# Patient Record
Sex: MALE | Race: WHITE | Employment: FULL TIME | URBAN - METROPOLITAN AREA
[De-identification: names, ages, dates, MRNs, and addresses within clinical notes are randomized per-mention and may not be internally consistent; named-entity substitution may affect disease eponyms.]

---

## 2017-11-02 ENCOUNTER — HOSPITAL ENCOUNTER (EMERGENCY)
Age: 52
Discharge: HOME | End: 2017-11-02
Attending: EMERGENCY MEDICINE
Payer: COMMERCIAL

## 2017-11-02 ENCOUNTER — APPOINTMENT (OUTPATIENT)
Dept: CT IMAGING | Age: 52
End: 2017-11-02
Payer: COMMERCIAL

## 2017-11-02 VITALS
RESPIRATION RATE: 16 BRPM | DIASTOLIC BLOOD PRESSURE: 93 MMHG | HEART RATE: 85 BPM | OXYGEN SATURATION: 97 % | SYSTOLIC BLOOD PRESSURE: 131 MMHG | HEIGHT: 73 IN | BODY MASS INDEX: 35.25 KG/M2 | TEMPERATURE: 98.1 F | WEIGHT: 266 LBS

## 2017-11-02 DIAGNOSIS — R19.7 NAUSEA VOMITING AND DIARRHEA: Primary | ICD-10-CM

## 2017-11-02 DIAGNOSIS — R11.2 NAUSEA VOMITING AND DIARRHEA: Primary | ICD-10-CM

## 2017-11-02 LAB
ABSOLUTE EOS #: 0.1 K/UL (ref 0–0.4)
ABSOLUTE IMMATURE GRANULOCYTE: ABNORMAL K/UL (ref 0–0.3)
ABSOLUTE LYMPH #: 0.8 K/UL (ref 1–4.8)
ABSOLUTE MONO #: 0.7 K/UL (ref 0.2–0.8)
ALBUMIN SERPL-MCNC: 4.1 G/DL (ref 3.5–5.2)
ALBUMIN/GLOBULIN RATIO: ABNORMAL (ref 1–2.5)
ALP BLD-CCNC: 61 U/L (ref 40–129)
ALT SERPL-CCNC: 19 U/L (ref 5–41)
AMYLASE: 29 U/L (ref 28–100)
ANION GAP SERPL CALCULATED.3IONS-SCNC: 12 MMOL/L (ref 9–17)
AST SERPL-CCNC: 15 U/L
BASOPHILS # BLD: 1 %
BASOPHILS ABSOLUTE: 0 K/UL (ref 0–0.2)
BILIRUB SERPL-MCNC: 1.47 MG/DL (ref 0.3–1.2)
BUN BLDV-MCNC: 16 MG/DL (ref 6–20)
BUN/CREAT BLD: 17 (ref 9–20)
CALCIUM SERPL-MCNC: 8.6 MG/DL (ref 8.6–10.4)
CHLORIDE BLD-SCNC: 103 MMOL/L (ref 98–107)
CO2: 22 MMOL/L (ref 20–31)
CREAT SERPL-MCNC: 0.95 MG/DL (ref 0.7–1.2)
DIFFERENTIAL TYPE: ABNORMAL
EKG ATRIAL RATE: 85 BPM
EKG P AXIS: 47 DEGREES
EKG P-R INTERVAL: 194 MS
EKG Q-T INTERVAL: 360 MS
EKG QRS DURATION: 88 MS
EKG QTC CALCULATION (BAZETT): 428 MS
EKG R AXIS: 64 DEGREES
EKG T AXIS: 72 DEGREES
EKG VENTRICULAR RATE: 85 BPM
EOSINOPHILS RELATIVE PERCENT: 1 %
GFR AFRICAN AMERICAN: >60 ML/MIN
GFR NON-AFRICAN AMERICAN: >60 ML/MIN
GFR SERPL CREATININE-BSD FRML MDRD: ABNORMAL ML/MIN/{1.73_M2}
GFR SERPL CREATININE-BSD FRML MDRD: ABNORMAL ML/MIN/{1.73_M2}
GLUCOSE BLD-MCNC: 139 MG/DL (ref 70–99)
HCT VFR BLD CALC: 31.2 % (ref 41–53)
HEMOGLOBIN: 10.9 G/DL (ref 13.5–17.5)
IMMATURE GRANULOCYTES: ABNORMAL %
LACTIC ACID: 0.7 MMOL/L (ref 0.5–2.2)
LIPASE: 35 U/L (ref 13–60)
LYMPHOCYTES # BLD: 10 %
MCH RBC QN AUTO: 28.1 PG (ref 26–34)
MCHC RBC AUTO-ENTMCNC: 34.8 G/DL (ref 31–37)
MCV RBC AUTO: 80.8 FL (ref 80–100)
MONOCYTES # BLD: 9 %
PDW BLD-RTO: 17.6 % (ref 11.5–14.5)
PLATELET # BLD: 124 K/UL (ref 130–400)
PLATELET ESTIMATE: ABNORMAL
PMV BLD AUTO: ABNORMAL FL (ref 6–12)
POTASSIUM SERPL-SCNC: 3.8 MMOL/L (ref 3.7–5.3)
RBC # BLD: 3.86 M/UL (ref 4.5–5.9)
RBC # BLD: ABNORMAL 10*6/UL
SEG NEUTROPHILS: 79 %
SEGMENTED NEUTROPHILS ABSOLUTE COUNT: 6.4 K/UL (ref 1.8–7.7)
SODIUM BLD-SCNC: 137 MMOL/L (ref 135–144)
TOTAL PROTEIN: 7 G/DL (ref 6.4–8.3)
WBC # BLD: 8 K/UL (ref 3.5–11)
WBC # BLD: ABNORMAL 10*3/UL

## 2017-11-02 PROCEDURE — 96375 TX/PRO/DX INJ NEW DRUG ADDON: CPT

## 2017-11-02 PROCEDURE — 96361 HYDRATE IV INFUSION ADD-ON: CPT

## 2017-11-02 PROCEDURE — 2580000003 HC RX 258: Performed by: EMERGENCY MEDICINE

## 2017-11-02 PROCEDURE — 80053 COMPREHEN METABOLIC PANEL: CPT

## 2017-11-02 PROCEDURE — 82150 ASSAY OF AMYLASE: CPT

## 2017-11-02 PROCEDURE — 83605 ASSAY OF LACTIC ACID: CPT

## 2017-11-02 PROCEDURE — 6360000002 HC RX W HCPCS: Performed by: EMERGENCY MEDICINE

## 2017-11-02 PROCEDURE — 85025 COMPLETE CBC W/AUTO DIFF WBC: CPT

## 2017-11-02 PROCEDURE — 93005 ELECTROCARDIOGRAM TRACING: CPT

## 2017-11-02 PROCEDURE — 96374 THER/PROPH/DIAG INJ IV PUSH: CPT

## 2017-11-02 PROCEDURE — 6360000004 HC RX CONTRAST MEDICATION: Performed by: EMERGENCY MEDICINE

## 2017-11-02 PROCEDURE — 99284 EMERGENCY DEPT VISIT MOD MDM: CPT

## 2017-11-02 PROCEDURE — 96376 TX/PRO/DX INJ SAME DRUG ADON: CPT

## 2017-11-02 PROCEDURE — 83690 ASSAY OF LIPASE: CPT

## 2017-11-02 PROCEDURE — 74177 CT ABD & PELVIS W/CONTRAST: CPT

## 2017-11-02 RX ORDER — SODIUM CHLORIDE 0.9 % (FLUSH) 0.9 %
10 SYRINGE (ML) INJECTION PRN
Status: DISCONTINUED | OUTPATIENT
Start: 2017-11-02 | End: 2017-11-02 | Stop reason: HOSPADM

## 2017-11-02 RX ORDER — TRAMADOL HYDROCHLORIDE 50 MG/1
50 TABLET ORAL EVERY 6 HOURS PRN
Qty: 10 TABLET | Refills: 0 | Status: SHIPPED | OUTPATIENT
Start: 2017-11-02 | End: 2017-11-12

## 2017-11-02 RX ORDER — 0.9 % SODIUM CHLORIDE 0.9 %
1000 INTRAVENOUS SOLUTION INTRAVENOUS ONCE
Status: COMPLETED | OUTPATIENT
Start: 2017-11-02 | End: 2017-11-02

## 2017-11-02 RX ORDER — ONDANSETRON 2 MG/ML
4 INJECTION INTRAMUSCULAR; INTRAVENOUS EVERY 30 MIN PRN
Status: DISCONTINUED | OUTPATIENT
Start: 2017-11-02 | End: 2017-11-02 | Stop reason: HOSPADM

## 2017-11-02 RX ORDER — LORAZEPAM 2 MG/ML
1 INJECTION INTRAMUSCULAR ONCE
Status: COMPLETED | OUTPATIENT
Start: 2017-11-02 | End: 2017-11-02

## 2017-11-02 RX ORDER — ONDANSETRON 4 MG/1
8 TABLET, ORALLY DISINTEGRATING ORAL EVERY 8 HOURS PRN
Qty: 10 TABLET | Refills: 0 | Status: ON HOLD | OUTPATIENT
Start: 2017-11-02 | End: 2018-02-01 | Stop reason: CLARIF

## 2017-11-02 RX ORDER — 0.9 % SODIUM CHLORIDE 0.9 %
50 INTRAVENOUS SOLUTION INTRAVENOUS ONCE
Status: COMPLETED | OUTPATIENT
Start: 2017-11-02 | End: 2017-11-02

## 2017-11-02 RX ADMIN — SODIUM CHLORIDE 50 ML: 9 INJECTION, SOLUTION INTRAVENOUS at 19:53

## 2017-11-02 RX ADMIN — ONDANSETRON 4 MG: 2 INJECTION INTRAMUSCULAR; INTRAVENOUS at 19:07

## 2017-11-02 RX ADMIN — LORAZEPAM 1 MG: 2 INJECTION INTRAMUSCULAR; INTRAVENOUS at 19:08

## 2017-11-02 RX ADMIN — HYDROMORPHONE HYDROCHLORIDE 1 MG: 1 INJECTION, SOLUTION INTRAMUSCULAR; INTRAVENOUS; SUBCUTANEOUS at 19:10

## 2017-11-02 RX ADMIN — Medication 10 ML: at 19:52

## 2017-11-02 RX ADMIN — SODIUM CHLORIDE 1000 ML: 9 INJECTION, SOLUTION INTRAVENOUS at 19:07

## 2017-11-02 RX ADMIN — IOPAMIDOL 125 ML: 755 INJECTION, SOLUTION INTRAVENOUS at 19:52

## 2017-11-02 RX ADMIN — HYDROMORPHONE HYDROCHLORIDE 1 MG: 1 INJECTION, SOLUTION INTRAMUSCULAR; INTRAVENOUS; SUBCUTANEOUS at 20:36

## 2017-11-02 ASSESSMENT — PAIN DESCRIPTION - DESCRIPTORS: DESCRIPTORS: SHARP;THROBBING

## 2017-11-02 ASSESSMENT — PAIN SCALES - GENERAL
PAINLEVEL_OUTOF10: 10
PAINLEVEL_OUTOF10: 10
PAINLEVEL_OUTOF10: 8
PAINLEVEL_OUTOF10: 5

## 2017-11-02 ASSESSMENT — PAIN DESCRIPTION - LOCATION: LOCATION: ABDOMEN

## 2017-11-02 ASSESSMENT — ENCOUNTER SYMPTOMS
EYES NEGATIVE: 1
RESPIRATORY NEGATIVE: 1
ABDOMINAL PAIN: 1
ABDOMINAL DISTENTION: 1

## 2017-11-02 ASSESSMENT — PAIN DESCRIPTION - ONSET: ONSET: GRADUAL

## 2017-11-02 ASSESSMENT — PAIN DESCRIPTION - FREQUENCY: FREQUENCY: CONTINUOUS

## 2017-11-02 ASSESSMENT — PAIN DESCRIPTION - ORIENTATION: ORIENTATION: MID;UPPER;RIGHT

## 2017-11-02 NOTE — ED PROVIDER NOTES
[address] [place] [address] ED  eMERGENCY dEPARTMENT eNCOUnter      Pt Name: Georgie Canela  MRN: [id number]  [place] 1965  Date of evaluation: 11/2/2017  Provider: Rojelio Matias MD    [address] [place] [address]       Chief Complaint   Patient presents with    Nausea    Diarrhea    Abdominal Pain         HISTORY OF PRESENT ILLNESS  (Location/Symptom, Timing/Onset, Context/Setting, Quality, Duration, Modifying Factors, Severity.)   Georgie Canela is a 46 y.o. male who presents to the emergency department With abdominal pain states his abdomen is distended and feels that he probably has a bowel obstruction again states that he's had these in the past from prior trauma as admitted here about a year ago for the same and seen by Dr. Digna Allen. At that time he did not require surgery he complains now of nausea and vomiting and some loose stool she is passing gas. But less than he normally does but she is abnormal  Nursing Notes were reviewed. ALLERGIES     Benadryl [diphenhydramine] and Pcn [penicillins]    CURRENT MEDICATIONS       Previous Medications    ALLOPURINOL (ZYLOPRIM) 100 MG TABLET    Take 1 tablet by mouth daily. ASPIRIN 81 MG TABLET    Take 81 mg by mouth daily. DAPAGLIFLOZIN (FARXIGA) 10 MG TABLET    Take 10 mg by mouth every morning    DICYCLOMINE (BENTYL) 10 MG CAPSULE    Take 1 capsule by mouth every 6 hours as needed (cramps)    DULAGLUTIDE (TRULICITY SC)    Inject 0.75 mg into the skin once a week     LISINOPRIL (PRINIVIL;ZESTRIL) 10 MG TABLET    Take 10 mg by mouth daily. METFORMIN (GLUCOPHAGE) 1000 MG TABLET    Take 1 tablet by mouth daily (with breakfast). Resume on Sunday pm    METOPROLOL (LOPRESSOR) 25 MG TABLET    Take 25 mg by mouth 2 times daily. OMEPRAZOLE (PRILOSEC) 20 MG CAPSULE    Take 20 mg by mouth daily. PRAVASTATIN (PRAVACHOL) 10 MG TABLET    Take 1 tablet by mouth daily. Take dose that you have at home    SITAGLIPTAN (JANUVIA) 100 MG TABLET    Take 100 mg by mouth daily. SOTALOL (BETAPACE) 120 MG TABLET    Take 120 mg by mouth 2 times daily. TAMSULOSIN (FLOMAX) 0.4 MG CAPSULE    Take 1 capsule by mouth daily       PAST MEDICAL HISTORY         Diagnosis Date    ADHD, predominantly hyperactive type     Anemia     Blood clot due to device, implant, or graft     IV site L forearm    Bowel obstruction     CAD (coronary artery disease)     Cardiomyopathy ([facility]     Diabetes mellitus ([facility]     Diverticulitis 1996    GERD (gastroesophageal reflux disease)     History of atrial fibrillation     History of ventricular tachycardia     Hypertension     Kidney calculi     Obesity     Sleep apnea     uses CPAP nightly       SURGICAL HISTORY           Procedure Laterality Date    ABDOMEN SURGERY  6113 & 1305    umbillical hernia with mesh and lysis of adhesions    ABDOMINAL HERNIA REPAIR  7/7/2014    RECURRENT HERNIA REPAIR WITH MESH    APPENDECTOMY      CARDIAC DEFIBRILLATOR PLACEMENT      CHOLECYSTECTOMY      COLECTOMY  1996    d/t ruptured diverticuli    CYSTOSCOPY N/A     stent in place taped to penis head    CYSTOSCOPY  3/4/16    with bilateral pyelogram    HERNIA REPAIR      umbilical    LITHOTRIPSY  3/4/16    laser lithotripsy with ureteral dilation    PACEMAKER PLACEMENT      pacemaker/defib         FAMILY HISTORY     History reviewed. No pertinent family history. No family status information on file. SOCIAL HISTORY      reports that he quit smoking about 5 years ago. He has a 2.00 pack-year smoking history. He has never used smokeless tobacco. He reports that he drinks alcohol. He reports that he does not use drugs. REVIEW OF SYSTEMS    (2-9 systems for level 4, 10 or more for level 5)     Review of Systems   Constitutional: Negative. HENT: Negative. Eyes: Negative. Respiratory: Negative. Cardiovascular: Negative. Gastrointestinal: Positive for abdominal distention and abdominal pain. See the HPI   Genitourinary: Negative. within normal limits   COMPREHENSIVE METABOLIC PANEL - Abnormal; Notable for the following:     Glucose 139 (*)     Total Bilirubin 1.47 (*)     All other components within normal limits   AMYLASE   LIPASE   LACTIC ACID       All other labs were within normal range or not returned as of this dictation. EMERGENCY DEPARTMENT COURSE and DIFFERENTIAL DIAGNOSIS/MDM:   Vitals:    Vitals:    11/02/17 1845   BP: (!) 131/93   Pulse: 85   Resp: 16   Temp: 98.1 °F (36.7 °C)   SpO2: 97%   Weight: 266 lb (120.7 kg)   Height: 6' 1\" (1.854 m)   Patient states he feels slightly better. That he uses her several signs of obstruction he will return if the symptoms worsen we'll give him the night off work    CONSULTS:  None    PROCEDURES:  Procedures    FINAL IMPRESSION      1.  Nausea vomiting and diarrhea          DISPOSITION/PLAN   DISPOSITION     PATIENT REFERRED TO:   Aubrey Porter MD  [address] [address] [phone]    In 1 day      [facility] ED  [address] [facility]  [phone]    If symptoms worsen      DISCHARGE MEDICATIONS:     New Prescriptions    ONDANSETRON (ZOFRAN ODT) 4 MG DISINTEGRATING TABLET    Take 2 tablets by mouth every 8 hours as needed for Nausea or Vomiting    TRAMADOL (ULTRAM) 50 MG TABLET    Take 1 tablet by mouth every 6 hours as needed for Pain           (Please note that portions of this note were completed with a voice recognition program.  Efforts were made to edit the dictations but occasionally words are mis-transcribed.)    Braulio Coker MD  Attending Emergency Physician          Braulio Coker MD  11/02/17 8056

## 2018-01-31 ENCOUNTER — APPOINTMENT (OUTPATIENT)
Dept: GENERAL RADIOLOGY | Age: 53
DRG: 389 | End: 2018-01-31
Payer: COMMERCIAL

## 2018-01-31 ENCOUNTER — HOSPITAL ENCOUNTER (INPATIENT)
Age: 53
LOS: 2 days | Discharge: HOME | DRG: 389 | End: 2018-02-03
Attending: EMERGENCY MEDICINE | Admitting: INTERNAL MEDICINE
Payer: COMMERCIAL

## 2018-01-31 DIAGNOSIS — K56.609 INTESTINAL OBSTRUCTION, UNSPECIFIED CAUSE, UNSPECIFIED WHETHER PARTIAL OR COMPLETE (HCC): Primary | ICD-10-CM

## 2018-01-31 LAB
ABSOLUTE EOS #: 0.2 K/UL (ref 0–0.4)
ABSOLUTE IMMATURE GRANULOCYTE: ABNORMAL K/UL (ref 0–0.3)
ABSOLUTE LYMPH #: 1.1 K/UL (ref 1–4.8)
ABSOLUTE MONO #: 0.6 K/UL (ref 0.2–0.8)
ALBUMIN SERPL-MCNC: 4.4 G/DL (ref 3.5–5.2)
ALBUMIN/GLOBULIN RATIO: NORMAL (ref 1–2.5)
ALP BLD-CCNC: 67 U/L (ref 40–129)
ALT SERPL-CCNC: 23 U/L (ref 5–41)
ANION GAP SERPL CALCULATED.3IONS-SCNC: 11 MMOL/L (ref 9–17)
AST SERPL-CCNC: 18 U/L
BASOPHILS # BLD: 0 % (ref 0–2)
BASOPHILS ABSOLUTE: 0 K/UL (ref 0–0.2)
BILIRUB SERPL-MCNC: 1.07 MG/DL (ref 0.3–1.2)
BILIRUBIN DIRECT: 0.23 MG/DL
BILIRUBIN, INDIRECT: 0.84 MG/DL (ref 0–1)
BUN BLDV-MCNC: 16 MG/DL (ref 6–20)
BUN/CREAT BLD: 16 (ref 9–20)
CALCIUM SERPL-MCNC: 8.8 MG/DL (ref 8.6–10.4)
CHLORIDE BLD-SCNC: 101 MMOL/L (ref 98–107)
CO2: 23 MMOL/L (ref 20–31)
CREAT SERPL-MCNC: 0.98 MG/DL (ref 0.7–1.2)
DIFFERENTIAL TYPE: ABNORMAL
EOSINOPHILS RELATIVE PERCENT: 2 % (ref 1–4)
GFR AFRICAN AMERICAN: >60 ML/MIN
GFR NON-AFRICAN AMERICAN: >60 ML/MIN
GFR SERPL CREATININE-BSD FRML MDRD: ABNORMAL ML/MIN/{1.73_M2}
GFR SERPL CREATININE-BSD FRML MDRD: ABNORMAL ML/MIN/{1.73_M2}
GLOBULIN: NORMAL G/DL (ref 1.5–3.8)
GLUCOSE BLD-MCNC: 118 MG/DL (ref 70–99)
HCT VFR BLD CALC: 42.4 % (ref 41–53)
HEMOGLOBIN: 14.2 G/DL (ref 13.5–17.5)
IMMATURE GRANULOCYTES: ABNORMAL %
LIPASE: 28 U/L (ref 13–60)
LYMPHOCYTES # BLD: 15 % (ref 24–44)
MCH RBC QN AUTO: 28.1 PG (ref 26–34)
MCHC RBC AUTO-ENTMCNC: 33.6 G/DL (ref 31–37)
MCV RBC AUTO: 83.7 FL (ref 80–100)
MONOCYTES # BLD: 7 % (ref 1–7)
NRBC AUTOMATED: ABNORMAL PER 100 WBC
PDW BLD-RTO: 18.5 % (ref 11.5–14.5)
PLATELET # BLD: 158 K/UL (ref 130–400)
PLATELET ESTIMATE: ABNORMAL
PMV BLD AUTO: ABNORMAL FL (ref 6–12)
POTASSIUM SERPL-SCNC: 3.9 MMOL/L (ref 3.7–5.3)
RBC # BLD: 5.06 M/UL (ref 4.5–5.9)
RBC # BLD: ABNORMAL 10*6/UL
SEG NEUTROPHILS: 76 % (ref 36–66)
SEGMENTED NEUTROPHILS ABSOLUTE COUNT: 6 K/UL (ref 1.8–7.7)
SODIUM BLD-SCNC: 135 MMOL/L (ref 135–144)
TOTAL PROTEIN: 7.1 G/DL (ref 6.4–8.3)
WBC # BLD: 7.9 K/UL (ref 3.5–11)
WBC # BLD: ABNORMAL 10*3/UL

## 2018-01-31 PROCEDURE — 83690 ASSAY OF LIPASE: CPT

## 2018-01-31 PROCEDURE — 74022 RADEX COMPL AQT ABD SERIES: CPT

## 2018-01-31 PROCEDURE — 80076 HEPATIC FUNCTION PANEL: CPT

## 2018-01-31 PROCEDURE — 80048 BASIC METABOLIC PNL TOTAL CA: CPT

## 2018-01-31 PROCEDURE — 85025 COMPLETE CBC W/AUTO DIFF WBC: CPT

## 2018-01-31 PROCEDURE — 96375 TX/PRO/DX INJ NEW DRUG ADDON: CPT

## 2018-01-31 PROCEDURE — 99285 EMERGENCY DEPT VISIT HI MDM: CPT

## 2018-01-31 PROCEDURE — 96374 THER/PROPH/DIAG INJ IV PUSH: CPT

## 2018-01-31 PROCEDURE — 6360000002 HC RX W HCPCS: Performed by: NURSE PRACTITIONER

## 2018-01-31 PROCEDURE — 2580000003 HC RX 258: Performed by: NURSE PRACTITIONER

## 2018-01-31 RX ORDER — ONDANSETRON 2 MG/ML
4 INJECTION INTRAMUSCULAR; INTRAVENOUS ONCE
Status: COMPLETED | OUTPATIENT
Start: 2018-01-31 | End: 2018-01-31

## 2018-01-31 RX ORDER — MORPHINE SULFATE 2 MG/ML
2 INJECTION, SOLUTION INTRAMUSCULAR; INTRAVENOUS ONCE
Status: COMPLETED | OUTPATIENT
Start: 2018-01-31 | End: 2018-01-31

## 2018-01-31 RX ORDER — 0.9 % SODIUM CHLORIDE 0.9 %
1000 INTRAVENOUS SOLUTION INTRAVENOUS ONCE
Status: COMPLETED | OUTPATIENT
Start: 2018-01-31 | End: 2018-01-31

## 2018-01-31 RX ADMIN — ONDANSETRON 4 MG: 2 INJECTION INTRAMUSCULAR; INTRAVENOUS at 21:55

## 2018-01-31 RX ADMIN — MORPHINE SULFATE 2 MG: 2 INJECTION, SOLUTION INTRAMUSCULAR; INTRAVENOUS at 21:55

## 2018-01-31 RX ADMIN — SODIUM CHLORIDE 1000 ML: 9 INJECTION, SOLUTION INTRAVENOUS at 21:55

## 2018-01-31 ASSESSMENT — ENCOUNTER SYMPTOMS
COUGH: 0
SHORTNESS OF BREATH: 0
ABDOMINAL PAIN: 1
DIARRHEA: 1
NAUSEA: 1
BLOOD IN STOOL: 0

## 2018-01-31 ASSESSMENT — PAIN DESCRIPTION - FREQUENCY: FREQUENCY: CONTINUOUS

## 2018-01-31 ASSESSMENT — PAIN SCALES - GENERAL: PAINLEVEL_OUTOF10: 10

## 2018-01-31 ASSESSMENT — PAIN SCALES - WONG BAKER: WONGBAKER_NUMERICALRESPONSE: 10

## 2018-01-31 ASSESSMENT — PAIN DESCRIPTION - DESCRIPTORS: DESCRIPTORS: CRAMPING

## 2018-01-31 ASSESSMENT — PAIN DESCRIPTION - LOCATION: LOCATION: ABDOMEN

## 2018-02-01 PROBLEM — I42.8 NONISCHEMIC CARDIOMYOPATHY (HCC): Status: ACTIVE | Noted: 2018-02-01

## 2018-02-01 PROBLEM — K56.609 SBO (SMALL BOWEL OBSTRUCTION) (HCC): Status: ACTIVE | Noted: 2018-02-01

## 2018-02-01 LAB
-: ABNORMAL
AMORPHOUS: ABNORMAL
BACTERIA: ABNORMAL
BILIRUBIN URINE: NEGATIVE
CASTS UA: ABNORMAL /LPF
COLOR: YELLOW
COMMENT UA: ABNORMAL
CRYSTALS, UA: ABNORMAL /HPF
EPITHELIAL CELLS UA: ABNORMAL /HPF (ref 0–5)
ESTIMATED AVERAGE GLUCOSE: 100 MG/DL
GLUCOSE BLD-MCNC: 115 MG/DL (ref 75–110)
GLUCOSE BLD-MCNC: 87 MG/DL (ref 75–110)
GLUCOSE BLD-MCNC: 88 MG/DL (ref 75–110)
GLUCOSE BLD-MCNC: 97 MG/DL (ref 75–110)
GLUCOSE URINE: ABNORMAL
HBA1C MFR BLD: 5.1 % (ref 4–6)
KETONES, URINE: NEGATIVE
LEUKOCYTE ESTERASE, URINE: NEGATIVE
MUCUS: ABNORMAL
NITRITE, URINE: NEGATIVE
OTHER OBSERVATIONS UA: ABNORMAL
PH UA: 5.5 (ref 5–8)
PROTEIN UA: ABNORMAL
RBC UA: ABNORMAL /HPF (ref 0–2)
RENAL EPITHELIAL, UA: ABNORMAL /HPF
SPECIFIC GRAVITY UA: 1.03 (ref 1–1.03)
TRICHOMONAS: ABNORMAL
TURBIDITY: CLEAR
URINE HGB: NEGATIVE
UROBILINOGEN, URINE: NORMAL
WBC UA: ABNORMAL /HPF (ref 0–5)
YEAST: ABNORMAL

## 2018-02-01 PROCEDURE — 2580000003 HC RX 258: Performed by: SURGERY

## 2018-02-01 PROCEDURE — 87086 URINE CULTURE/COLONY COUNT: CPT

## 2018-02-01 PROCEDURE — 82947 ASSAY GLUCOSE BLOOD QUANT: CPT

## 2018-02-01 PROCEDURE — 2580000003 HC RX 258: Performed by: NURSE PRACTITIONER

## 2018-02-01 PROCEDURE — 99223 1ST HOSP IP/OBS HIGH 75: CPT | Performed by: INTERNAL MEDICINE

## 2018-02-01 PROCEDURE — 1200000000 HC SEMI PRIVATE

## 2018-02-01 PROCEDURE — 6360000002 HC RX W HCPCS: Performed by: NURSE PRACTITIONER

## 2018-02-01 PROCEDURE — 6370000000 HC RX 637 (ALT 250 FOR IP): Performed by: NURSE PRACTITIONER

## 2018-02-01 PROCEDURE — 36415 COLL VENOUS BLD VENIPUNCTURE: CPT

## 2018-02-01 PROCEDURE — 6370000000 HC RX 637 (ALT 250 FOR IP): Performed by: INTERNAL MEDICINE

## 2018-02-01 PROCEDURE — 83036 HEMOGLOBIN GLYCOSYLATED A1C: CPT

## 2018-02-01 PROCEDURE — 81001 URINALYSIS AUTO W/SCOPE: CPT

## 2018-02-01 RX ORDER — DEXTROSE MONOHYDRATE 25 G/50ML
12.5 INJECTION, SOLUTION INTRAVENOUS PRN
Status: DISCONTINUED | OUTPATIENT
Start: 2018-02-01 | End: 2018-02-03 | Stop reason: HOSPADM

## 2018-02-01 RX ORDER — MAGNESIUM SULFATE 1 G/100ML
1 INJECTION INTRAVENOUS PRN
Status: DISCONTINUED | OUTPATIENT
Start: 2018-02-01 | End: 2018-02-03 | Stop reason: HOSPADM

## 2018-02-01 RX ORDER — MORPHINE SULFATE 2 MG/ML
2 INJECTION, SOLUTION INTRAMUSCULAR; INTRAVENOUS ONCE
Status: COMPLETED | OUTPATIENT
Start: 2018-02-01 | End: 2018-02-01

## 2018-02-01 RX ORDER — ASPIRIN 81 MG/1
81 TABLET, CHEWABLE ORAL DAILY
Status: DISCONTINUED | OUTPATIENT
Start: 2018-02-01 | End: 2018-02-03 | Stop reason: HOSPADM

## 2018-02-01 RX ORDER — SODIUM CHLORIDE, SODIUM LACTATE, POTASSIUM CHLORIDE, CALCIUM CHLORIDE 600; 310; 30; 20 MG/100ML; MG/100ML; MG/100ML; MG/100ML
INJECTION, SOLUTION INTRAVENOUS CONTINUOUS
Status: DISCONTINUED | OUTPATIENT
Start: 2018-02-01 | End: 2018-02-03 | Stop reason: HOSPADM

## 2018-02-01 RX ORDER — ONDANSETRON 2 MG/ML
4 INJECTION INTRAMUSCULAR; INTRAVENOUS EVERY 6 HOURS PRN
Status: DISCONTINUED | OUTPATIENT
Start: 2018-02-01 | End: 2018-02-03 | Stop reason: HOSPADM

## 2018-02-01 RX ORDER — POTASSIUM CHLORIDE 7.45 MG/ML
10 INJECTION INTRAVENOUS PRN
Status: DISCONTINUED | OUTPATIENT
Start: 2018-02-01 | End: 2018-02-03 | Stop reason: HOSPADM

## 2018-02-01 RX ORDER — ALLOPURINOL 300 MG/1
300 TABLET ORAL DAILY
Status: DISCONTINUED | OUTPATIENT
Start: 2018-02-01 | End: 2018-02-03 | Stop reason: HOSPADM

## 2018-02-01 RX ORDER — LISINOPRIL 10 MG/1
10 TABLET ORAL DAILY
Status: DISCONTINUED | OUTPATIENT
Start: 2018-02-01 | End: 2018-02-03 | Stop reason: HOSPADM

## 2018-02-01 RX ORDER — SODIUM CHLORIDE 0.9 % (FLUSH) 0.9 %
10 SYRINGE (ML) INJECTION PRN
Status: DISCONTINUED | OUTPATIENT
Start: 2018-02-01 | End: 2018-02-03 | Stop reason: HOSPADM

## 2018-02-01 RX ORDER — SOTALOL HYDROCHLORIDE 80 MG/1
120 TABLET ORAL 2 TIMES DAILY
Status: DISCONTINUED | OUTPATIENT
Start: 2018-02-01 | End: 2018-02-03 | Stop reason: HOSPADM

## 2018-02-01 RX ORDER — DEXTROSE MONOHYDRATE 50 MG/ML
100 INJECTION, SOLUTION INTRAVENOUS PRN
Status: DISCONTINUED | OUTPATIENT
Start: 2018-02-01 | End: 2018-02-03 | Stop reason: HOSPADM

## 2018-02-01 RX ORDER — NICOTINE POLACRILEX 4 MG
15 LOZENGE BUCCAL PRN
Status: DISCONTINUED | OUTPATIENT
Start: 2018-02-01 | End: 2018-02-03 | Stop reason: HOSPADM

## 2018-02-01 RX ORDER — SODIUM CHLORIDE 9 MG/ML
INJECTION, SOLUTION INTRAVENOUS CONTINUOUS
Status: DISCONTINUED | OUTPATIENT
Start: 2018-02-01 | End: 2018-02-01

## 2018-02-01 RX ORDER — SODIUM CHLORIDE 0.9 % (FLUSH) 0.9 %
10 SYRINGE (ML) INJECTION EVERY 12 HOURS SCHEDULED
Status: DISCONTINUED | OUTPATIENT
Start: 2018-02-01 | End: 2018-02-03 | Stop reason: HOSPADM

## 2018-02-01 RX ORDER — TAMSULOSIN HYDROCHLORIDE 0.4 MG/1
0.4 CAPSULE ORAL DAILY
Status: DISCONTINUED | OUTPATIENT
Start: 2018-02-01 | End: 2018-02-03 | Stop reason: HOSPADM

## 2018-02-01 RX ORDER — PANTOPRAZOLE SODIUM 40 MG/1
40 TABLET, DELAYED RELEASE ORAL
Status: DISCONTINUED | OUTPATIENT
Start: 2018-02-01 | End: 2018-02-03 | Stop reason: HOSPADM

## 2018-02-01 RX ADMIN — MORPHINE SULFATE 2 MG: 2 INJECTION, SOLUTION INTRAMUSCULAR; INTRAVENOUS at 00:56

## 2018-02-01 RX ADMIN — METOPROLOL TARTRATE 25 MG: 25 TABLET ORAL at 21:09

## 2018-02-01 RX ADMIN — HYDROMORPHONE HYDROCHLORIDE 0.5 MG: 1 INJECTION, SOLUTION INTRAMUSCULAR; INTRAVENOUS; SUBCUTANEOUS at 08:57

## 2018-02-01 RX ADMIN — ASPIRIN 81 MG 81 MG: 81 TABLET ORAL at 11:16

## 2018-02-01 RX ADMIN — SODIUM CHLORIDE, POTASSIUM CHLORIDE, SODIUM LACTATE AND CALCIUM CHLORIDE: 600; 310; 30; 20 INJECTION, SOLUTION INTRAVENOUS at 21:09

## 2018-02-01 RX ADMIN — SOTALOL HYDROCHLORIDE 120 MG: 80 TABLET ORAL at 21:08

## 2018-02-01 RX ADMIN — ONDANSETRON 4 MG: 2 INJECTION INTRAMUSCULAR; INTRAVENOUS at 18:02

## 2018-02-01 RX ADMIN — TAMSULOSIN HYDROCHLORIDE 0.4 MG: 0.4 CAPSULE ORAL at 11:17

## 2018-02-01 RX ADMIN — SODIUM CHLORIDE, POTASSIUM CHLORIDE, SODIUM LACTATE AND CALCIUM CHLORIDE 1000 ML: 600; 310; 30; 20 INJECTION, SOLUTION INTRAVENOUS at 12:48

## 2018-02-01 RX ADMIN — ENOXAPARIN SODIUM 40 MG: 40 INJECTION SUBCUTANEOUS at 08:58

## 2018-02-01 RX ADMIN — HYDROMORPHONE HYDROCHLORIDE 0.5 MG: 1 INJECTION, SOLUTION INTRAMUSCULAR; INTRAVENOUS; SUBCUTANEOUS at 06:16

## 2018-02-01 RX ADMIN — LISINOPRIL 10 MG: 10 TABLET ORAL at 11:16

## 2018-02-01 RX ADMIN — SODIUM CHLORIDE: 9 INJECTION, SOLUTION INTRAVENOUS at 03:22

## 2018-02-01 RX ADMIN — HYDROMORPHONE HYDROCHLORIDE 0.5 MG: 1 INJECTION, SOLUTION INTRAMUSCULAR; INTRAVENOUS; SUBCUTANEOUS at 18:03

## 2018-02-01 RX ADMIN — MORPHINE SULFATE 2 MG: 2 INJECTION, SOLUTION INTRAMUSCULAR; INTRAVENOUS at 03:13

## 2018-02-01 RX ADMIN — PANTOPRAZOLE SODIUM 40 MG: 40 TABLET, DELAYED RELEASE ORAL at 11:17

## 2018-02-01 RX ADMIN — ALLOPURINOL 300 MG: 300 TABLET ORAL at 11:15

## 2018-02-01 RX ADMIN — HYDROMORPHONE HYDROCHLORIDE 0.5 MG: 1 INJECTION, SOLUTION INTRAMUSCULAR; INTRAVENOUS; SUBCUTANEOUS at 15:13

## 2018-02-01 RX ADMIN — SOTALOL HYDROCHLORIDE 120 MG: 80 TABLET ORAL at 13:01

## 2018-02-01 RX ADMIN — HYDROMORPHONE HYDROCHLORIDE 0.5 MG: 1 INJECTION, SOLUTION INTRAMUSCULAR; INTRAVENOUS; SUBCUTANEOUS at 23:58

## 2018-02-01 RX ADMIN — METOPROLOL TARTRATE 25 MG: 25 TABLET ORAL at 11:16

## 2018-02-01 RX ADMIN — HYDROMORPHONE HYDROCHLORIDE 0.5 MG: 1 INJECTION, SOLUTION INTRAMUSCULAR; INTRAVENOUS; SUBCUTANEOUS at 21:09

## 2018-02-01 RX ADMIN — HYDROMORPHONE HYDROCHLORIDE 0.5 MG: 1 INJECTION, SOLUTION INTRAMUSCULAR; INTRAVENOUS; SUBCUTANEOUS at 11:48

## 2018-02-01 ASSESSMENT — PAIN SCALES - GENERAL
PAINLEVEL_OUTOF10: 10
PAINLEVEL_OUTOF10: 7
PAINLEVEL_OUTOF10: 8
PAINLEVEL_OUTOF10: 10
PAINLEVEL_OUTOF10: 5
PAINLEVEL_OUTOF10: 7
PAINLEVEL_OUTOF10: 7
PAINLEVEL_OUTOF10: 6
PAINLEVEL_OUTOF10: 10
PAINLEVEL_OUTOF10: 7
PAINLEVEL_OUTOF10: 8
PAINLEVEL_OUTOF10: 8
PAINLEVEL_OUTOF10: 10
PAINLEVEL_OUTOF10: 8
PAINLEVEL_OUTOF10: 7
PAINLEVEL_OUTOF10: 4
PAINLEVEL_OUTOF10: 7

## 2018-02-01 ASSESSMENT — PAIN DESCRIPTION - LOCATION
LOCATION: ABDOMEN

## 2018-02-01 ASSESSMENT — PAIN DESCRIPTION - ORIENTATION
ORIENTATION: RIGHT;UPPER

## 2018-02-01 ASSESSMENT — PAIN DESCRIPTION - PROGRESSION: CLINICAL_PROGRESSION: GRADUALLY IMPROVING

## 2018-02-01 ASSESSMENT — PAIN DESCRIPTION - FREQUENCY
FREQUENCY: CONTINUOUS

## 2018-02-01 ASSESSMENT — PAIN DESCRIPTION - PAIN TYPE
TYPE: ACUTE PAIN

## 2018-02-01 ASSESSMENT — PAIN DESCRIPTION - ONSET
ONSET: ON-GOING

## 2018-02-01 ASSESSMENT — PAIN DESCRIPTION - DESCRIPTORS
DESCRIPTORS: CRAMPING

## 2018-02-01 NOTE — ED PROVIDER NOTES
Attending Supervisory Note/Shared Visit   I have personally performed a face to face diagnostic evaluation on this patient. I have reviewed the mid-levels findings and agree. Patient presents with abdominal distention, pain and nausea and his workup reveals bowel obstruction. A nasogastric tube has been inserted for gastric decompression. Patient is admitted to the hospitalist service and his surgeon Dr. Rolanda Contreras has also been consulted.       (Please note that portions of this note were completed with a voice recognition program.  Efforts were made to edit the dictations but occasionally words are mis-transcribed.)    Gianna Mccarty MD  Attending Emergency Physician        Gianna Mccarty MD  02/01/18 [id number]

## 2018-02-01 NOTE — CONSULTS
REPAIR      umbilical    LITHOTRIPSY  3/4/16    laser lithotripsy with ureteral dilation    PACEMAKER PLACEMENT      pacemaker/defib        Medications Prior to Admission:     Prior to Admission medications    Medication Sig Start Date End Date Taking? Authorizing Provider   tamsulosin (FLOMAX) 0.4 MG capsule Take 1 capsule by mouth daily 3/4/16  Yes Lamberto Gaxiola MD   dapagliflozin (FARXIGA) 10 MG tablet Take 10 mg by mouth every morning   Yes Historical Provider, MD   metoprolol (LOPRESSOR) 25 MG tablet Take 25 mg by mouth 2 times daily. Yes Historical Provider, MD   allopurinol (ZYLOPRIM) 100 MG tablet Take 1 tablet by mouth daily. Patient taking differently: Take 300 mg by mouth daily  7/21/14  Yes Alberta Edmonds MD   sitaGLIPtan (JANUVIA) 100 MG tablet Take 100 mg by mouth daily. Yes Historical Provider, MD   omeprazole (PRILOSEC) 20 MG capsule Take 20 mg by mouth daily. Yes Historical Provider, MD   aspirin 81 MG tablet Take 81 mg by mouth daily. Yes Historical Provider, MD   sotalol (BETAPACE) 120 MG tablet Take 120 mg by mouth 2 times daily. Yes Historical Provider, MD   lisinopril (PRINIVIL;ZESTRIL) 10 MG tablet Take 10 mg by mouth daily. Yes Historical Provider, MD   ondansetron (ZOFRAN ODT) 4 MG disintegrating tablet Take 2 tablets by mouth every 8 hours as needed for Nausea or Vomiting 11/2/17   Julia Clements MD   dicyclomine (BENTYL) 10 MG capsule Take 1 capsule by mouth every 6 hours as needed (cramps) 8/3/16   Rojas Paredes, NP   Dulaglutide (TRULICITY SC) Inject 7.35 mg into the skin once a week     Historical Provider, MD   pravastatin (PRAVACHOL) 10 MG tablet Take 1 tablet by mouth daily. Take dose that you have at home  Patient taking differently: Take 10 mg by mouth every other day Take dose that you have at home 7/21/14   Alberta Edmonds MD   metFORMIN (GLUCOPHAGE) 1000 MG tablet Take 1 tablet by mouth daily (with breakfast).  Resume on Sunday pm  Patient taking movements intact, sclera anicteric  Nose - within normal limits, NG tube in place. Mouth -he has his own teeth. No erythema. Neck - supple, no significant adenopathy, thyroid exam: thyroid is normal in size without nodules or tenderness  Lymphatics - no palpable lymphadenopathy  Chest - clear to auscultation, no wheezes, rales or rhonchi, symmetric air entry  Heart - normal rate, regular rhythm, normal S1, S2, no murmurs, rubs, clicks or gallops, no JVD  Abdomen - abdomen is soft but slightly distended. He is quite obese. There is a long midline scar. No evidence of recurrent hernia can be palpated within the scar. Abdomen is soft without peritoneal signs but he is somewhat tender to palpation in the epigastrium just to the right of the midline. Bowel sounds are present and are normal pitched although somewhat active. They could not be characterized as hyperactive. I cannot palpate any intra-abdominal masses. No costal vertebral angle tenderness is present. Rectal exam was not performed. Rectal: Not performed.   Back exam - not examined  Neurological - alert, oriented, normal speech, no focal findings or movement disorder noted  Musculoskeletal - no joint tenderness, deformity or swelling  Extremities - peripheral pulses normal, no pedal edema, no clubbing or cyanosis  Skin - normal coloration and turgor, no rashes, no suspicious skin lesions noted           DATA:    Hematology:  Recent Labs      01/31/18   2200   WBC  7.9   RBC  5.06   HGB  14.2   HCT  42.4   MCV  83.7   MCH  28.1   MCHC  33.6   RDW  18.5*   PLT  158   MPV  NOT REPORTED     Chemistry:  Recent Labs      01/31/18   2200   NA  135   K  3.9   CL  101   CO2  23   GLUCOSE  118*   BUN  16   CREATININE  0.98   ANIONGAP  11   LABGLOM  >60   GFRAA  >60   CALCIUM  8.8     Recent Labs      01/31/18   2200  02/01/18   0327  02/01/18   1102   PROT  7.1   --    --    LABALBU  4.4   --    --    AST  18   --    --    ALT  23   --    --    ALKPHOS  67 --    --    BILITOT  1.07   --    --    BILIDIR  0.23   --    --    LIPASE  28   --    --    POCGLU   --   115*  88     Imaging:   Narrative   EXAMINATION:   ACUTE ABDOMINAL SERIES WITH SINGLE  VIEW OF THE CHEST       1/31/2018 10:15 pm       COMPARISON:   None.       HISTORY:   ORDERING SYSTEM PROVIDED HISTORY: pain, h/o obstruction   TECHNOLOGIST PROVIDED HISTORY:   Reason for exam:->pain, h/o obstruction   Ordering Physician Provided Reason for Exam: centralized belly pain   Relevant Medical/Surgical History: several bowel surgeries and several SBO       FINDINGS:   Lungs are clear without acute process. No pleural effusion or pneumothorax. No focal consolidation or edema. Cardiomediastinal silhouette and bony thorax   are without acute abnormality.       No evidence of intraperitoneal free air.  Dilated small bowel loops. Osseous   structures are intact.           Impression   No acute process in the chest.       Dilated small bowel loops-suspicious for small bowel obstruction or less   likely ileus. I personally reviewed above images as well as CT from 11/2017. ASSESSMENT:  The patient has either partial small bowel obstruction or ileus. He has had numerous similar episodes in the past.  These were not entirely eliminated by repair of his hernia but have been far less numerous since his hernia was repaired. Likely there is some degree of functional problem and some relationship to intra-abdominal adhesions. There are no current peritoneal findings or signs of a surgical emergency. Given his past history of a chance this will resolve with nonoperative means is likely quite good. A has not had a UA and in light of his past history he should have a UA performed despite absence of genitourinary symptoms.       Active Hospital Problems    Diagnosis Date Noted    Nonischemic cardiomyopathy [facility]) [I42.8] 02/01/2018    Small bowel obstruction due to adhesions [K56.50] 07/22/2016    Diabetes mellitus

## 2018-02-01 NOTE — PROGRESS NOTES
Pt admitted to room. Oriented to room, call light and bed mechanics. Side rails up x2. Call light within reach. Orders reviewed.

## 2018-02-01 NOTE — H&P
(FARXIGA) 10 MG tablet Take 10 mg by mouth every morning      metoprolol (LOPRESSOR) 25 MG tablet Take 25 mg by mouth 2 times daily.  allopurinol (ZYLOPRIM) 100 MG tablet Take 1 tablet by mouth daily. (Patient taking differently: Take 300 mg by mouth daily ) 30 tablet 3    sitaGLIPtan (JANUVIA) 100 MG tablet Take 100 mg by mouth daily.  omeprazole (PRILOSEC) 20 MG capsule Take 20 mg by mouth daily.  aspirin 81 MG tablet Take 81 mg by mouth daily.  sotalol (BETAPACE) 120 MG tablet Take 120 mg by mouth 2 times daily.  lisinopril (PRINIVIL;ZESTRIL) 10 MG tablet Take 10 mg by mouth daily.  ondansetron (ZOFRAN ODT) 4 MG disintegrating tablet Take 2 tablets by mouth every 8 hours as needed for Nausea or Vomiting 10 tablet 0    dicyclomine (BENTYL) 10 MG capsule Take 1 capsule by mouth every 6 hours as needed (cramps) 20 capsule 0    Dulaglutide (TRULICITY SC) Inject 8.41 mg into the skin once a week       pravastatin (PRAVACHOL) 10 MG tablet Take 1 tablet by mouth daily. Take dose that you have at home (Patient taking differently: Take 10 mg by mouth every other day Take dose that you have at home) 30 tablet 3    metFORMIN (GLUCOPHAGE) 1000 MG tablet Take 1 tablet by mouth daily (with breakfast).  Resume on Sunday pm (Patient taking differently: Take 1,000 mg by mouth daily (with breakfast) Indications: pt states he is to resume this on Sunday 7/24/16 Resume on Sunday pm) 60 tablet 1     Consultations:  IP CONSULT TO HOSPITALIST  IP CONSULT TO GENERAL SURGERY  IP CONSULT TO GENERAL SURGERY    ASSESSMENT:    Primary Problem  Small bowel obstruction due to adhesions    Active Hospital Problems    Diagnosis Date Noted    Nonischemic cardiomyopathy ([facility] [I42.8] 02/01/2018    Small bowel obstruction due to adhesions [K56.50] 07/22/2016    Diabetes mellitus type 2 in obese ([facility] [E11.69, E66.9] 07/10/2014    AICD (automatic cardioverter/defibrillator) present [Z95.810] 07/10/2014 PLAN:  1. SBO: NG tube to low intermittent suction  2. General surgical consultation Dr. Angela Anguiano  3. Recheck a.m. labs, monitor electrolytes closely  4. Nonischemic cardiomyopathy: Cardiac medications with small sip of water. Clamp NG tube for 1 hour following  5. Telemetry  6. GI prophylaxis  7. DVT prophylaxis  8. Glucose control with sliding scale coverage    The severity of this patient's signs and symptoms indicate the need for an inpatient admission. This patient's medical condition would be significantly and directly threatened if care was provided in a less intensive setting.     Electronically signed by Ara Atkinson DO on 2/1/2018 at 10:07 AM     Copy sent to Dr. Jluis Jo MD

## 2018-02-01 NOTE — ED PROVIDER NOTES
[address] [place] [address] ED  eMERGENCY dEPARTMENT eNCOUnter      Pt Name: Ash Rios  MRN: [id number]  [place] 1965  Date of evaluation: 1/31/2018  Provider: Jayne Fernandez NP    CHIEF COMPLAINT       Chief Complaint   Patient presents with    Abdominal Pain     onset this am, history of bowel obstruction    Nausea    Diarrhea         HISTORY OF PRESENT ILLNESS  (Location/Symptom, Timing/Onset, Context/Setting, Quality, Duration, Modifying Factors, Severity.)   Ash Rios is a 46 y.o. male who presents to the emergency department Via private auto with complaint of abdominal pain that started this morning. He has diffuse abdominal pain with more concentration in the middle. He rates the pain at a 10 and describes it as distended and cramping. Additional symptoms include nausea without vomiting and diarrhea that started within the past several hours. He has had multiple brown loose watery stools without blood or mucus. No generalized body aches or fevers. Abdominal pain does not radiate. He has diabetic and sugars have been running good. Most recently was 5.5. He does have history of bowel obstructions. He states that this feels similar. Nursing Notes were reviewed. ALLERGIES     Benadryl [diphenhydramine] and Pcn [penicillins]    CURRENT MEDICATIONS       Previous Medications    ALLOPURINOL (ZYLOPRIM) 100 MG TABLET    Take 1 tablet by mouth daily. ASPIRIN 81 MG TABLET    Take 81 mg by mouth daily. DAPAGLIFLOZIN (FARXIGA) 10 MG TABLET    Take 10 mg by mouth every morning    DICYCLOMINE (BENTYL) 10 MG CAPSULE    Take 1 capsule by mouth every 6 hours as needed (cramps)    DULAGLUTIDE (TRULICITY SC)    Inject 0.75 mg into the skin once a week     LISINOPRIL (PRINIVIL;ZESTRIL) 10 MG TABLET    Take 10 mg by mouth daily. METFORMIN (GLUCOPHAGE) 1000 MG TABLET    Take 1 tablet by mouth daily (with breakfast).  Resume on Sunday pm    METOPROLOL (LOPRESSOR) 25 MG TABLET    Take 25 mg by mouth note were completed with a voice recognition program.  Efforts were made to edit the dictations but occasionally words are mis-transcribed. )    NEDA SUAREZ NP  02/01/18 0056       Kendal Paredes NP  02/01/18 0101

## 2018-02-02 ENCOUNTER — APPOINTMENT (OUTPATIENT)
Dept: GENERAL RADIOLOGY | Age: 53
DRG: 389 | End: 2018-02-02
Payer: COMMERCIAL

## 2018-02-02 LAB
ALBUMIN SERPL-MCNC: 4 G/DL (ref 3.5–5.2)
ALBUMIN/GLOBULIN RATIO: ABNORMAL (ref 1–2.5)
ALP BLD-CCNC: 61 U/L (ref 40–129)
ALT SERPL-CCNC: 20 U/L (ref 5–41)
ANION GAP SERPL CALCULATED.3IONS-SCNC: 10 MMOL/L (ref 9–17)
AST SERPL-CCNC: 17 U/L
BILIRUB SERPL-MCNC: 1.92 MG/DL (ref 0.3–1.2)
BUN BLDV-MCNC: 14 MG/DL (ref 6–20)
BUN/CREAT BLD: 17 (ref 9–20)
CALCIUM SERPL-MCNC: 8.6 MG/DL (ref 8.6–10.4)
CHLORIDE BLD-SCNC: 98 MMOL/L (ref 98–107)
CO2: 26 MMOL/L (ref 20–31)
CREAT SERPL-MCNC: 0.83 MG/DL (ref 0.7–1.2)
CULTURE: NORMAL
CULTURE: NORMAL
GFR AFRICAN AMERICAN: >60 ML/MIN
GFR NON-AFRICAN AMERICAN: >60 ML/MIN
GFR SERPL CREATININE-BSD FRML MDRD: ABNORMAL ML/MIN/{1.73_M2}
GFR SERPL CREATININE-BSD FRML MDRD: ABNORMAL ML/MIN/{1.73_M2}
GLUCOSE BLD-MCNC: 108 MG/DL (ref 75–110)
GLUCOSE BLD-MCNC: 81 MG/DL (ref 75–110)
GLUCOSE BLD-MCNC: 81 MG/DL (ref 75–110)
GLUCOSE BLD-MCNC: 89 MG/DL (ref 75–110)
GLUCOSE BLD-MCNC: 90 MG/DL (ref 75–110)
GLUCOSE BLD-MCNC: 93 MG/DL (ref 70–99)
GLUCOSE BLD-MCNC: 97 MG/DL (ref 75–110)
HCT VFR BLD CALC: 37.9 % (ref 41–53)
HEMOGLOBIN: 12.8 G/DL (ref 13.5–17.5)
INR BLD: 1.1
Lab: NORMAL
MAGNESIUM: 1.7 MG/DL (ref 1.6–2.6)
MCH RBC QN AUTO: 28.4 PG (ref 26–34)
MCHC RBC AUTO-ENTMCNC: 33.7 G/DL (ref 31–37)
MCV RBC AUTO: 84.2 FL (ref 80–100)
NRBC AUTOMATED: ABNORMAL PER 100 WBC
PDW BLD-RTO: 19.1 % (ref 11.5–14.5)
PHOSPHORUS: 2.8 MG/DL (ref 2.5–4.5)
PLATELET # BLD: 131 K/UL (ref 130–400)
PMV BLD AUTO: 7.6 FL (ref 6–12)
POTASSIUM SERPL-SCNC: 3.6 MMOL/L (ref 3.7–5.3)
PROTHROMBIN TIME: 11.8 SEC (ref 9.7–11.6)
RBC # BLD: 4.51 M/UL (ref 4.5–5.9)
SODIUM BLD-SCNC: 134 MMOL/L (ref 135–144)
SPECIMEN DESCRIPTION: NORMAL
SPECIMEN DESCRIPTION: NORMAL
STATUS: NORMAL
TOTAL PROTEIN: 6.8 G/DL (ref 6.4–8.3)
WBC # BLD: 7.7 K/UL (ref 3.5–11)

## 2018-02-02 PROCEDURE — 6360000002 HC RX W HCPCS: Performed by: SURGERY

## 2018-02-02 PROCEDURE — 36415 COLL VENOUS BLD VENIPUNCTURE: CPT

## 2018-02-02 PROCEDURE — 83735 ASSAY OF MAGNESIUM: CPT

## 2018-02-02 PROCEDURE — 2580000003 HC RX 258: Performed by: SURGERY

## 2018-02-02 PROCEDURE — 84100 ASSAY OF PHOSPHORUS: CPT

## 2018-02-02 PROCEDURE — 1200000000 HC SEMI PRIVATE

## 2018-02-02 PROCEDURE — 74019 RADEX ABDOMEN 2 VIEWS: CPT

## 2018-02-02 PROCEDURE — 99232 SBSQ HOSP IP/OBS MODERATE 35: CPT | Performed by: INTERNAL MEDICINE

## 2018-02-02 PROCEDURE — 6370000000 HC RX 637 (ALT 250 FOR IP): Performed by: SURGERY

## 2018-02-02 PROCEDURE — 80053 COMPREHEN METABOLIC PANEL: CPT

## 2018-02-02 PROCEDURE — 82947 ASSAY GLUCOSE BLOOD QUANT: CPT

## 2018-02-02 PROCEDURE — 2580000003 HC RX 258: Performed by: NURSE PRACTITIONER

## 2018-02-02 PROCEDURE — 6370000000 HC RX 637 (ALT 250 FOR IP): Performed by: NURSE PRACTITIONER

## 2018-02-02 PROCEDURE — 85027 COMPLETE CBC AUTOMATED: CPT

## 2018-02-02 PROCEDURE — 85610 PROTHROMBIN TIME: CPT

## 2018-02-02 PROCEDURE — 6360000002 HC RX W HCPCS: Performed by: NURSE PRACTITIONER

## 2018-02-02 PROCEDURE — 6370000000 HC RX 637 (ALT 250 FOR IP): Performed by: INTERNAL MEDICINE

## 2018-02-02 RX ORDER — ALLOPURINOL 300 MG/1
300 TABLET ORAL DAILY
Refills: 2 | COMMUNITY
Start: 2018-01-12

## 2018-02-02 RX ORDER — MAGNESIUM SULFATE 1 G/100ML
1 INJECTION INTRAVENOUS ONCE
Status: COMPLETED | OUTPATIENT
Start: 2018-02-02 | End: 2018-02-02

## 2018-02-02 RX ORDER — POTASSIUM CHLORIDE 20 MEQ/1
10 TABLET, EXTENDED RELEASE ORAL 2 TIMES DAILY
Status: COMPLETED | OUTPATIENT
Start: 2018-02-02 | End: 2018-02-02

## 2018-02-02 RX ADMIN — HYDROMORPHONE HYDROCHLORIDE 0.5 MG: 1 INJECTION, SOLUTION INTRAMUSCULAR; INTRAVENOUS; SUBCUTANEOUS at 08:28

## 2018-02-02 RX ADMIN — TAMSULOSIN HYDROCHLORIDE 0.4 MG: 0.4 CAPSULE ORAL at 08:21

## 2018-02-02 RX ADMIN — ALLOPURINOL 300 MG: 300 TABLET ORAL at 08:22

## 2018-02-02 RX ADMIN — Medication 10 ML: at 21:20

## 2018-02-02 RX ADMIN — LISINOPRIL 10 MG: 10 TABLET ORAL at 08:22

## 2018-02-02 RX ADMIN — PANTOPRAZOLE SODIUM 40 MG: 40 TABLET, DELAYED RELEASE ORAL at 04:54

## 2018-02-02 RX ADMIN — METOPROLOL TARTRATE 25 MG: 25 TABLET ORAL at 21:20

## 2018-02-02 RX ADMIN — METOPROLOL TARTRATE 25 MG: 25 TABLET ORAL at 08:22

## 2018-02-02 RX ADMIN — SOTALOL HYDROCHLORIDE 120 MG: 80 TABLET ORAL at 08:21

## 2018-02-02 RX ADMIN — ENOXAPARIN SODIUM 40 MG: 40 INJECTION SUBCUTANEOUS at 08:20

## 2018-02-02 RX ADMIN — POTASSIUM CHLORIDE 10 MEQ: 20 TABLET, EXTENDED RELEASE ORAL at 21:20

## 2018-02-02 RX ADMIN — SOTALOL HYDROCHLORIDE 120 MG: 80 TABLET ORAL at 21:19

## 2018-02-02 RX ADMIN — HYDROMORPHONE HYDROCHLORIDE 0.5 MG: 1 INJECTION, SOLUTION INTRAMUSCULAR; INTRAVENOUS; SUBCUTANEOUS at 04:53

## 2018-02-02 RX ADMIN — ASPIRIN 81 MG 81 MG: 81 TABLET ORAL at 08:22

## 2018-02-02 RX ADMIN — MAGNESIUM SULFATE HEPTAHYDRATE 1 G: 1 INJECTION, SOLUTION INTRAVENOUS at 14:20

## 2018-02-02 RX ADMIN — SODIUM CHLORIDE, POTASSIUM CHLORIDE, SODIUM LACTATE AND CALCIUM CHLORIDE: 600; 310; 30; 20 INJECTION, SOLUTION INTRAVENOUS at 04:58

## 2018-02-02 RX ADMIN — HYDROMORPHONE HYDROCHLORIDE 0.5 MG: 1 INJECTION, SOLUTION INTRAMUSCULAR; INTRAVENOUS; SUBCUTANEOUS at 21:27

## 2018-02-02 RX ADMIN — SODIUM CHLORIDE, POTASSIUM CHLORIDE, SODIUM LACTATE AND CALCIUM CHLORIDE: 600; 310; 30; 20 INJECTION, SOLUTION INTRAVENOUS at 13:14

## 2018-02-02 RX ADMIN — POTASSIUM CHLORIDE 10 MEQ: 20 TABLET, EXTENDED RELEASE ORAL at 14:20

## 2018-02-02 ASSESSMENT — PAIN DESCRIPTION - ORIENTATION
ORIENTATION: RIGHT;UPPER
ORIENTATION: RIGHT

## 2018-02-02 ASSESSMENT — PAIN SCALES - GENERAL
PAINLEVEL_OUTOF10: 7
PAINLEVEL_OUTOF10: 4
PAINLEVEL_OUTOF10: 7
PAINLEVEL_OUTOF10: 7

## 2018-02-02 ASSESSMENT — PAIN DESCRIPTION - DESCRIPTORS
DESCRIPTORS: SHARP
DESCRIPTORS: DULL

## 2018-02-02 ASSESSMENT — PAIN DESCRIPTION - FREQUENCY: FREQUENCY: CONTINUOUS

## 2018-02-02 ASSESSMENT — PAIN DESCRIPTION - LOCATION
LOCATION: ABDOMEN
LOCATION: ABDOMEN

## 2018-02-02 ASSESSMENT — PAIN DESCRIPTION - ONSET: ONSET: ON-GOING

## 2018-02-02 ASSESSMENT — PAIN DESCRIPTION - PAIN TYPE: TYPE: ACUTE PAIN

## 2018-02-02 NOTE — FLOWSHEET NOTE
Patient had lights off and TV on very low. Patient states he is ok at this moment and would like to rest.   shared in prayer. Patient grateful for the visit. Follow up as needed.       02/01/18 1939   Encounter Summary   Services provided to: Patient   Referral/Consult From: Cathy Renteria Visiting (2-1-18)   Complexity of Encounter Low   Length of Encounter 15 minutes   Spiritual Assessment Completed Yes   Routine   Type Initial   Assessment Approachable;Calm   Intervention Explored feelings, thoughts, concerns;Prayer;Discussed belief system/Restorationist practices/ramone   Outcome Expressed gratitude;Receptive;Engaged in conversation

## 2018-02-02 NOTE — CARE COORDINATION
Case Management Initial Discharge Plan  Ash Gabriel,         Readmission Risk              Readmission Risk:        8.5       Age 72 or Greater:  0    Admitted from SNF or Requires Paid or Family Care:  0    Currently has CHF,COPD,ARF,CRI,or is on dialysis:  0    Takes more than 5 Prescription Medications:  4    Takes Digoxin,Insulin,Anticoagulants,Narcotics or ASA/Plavix:  [address] in Past 12 Months:  0    On Disability:  0    Patient Considers own Health:  2.5            Met with:patient to discuss discharge plans. Information verified: address, contacts, phone number, , insurance Yes  PCP: Shailesh Talyor MD  Date of last visit:     Insurance Provider: [employer]    Discharge Planning  Current Residence:   Private residence  Living Arrangements:  Spouse/Significant Other, Children   Home has 2 stories/12 stairs to climb  Support Systems:  Spouse/Significant Other, Children  Current Services PTA:    Supplier: none  Patient able to perform ADL's:Independent  DME used to aid ambulation prior to admission: CPAP/during admission - none    Potential Assistance Needed:  N/A    Pharmacy: [facility]   Potential Assistance Purchasing Medications:  No  Does patient want to participate in local refill/ meds to beds program?  N/A    Patient agreeable to home care: No  [place] of choice provided:  n/a      Type of Home Care Services:  None  Patient expects to be discharged to:  home    Prior SNF/Rehab Placement and Facility: no  Agreeable to SNF/Rehab: No  [place] of choice provided: n/a   Evaluation: n/a    Expected Discharge date:  18  Follow Up Appointment: Best Day/ Time:      Transportation provider: wife  Transportation arrangements needed for discharge: No    Discharge Plan: Met with patient at bedside. Lives with wife and daughter, independent and works full time.   Has had multiple bowel obstructions in the past and most have resolved with NG tube.   Awaiting surgical recommendation. No home needs identified.         Electronically signed by Virginia Crocker RN on 2/2/18 at 8:49 AM

## 2018-02-02 NOTE — PROGRESS NOTES
2/2/2018  1:25 PM  General Surgery    T  98.2  P 64  /63  Feeling much better. States bloating and pain is gone. He is passing flatus now. NG tube drainage now clear yellow. He had 500 ml out NG over night but only 100 ml since 0700 today. Abdomen less distended, soft, no focal tenderness. Bowel sounds are present and are normal pitched. K+ 3.6  Mg++ 1.7    I reviewed the abdominal x-rays today. (Images and report reviewed). No small bowel gas is seen, air fluid levels are gone and colon gas is present, even in the sigmoid colon. Reported by radiologist as follows:      EXAMINATION:   SUPINE AND UPRIGHT VIEWS OF THE ABDOMEN       2/2/2018 1:08 pm       COMPARISON:   January 31, 2018       HISTORY:   ORDERING SYSTEM PROVIDED HISTORY: f/u sbo   TECHNOLOGIST PROVIDED HISTORY:   Reason for exam:->f/u sbo   Ordering Physician Provided Reason for Exam: Possible SBO abd pain. Acuity: Chronic   Type of Exam: Subsequent/Follow-up       FINDINGS:   The visualized lung bases reveal no acute findings.  An ICD is partially   visualized.  There is an enteric tube present projecting in the region of the   body of the stomach.  Surgical clips consistent with cholecystectomy are   noted.  The previously seen small bowel dilatation and air-fluid levels are   no longer appreciated. Chito Jessica is a relative paucity of bowel gas. Degenerative spurring is present in the spine.           Impression   1.   Relative paucity of bowel gas.  The previously seen dilated loops of   small bowel are no longer appreciated.       2.  Enteric tube terminates in the      IMPRESSION:  SBO/ileus resolving. Both magnesium and potassium are borderline. PLAN:  OK to D/C NG, try clear liquids. Will supplement Magnesium and K+ today and re-check. Will not order further films tomorrow unless he regress with clears po.      Koby Alvarado MD

## 2018-02-02 NOTE — PROGRESS NOTES
[facility] - Progress Note    2018   9:31 AM    Name:  Solange Talbert  :    1965  Age:  46 y.o. male  MRN:    [id number]     Acct:     [de-identified]   Room:    IP Day:  Date: 2018  9:28 PM  PCP: Misha Sumner MD    Subjective:     C/C:   Chief Complaint   Patient presents with    Abdominal Pain     onset this am, history of bowel obstruction    Nausea    Diarrhea       Interval History: Status: improved. Patient states abdominal discomfort is largely gone with only one area midline towards the right upper quadrant that is still somewhat tender. He is beginning to pass flatus. He denies any nausea, chest pain or shortness of breath. Surgical consultation has been reviewed extensively. Patient is on both IV potassium and magnesium replacement scales. Hemoglobin has dropped slightly with hydration. KUB will be ordered for this morning. If stable NG tube possibly will be clamped per surgery. History: \"Jim Blair is a 46 y.o.  male who presents to the emergency department with a complaint of abdominal pain starting yesterday morning. He describes the pain as diffuse across the entire abdomen concentrating in the middle. The pain is associated with nausea without emesis as well as diarrhea that began several hours prior. He has a prior history of bowel obstruction and states that this feels the same. The patient has a history of recurring incisional hernias, numerous intra-abdominal adhesions from a remote prior surgery for diverticulitis () and a history of episodes of partial bowel obstructions. His hospital records from 2016 were reviewed. At that time he was admitted with a similar episode of small bowel obstruction which was treated conservatively. He has had a total of 3 prior abdominal surgeries the 1st being the one for diverticulitis the last being a repair of an umbilical hernia.  Acute >60   CALCIUM  8.8  8.6   PHOS   --   2.8     Recent Labs      01/31/18   2200  02/01/18   0306  02/02/18   0615   PROT  7.1   --   6.8   LABALBU  4.4   --   4.0   LABA1C   --   5.1   --    AST  18   --   17   ALT  23   --   20   ALKPHOS  67   --   61   BILITOT  1.07   --   1.92*   BILIDIR  0.23   --    --    LIPASE  28   --    --      UA:  Recent Labs      02/01/18   1738   COLORU  YELLOW   PHUR  5.5   WBCUA  5 TO 10   RBCUA  0 TO 2   MUCUS  1+*   TRICHOMONAS  NOT REPORTED   YEAST  NOT REPORTED   BACTERIA  NOT REPORTED   SPECGRAV  1.027   LEUKOCYTESUR  NEGATIVE   UROBILINOGEN  Normal   BILIRUBINUR  NEGATIVE   GLUCOSEU  2+*   AMORPHOUS  1+*       POC Glucose:  Recent Labs      02/01/18   1102  02/01/18   1834  02/01/18   2050  02/01/18   2356  02/02/18   0318  02/02/18   0810   POCGLU  88  87  97  89  90  81       Physical Examination:    /63   Pulse 64   Temp 98.2 °F (36.8 °C) (Oral)   Resp 18   Ht 6' 1\" (1.854 m)   Wt 260 lb (117.9 kg)   SpO2 98%   BMI 34.30 kg/m²   Intake/Output Summary (Last 24 hours) at 02/02/18 0931  Last data filed at 02/02/18 0505   Gross per 24 hour   Intake             1619 ml   Output             1900 ml   Net             -281 ml       General Appearance:    Alert, cooperative, no distress, appears stated age   Head:    Normocephalic, without obvious abnormality, atraumatic   Eyes:    PERRL, conjunctiva/corneas clear, EOM's intact        Ears:    Normal external ear canals, both ears   Nose:   Nares normal, septum midline, mucosa normal, no drainage    or sinus tenderness. NG tube is in place.     Throat:   Lips, mucosa, and tongue normal; teeth and gums normal   Neck:   Supple, symmetrical, trachea midline, no adenopathy;        thyroid:  No enlargement/tenderness/nodules; no carotid    bruit or JVD   Back:     Symmetric, no curvature, ROM Not attempted, no CVA tenderness   Lungs:     Clear to auscultation bilaterally, respirations unlabored   Chest wall:    No tenderness or

## 2018-02-02 NOTE — PLAN OF CARE
Problem: Falls - Risk of  Goal: Absence of falls  Outcome: Ongoing  Siderails up x 2  Hourly rounding. Call light in reach. Instructed to call for assist before attempting out of bed. Remains free from falls and accidental injury at this time. Floor free from obstacles, and bed is locked and in lowest position. Adequate lighting provided. Bed alarm on, red falling star and Stay with Me signs posted. Problem: Pain:  Goal: Pain level will decrease  Pain level will decrease   Outcome: Ongoing  Pain level assessment complete. Pt educated on pain scale and control interventions. PRN pain medication given per pt request.   Pt instructed to call out with new onset of pain or unrelieved pain.

## 2018-02-02 NOTE — PROGRESS NOTES
Pharmacy Accuracy Service Medication History Note    The patient's list of current home medications has been reviewed. Source(s) of information: patient, Surescripts    Based on information provided by the above source(s), I have updated the patient's home med list as described below. I changed or updated the following medications on the patient's home medication list:  Adjusted   · Allopurinol 100mg x 3 tabs daily adjusted to 300mg tab daily       Discrepancies on current hospital orders that need to be addressed by a physician: none      Please feel free to call me with any questions about this encounter. Thank you.     Akira Peraza PharmD  Pharmacy Medication Accuracy Review Service  Phone:  [phone]  Fax: [phone]      Electronically signed by ROXANN Augustin [facility] on 2/2/2018 at 4:46 PM

## 2018-02-03 VITALS
DIASTOLIC BLOOD PRESSURE: 55 MMHG | HEIGHT: 73 IN | BODY MASS INDEX: 34.46 KG/M2 | OXYGEN SATURATION: 98 % | RESPIRATION RATE: 20 BRPM | HEART RATE: 67 BPM | SYSTOLIC BLOOD PRESSURE: 103 MMHG | WEIGHT: 260 LBS | TEMPERATURE: 98.4 F

## 2018-02-03 LAB
ABSOLUTE EOS #: 0.1 K/UL (ref 0–0.4)
ABSOLUTE IMMATURE GRANULOCYTE: ABNORMAL K/UL (ref 0–0.3)
ABSOLUTE LYMPH #: 0.6 K/UL (ref 1–4.8)
ABSOLUTE MONO #: 0.5 K/UL (ref 0.2–0.8)
ANION GAP SERPL CALCULATED.3IONS-SCNC: 11 MMOL/L (ref 9–17)
BASOPHILS # BLD: 0 % (ref 0–2)
BASOPHILS ABSOLUTE: 0 K/UL (ref 0–0.2)
BUN BLDV-MCNC: 13 MG/DL (ref 6–20)
BUN/CREAT BLD: 16 (ref 9–20)
CALCIUM SERPL-MCNC: 8.9 MG/DL (ref 8.6–10.4)
CHLORIDE BLD-SCNC: 98 MMOL/L (ref 98–107)
CO2: 27 MMOL/L (ref 20–31)
CREAT SERPL-MCNC: 0.83 MG/DL (ref 0.7–1.2)
DIFFERENTIAL TYPE: ABNORMAL
EOSINOPHILS RELATIVE PERCENT: 2 % (ref 1–4)
GFR AFRICAN AMERICAN: >60 ML/MIN
GFR NON-AFRICAN AMERICAN: >60 ML/MIN
GFR SERPL CREATININE-BSD FRML MDRD: ABNORMAL ML/MIN/{1.73_M2}
GFR SERPL CREATININE-BSD FRML MDRD: ABNORMAL ML/MIN/{1.73_M2}
GLUCOSE BLD-MCNC: 111 MG/DL (ref 75–110)
GLUCOSE BLD-MCNC: 141 MG/DL (ref 70–99)
GLUCOSE BLD-MCNC: 158 MG/DL (ref 75–110)
GLUCOSE BLD-MCNC: 211 MG/DL (ref 75–110)
HCT VFR BLD CALC: 38.3 % (ref 41–53)
HEMOGLOBIN: 12.9 G/DL (ref 13.5–17.5)
IMMATURE GRANULOCYTES: ABNORMAL %
LYMPHOCYTES # BLD: 9 % (ref 24–44)
MAGNESIUM: 1.9 MG/DL (ref 1.6–2.6)
MCH RBC QN AUTO: 28.4 PG (ref 26–34)
MCHC RBC AUTO-ENTMCNC: 33.7 G/DL (ref 31–37)
MCV RBC AUTO: 84.1 FL (ref 80–100)
MONOCYTES # BLD: 8 % (ref 1–7)
NRBC AUTOMATED: ABNORMAL PER 100 WBC
PDW BLD-RTO: 18.9 % (ref 11.5–14.5)
PLATELET # BLD: 139 K/UL (ref 130–400)
PLATELET ESTIMATE: ABNORMAL
PMV BLD AUTO: 7.7 FL (ref 6–12)
POTASSIUM SERPL-SCNC: 3.9 MMOL/L (ref 3.7–5.3)
RBC # BLD: 4.56 M/UL (ref 4.5–5.9)
RBC # BLD: ABNORMAL 10*6/UL
SEG NEUTROPHILS: 81 % (ref 36–66)
SEGMENTED NEUTROPHILS ABSOLUTE COUNT: 5.3 K/UL (ref 1.8–7.7)
SODIUM BLD-SCNC: 136 MMOL/L (ref 135–144)
WBC # BLD: 6.5 K/UL (ref 3.5–11)
WBC # BLD: ABNORMAL 10*3/UL

## 2018-02-03 PROCEDURE — 6360000002 HC RX W HCPCS: Performed by: NURSE PRACTITIONER

## 2018-02-03 PROCEDURE — 2580000003 HC RX 258: Performed by: SURGERY

## 2018-02-03 PROCEDURE — 6370000000 HC RX 637 (ALT 250 FOR IP): Performed by: INTERNAL MEDICINE

## 2018-02-03 PROCEDURE — 82947 ASSAY GLUCOSE BLOOD QUANT: CPT

## 2018-02-03 PROCEDURE — 99239 HOSP IP/OBS DSCHRG MGMT >30: CPT | Performed by: INTERNAL MEDICINE

## 2018-02-03 PROCEDURE — 6370000000 HC RX 637 (ALT 250 FOR IP): Performed by: NURSE PRACTITIONER

## 2018-02-03 PROCEDURE — 83735 ASSAY OF MAGNESIUM: CPT

## 2018-02-03 PROCEDURE — 85025 COMPLETE CBC W/AUTO DIFF WBC: CPT

## 2018-02-03 PROCEDURE — 36415 COLL VENOUS BLD VENIPUNCTURE: CPT

## 2018-02-03 PROCEDURE — 80048 BASIC METABOLIC PNL TOTAL CA: CPT

## 2018-02-03 RX ADMIN — METOPROLOL TARTRATE 25 MG: 25 TABLET ORAL at 08:46

## 2018-02-03 RX ADMIN — INSULIN LISPRO 2 UNITS: 100 INJECTION, SOLUTION INTRAVENOUS; SUBCUTANEOUS at 12:15

## 2018-02-03 RX ADMIN — ALLOPURINOL 300 MG: 300 TABLET ORAL at 08:46

## 2018-02-03 RX ADMIN — TAMSULOSIN HYDROCHLORIDE 0.4 MG: 0.4 CAPSULE ORAL at 08:46

## 2018-02-03 RX ADMIN — PANTOPRAZOLE SODIUM 40 MG: 40 TABLET, DELAYED RELEASE ORAL at 06:05

## 2018-02-03 RX ADMIN — ENOXAPARIN SODIUM 40 MG: 40 INJECTION SUBCUTANEOUS at 08:46

## 2018-02-03 RX ADMIN — SODIUM CHLORIDE, POTASSIUM CHLORIDE, SODIUM LACTATE AND CALCIUM CHLORIDE: 600; 310; 30; 20 INJECTION, SOLUTION INTRAVENOUS at 10:23

## 2018-02-03 RX ADMIN — ASPIRIN 81 MG 81 MG: 81 TABLET ORAL at 08:46

## 2018-02-03 RX ADMIN — LISINOPRIL 10 MG: 10 TABLET ORAL at 08:46

## 2018-02-03 RX ADMIN — SOTALOL HYDROCHLORIDE 120 MG: 80 TABLET ORAL at 08:45

## 2018-09-26 ENCOUNTER — APPOINTMENT (OUTPATIENT)
Dept: CT IMAGING | Age: 53
DRG: 389 | End: 2018-09-26
Payer: COMMERCIAL

## 2018-09-26 ENCOUNTER — HOSPITAL ENCOUNTER (INPATIENT)
Age: 53
LOS: 6 days | Discharge: HOME | DRG: 389 | End: 2018-10-02
Admitting: INTERNAL MEDICINE
Payer: COMMERCIAL

## 2018-09-26 DIAGNOSIS — R10.30 LOWER ABDOMINAL PAIN: Primary | ICD-10-CM

## 2018-09-26 DIAGNOSIS — K56.609 SBO (SMALL BOWEL OBSTRUCTION) (HCC): ICD-10-CM

## 2018-09-26 DIAGNOSIS — R11.2 NON-INTRACTABLE VOMITING WITH NAUSEA, UNSPECIFIED VOMITING TYPE: ICD-10-CM

## 2018-09-26 PROBLEM — E66.9 OBESITY (BMI 30-39.9): Status: ACTIVE | Noted: 2018-09-26

## 2018-09-26 PROBLEM — E87.5 HYPERKALEMIA: Status: ACTIVE | Noted: 2018-09-26

## 2018-09-26 PROBLEM — I10 ESSENTIAL HYPERTENSION: Status: ACTIVE | Noted: 2018-09-26

## 2018-09-26 LAB
ABSOLUTE EOS #: 0.2 K/UL (ref 0–0.4)
ABSOLUTE IMMATURE GRANULOCYTE: ABNORMAL K/UL (ref 0–0.3)
ABSOLUTE LYMPH #: 0.9 K/UL (ref 1–4.8)
ABSOLUTE MONO #: 0.7 K/UL (ref 0.2–0.8)
ALBUMIN SERPL-MCNC: 4.8 G/DL (ref 3.5–5.2)
ALBUMIN/GLOBULIN RATIO: ABNORMAL (ref 1–2.5)
ALP BLD-CCNC: 66 U/L (ref 40–129)
ALT SERPL-CCNC: 37 U/L (ref 5–41)
AMYLASE: 32 U/L (ref 28–100)
ANION GAP SERPL CALCULATED.3IONS-SCNC: 12 MMOL/L (ref 9–17)
AST SERPL-CCNC: 21 U/L
BASOPHILS # BLD: 0 % (ref 0–2)
BASOPHILS ABSOLUTE: 0 K/UL (ref 0–0.2)
BILIRUB SERPL-MCNC: 1.26 MG/DL (ref 0.3–1.2)
BILIRUBIN DIRECT: 0.27 MG/DL
BILIRUBIN URINE: NEGATIVE
BILIRUBIN, INDIRECT: 0.99 MG/DL (ref 0–1)
BUN BLDV-MCNC: 22 MG/DL (ref 6–20)
BUN/CREAT BLD: 22 (ref 9–20)
CALCIUM SERPL-MCNC: 9.9 MG/DL (ref 8.6–10.4)
CHLORIDE BLD-SCNC: 95 MMOL/L (ref 98–107)
CO2: 24 MMOL/L (ref 20–31)
COLOR: YELLOW
COMMENT UA: ABNORMAL
CREAT SERPL-MCNC: 1.01 MG/DL (ref 0.7–1.2)
DIFFERENTIAL TYPE: ABNORMAL
EOSINOPHILS RELATIVE PERCENT: 2 % (ref 1–4)
GFR AFRICAN AMERICAN: >60 ML/MIN
GFR NON-AFRICAN AMERICAN: >60 ML/MIN
GFR SERPL CREATININE-BSD FRML MDRD: ABNORMAL ML/MIN/{1.73_M2}
GFR SERPL CREATININE-BSD FRML MDRD: ABNORMAL ML/MIN/{1.73_M2}
GLOBULIN: ABNORMAL G/DL (ref 1.5–3.8)
GLUCOSE BLD-MCNC: 140 MG/DL (ref 75–110)
GLUCOSE BLD-MCNC: 158 MG/DL (ref 70–99)
GLUCOSE URINE: ABNORMAL
HCT VFR BLD CALC: 49.7 % (ref 41–53)
HEMOGLOBIN: 16.8 G/DL (ref 13.5–17.5)
IMMATURE GRANULOCYTES: ABNORMAL %
KETONES, URINE: NEGATIVE
LEUKOCYTE ESTERASE, URINE: NEGATIVE
LIPASE: 36 U/L (ref 13–60)
LYMPHOCYTES # BLD: 10 % (ref 24–44)
MCH RBC QN AUTO: 28.5 PG (ref 26–34)
MCHC RBC AUTO-ENTMCNC: 33.8 G/DL (ref 31–37)
MCV RBC AUTO: 84.4 FL (ref 80–100)
MONOCYTES # BLD: 7 % (ref 1–7)
NITRITE, URINE: NEGATIVE
NRBC AUTOMATED: ABNORMAL PER 100 WBC
PDW BLD-RTO: 17.1 % (ref 11.5–14.5)
PH UA: 5.5 (ref 5–8)
PLATELET # BLD: 145 K/UL (ref 130–400)
PLATELET ESTIMATE: ABNORMAL
PMV BLD AUTO: ABNORMAL FL (ref 6–12)
POTASSIUM SERPL-SCNC: 5.5 MMOL/L (ref 3.7–5.3)
PROTEIN UA: NEGATIVE
RBC # BLD: 5.88 M/UL (ref 4.5–5.9)
RBC # BLD: ABNORMAL 10*6/UL
SEG NEUTROPHILS: 81 % (ref 36–66)
SEGMENTED NEUTROPHILS ABSOLUTE COUNT: 7.7 K/UL (ref 1.8–7.7)
SODIUM BLD-SCNC: 131 MMOL/L (ref 135–144)
SPECIFIC GRAVITY UA: 1.01 (ref 1–1.03)
TOTAL PROTEIN: 8 G/DL (ref 6.4–8.3)
TURBIDITY: CLEAR
URINE HGB: NEGATIVE
UROBILINOGEN, URINE: NORMAL
WBC # BLD: 9.5 K/UL (ref 3.5–11)
WBC # BLD: ABNORMAL 10*3/UL

## 2018-09-26 PROCEDURE — 80048 BASIC METABOLIC PNL TOTAL CA: CPT

## 2018-09-26 PROCEDURE — 96375 TX/PRO/DX INJ NEW DRUG ADDON: CPT

## 2018-09-26 PROCEDURE — 83690 ASSAY OF LIPASE: CPT

## 2018-09-26 PROCEDURE — 99285 EMERGENCY DEPT VISIT HI MDM: CPT

## 2018-09-26 PROCEDURE — 85025 COMPLETE CBC W/AUTO DIFF WBC: CPT

## 2018-09-26 PROCEDURE — 81003 URINALYSIS AUTO W/O SCOPE: CPT

## 2018-09-26 PROCEDURE — 82150 ASSAY OF AMYLASE: CPT

## 2018-09-26 PROCEDURE — 96374 THER/PROPH/DIAG INJ IV PUSH: CPT

## 2018-09-26 PROCEDURE — 82947 ASSAY GLUCOSE BLOOD QUANT: CPT

## 2018-09-26 PROCEDURE — 80076 HEPATIC FUNCTION PANEL: CPT

## 2018-09-26 PROCEDURE — 96361 HYDRATE IV INFUSION ADD-ON: CPT

## 2018-09-26 PROCEDURE — 1200000000 HC SEMI PRIVATE

## 2018-09-26 PROCEDURE — 99222 1ST HOSP IP/OBS MODERATE 55: CPT | Performed by: NURSE PRACTITIONER

## 2018-09-26 PROCEDURE — 2580000003 HC RX 258: Performed by: NURSE PRACTITIONER

## 2018-09-26 PROCEDURE — 2580000003 HC RX 258

## 2018-09-26 PROCEDURE — 6360000002 HC RX W HCPCS

## 2018-09-26 PROCEDURE — 6370000000 HC RX 637 (ALT 250 FOR IP): Performed by: NURSE PRACTITIONER

## 2018-09-26 PROCEDURE — 74177 CT ABD & PELVIS W/CONTRAST: CPT

## 2018-09-26 PROCEDURE — 6360000002 HC RX W HCPCS: Performed by: NURSE PRACTITIONER

## 2018-09-26 PROCEDURE — 6360000004 HC RX CONTRAST MEDICATION

## 2018-09-26 PROCEDURE — 83036 HEMOGLOBIN GLYCOSYLATED A1C: CPT

## 2018-09-26 RX ORDER — ASPIRIN 81 MG/1
81 TABLET, CHEWABLE ORAL DAILY
Status: DISCONTINUED | OUTPATIENT
Start: 2018-09-27 | End: 2018-10-02 | Stop reason: HOSPADM

## 2018-09-26 RX ORDER — SODIUM CHLORIDE 0.9 % (FLUSH) 0.9 %
10 SYRINGE (ML) INJECTION EVERY 12 HOURS SCHEDULED
Status: DISCONTINUED | OUTPATIENT
Start: 2018-09-26 | End: 2018-10-02 | Stop reason: HOSPADM

## 2018-09-26 RX ORDER — 0.9 % SODIUM CHLORIDE 0.9 %
50 INTRAVENOUS SOLUTION INTRAVENOUS ONCE
Status: COMPLETED | OUTPATIENT
Start: 2018-09-26 | End: 2018-09-26

## 2018-09-26 RX ORDER — SODIUM CHLORIDE 9 MG/ML
INJECTION, SOLUTION INTRAVENOUS CONTINUOUS
Status: DISCONTINUED | OUTPATIENT
Start: 2018-09-26 | End: 2018-09-27

## 2018-09-26 RX ORDER — MAGNESIUM SULFATE 1 G/100ML
1 INJECTION INTRAVENOUS PRN
Status: DISCONTINUED | OUTPATIENT
Start: 2018-09-26 | End: 2018-10-02 | Stop reason: HOSPADM

## 2018-09-26 RX ORDER — POTASSIUM CHLORIDE 20 MEQ/1
40 TABLET, EXTENDED RELEASE ORAL PRN
Status: DISCONTINUED | OUTPATIENT
Start: 2018-09-26 | End: 2018-10-02 | Stop reason: HOSPADM

## 2018-09-26 RX ORDER — NICOTINE POLACRILEX 4 MG
15 LOZENGE BUCCAL PRN
Status: DISCONTINUED | OUTPATIENT
Start: 2018-09-26 | End: 2018-10-02 | Stop reason: HOSPADM

## 2018-09-26 RX ORDER — DEXTROSE MONOHYDRATE 25 G/50ML
12.5 INJECTION, SOLUTION INTRAVENOUS PRN
Status: DISCONTINUED | OUTPATIENT
Start: 2018-09-26 | End: 2018-10-02 | Stop reason: HOSPADM

## 2018-09-26 RX ORDER — POTASSIUM CHLORIDE 20MEQ/15ML
40 LIQUID (ML) ORAL PRN
Status: DISCONTINUED | OUTPATIENT
Start: 2018-09-26 | End: 2018-10-02 | Stop reason: HOSPADM

## 2018-09-26 RX ORDER — SOTALOL HYDROCHLORIDE 80 MG/1
160 TABLET ORAL 2 TIMES DAILY
Status: DISCONTINUED | OUTPATIENT
Start: 2018-09-26 | End: 2018-10-02 | Stop reason: HOSPADM

## 2018-09-26 RX ORDER — ONDANSETRON 2 MG/ML
4 INJECTION INTRAMUSCULAR; INTRAVENOUS ONCE
Status: COMPLETED | OUTPATIENT
Start: 2018-09-26 | End: 2018-09-26

## 2018-09-26 RX ORDER — NICOTINE 21 MG/24HR
1 PATCH, TRANSDERMAL 24 HOURS TRANSDERMAL DAILY PRN
Status: DISCONTINUED | OUTPATIENT
Start: 2018-09-26 | End: 2018-10-02 | Stop reason: HOSPADM

## 2018-09-26 RX ORDER — PRAVASTATIN SODIUM 10 MG
10 TABLET ORAL DAILY
COMMUNITY
Start: 2018-09-06

## 2018-09-26 RX ORDER — ALLOPURINOL 300 MG/1
300 TABLET ORAL DAILY
Status: DISCONTINUED | OUTPATIENT
Start: 2018-09-27 | End: 2018-10-02 | Stop reason: HOSPADM

## 2018-09-26 RX ORDER — PANTOPRAZOLE SODIUM 40 MG/1
40 TABLET, DELAYED RELEASE ORAL
Status: DISCONTINUED | OUTPATIENT
Start: 2018-09-27 | End: 2018-09-27

## 2018-09-26 RX ORDER — ONDANSETRON 2 MG/ML
4 INJECTION INTRAMUSCULAR; INTRAVENOUS EVERY 6 HOURS PRN
Status: DISCONTINUED | OUTPATIENT
Start: 2018-09-26 | End: 2018-09-26 | Stop reason: SDUPTHER

## 2018-09-26 RX ORDER — ONDANSETRON 2 MG/ML
4 INJECTION INTRAMUSCULAR; INTRAVENOUS EVERY 6 HOURS PRN
Status: DISCONTINUED | OUTPATIENT
Start: 2018-09-26 | End: 2018-10-02 | Stop reason: HOSPADM

## 2018-09-26 RX ORDER — TAMSULOSIN HYDROCHLORIDE 0.4 MG/1
0.4 CAPSULE ORAL DAILY
Status: DISCONTINUED | OUTPATIENT
Start: 2018-09-27 | End: 2018-10-02 | Stop reason: HOSPADM

## 2018-09-26 RX ORDER — BISACODYL 10 MG
10 SUPPOSITORY, RECTAL RECTAL DAILY PRN
Status: DISCONTINUED | OUTPATIENT
Start: 2018-09-26 | End: 2018-10-02 | Stop reason: HOSPADM

## 2018-09-26 RX ORDER — SODIUM CHLORIDE 0.9 % (FLUSH) 0.9 %
10 SYRINGE (ML) INJECTION PRN
Status: DISCONTINUED | OUTPATIENT
Start: 2018-09-26 | End: 2018-10-02 | Stop reason: HOSPADM

## 2018-09-26 RX ORDER — 0.9 % SODIUM CHLORIDE 0.9 %
1000 INTRAVENOUS SOLUTION INTRAVENOUS ONCE
Status: COMPLETED | OUTPATIENT
Start: 2018-09-26 | End: 2018-09-26

## 2018-09-26 RX ORDER — ONDANSETRON 4 MG/1
4 TABLET, ORALLY DISINTEGRATING ORAL EVERY 6 HOURS PRN
Status: DISCONTINUED | OUTPATIENT
Start: 2018-09-26 | End: 2018-10-02 | Stop reason: HOSPADM

## 2018-09-26 RX ORDER — SODIUM CHLORIDE 0.9 % (FLUSH) 0.9 %
10 SYRINGE (ML) INJECTION PRN
Status: DISCONTINUED | OUTPATIENT
Start: 2018-09-26 | End: 2018-09-26

## 2018-09-26 RX ORDER — ACETAMINOPHEN 325 MG/1
650 TABLET ORAL EVERY 4 HOURS PRN
Status: DISCONTINUED | OUTPATIENT
Start: 2018-09-26 | End: 2018-09-30

## 2018-09-26 RX ORDER — ALBUTEROL SULFATE 2.5 MG/3ML
2.5 SOLUTION RESPIRATORY (INHALATION)
Status: DISCONTINUED | OUTPATIENT
Start: 2018-09-26 | End: 2018-09-26

## 2018-09-26 RX ORDER — HYDROMORPHONE HCL 110MG/55ML
2 PATIENT CONTROLLED ANALGESIA SYRINGE INTRAVENOUS ONCE
Status: COMPLETED | OUTPATIENT
Start: 2018-09-26 | End: 2018-09-26

## 2018-09-26 RX ORDER — DEXTROSE MONOHYDRATE 50 MG/ML
100 INJECTION, SOLUTION INTRAVENOUS PRN
Status: DISCONTINUED | OUTPATIENT
Start: 2018-09-26 | End: 2018-10-02 | Stop reason: HOSPADM

## 2018-09-26 RX ORDER — LISINOPRIL 10 MG/1
10 TABLET ORAL DAILY
Status: DISCONTINUED | OUTPATIENT
Start: 2018-09-27 | End: 2018-10-02 | Stop reason: HOSPADM

## 2018-09-26 RX ORDER — POTASSIUM CHLORIDE 7.45 MG/ML
10 INJECTION INTRAVENOUS PRN
Status: DISCONTINUED | OUTPATIENT
Start: 2018-09-26 | End: 2018-10-02 | Stop reason: HOSPADM

## 2018-09-26 RX ORDER — PRAVASTATIN SODIUM 20 MG
10 TABLET ORAL NIGHTLY
Status: DISCONTINUED | OUTPATIENT
Start: 2018-09-26 | End: 2018-09-30

## 2018-09-26 RX ADMIN — Medication 10 ML: at 19:33

## 2018-09-26 RX ADMIN — SODIUM CHLORIDE 1000 ML: 9 INJECTION, SOLUTION INTRAVENOUS at 18:34

## 2018-09-26 RX ADMIN — SODIUM CHLORIDE: 9 INJECTION, SOLUTION INTRAVENOUS at 22:40

## 2018-09-26 RX ADMIN — HYDROMORPHONE HYDROCHLORIDE 2 MG: 2 INJECTION INTRAMUSCULAR; INTRAVENOUS; SUBCUTANEOUS at 20:49

## 2018-09-26 RX ADMIN — METOPROLOL TARTRATE 25 MG: 25 TABLET ORAL at 22:36

## 2018-09-26 RX ADMIN — Medication 10 ML: at 22:41

## 2018-09-26 RX ADMIN — PRAVASTATIN SODIUM 10 MG: 20 TABLET ORAL at 22:36

## 2018-09-26 RX ADMIN — HYDROMORPHONE HYDROCHLORIDE 0.5 MG: 1 INJECTION, SOLUTION INTRAMUSCULAR; INTRAVENOUS; SUBCUTANEOUS at 23:46

## 2018-09-26 RX ADMIN — ONDANSETRON 4 MG: 2 INJECTION INTRAMUSCULAR; INTRAVENOUS at 18:33

## 2018-09-26 RX ADMIN — INSULIN LISPRO 2 UNITS: 100 INJECTION, SOLUTION INTRAVENOUS; SUBCUTANEOUS at 22:36

## 2018-09-26 RX ADMIN — ONDANSETRON 4 MG: 2 INJECTION INTRAMUSCULAR; INTRAVENOUS at 20:49

## 2018-09-26 RX ADMIN — IOPAMIDOL 75 ML: 755 INJECTION, SOLUTION INTRAVENOUS at 19:32

## 2018-09-26 RX ADMIN — HYDROMORPHONE HYDROCHLORIDE 1 MG: 1 INJECTION, SOLUTION INTRAMUSCULAR; INTRAVENOUS; SUBCUTANEOUS at 18:33

## 2018-09-26 RX ADMIN — SODIUM CHLORIDE 50 ML: 9 INJECTION, SOLUTION INTRAVENOUS at 19:33

## 2018-09-26 RX ADMIN — APIXABAN 5 MG: 5 TABLET, FILM COATED ORAL at 22:36

## 2018-09-26 RX ADMIN — SOTALOL HYDROCHLORIDE 160 MG: 80 TABLET ORAL at 22:36

## 2018-09-26 ASSESSMENT — ENCOUNTER SYMPTOMS
ABDOMINAL PAIN: 1
VOMITING: 0
CONSTIPATION: 0
DIARRHEA: 0
ABDOMINAL DISTENTION: 1
NAUSEA: 1

## 2018-09-26 ASSESSMENT — PAIN DESCRIPTION - LOCATION
LOCATION: ABDOMEN
LOCATION: ABDOMEN

## 2018-09-26 ASSESSMENT — PAIN SCALES - GENERAL
PAINLEVEL_OUTOF10: 9
PAINLEVEL_OUTOF10: 10
PAINLEVEL_OUTOF10: 6

## 2018-09-26 ASSESSMENT — PAIN DESCRIPTION - ORIENTATION: ORIENTATION: LEFT;UPPER

## 2018-09-27 ENCOUNTER — APPOINTMENT (OUTPATIENT)
Dept: GENERAL RADIOLOGY | Age: 53
DRG: 389 | End: 2018-09-27
Payer: COMMERCIAL

## 2018-09-27 LAB
ALBUMIN SERPL-MCNC: 4.2 G/DL (ref 3.5–5.2)
ALBUMIN SERPL-MCNC: 4.5 G/DL (ref 3.5–5.2)
ALBUMIN/GLOBULIN RATIO: ABNORMAL (ref 1–2.5)
ALBUMIN/GLOBULIN RATIO: ABNORMAL (ref 1–2.5)
ALP BLD-CCNC: 58 U/L (ref 40–129)
ALP BLD-CCNC: 61 U/L (ref 40–129)
ALT SERPL-CCNC: 35 U/L (ref 5–41)
ALT SERPL-CCNC: 41 U/L (ref 5–41)
AMYLASE: 27 U/L (ref 28–100)
ANION GAP SERPL CALCULATED.3IONS-SCNC: 9 MMOL/L (ref 9–17)
AST SERPL-CCNC: 20 U/L
AST SERPL-CCNC: 25 U/L
BILIRUB SERPL-MCNC: 2.06 MG/DL (ref 0.3–1.2)
BILIRUB SERPL-MCNC: 2.83 MG/DL (ref 0.3–1.2)
BILIRUBIN DIRECT: 0.55 MG/DL
BILIRUBIN, INDIRECT: 1.51 MG/DL (ref 0–1)
BUN BLDV-MCNC: 24 MG/DL (ref 6–20)
BUN/CREAT BLD: 22 (ref 9–20)
CALCIUM SERPL-MCNC: 9.3 MG/DL (ref 8.6–10.4)
CHLORIDE BLD-SCNC: 98 MMOL/L (ref 98–107)
CO2: 25 MMOL/L (ref 20–31)
CREAT SERPL-MCNC: 1.11 MG/DL (ref 0.7–1.2)
EKG ATRIAL RATE: 62 BPM
EKG P AXIS: 56 DEGREES
EKG P-R INTERVAL: 218 MS
EKG Q-T INTERVAL: 414 MS
EKG QRS DURATION: 76 MS
EKG QTC CALCULATION (BAZETT): 420 MS
EKG R AXIS: 35 DEGREES
EKG T AXIS: 49 DEGREES
EKG VENTRICULAR RATE: 62 BPM
ESTIMATED AVERAGE GLUCOSE: 105 MG/DL
GFR AFRICAN AMERICAN: >60 ML/MIN
GFR NON-AFRICAN AMERICAN: >60 ML/MIN
GFR SERPL CREATININE-BSD FRML MDRD: ABNORMAL ML/MIN/{1.73_M2}
GFR SERPL CREATININE-BSD FRML MDRD: ABNORMAL ML/MIN/{1.73_M2}
GLOBULIN: ABNORMAL G/DL (ref 1.5–3.8)
GLUCOSE BLD-MCNC: 116 MG/DL (ref 75–110)
GLUCOSE BLD-MCNC: 121 MG/DL (ref 75–110)
GLUCOSE BLD-MCNC: 138 MG/DL (ref 75–110)
GLUCOSE BLD-MCNC: 189 MG/DL (ref 70–99)
GLUCOSE BLD-MCNC: 213 MG/DL (ref 75–110)
HBA1C MFR BLD: 5.3 % (ref 4–6)
HCT VFR BLD CALC: 45.3 % (ref 41–53)
HEMOGLOBIN: 15.6 G/DL (ref 13.5–17.5)
LIPASE: 19 U/L (ref 13–60)
MAGNESIUM: 2.1 MG/DL (ref 1.6–2.6)
MCH RBC QN AUTO: 28.7 PG (ref 26–34)
MCHC RBC AUTO-ENTMCNC: 34.5 G/DL (ref 31–37)
MCV RBC AUTO: 83.2 FL (ref 80–100)
NRBC AUTOMATED: ABNORMAL PER 100 WBC
PDW BLD-RTO: 18.1 % (ref 11.5–14.5)
PHOSPHORUS: 2.9 MG/DL (ref 2.5–4.5)
PLATELET # BLD: 147 K/UL (ref 130–400)
PMV BLD AUTO: 8 FL (ref 6–12)
POTASSIUM SERPL-SCNC: 4.7 MMOL/L (ref 3.7–5.3)
POTASSIUM SERPL-SCNC: 5.4 MMOL/L (ref 3.7–5.3)
RBC # BLD: 5.44 M/UL (ref 4.5–5.9)
SODIUM BLD-SCNC: 132 MMOL/L (ref 135–144)
TOTAL PROTEIN: 7 G/DL (ref 6.4–8.3)
TOTAL PROTEIN: 7.4 G/DL (ref 6.4–8.3)
WBC # BLD: 11.9 K/UL (ref 3.5–11)

## 2018-09-27 PROCEDURE — 74019 RADEX ABDOMEN 2 VIEWS: CPT

## 2018-09-27 PROCEDURE — 6370000000 HC RX 637 (ALT 250 FOR IP): Performed by: NURSE PRACTITIONER

## 2018-09-27 PROCEDURE — 80076 HEPATIC FUNCTION PANEL: CPT

## 2018-09-27 PROCEDURE — 90686 IIV4 VACC NO PRSV 0.5 ML IM: CPT | Performed by: INTERNAL MEDICINE

## 2018-09-27 PROCEDURE — 2580000003 HC RX 258: Performed by: INTERNAL MEDICINE

## 2018-09-27 PROCEDURE — 0D9670Z DRAINAGE OF STOMACH WITH DRAINAGE DEVICE, VIA NATURAL OR ARTIFICIAL OPENING: ICD-10-PCS | Performed by: INTERNAL MEDICINE

## 2018-09-27 PROCEDURE — 93005 ELECTROCARDIOGRAM TRACING: CPT

## 2018-09-27 PROCEDURE — 84100 ASSAY OF PHOSPHORUS: CPT

## 2018-09-27 PROCEDURE — G0008 ADMIN INFLUENZA VIRUS VAC: HCPCS | Performed by: INTERNAL MEDICINE

## 2018-09-27 PROCEDURE — 80053 COMPREHEN METABOLIC PANEL: CPT

## 2018-09-27 PROCEDURE — 99232 SBSQ HOSP IP/OBS MODERATE 35: CPT | Performed by: INTERNAL MEDICINE

## 2018-09-27 PROCEDURE — C9113 INJ PANTOPRAZOLE SODIUM, VIA: HCPCS | Performed by: INTERNAL MEDICINE

## 2018-09-27 PROCEDURE — 1200000000 HC SEMI PRIVATE

## 2018-09-27 PROCEDURE — 83735 ASSAY OF MAGNESIUM: CPT

## 2018-09-27 PROCEDURE — 2580000003 HC RX 258: Performed by: NURSE PRACTITIONER

## 2018-09-27 PROCEDURE — 6360000002 HC RX W HCPCS: Performed by: INTERNAL MEDICINE

## 2018-09-27 PROCEDURE — 82150 ASSAY OF AMYLASE: CPT

## 2018-09-27 PROCEDURE — 2500000003 HC RX 250 WO HCPCS: Performed by: INTERNAL MEDICINE

## 2018-09-27 PROCEDURE — 6360000002 HC RX W HCPCS: Performed by: NURSE PRACTITIONER

## 2018-09-27 PROCEDURE — 36415 COLL VENOUS BLD VENIPUNCTURE: CPT

## 2018-09-27 PROCEDURE — 74018 RADEX ABDOMEN 1 VIEW: CPT

## 2018-09-27 PROCEDURE — 83690 ASSAY OF LIPASE: CPT

## 2018-09-27 PROCEDURE — 82947 ASSAY GLUCOSE BLOOD QUANT: CPT

## 2018-09-27 PROCEDURE — 84132 ASSAY OF SERUM POTASSIUM: CPT

## 2018-09-27 PROCEDURE — 85027 COMPLETE CBC AUTOMATED: CPT

## 2018-09-27 RX ORDER — COLCHICINE 0.6 MG/1
0.6 TABLET ORAL 2 TIMES DAILY PRN
Status: ON HOLD | COMMUNITY
End: 2022-01-25

## 2018-09-27 RX ORDER — IBUPROFEN 200 MG
200 TABLET ORAL DAILY PRN
COMMUNITY
End: 2019-01-03

## 2018-09-27 RX ORDER — 0.9 % SODIUM CHLORIDE 0.9 %
10 VIAL (ML) INJECTION DAILY
Status: DISCONTINUED | OUTPATIENT
Start: 2018-09-27 | End: 2018-10-02 | Stop reason: HOSPADM

## 2018-09-27 RX ORDER — METOPROLOL TARTRATE 5 MG/5ML
5 INJECTION INTRAVENOUS EVERY 6 HOURS
Status: DISCONTINUED | OUTPATIENT
Start: 2018-09-27 | End: 2018-10-01

## 2018-09-27 RX ORDER — PANTOPRAZOLE SODIUM 40 MG/10ML
40 INJECTION, POWDER, LYOPHILIZED, FOR SOLUTION INTRAVENOUS DAILY
Status: DISCONTINUED | OUTPATIENT
Start: 2018-09-27 | End: 2018-10-02 | Stop reason: HOSPADM

## 2018-09-27 RX ORDER — DEXTROSE AND SODIUM CHLORIDE 5; .45 G/100ML; G/100ML
INJECTION, SOLUTION INTRAVENOUS CONTINUOUS
Status: DISCONTINUED | OUTPATIENT
Start: 2018-09-27 | End: 2018-10-02 | Stop reason: HOSPADM

## 2018-09-27 RX ADMIN — HYDROMORPHONE HYDROCHLORIDE 0.5 MG: 1 INJECTION, SOLUTION INTRAMUSCULAR; INTRAVENOUS; SUBCUTANEOUS at 11:42

## 2018-09-27 RX ADMIN — INFLUENZA A VIRUS A/MICHIGAN/45/2015 X-275 (H1N1) ANTIGEN (FORMALDEHYDE INACTIVATED), INFLUENZA A VIRUS A/SINGAPORE/INFIMH-16-0019/2016 IVR-186 (H3N2) ANTIGEN (FORMALDEHYDE INACTIVATED), INFLUENZA B VIRUS B/PHUKET/3073/2013 ANTIGEN (FORMALDEHYDE INACTIVATED), AND INFLUENZA B VIRUS B/MARYLAND/15/2016 BX-69A ANTIGEN (FORMALDEHYDE INACTIVATED) 0.5 ML: 15; 15; 15; 15 INJECTION, SUSPENSION INTRAMUSCULAR at 10:14

## 2018-09-27 RX ADMIN — SOTALOL HYDROCHLORIDE 160 MG: 80 TABLET ORAL at 08:17

## 2018-09-27 RX ADMIN — DEXTROSE AND SODIUM CHLORIDE: 5; 450 INJECTION, SOLUTION INTRAVENOUS at 23:12

## 2018-09-27 RX ADMIN — INSULIN LISPRO 4 UNITS: 100 INJECTION, SOLUTION INTRAVENOUS; SUBCUTANEOUS at 04:44

## 2018-09-27 RX ADMIN — HYDROMORPHONE HYDROCHLORIDE 0.5 MG: 1 INJECTION, SOLUTION INTRAMUSCULAR; INTRAVENOUS; SUBCUTANEOUS at 03:55

## 2018-09-27 RX ADMIN — DEXTROSE AND SODIUM CHLORIDE: 5; 450 INJECTION, SOLUTION INTRAVENOUS at 14:56

## 2018-09-27 RX ADMIN — HYDROMORPHONE HYDROCHLORIDE 0.5 MG: 1 INJECTION, SOLUTION INTRAMUSCULAR; INTRAVENOUS; SUBCUTANEOUS at 18:06

## 2018-09-27 RX ADMIN — ONDANSETRON 4 MG: 2 INJECTION INTRAMUSCULAR; INTRAVENOUS at 14:56

## 2018-09-27 RX ADMIN — HYDROMORPHONE HYDROCHLORIDE 0.5 MG: 1 INJECTION, SOLUTION INTRAMUSCULAR; INTRAVENOUS; SUBCUTANEOUS at 21:07

## 2018-09-27 RX ADMIN — METOPROLOL TARTRATE 5 MG: 5 INJECTION, SOLUTION INTRAVENOUS at 17:22

## 2018-09-27 RX ADMIN — METOPROLOL TARTRATE 25 MG: 25 TABLET ORAL at 08:17

## 2018-09-27 RX ADMIN — PANTOPRAZOLE SODIUM 40 MG: 40 INJECTION, POWDER, FOR SOLUTION INTRAVENOUS at 17:21

## 2018-09-27 RX ADMIN — HYDROMORPHONE HYDROCHLORIDE 0.5 MG: 1 INJECTION, SOLUTION INTRAMUSCULAR; INTRAVENOUS; SUBCUTANEOUS at 14:56

## 2018-09-27 RX ADMIN — ONDANSETRON 4 MG: 2 INJECTION INTRAMUSCULAR; INTRAVENOUS at 08:11

## 2018-09-27 RX ADMIN — SODIUM CHLORIDE: 9 INJECTION, SOLUTION INTRAVENOUS at 06:29

## 2018-09-27 RX ADMIN — HYDROMORPHONE HYDROCHLORIDE 0.5 MG: 1 INJECTION, SOLUTION INTRAMUSCULAR; INTRAVENOUS; SUBCUTANEOUS at 08:11

## 2018-09-27 RX ADMIN — Medication 10 ML: at 17:22

## 2018-09-27 ASSESSMENT — PAIN DESCRIPTION - DESCRIPTORS
DESCRIPTORS: ACHING;CONSTANT;DISCOMFORT
DESCRIPTORS: ACHING;DISCOMFORT
DESCRIPTORS: ACHING;CONSTANT;DISCOMFORT

## 2018-09-27 ASSESSMENT — PAIN SCALES - GENERAL
PAINLEVEL_OUTOF10: 6
PAINLEVEL_OUTOF10: 5
PAINLEVEL_OUTOF10: 10
PAINLEVEL_OUTOF10: 5
PAINLEVEL_OUTOF10: 7
PAINLEVEL_OUTOF10: 5
PAINLEVEL_OUTOF10: 8
PAINLEVEL_OUTOF10: 7
PAINLEVEL_OUTOF10: 5
PAINLEVEL_OUTOF10: 3
PAINLEVEL_OUTOF10: 7
PAINLEVEL_OUTOF10: 4
PAINLEVEL_OUTOF10: 7

## 2018-09-27 ASSESSMENT — PAIN DESCRIPTION - LOCATION
LOCATION: ABDOMEN

## 2018-09-27 ASSESSMENT — PAIN DESCRIPTION - ORIENTATION
ORIENTATION: LEFT;LOWER
ORIENTATION: LEFT;UPPER

## 2018-09-27 ASSESSMENT — PAIN DESCRIPTION - PAIN TYPE
TYPE: ACUTE PAIN

## 2018-09-27 ASSESSMENT — PAIN DESCRIPTION - FREQUENCY
FREQUENCY: CONTINUOUS

## 2018-09-27 ASSESSMENT — PAIN DESCRIPTION - ONSET
ONSET: ON-GOING

## 2018-09-27 ASSESSMENT — PAIN DESCRIPTION - PROGRESSION
CLINICAL_PROGRESSION: NOT CHANGED

## 2018-09-27 NOTE — H&P
CYSTOSCOPY N/A     stent in place taped to penis head    CYSTOSCOPY  3/4/16    with bilateral pyelogram    HERNIA REPAIR      umbilical    LITHOTRIPSY  3/4/16    laser lithotripsy with ureteral dilation    PACEMAKER PLACEMENT      pacemaker/defib        Medications Prior to Admission:     Prior to Admission medications    Medication Sig Start Date End Date Taking? Authorizing Provider   apixaban (ELIQUIS) 5 MG TABS tablet Take 5 mg by mouth 2 times daily  6/21/18  Yes Historical Provider, MD   pravastatin (PRAVACHOL) 10 MG tablet Take 10 mg by mouth 9/6/18  Yes Historical Provider, MD   allopurinol (ZYLOPRIM) 300 MG tablet Take 300 mg by mouth daily 1/12/18  Yes Historical Provider, MD   metFORMIN (GLUCOPHAGE) 1000 MG tablet Take 1,000 mg by mouth daily   Yes Historical Provider, MD   Dulaglutide (TRULICITY SC) Inject 3.68 mg into the skin once a week    Yes Historical Provider, MD   tamsulosin (FLOMAX) 0.4 MG capsule Take 1 capsule by mouth daily 3/4/16  Yes Dallas Oliveira MD   dapagliflozin (FARXIGA) 10 MG tablet Take 10 mg by mouth every morning   Yes Historical Provider, MD   metoprolol (LOPRESSOR) 25 MG tablet Take 25 mg by mouth 2 times daily. Yes Historical Provider, MD   sitaGLIPtan (JANUVIA) 100 MG tablet Take 100 mg by mouth daily. Yes Historical Provider, MD   omeprazole (PRILOSEC) 20 MG capsule Take 20 mg by mouth daily. Yes Historical Provider, MD   aspirin 81 MG tablet Take 81 mg by mouth daily. Yes Historical Provider, MD   sotalol (BETAPACE) 120 MG tablet Take 160 mg by mouth 2 times daily    Yes Historical Provider, MD   lisinopril (PRINIVIL;ZESTRIL) 10 MG tablet Take 10 mg by mouth daily. Yes Historical Provider, MD        Allergies:     Benadryl [diphenhydramine] and Pcn [penicillins]    Social History:     Tobacco:    reports that he quit smoking about 6 years ago. He has a 2.00 pack-year smoking history.  He has never used smokeless tobacco.  Alcohol:      reports that he drinks external ear, no discharge, hearing intact  Nose:  No drainage noted  Mouth: Mucous membranes moist  Neck: Supple, no carotid bruits, thyroid not palpable  Lungs: Bilateral equal air entry, clear to auscultation, no wheezing, rales or rhonchi, normal effort. Cardiovascular: Normal rate, regular rhythm, no murmur, gallop, rub. Abdomen: Firm, left upper and lower quadrant tenderness, distended, normal bowel sounds. Neurologic: There are no new focal motor or sensory deficits, normal muscle tone and bulk, no abnormal sensation, normal speech, cranial nerves II through XII grossly intact. Skin: No gross lesions, rashes, bruising or bleeding on exposed skin area. Extremities:  Peripheral pulses palpable, no pedal edema or calf pain with palpation. Psych: Normal affect.      Investigations:      Laboratory Testing:  Recent Results (from the past 24 hour(s))   CBC with DIFF    Collection Time: 09/26/18  6:34 PM   Result Value Ref Range    WBC 9.5 3.5 - 11.0 k/uL    RBC 5.88 4.5 - 5.9 m/uL    Hemoglobin 16.8 13.5 - 17.5 g/dL    Hematocrit 49.7 41 - 53 %    MCV 84.4 80 - 100 fL    MCH 28.5 26 - 34 pg    MCHC 33.8 31 - 37 g/dL    RDW 17.1 (H) 11.5 - 14.5 %    Platelets 784 312 - 230 k/uL    MPV NOT REPORTED 6.0 - 12.0 fL    NRBC Automated NOT REPORTED per 100 WBC    Differential Type NOT REPORTED     Immature Granulocytes NOT REPORTED 0 %    Absolute Immature Granulocyte NOT REPORTED 0.00 - 0.30 k/uL    WBC Morphology NOT REPORTED     RBC Morphology NOT REPORTED     Platelet Estimate NOT REPORTED     Seg Neutrophils 81 (H) 36 - 66 %    Lymphocytes 10 (L) 24 - 44 %    Monocytes 7 1 - 7 %    Eosinophils % 2 1 - 4 %    Basophils 0 0 - 2 %    Segs Absolute 7.70 1.8 - 7.7 k/uL    Absolute Lymph # 0.90 (L) 1.0 - 4.8 k/uL    Absolute Mono # 0.70 0.2 - 0.8 k/uL    Absolute Eos # 0.20 0.0 - 0.4 k/uL    Basophils # 0.00 0.0 - 0.2 k/uL   Basic Metabolic Prof    Collection Time: 09/26/18  6:34 PM   Result Value Ref Range    Glucose reduce the radiation dose to as low as reasonably achievable. COMPARISON: None. Splenomegaly. Loops of small bowel within the left abdomen are mildly dilated measuring up to 3.4 cm. No discrete transition point visualized. No free air or fluid collection. Assessment :      Primary Problem  SBO (small bowel obstruction) [facility])    Active Hospital Problems    Diagnosis Date Noted    Essential hypertension [I10] 09/26/2018    Obesity (BMI 30-39. 9) [E66.9] 09/26/2018    Hyperkalemia [E87.5] 09/26/2018    SBO (small bowel obstruction) ([facility] [K56.609] 02/01/2018    Diabetes mellitus type 2 in obese ([facility] [E11.69, E66.9] 07/10/2014    Hyponatremia [E87.1] 07/10/2014       Plan:     Patient status Admit as inpatient to the medical surgical unit. 1. Admit as inpatient. 2. Consult surgery. 3. IV fluids. 4. Nausea control. 5. Pain control. 6. Restart home medications. 7. Monitor and control blood glucose. 8. Electrolyte replacement per siding scale. 9. NPO after midnight, may take medications with sips of water. 10. Activity as tolerated. Consultations:   IP CONSULT TO HOSPITALIST  IP CONSULT TO GENERAL SURGERY    Patient is admitted as inpatient status because of co-morbidities listed above, severity of signs and symptoms as outlined, requirement for current medical therapies and most importantly because of direct risk to patient if care not provided in a hospital setting.     FABIANO Larson - CNP  9/26/2018  10:16 PM    Copy sent to Dr. Kelsey Valdez MD

## 2018-09-27 NOTE — CONSULTS
both his CT scan from yesterday and plain abdominal x-rays from today. Once again there is no evidence of recurrent hernia by CT. The CT scan did not show very much impressive bowel distention but plain films show primarily gaseous distention of proximal small bowel loops which corresponds to the dilated loops on the CT. There is only a very tiny amount of colon gas present on today's films consistent with persistent high-grade partial small bowel obstruction. Surgery is consulted for evaluation and treatment of his high-grade small bowel obstruction. Past Medical History:    Past Medical History:   Diagnosis Date    ADHD, predominantly hyperactive type     Anemia     Blood clot due to device, implant, or graft     IV site L forearm    Bowel obstruction (HCC)     CAD (coronary artery disease)     Cardiomyopathy ([address]     Diabetes mellitus ([address]     Diverticulitis 1996    GERD (gastroesophageal reflux disease)     History of atrial fibrillation     History of ventricular tachycardia     Kidney calculi     Obesity     Sleep apnea     uses CPAP nightly        Past Surgical History:    Past Surgical History:   Procedure Laterality Date    ABDOMEN SURGERY  [address]    umbillical hernia with mesh and lysis of adhesions    ABDOMINAL HERNIA REPAIR  7/7/2014    RECURRENT HERNIA REPAIR WITH MESH    APPENDECTOMY      CARDIAC DEFIBRILLATOR PLACEMENT      CHOLECYSTECTOMY      COLECTOMY  1996    d/t ruptured diverticuli    CYSTOSCOPY N/A     stent in place taped to penis head    CYSTOSCOPY  3/4/16    with bilateral pyelogram    HERNIA REPAIR      umbilical    LITHOTRIPSY  3/4/16    laser lithotripsy with ureteral dilation    PACEMAKER PLACEMENT      pacemaker/defib        Medications Prior to Admission:     Prior to Admission medications    Medication Sig Start Date End Date Taking?  Authorizing Provider   apixaban (ELIQUIS) 5 MG TABS tablet Take 5 mg by mouth 2 times daily  6/21/18  Yes Historical Provider, MD   pravastatin (PRAVACHOL) 10 MG tablet Take 10 mg by mouth 9/6/18  Yes Historical Provider, MD   allopurinol (ZYLOPRIM) 300 MG tablet Take 300 mg by mouth daily 1/12/18  Yes Historical Provider, MD   metFORMIN (GLUCOPHAGE) 1000 MG tablet Take 1,000 mg by mouth daily   Yes Historical Provider, MD   Dulaglutide (TRULICITY SC) Inject 4.02 mg into the skin once a week    Yes Historical Provider, MD   tamsulosin (FLOMAX) 0.4 MG capsule Take 1 capsule by mouth daily 3/4/16  Yes Wilner Retana MD   dapagliflozin (FARXIGA) 10 MG tablet Take 10 mg by mouth every morning   Yes Historical Provider, MD   metoprolol (LOPRESSOR) 25 MG tablet Take 25 mg by mouth 2 times daily. Yes Historical Provider, MD   sitaGLIPtan (JANUVIA) 100 MG tablet Take 100 mg by mouth daily. Yes Historical Provider, MD   omeprazole (PRILOSEC) 20 MG capsule Take 20 mg by mouth daily. Yes Historical Provider, MD   aspirin 81 MG tablet Take 81 mg by mouth daily. Yes Historical Provider, MD   sotalol (BETAPACE) 120 MG tablet Take 160 mg by mouth 2 times daily    Yes Historical Provider, MD   lisinopril (PRINIVIL;ZESTRIL) 10 MG tablet Take 10 mg by mouth daily. Yes Historical Provider, MD        Allergies:  Benadryl [diphenhydramine] and Pcn [penicillins]    Social History:   Tobacco:    reports that he quit smoking about 6 years ago. He has a 2.00 pack-year smoking history. He has never used smokeless tobacco.  Alcohol:      reports that he drinks alcohol. Drug Use:  reports that he does not use drugs. Family History:   Family History   Problem Relation Age of Onset    Heart Disease Mother     Heart Disease Father        REVIEW OF SYSTE  Constitutional: He denies any weight loss fever or chills and was feeling well prior to the onset of his acute symptoms. Respiratory: He denies chronic cough sputum production shortness of breath or wheezing. He is a former smoker and quit about 7 years ago.   Cardiac: He denies

## 2018-09-27 NOTE — CONSULTS
[address] [facility] [address]                 [address] [facility], [address] [place] [address]                                   CONSULTATION    PATIENT NAME: Josesito Morrow                   :        1965  MED REC NO:   [id number]                             ROOM:         ACCOUNT NO:   [de-identified]                           ADMIT DATE: 2018  PROVIDER:     Tana Winkler    CONSULT DATE:  2018    REASON FOR CONSULTATION:  Ventricular tachycardia, history of defibrillator  implantation, currently the patient has bowel obstruction and unable to  take oral sotalol. CHIEF COMPLAINT:  Abdominal pain. HISTORY OF PRESENT ILLNESS:  The patient is a 40-year-old male who has  history of ventricular tachycardia with subsequent defibrillator  implantation, usually follows with [facility] cardiologist, presented to [facility] with chief complaint of abdominal pain and bloating. He  was diagnosed as having bowel obstruction. He apparently has had history  of diverticular disease and prior perforated diverticula. Cardiology  service was consulted as patient is on chronic oral suppressive therapy  with sotalol for ventricular tachycardia and he has a defibrillator that  was implanted for secondary prevention. PAST MEDICAL HISTORY:  1. Cardiomyopathy. 2.  History of defibrillator implantation for ventricular tachycardia. 3.  History of paroxysmal atrial fibrillation. 4.  Sleep apnea syndrome. 5.  Diabetes mellitus. 6.  Diverticular disease. 7.  Gastroesophageal reflux disease. 8.  Attention deficit hyperactivity disorder. PAST SURGICAL HISTORY:  Positive for recurrent abdominal hernias with mesh  repair, appendectomy, history of ICD implantation, cystoscopy, and  abdominal surgeries. ALLERGIES:  To PENICILLIN class drugs and BENADRYL. SOCIAL HISTORY:  The patient has had two-pack year history of smoking. He  apparently quit smoking 6 years ago.

## 2018-09-27 NOTE — FLOWSHEET NOTE
Patient was anointed by  [facility]. 09/27/18 0906   Encounter Summary   Services provided to: Patient   Referral/Consult From: Rounding   Place of [address] [place] [address] PT Place   Continue Visiting (4-99-16 PT: anointed)   [facility]   Sacrament of Sick-Anointing Anointed  (9-27-18 by Fr. Thomas Whitaker)

## 2018-09-27 NOTE — PROGRESS NOTES
Patient admitted to room 2024 From [facility] bed/stretcher with belongings and family at bedside. VSS  Patient oriented to room and call lig  Admission database done. Call light in reach. Will continue to monitor.

## 2018-09-27 NOTE — ED PROVIDER NOTES
[address] [place] [address] ED  eMERGENCY dEPARTMENT eNCOUnter      Pt Name: Yohan Welsh  MRN: [id number]  [place] 1965  Date of evaluation: 9/26/2018  Provider: Olvin Landaverde MD    CHIEF COMPLAINT       Chief Complaint   Patient presents with    Abdominal Pain         HISTORY OF PRESENT ILLNESS  (Location/Symptom, Timing/Onset, Context/Setting, Quality, Duration, Modifying Factors, Severity.)   Yohan Welsh is a 48 y.o. male who presents to the emergency department Presents with abdominal pain nausea. She also complains of a bloating sensation. Patient states he has 2 distinct pains a pain in the left lower quadrant that is an 8 on a scale of 1-10 exacerbated by palpation. He states that also has a 2nd pain that is a bloating type pain in the left upper quadrant consistent with his previous early bowel obstruction. Patient is on a liquid as she has a history of pacemaker. States pains a 10 on a scale of 1-10       Nursing Notes were reviewed. ALLERGIES     Benadryl [diphenhydramine] and Pcn [penicillins]    CURRENT MEDICATIONS       Previous Medications    ALLOPURINOL (ZYLOPRIM) 300 MG TABLET    Take 300 mg by mouth daily    APIXABAN (ELIQUIS) 5 MG TABS TABLET    Take 5 mg by mouth 2 times daily     ASPIRIN 81 MG TABLET    Take 81 mg by mouth daily. DAPAGLIFLOZIN (FARXIGA) 10 MG TABLET    Take 10 mg by mouth every morning    DULAGLUTIDE (TRULICITY SC)    Inject 0.75 mg into the skin once a week     LISINOPRIL (PRINIVIL;ZESTRIL) 10 MG TABLET    Take 10 mg by mouth daily. METFORMIN (GLUCOPHAGE) 1000 MG TABLET    Take 1,000 mg by mouth daily    METOPROLOL (LOPRESSOR) 25 MG TABLET    Take 25 mg by mouth 2 times daily. OMEPRAZOLE (PRILOSEC) 20 MG CAPSULE    Take 20 mg by mouth daily. PRAVASTATIN (PRAVACHOL) 10 MG TABLET    Take 10 mg by mouth    SITAGLIPTAN (JANUVIA) 100 MG TABLET    Take 100 mg by mouth daily.     SOTALOL (BETAPACE) 120 MG TABLET    Take 160 mg by mouth 2 times daily TAMSULOSIN (FLOMAX) 0.4 MG CAPSULE    Take 1 capsule by mouth daily       PAST MEDICAL HISTORY         Diagnosis Date    ADHD, predominantly hyperactive type     Anemia     Blood clot due to device, implant, or graft     IV site L forearm    Bowel obstruction (HCC)     CAD (coronary artery disease)     Cardiomyopathy ([facility]     Diabetes mellitus ([facility]     Diverticulitis 1996    GERD (gastroesophageal reflux disease)     History of atrial fibrillation     History of ventricular tachycardia     Hypertension     Kidney calculi     Obesity     Sleep apnea     uses CPAP nightly       SURGICAL HISTORY           Procedure Laterality Date    ABDOMEN SURGERY  [address]    umbillical hernia with mesh and lysis of adhesions    ABDOMINAL HERNIA REPAIR  7/7/2014    RECURRENT HERNIA REPAIR WITH MESH    APPENDECTOMY      CARDIAC DEFIBRILLATOR PLACEMENT      CHOLECYSTECTOMY      COLECTOMY  1996    d/t ruptured diverticuli    CYSTOSCOPY N/A     stent in place taped to penis head    CYSTOSCOPY  3/4/16    with bilateral pyelogram    HERNIA REPAIR      umbilical    LITHOTRIPSY  3/4/16    laser lithotripsy with ureteral dilation    PACEMAKER PLACEMENT      pacemaker/defib         FAMILY HISTORY     History reviewed. No pertinent family history. No family status information on file. SOCIAL HISTORY      reports that he quit smoking about 6 years ago. He has a 2.00 pack-year smoking history. He has never used smokeless tobacco. He reports that he drinks alcohol. He reports that he does not use drugs. REVIEW OF SYSTEMS    (2-9 systems for level 4, 10 or more for level 5)     Review of Systems   Constitutional: Positive for activity change, appetite change and fatigue. Gastrointestinal: Positive for abdominal distention, abdominal pain and nausea. Negative for constipation, diarrhea and vomiting. Genitourinary: Negative for dysuria and frequency. Skin: Negative.     Neurological: Negative for

## 2018-09-28 ENCOUNTER — APPOINTMENT (OUTPATIENT)
Dept: GENERAL RADIOLOGY | Age: 53
DRG: 389 | End: 2018-09-28
Payer: COMMERCIAL

## 2018-09-28 LAB
ABSOLUTE EOS #: 0.1 K/UL (ref 0–0.4)
ABSOLUTE IMMATURE GRANULOCYTE: ABNORMAL K/UL (ref 0–0.3)
ABSOLUTE LYMPH #: 0.6 K/UL (ref 1–4.8)
ABSOLUTE MONO #: 0.7 K/UL (ref 0.2–0.8)
ALBUMIN SERPL-MCNC: 4.1 G/DL (ref 3.5–5.2)
ALBUMIN/GLOBULIN RATIO: ABNORMAL (ref 1–2.5)
ALP BLD-CCNC: 58 U/L (ref 40–129)
ALT SERPL-CCNC: 53 U/L (ref 5–41)
ANION GAP SERPL CALCULATED.3IONS-SCNC: 10 MMOL/L (ref 9–17)
AST SERPL-CCNC: 29 U/L
BASOPHILS # BLD: 0 % (ref 0–2)
BASOPHILS ABSOLUTE: 0 K/UL (ref 0–0.2)
BILIRUB SERPL-MCNC: 2.51 MG/DL (ref 0.3–1.2)
BILIRUBIN DIRECT: 0.77 MG/DL
BILIRUBIN, INDIRECT: 1.74 MG/DL (ref 0–1)
BUN BLDV-MCNC: 17 MG/DL (ref 6–20)
BUN/CREAT BLD: 18 (ref 9–20)
CALCIUM SERPL-MCNC: 8.7 MG/DL (ref 8.6–10.4)
CHLORIDE BLD-SCNC: 101 MMOL/L (ref 98–107)
CO2: 25 MMOL/L (ref 20–31)
CREAT SERPL-MCNC: 0.93 MG/DL (ref 0.7–1.2)
DIFFERENTIAL TYPE: ABNORMAL
EOSINOPHILS RELATIVE PERCENT: 1 % (ref 1–4)
GFR AFRICAN AMERICAN: >60 ML/MIN
GFR NON-AFRICAN AMERICAN: >60 ML/MIN
GFR SERPL CREATININE-BSD FRML MDRD: ABNORMAL ML/MIN/{1.73_M2}
GFR SERPL CREATININE-BSD FRML MDRD: ABNORMAL ML/MIN/{1.73_M2}
GLOBULIN: ABNORMAL G/DL (ref 1.5–3.8)
GLUCOSE BLD-MCNC: 124 MG/DL (ref 75–110)
GLUCOSE BLD-MCNC: 135 MG/DL (ref 75–110)
GLUCOSE BLD-MCNC: 143 MG/DL (ref 75–110)
GLUCOSE BLD-MCNC: 151 MG/DL (ref 75–110)
GLUCOSE BLD-MCNC: 152 MG/DL (ref 75–110)
GLUCOSE BLD-MCNC: 162 MG/DL (ref 70–99)
GLUCOSE BLD-MCNC: 167 MG/DL (ref 75–110)
HCT VFR BLD CALC: 41.5 % (ref 41–53)
HEMOGLOBIN: 14.7 G/DL (ref 13.5–17.5)
IMMATURE GRANULOCYTES: ABNORMAL %
LV EF: 55 %
LVEF MODALITY: NORMAL
LYMPHOCYTES # BLD: 8 % (ref 24–44)
MAGNESIUM: 1.8 MG/DL (ref 1.6–2.6)
MCH RBC QN AUTO: 28.9 PG (ref 26–34)
MCHC RBC AUTO-ENTMCNC: 35.4 G/DL (ref 31–37)
MCV RBC AUTO: 81.7 FL (ref 80–100)
MONOCYTES # BLD: 9 % (ref 1–7)
NRBC AUTOMATED: ABNORMAL PER 100 WBC
PDW BLD-RTO: 17.6 % (ref 11.5–14.5)
PLATELET # BLD: 126 K/UL (ref 130–400)
PLATELET ESTIMATE: ABNORMAL
PMV BLD AUTO: 8.1 FL (ref 6–12)
POTASSIUM SERPL-SCNC: 4 MMOL/L (ref 3.7–5.3)
RBC # BLD: 5.08 M/UL (ref 4.5–5.9)
RBC # BLD: ABNORMAL 10*6/UL
SEG NEUTROPHILS: 82 % (ref 36–66)
SEGMENTED NEUTROPHILS ABSOLUTE COUNT: 6.1 K/UL (ref 1.8–7.7)
SODIUM BLD-SCNC: 136 MMOL/L (ref 135–144)
TOTAL PROTEIN: 6.7 G/DL (ref 6.4–8.3)
WBC # BLD: 7.4 K/UL (ref 3.5–11)
WBC # BLD: ABNORMAL 10*3/UL

## 2018-09-28 PROCEDURE — 6360000002 HC RX W HCPCS: Performed by: NURSE PRACTITIONER

## 2018-09-28 PROCEDURE — 99232 SBSQ HOSP IP/OBS MODERATE 35: CPT | Performed by: INTERNAL MEDICINE

## 2018-09-28 PROCEDURE — 36415 COLL VENOUS BLD VENIPUNCTURE: CPT

## 2018-09-28 PROCEDURE — 80076 HEPATIC FUNCTION PANEL: CPT

## 2018-09-28 PROCEDURE — 85025 COMPLETE CBC W/AUTO DIFF WBC: CPT

## 2018-09-28 PROCEDURE — 6370000000 HC RX 637 (ALT 250 FOR IP): Performed by: NURSE PRACTITIONER

## 2018-09-28 PROCEDURE — 2580000003 HC RX 258: Performed by: INTERNAL MEDICINE

## 2018-09-28 PROCEDURE — 6360000002 HC RX W HCPCS: Performed by: SURGERY

## 2018-09-28 PROCEDURE — 93306 TTE W/DOPPLER COMPLETE: CPT

## 2018-09-28 PROCEDURE — 80048 BASIC METABOLIC PNL TOTAL CA: CPT

## 2018-09-28 PROCEDURE — 6360000002 HC RX W HCPCS: Performed by: INTERNAL MEDICINE

## 2018-09-28 PROCEDURE — 74019 RADEX ABDOMEN 2 VIEWS: CPT

## 2018-09-28 PROCEDURE — 82947 ASSAY GLUCOSE BLOOD QUANT: CPT

## 2018-09-28 PROCEDURE — 1200000000 HC SEMI PRIVATE

## 2018-09-28 PROCEDURE — 83735 ASSAY OF MAGNESIUM: CPT

## 2018-09-28 PROCEDURE — C9113 INJ PANTOPRAZOLE SODIUM, VIA: HCPCS | Performed by: INTERNAL MEDICINE

## 2018-09-28 PROCEDURE — 2500000003 HC RX 250 WO HCPCS: Performed by: INTERNAL MEDICINE

## 2018-09-28 RX ORDER — MAGNESIUM SULFATE 1 G/100ML
1 INJECTION INTRAVENOUS ONCE
Status: COMPLETED | OUTPATIENT
Start: 2018-09-28 | End: 2018-09-28

## 2018-09-28 RX ADMIN — METOPROLOL TARTRATE 5 MG: 5 INJECTION, SOLUTION INTRAVENOUS at 23:56

## 2018-09-28 RX ADMIN — ONDANSETRON 4 MG: 2 INJECTION INTRAMUSCULAR; INTRAVENOUS at 15:24

## 2018-09-28 RX ADMIN — ONDANSETRON 4 MG: 2 INJECTION INTRAMUSCULAR; INTRAVENOUS at 09:38

## 2018-09-28 RX ADMIN — INSULIN LISPRO 2 UNITS: 100 INJECTION, SOLUTION INTRAVENOUS; SUBCUTANEOUS at 03:31

## 2018-09-28 RX ADMIN — HYDROMORPHONE HYDROCHLORIDE 0.5 MG: 1 INJECTION, SOLUTION INTRAMUSCULAR; INTRAVENOUS; SUBCUTANEOUS at 00:48

## 2018-09-28 RX ADMIN — INSULIN LISPRO 2 UNITS: 100 INJECTION, SOLUTION INTRAVENOUS; SUBCUTANEOUS at 12:51

## 2018-09-28 RX ADMIN — METOPROLOL TARTRATE 5 MG: 5 INJECTION, SOLUTION INTRAVENOUS at 12:51

## 2018-09-28 RX ADMIN — METOPROLOL TARTRATE 5 MG: 5 INJECTION, SOLUTION INTRAVENOUS at 17:22

## 2018-09-28 RX ADMIN — HYDROMORPHONE HYDROCHLORIDE 0.5 MG: 1 INJECTION, SOLUTION INTRAMUSCULAR; INTRAVENOUS; SUBCUTANEOUS at 09:55

## 2018-09-28 RX ADMIN — DEXTROSE AND SODIUM CHLORIDE: 5; 450 INJECTION, SOLUTION INTRAVENOUS at 06:42

## 2018-09-28 RX ADMIN — HYDROMORPHONE HYDROCHLORIDE 0.5 MG: 1 INJECTION, SOLUTION INTRAMUSCULAR; INTRAVENOUS; SUBCUTANEOUS at 17:22

## 2018-09-28 RX ADMIN — PANTOPRAZOLE SODIUM 40 MG: 40 INJECTION, POWDER, FOR SOLUTION INTRAVENOUS at 09:38

## 2018-09-28 RX ADMIN — INSULIN LISPRO 2 UNITS: 100 INJECTION, SOLUTION INTRAVENOUS; SUBCUTANEOUS at 17:22

## 2018-09-28 RX ADMIN — DEXTROSE AND SODIUM CHLORIDE: 5; 450 INJECTION, SOLUTION INTRAVENOUS at 15:24

## 2018-09-28 RX ADMIN — HYDROMORPHONE HYDROCHLORIDE 0.5 MG: 1 INJECTION, SOLUTION INTRAMUSCULAR; INTRAVENOUS; SUBCUTANEOUS at 20:26

## 2018-09-28 RX ADMIN — METOPROLOL TARTRATE 5 MG: 5 INJECTION, SOLUTION INTRAVENOUS at 06:40

## 2018-09-28 RX ADMIN — ONDANSETRON 4 MG: 2 INJECTION INTRAMUSCULAR; INTRAVENOUS at 21:56

## 2018-09-28 RX ADMIN — HYDROMORPHONE HYDROCHLORIDE 0.5 MG: 1 INJECTION, SOLUTION INTRAMUSCULAR; INTRAVENOUS; SUBCUTANEOUS at 06:40

## 2018-09-28 RX ADMIN — INSULIN LISPRO 2 UNITS: 100 INJECTION, SOLUTION INTRAVENOUS; SUBCUTANEOUS at 09:38

## 2018-09-28 RX ADMIN — DEXTROSE AND SODIUM CHLORIDE: 5; 450 INJECTION, SOLUTION INTRAVENOUS at 23:06

## 2018-09-28 RX ADMIN — HYDROMORPHONE HYDROCHLORIDE 0.5 MG: 1 INJECTION, SOLUTION INTRAMUSCULAR; INTRAVENOUS; SUBCUTANEOUS at 13:30

## 2018-09-28 RX ADMIN — METOPROLOL TARTRATE 5 MG: 5 INJECTION, SOLUTION INTRAVENOUS at 00:48

## 2018-09-28 RX ADMIN — MAGNESIUM SULFATE HEPTAHYDRATE 1 G: 1 INJECTION, SOLUTION INTRAVENOUS at 12:51

## 2018-09-28 RX ADMIN — HYDROMORPHONE HYDROCHLORIDE 0.5 MG: 1 INJECTION, SOLUTION INTRAMUSCULAR; INTRAVENOUS; SUBCUTANEOUS at 23:50

## 2018-09-28 ASSESSMENT — PAIN DESCRIPTION - LOCATION
LOCATION: ABDOMEN

## 2018-09-28 ASSESSMENT — PAIN DESCRIPTION - ONSET: ONSET: GRADUAL

## 2018-09-28 ASSESSMENT — PAIN SCALES - GENERAL
PAINLEVEL_OUTOF10: 3
PAINLEVEL_OUTOF10: 7
PAINLEVEL_OUTOF10: 4
PAINLEVEL_OUTOF10: 9
PAINLEVEL_OUTOF10: 7
PAINLEVEL_OUTOF10: 4
PAINLEVEL_OUTOF10: 3
PAINLEVEL_OUTOF10: 7
PAINLEVEL_OUTOF10: 8
PAINLEVEL_OUTOF10: 7
PAINLEVEL_OUTOF10: 7
PAINLEVEL_OUTOF10: 3

## 2018-09-28 ASSESSMENT — PAIN DESCRIPTION - FREQUENCY
FREQUENCY: INTERMITTENT
FREQUENCY: CONTINUOUS
FREQUENCY: CONTINUOUS

## 2018-09-28 ASSESSMENT — PAIN DESCRIPTION - PAIN TYPE
TYPE: ACUTE PAIN

## 2018-09-28 ASSESSMENT — PAIN DESCRIPTION - DESCRIPTORS
DESCRIPTORS: ACHING;CONSTANT;DISCOMFORT
DESCRIPTORS: ACHING;CONSTANT;DISCOMFORT
DESCRIPTORS: ACHING;DISCOMFORT;SHARP

## 2018-09-28 ASSESSMENT — PAIN DESCRIPTION - ORIENTATION
ORIENTATION: UPPER
ORIENTATION: RIGHT;LEFT;UPPER
ORIENTATION: UPPER

## 2018-09-28 ASSESSMENT — PAIN DESCRIPTION - PROGRESSION: CLINICAL_PROGRESSION: GRADUALLY IMPROVING

## 2018-09-28 NOTE — PROGRESS NOTES
PRN       Data:     Code Status:  Full Code     XR Abdomen (2 Views)  Result Date: 9/27/2018  COMPARISON: 02/02/2018, CT study on 09/26/2018  FINDINGS: There are several adjacent dilated loops of small bowel measuring up to 5.4 cm, increased from the recent CT examination no free air is identified. There is some colonic gas noted as well. No pathologic appearing calcification is identified. Impression:  Mild worsening of dilated loops of small bowel in the central aspect of the abdomen, measuring up to 5.4 cm. No free air. This could be related to a partial small bowel obstruction or small-bowel ileus. CT Abdomen Pelvis W Iv Contrast  Result Date: 9/26/2018  FINDINGS:   Lower Chest: Unremarkable   Organs: Liver and pancreas are unremarkable. Cholecystectomy. Spleen is enlarged measuring up to 20 cm craniocaudal.   GI/Bowel: Postsurgical changes noted near the distal sigmoid colon. Appendix is not clearly visualized however no findings to suggest acute appendicitis. Loops of small bowel in the left abdomen are mildly dilated measuring up to 3.4 cm. No discrete transition point visualized. No pneumatosis or free air. Pelvis: No bladder stone. No pelvic fluid collection. No adenopathy. Peritoneum/Retroperitoneum: No abdominal aortic aneurysm. Adrenal glands are unremarkable. Left kidney is unremarkable. 3.0 x 2.9 cm right renal cyst.   Bones/Soft Tissues: No acute bony abnormality. Impression:  Splenomegaly. Loops of small bowel within the left abdomen are mildly dilated measuring up to 3.4 cm. No discrete transition point visualized. No free air or fluid collection.      Labs:    Hematology:  Recent Labs      09/26/18   1834  09/27/18   0534  09/28/18   0537   WBC  9.5  11.9*  7.4   RBC  5.88  5.44  5.08   HGB  16.8  15.6  14.7   HCT  49.7  45.3  41.5   MCV  84.4  83.2  81.7   MCH  28.5  28.7  28.9   MCHC  33.8  34.5  35.4   RDW  17.1*  18.1*  17.6*   PLT  145  147  126*   MPV  NOT REPORTED

## 2018-09-29 ENCOUNTER — APPOINTMENT (OUTPATIENT)
Dept: GENERAL RADIOLOGY | Age: 53
DRG: 389 | End: 2018-09-29
Payer: COMMERCIAL

## 2018-09-29 LAB
ABSOLUTE EOS #: 0.1 K/UL (ref 0–0.4)
ABSOLUTE IMMATURE GRANULOCYTE: ABNORMAL K/UL (ref 0–0.3)
ABSOLUTE LYMPH #: 0.9 K/UL (ref 1–4.8)
ABSOLUTE MONO #: 0.8 K/UL (ref 0.2–0.8)
ALBUMIN SERPL-MCNC: 4.2 G/DL (ref 3.5–5.2)
ALBUMIN/GLOBULIN RATIO: ABNORMAL (ref 1–2.5)
ALP BLD-CCNC: 60 U/L (ref 40–129)
ALT SERPL-CCNC: 69 U/L (ref 5–41)
ANION GAP SERPL CALCULATED.3IONS-SCNC: 8 MMOL/L (ref 9–17)
AST SERPL-CCNC: 37 U/L
BASOPHILS # BLD: 1 % (ref 0–2)
BASOPHILS ABSOLUTE: 0.1 K/UL (ref 0–0.2)
BILIRUB SERPL-MCNC: 2.68 MG/DL (ref 0.3–1.2)
BILIRUBIN DIRECT: 0.99 MG/DL
BILIRUBIN, INDIRECT: 1.69 MG/DL (ref 0–1)
BUN BLDV-MCNC: 13 MG/DL (ref 6–20)
BUN/CREAT BLD: 13 (ref 9–20)
CALCIUM SERPL-MCNC: 8.6 MG/DL (ref 8.6–10.4)
CHLORIDE BLD-SCNC: 98 MMOL/L (ref 98–107)
CO2: 28 MMOL/L (ref 20–31)
CREAT SERPL-MCNC: 0.97 MG/DL (ref 0.7–1.2)
DIFFERENTIAL TYPE: ABNORMAL
EOSINOPHILS RELATIVE PERCENT: 2 % (ref 1–4)
GFR AFRICAN AMERICAN: >60 ML/MIN
GFR NON-AFRICAN AMERICAN: >60 ML/MIN
GFR SERPL CREATININE-BSD FRML MDRD: ABNORMAL ML/MIN/{1.73_M2}
GFR SERPL CREATININE-BSD FRML MDRD: ABNORMAL ML/MIN/{1.73_M2}
GLOBULIN: ABNORMAL G/DL (ref 1.5–3.8)
GLUCOSE BLD-MCNC: 141 MG/DL (ref 75–110)
GLUCOSE BLD-MCNC: 144 MG/DL (ref 75–110)
GLUCOSE BLD-MCNC: 148 MG/DL (ref 75–110)
GLUCOSE BLD-MCNC: 155 MG/DL (ref 75–110)
GLUCOSE BLD-MCNC: 155 MG/DL (ref 75–110)
GLUCOSE BLD-MCNC: 156 MG/DL (ref 75–110)
GLUCOSE BLD-MCNC: 161 MG/DL (ref 70–99)
GLUCOSE BLD-MCNC: 164 MG/DL (ref 75–110)
HCT VFR BLD CALC: 41.7 % (ref 41–53)
HEMOGLOBIN: 14.2 G/DL (ref 13.5–17.5)
IMMATURE GRANULOCYTES: ABNORMAL %
LYMPHOCYTES # BLD: 14 % (ref 24–44)
MAGNESIUM: 1.8 MG/DL (ref 1.6–2.6)
MCH RBC QN AUTO: 28.2 PG (ref 26–34)
MCHC RBC AUTO-ENTMCNC: 34 G/DL (ref 31–37)
MCV RBC AUTO: 83.2 FL (ref 80–100)
MONOCYTES # BLD: 12 % (ref 1–7)
NRBC AUTOMATED: ABNORMAL PER 100 WBC
PDW BLD-RTO: 16.5 % (ref 11.5–14.5)
PLATELET # BLD: 128 K/UL (ref 130–400)
PLATELET ESTIMATE: ABNORMAL
PMV BLD AUTO: ABNORMAL FL (ref 6–12)
POTASSIUM SERPL-SCNC: 3.9 MMOL/L (ref 3.7–5.3)
RBC # BLD: 5.02 M/UL (ref 4.5–5.9)
RBC # BLD: ABNORMAL 10*6/UL
SEG NEUTROPHILS: 71 % (ref 36–66)
SEGMENTED NEUTROPHILS ABSOLUTE COUNT: 4.6 K/UL (ref 1.8–7.7)
SODIUM BLD-SCNC: 134 MMOL/L (ref 135–144)
TOTAL PROTEIN: 6.8 G/DL (ref 6.4–8.3)
WBC # BLD: 6.5 K/UL (ref 3.5–11)
WBC # BLD: ABNORMAL 10*3/UL

## 2018-09-29 PROCEDURE — 6360000002 HC RX W HCPCS: Performed by: INTERNAL MEDICINE

## 2018-09-29 PROCEDURE — 2580000003 HC RX 258: Performed by: INTERNAL MEDICINE

## 2018-09-29 PROCEDURE — 6360000002 HC RX W HCPCS: Performed by: NURSE PRACTITIONER

## 2018-09-29 PROCEDURE — C9113 INJ PANTOPRAZOLE SODIUM, VIA: HCPCS | Performed by: INTERNAL MEDICINE

## 2018-09-29 PROCEDURE — 80048 BASIC METABOLIC PNL TOTAL CA: CPT

## 2018-09-29 PROCEDURE — 85025 COMPLETE CBC W/AUTO DIFF WBC: CPT

## 2018-09-29 PROCEDURE — 99232 SBSQ HOSP IP/OBS MODERATE 35: CPT | Performed by: INTERNAL MEDICINE

## 2018-09-29 PROCEDURE — 74019 RADEX ABDOMEN 2 VIEWS: CPT

## 2018-09-29 PROCEDURE — 36415 COLL VENOUS BLD VENIPUNCTURE: CPT

## 2018-09-29 PROCEDURE — 1200000000 HC SEMI PRIVATE

## 2018-09-29 PROCEDURE — 2500000003 HC RX 250 WO HCPCS: Performed by: INTERNAL MEDICINE

## 2018-09-29 PROCEDURE — 80076 HEPATIC FUNCTION PANEL: CPT

## 2018-09-29 PROCEDURE — 6370000000 HC RX 637 (ALT 250 FOR IP): Performed by: NURSE PRACTITIONER

## 2018-09-29 PROCEDURE — 83735 ASSAY OF MAGNESIUM: CPT

## 2018-09-29 PROCEDURE — 82947 ASSAY GLUCOSE BLOOD QUANT: CPT

## 2018-09-29 RX ADMIN — INSULIN LISPRO 2 UNITS: 100 INJECTION, SOLUTION INTRAVENOUS; SUBCUTANEOUS at 00:08

## 2018-09-29 RX ADMIN — HYDROMORPHONE HYDROCHLORIDE 0.5 MG: 1 INJECTION, SOLUTION INTRAMUSCULAR; INTRAVENOUS; SUBCUTANEOUS at 04:33

## 2018-09-29 RX ADMIN — HYDROMORPHONE HYDROCHLORIDE 0.5 MG: 1 INJECTION, SOLUTION INTRAMUSCULAR; INTRAVENOUS; SUBCUTANEOUS at 20:11

## 2018-09-29 RX ADMIN — HYDROMORPHONE HYDROCHLORIDE 0.5 MG: 1 INJECTION, SOLUTION INTRAMUSCULAR; INTRAVENOUS; SUBCUTANEOUS at 08:43

## 2018-09-29 RX ADMIN — METOPROLOL TARTRATE 5 MG: 5 INJECTION, SOLUTION INTRAVENOUS at 06:31

## 2018-09-29 RX ADMIN — ONDANSETRON 4 MG: 2 INJECTION INTRAMUSCULAR; INTRAVENOUS at 04:33

## 2018-09-29 RX ADMIN — ONDANSETRON 4 MG: 2 INJECTION INTRAMUSCULAR; INTRAVENOUS at 20:11

## 2018-09-29 RX ADMIN — PANTOPRAZOLE SODIUM 40 MG: 40 INJECTION, POWDER, FOR SOLUTION INTRAVENOUS at 08:45

## 2018-09-29 RX ADMIN — INSULIN LISPRO 2 UNITS: 100 INJECTION, SOLUTION INTRAVENOUS; SUBCUTANEOUS at 16:17

## 2018-09-29 RX ADMIN — INSULIN LISPRO 2 UNITS: 100 INJECTION, SOLUTION INTRAVENOUS; SUBCUTANEOUS at 20:28

## 2018-09-29 RX ADMIN — INSULIN LISPRO 2 UNITS: 100 INJECTION, SOLUTION INTRAVENOUS; SUBCUTANEOUS at 12:32

## 2018-09-29 RX ADMIN — HYDROMORPHONE HYDROCHLORIDE 0.5 MG: 1 INJECTION, SOLUTION INTRAMUSCULAR; INTRAVENOUS; SUBCUTANEOUS at 16:16

## 2018-09-29 RX ADMIN — Medication 10 ML: at 08:45

## 2018-09-29 RX ADMIN — METOPROLOL TARTRATE 5 MG: 5 INJECTION, SOLUTION INTRAVENOUS at 12:31

## 2018-09-29 RX ADMIN — INSULIN LISPRO 2 UNITS: 100 INJECTION, SOLUTION INTRAVENOUS; SUBCUTANEOUS at 04:34

## 2018-09-29 RX ADMIN — DEXTROSE AND SODIUM CHLORIDE: 5; 450 INJECTION, SOLUTION INTRAVENOUS at 07:07

## 2018-09-29 RX ADMIN — HYDROMORPHONE HYDROCHLORIDE 0.5 MG: 1 INJECTION, SOLUTION INTRAMUSCULAR; INTRAVENOUS; SUBCUTANEOUS at 12:32

## 2018-09-29 RX ADMIN — METOPROLOL TARTRATE 5 MG: 5 INJECTION, SOLUTION INTRAVENOUS at 18:25

## 2018-09-29 RX ADMIN — DEXTROSE AND SODIUM CHLORIDE: 5; 450 INJECTION, SOLUTION INTRAVENOUS at 23:19

## 2018-09-29 ASSESSMENT — PAIN DESCRIPTION - PROGRESSION
CLINICAL_PROGRESSION: NOT CHANGED

## 2018-09-29 ASSESSMENT — PAIN DESCRIPTION - DESCRIPTORS
DESCRIPTORS: DISCOMFORT
DESCRIPTORS: ACHING;CONSTANT;DISCOMFORT

## 2018-09-29 ASSESSMENT — PAIN DESCRIPTION - LOCATION
LOCATION: ABDOMEN

## 2018-09-29 ASSESSMENT — PAIN SCALES - GENERAL
PAINLEVEL_OUTOF10: 7
PAINLEVEL_OUTOF10: 3
PAINLEVEL_OUTOF10: 7
PAINLEVEL_OUTOF10: 4
PAINLEVEL_OUTOF10: 7
PAINLEVEL_OUTOF10: 4
PAINLEVEL_OUTOF10: 7

## 2018-09-29 ASSESSMENT — PAIN DESCRIPTION - PAIN TYPE
TYPE: ACUTE PAIN

## 2018-09-29 ASSESSMENT — PAIN DESCRIPTION - FREQUENCY
FREQUENCY: CONTINUOUS

## 2018-09-29 ASSESSMENT — PAIN DESCRIPTION - ORIENTATION
ORIENTATION: RIGHT;LEFT
ORIENTATION: RIGHT;LEFT;UPPER

## 2018-09-29 ASSESSMENT — PAIN DESCRIPTION - ONSET
ONSET: ON-GOING

## 2018-09-29 NOTE — PROGRESS NOTES
2035 09/29/18   0002  09/29/18   0426  09/29/18   0627   POCGLU  135*  141*  155*  156*       I/O (24Hr):     Intake/Output Summary (Last 24 hours) at 09/29/18 0811  Last data filed at 09/29/18 7257   Gross per 24 hour   Intake             2868 ml   Output             2350 ml   Net              518 ml       Labs:    Hematology:  Recent Labs      09/27/18   0534  09/28/18   0537  09/29/18   0504   WBC  11.9*  7.4  6.5   HGB  15.6  14.7  14.2   HCT  45.3  41.5  41.7   PLT  147  126*  128*     Chemistry:  Recent Labs      09/27/18   0534  09/27/18   1407  09/28/18   0537  09/29/18   0504   NA  132*   --   136  134*   K  5.4*  4.7  4.0  3.9   CL  98   --   101  98   CO2  25   --   25  28   GLUCOSE  189*   --   162*  161*   BUN  24*   --   17  13   CREATININE  1.11   --   0.93  0.97   MG  2.1   --   1.8  1.8   ANIONGAP  9   --   10  8*   LABGLOM  >60   --   >60  >60   GFRAA  >60   --   >60  >60   CALCIUM  9.3   --   8.7  8.6   PHOS  2.9   --    --    --      Recent Labs      09/26/18   1834  09/27/18   0534  09/27/18   1407  09/28/18   0537   PROT  8.0  7.4  7.0  6.7   LABALBU  4.8  4.5  4.2  4.1   LABA1C  5.3   --    --    --    AST  21  20  25  29   ALT  37  35  41  53*   ALKPHOS  66  61  58  58   BILITOT  1.26*  2.83*  2.06*  2.51*   BILIDIR  0.27   --   0.55*  0.77*   AMYLASE  32  27*   --    --    LIPASE  36  19   --    --          Lab Results   Component Value Date/Time    SPECIAL NOT REPORTED 02/01/2018 06:00 PM     Lab Results   Component Value Date/Time    CULTURE NO SIGNIFICANT GROWTH 02/01/2018 06:00 PM    CULTURE  02/01/2018 06:00 PM     Performed at [address] [phone]       No results found for: POCPH, PHART, PH, POCPCO2, MWJ5GAG, PCO2, POCPO2, PO2ART, PO2, POCHCO3, EPN6ZBO, HCO3, NBEA, PBEA, BEART, BE, THGBART, THB, KUM8CBP, EQQN9YKY, R1EUVQSF, O2SAT, FIO2    Radiology:    Xr Abdomen (kub) (single Ap View)    Result Date: 9/27/2018  EXAMINATION: SINGLE SUPINE

## 2018-09-30 LAB
ABSOLUTE EOS #: 0.1 K/UL (ref 0–0.4)
ABSOLUTE IMMATURE GRANULOCYTE: ABNORMAL K/UL (ref 0–0.3)
ABSOLUTE LYMPH #: 0.9 K/UL (ref 1–4.8)
ABSOLUTE MONO #: 0.9 K/UL (ref 0.2–0.8)
AFP: 2 UG/L
ALBUMIN SERPL-MCNC: 4 G/DL (ref 3.5–5.2)
ALBUMIN/GLOBULIN RATIO: ABNORMAL (ref 1–2.5)
ALP BLD-CCNC: 63 U/L (ref 40–129)
ALT SERPL-CCNC: 132 U/L (ref 5–41)
ANION GAP SERPL CALCULATED.3IONS-SCNC: 9 MMOL/L (ref 9–17)
AST SERPL-CCNC: 66 U/L
BASOPHILS # BLD: 1 % (ref 0–2)
BASOPHILS ABSOLUTE: 0.1 K/UL (ref 0–0.2)
BILIRUB SERPL-MCNC: 5.48 MG/DL (ref 0.3–1.2)
BILIRUBIN DIRECT: 2.44 MG/DL
BILIRUBIN, INDIRECT: 3.04 MG/DL (ref 0–1)
BUN BLDV-MCNC: 10 MG/DL (ref 6–20)
BUN/CREAT BLD: 11 (ref 9–20)
CALCIUM SERPL-MCNC: 8.8 MG/DL (ref 8.6–10.4)
CERULOPLASMIN: 27 MG/DL (ref 15–30)
CHLORIDE BLD-SCNC: 98 MMOL/L (ref 98–107)
CO2: 28 MMOL/L (ref 20–31)
CREAT SERPL-MCNC: 0.89 MG/DL (ref 0.7–1.2)
DIFFERENTIAL TYPE: ABNORMAL
EOSINOPHILS RELATIVE PERCENT: 2 % (ref 1–4)
GFR AFRICAN AMERICAN: >60 ML/MIN
GFR NON-AFRICAN AMERICAN: >60 ML/MIN
GFR SERPL CREATININE-BSD FRML MDRD: ABNORMAL ML/MIN/{1.73_M2}
GFR SERPL CREATININE-BSD FRML MDRD: ABNORMAL ML/MIN/{1.73_M2}
GLOBULIN: ABNORMAL G/DL (ref 1.5–3.8)
GLUCOSE BLD-MCNC: 133 MG/DL (ref 75–110)
GLUCOSE BLD-MCNC: 134 MG/DL (ref 75–110)
GLUCOSE BLD-MCNC: 145 MG/DL (ref 75–110)
GLUCOSE BLD-MCNC: 149 MG/DL (ref 75–110)
GLUCOSE BLD-MCNC: 153 MG/DL (ref 75–110)
GLUCOSE BLD-MCNC: 159 MG/DL (ref 75–110)
GLUCOSE BLD-MCNC: 166 MG/DL (ref 70–99)
GLUCOSE BLD-MCNC: 175 MG/DL (ref 75–110)
HAV IGM SER IA-ACNC: ABNORMAL
HCT VFR BLD CALC: 40.7 % (ref 41–53)
HEMOGLOBIN: 14 G/DL (ref 13.5–17.5)
HEPATITIS B CORE IGM ANTIBODY: NONREACTIVE
HEPATITIS B SURFACE ANTIGEN: NONREACTIVE
HEPATITIS C ANTIBODY: NONREACTIVE
IMMATURE GRANULOCYTES: ABNORMAL %
IRON: 64 UG/DL (ref 59–158)
LYMPHOCYTES # BLD: 13 % (ref 24–44)
MAGNESIUM: 1.7 MG/DL (ref 1.6–2.6)
MCH RBC QN AUTO: 28.5 PG (ref 26–34)
MCHC RBC AUTO-ENTMCNC: 34.4 G/DL (ref 31–37)
MCV RBC AUTO: 82.7 FL (ref 80–100)
MONOCYTES # BLD: 13 % (ref 1–7)
NRBC AUTOMATED: ABNORMAL PER 100 WBC
PDW BLD-RTO: 16.8 % (ref 11.5–14.5)
PLATELET # BLD: 136 K/UL (ref 130–400)
PLATELET ESTIMATE: ABNORMAL
PMV BLD AUTO: ABNORMAL FL (ref 6–12)
POTASSIUM SERPL-SCNC: 3.9 MMOL/L (ref 3.7–5.3)
RBC # BLD: 4.92 M/UL (ref 4.5–5.9)
RBC # BLD: ABNORMAL 10*6/UL
SEG NEUTROPHILS: 71 % (ref 36–66)
SEGMENTED NEUTROPHILS ABSOLUTE COUNT: 4.7 K/UL (ref 1.8–7.7)
SODIUM BLD-SCNC: 135 MMOL/L (ref 135–144)
TOTAL PROTEIN: 6.6 G/DL (ref 6.4–8.3)
TRANSFERRIN: 184 MG/DL (ref 200–360)
WBC # BLD: 6.7 K/UL (ref 3.5–11)
WBC # BLD: ABNORMAL 10*3/UL

## 2018-09-30 PROCEDURE — 6360000002 HC RX W HCPCS: Performed by: INTERNAL MEDICINE

## 2018-09-30 PROCEDURE — 80048 BASIC METABOLIC PNL TOTAL CA: CPT

## 2018-09-30 PROCEDURE — 86663 EPSTEIN-BARR ANTIBODY: CPT

## 2018-09-30 PROCEDURE — 2580000003 HC RX 258: Performed by: NURSE PRACTITIONER

## 2018-09-30 PROCEDURE — 99254 IP/OBS CNSLTJ NEW/EST MOD 60: CPT | Performed by: INTERNAL MEDICINE

## 2018-09-30 PROCEDURE — 85025 COMPLETE CBC W/AUTO DIFF WBC: CPT

## 2018-09-30 PROCEDURE — 36415 COLL VENOUS BLD VENIPUNCTURE: CPT

## 2018-09-30 PROCEDURE — 84466 ASSAY OF TRANSFERRIN: CPT

## 2018-09-30 PROCEDURE — 80076 HEPATIC FUNCTION PANEL: CPT

## 2018-09-30 PROCEDURE — 2580000003 HC RX 258: Performed by: INTERNAL MEDICINE

## 2018-09-30 PROCEDURE — 83735 ASSAY OF MAGNESIUM: CPT

## 2018-09-30 PROCEDURE — 83540 ASSAY OF IRON: CPT

## 2018-09-30 PROCEDURE — 6370000000 HC RX 637 (ALT 250 FOR IP): Performed by: NURSE PRACTITIONER

## 2018-09-30 PROCEDURE — 83516 IMMUNOASSAY NONANTIBODY: CPT

## 2018-09-30 PROCEDURE — 86664 EPSTEIN-BARR NUCLEAR ANTIGEN: CPT

## 2018-09-30 PROCEDURE — 82390 ASSAY OF CERULOPLASMIN: CPT

## 2018-09-30 PROCEDURE — C9113 INJ PANTOPRAZOLE SODIUM, VIA: HCPCS | Performed by: INTERNAL MEDICINE

## 2018-09-30 PROCEDURE — 82947 ASSAY GLUCOSE BLOOD QUANT: CPT

## 2018-09-30 PROCEDURE — 2500000003 HC RX 250 WO HCPCS: Performed by: INTERNAL MEDICINE

## 2018-09-30 PROCEDURE — 1200000000 HC SEMI PRIVATE

## 2018-09-30 PROCEDURE — 86665 EPSTEIN-BARR CAPSID VCA: CPT

## 2018-09-30 PROCEDURE — 99232 SBSQ HOSP IP/OBS MODERATE 35: CPT | Performed by: INTERNAL MEDICINE

## 2018-09-30 PROCEDURE — 6360000002 HC RX W HCPCS: Performed by: NURSE PRACTITIONER

## 2018-09-30 PROCEDURE — 80074 ACUTE HEPATITIS PANEL: CPT

## 2018-09-30 PROCEDURE — 86038 ANTINUCLEAR ANTIBODIES: CPT

## 2018-09-30 PROCEDURE — 82105 ALPHA-FETOPROTEIN SERUM: CPT

## 2018-09-30 PROCEDURE — 82103 ALPHA-1-ANTITRYPSIN TOTAL: CPT

## 2018-09-30 PROCEDURE — 2580000003 HC RX 258: Performed by: SURGERY

## 2018-09-30 PROCEDURE — 82104 ALPHA-1-ANTITRYPSIN PHENO: CPT

## 2018-09-30 RX ADMIN — METOPROLOL TARTRATE 5 MG: 5 INJECTION, SOLUTION INTRAVENOUS at 12:27

## 2018-09-30 RX ADMIN — DEXTROSE AND SODIUM CHLORIDE: 5; 450 INJECTION, SOLUTION INTRAVENOUS at 07:20

## 2018-09-30 RX ADMIN — METOPROLOL TARTRATE 5 MG: 5 INJECTION, SOLUTION INTRAVENOUS at 18:18

## 2018-09-30 RX ADMIN — Medication 10 ML: at 08:45

## 2018-09-30 RX ADMIN — INSULIN LISPRO 2 UNITS: 100 INJECTION, SOLUTION INTRAVENOUS; SUBCUTANEOUS at 12:27

## 2018-09-30 RX ADMIN — HYDROMORPHONE HYDROCHLORIDE 0.5 MG: 1 INJECTION, SOLUTION INTRAMUSCULAR; INTRAVENOUS; SUBCUTANEOUS at 08:45

## 2018-09-30 RX ADMIN — INSULIN LISPRO 2 UNITS: 100 INJECTION, SOLUTION INTRAVENOUS; SUBCUTANEOUS at 08:57

## 2018-09-30 RX ADMIN — METOPROLOL TARTRATE 5 MG: 5 INJECTION, SOLUTION INTRAVENOUS at 06:25

## 2018-09-30 RX ADMIN — INSULIN LISPRO 2 UNITS: 100 INJECTION, SOLUTION INTRAVENOUS; SUBCUTANEOUS at 00:19

## 2018-09-30 RX ADMIN — INSULIN LISPRO 2 UNITS: 100 INJECTION, SOLUTION INTRAVENOUS; SUBCUTANEOUS at 04:26

## 2018-09-30 RX ADMIN — DEXTROSE AND SODIUM CHLORIDE: 5; 450 INJECTION, SOLUTION INTRAVENOUS at 18:26

## 2018-09-30 RX ADMIN — Medication 10 ML: at 08:49

## 2018-09-30 RX ADMIN — PANTOPRAZOLE SODIUM 40 MG: 40 INJECTION, POWDER, FOR SOLUTION INTRAVENOUS at 08:45

## 2018-09-30 RX ADMIN — METOPROLOL TARTRATE 5 MG: 5 INJECTION, SOLUTION INTRAVENOUS at 00:12

## 2018-09-30 ASSESSMENT — ENCOUNTER SYMPTOMS
BLURRED VISION: 0
NAUSEA: 1
BACK PAIN: 0
BLOOD IN STOOL: 0
COUGH: 0
CONSTIPATION: 0
EYE DISCHARGE: 0
SHORTNESS OF BREATH: 0
HEARTBURN: 1
WHEEZING: 0
ABDOMINAL PAIN: 1
SORE THROAT: 0
VOMITING: 1

## 2018-09-30 ASSESSMENT — PAIN DESCRIPTION - DESCRIPTORS: DESCRIPTORS: DISCOMFORT;PRESSURE

## 2018-09-30 ASSESSMENT — PAIN DESCRIPTION - LOCATION: LOCATION: ABDOMEN

## 2018-09-30 ASSESSMENT — PAIN DESCRIPTION - PROGRESSION
CLINICAL_PROGRESSION: NOT CHANGED
CLINICAL_PROGRESSION: NOT CHANGED

## 2018-09-30 ASSESSMENT — PAIN DESCRIPTION - PAIN TYPE: TYPE: ACUTE PAIN

## 2018-09-30 ASSESSMENT — PAIN DESCRIPTION - FREQUENCY: FREQUENCY: CONTINUOUS

## 2018-09-30 ASSESSMENT — PAIN SCALES - GENERAL
PAINLEVEL_OUTOF10: 7
PAINLEVEL_OUTOF10: 7

## 2018-09-30 ASSESSMENT — PAIN DESCRIPTION - ONSET: ONSET: ON-GOING

## 2018-09-30 NOTE — PROGRESS NOTES
[facility]    Progress Note    9/30/2018    8:07 AM    Name:   Naya Silvestre  MRN:     [id number]     Earnestine[place]:      [de-identified]   Room:   2024/2024-01  IP Day:  4  Admit Date:  9/26/2018  6:13 PM    PCP:   Linda Tellez MD  Code Status:  Full Code    Subjective:     C/C:   Chief Complaint   Patient presents with    Abdominal Pain     Interval History Status:   Improving  NG tube was clamped intermittently yesterday and output has decreased to less than 200 per shift  Denies abdominal pain,  passing flatus  and had one liquid stool today  Bilirubin is still rising        Brief History: \"The patient is a 48 y.o.  male who presents to the ER with complaints of abdominal pain and bloating. He also reports excessive belching with a foul odor. He has pain in his left upper and lower quadrant, and rates the 8-10 and sharp, and is aggravated with movement and palpation. He states he has had previous bowel obstructions that have resolved spontaneously or with the assistance of an NG tube. He reports this pain is consistent with his previous bowel obstructions. [facility] course Till yesterday:  Patient had greater than 1 L NG output. His abdomen, softer and he has less nausea however he still has high grade obstruction. Patient has unexplained bilirubin elevations. not certain about the chronicity of this however was present to at least February of this year. He may have an underlying Gilbert's syndrome. GI consultation will be obtained. Cardiology consultation has been obtained for his history of ventricular tachycardia/AICD and nonischemic CMP. He is currently on Lopressor 5 mg IV every 6 hours scheduled. He will need to be placed back on his oral metoprolol and sotalol when NG tube is discontinued. Patient has a relatively long history of intermittently recurring small bowel obstructions.  Onset was following diverticular rupture and Labs      09/29/18 2020 09/30/18   0012  09/30/18   0423  09/30/18   0659   POCGLU  144*  149*  159*  153*       I/O (24Hr):     Intake/Output Summary (Last 24 hours) at 09/30/18 0807  Last data filed at 09/30/18 7108   Gross per 24 hour   Intake          2876.23 ml   Output             1050 ml   Net          1826.23 ml       Labs:    Hematology:  Recent Labs      09/28/18   0537  09/29/18   0504  09/30/18   0611   WBC  7.4  6.5  6.7   HGB  14.7  14.2  14.0   HCT  41.5  41.7  40.7*   PLT  126*  128*  136     Chemistry:  Recent Labs      09/28/18   0537 09/29/18   0504  09/30/18   0611   NA  136  134*  135   K  4.0  3.9  3.9   CL  101  98  98   CO2  25  28  28   GLUCOSE  162*  161*  166*   BUN  17  13  10   CREATININE  0.93  0.97  0.89   MG  1.8  1.8  1.7   ANIONGAP  10  8*  9   LABGLOM  >60  >60  >60   GFRAA  >60  >60  >60   CALCIUM  8.7  8.6  8.8     Recent Labs      09/28/18   0537 09/29/18   0504  09/30/18   0611   PROT  6.7  6.8  6.6   LABALBU  4.1  4.2  4.0   AST  29  37  66*   ALT  53*  69*  132*   ALKPHOS  58  60  63   BILITOT  2.51*  2.68*  5.48*   BILIDIR  0.77*  0.99*  2.44*         Lab Results   Component Value Date/Time    SPECIAL NOT REPORTED 02/01/2018 06:00 PM     Lab Results   Component Value Date/Time    CULTURE NO SIGNIFICANT GROWTH 02/01/2018 06:00 PM    CULTURE  02/01/2018 06:00 PM     Performed at [address] [place] [address], [address] [place] [address] [phone]       No results found for: POCPH, PHART, PH, POCPCO2, GRR3MVA, PCO2, POCPO2, PO2ART, PO2, POCHCO3, XNR6TAG, HCO3, NBEA, PBEA, BEART, BE, THGBART, THB, TYB8WQV, FVGO9XFT, A6NJTLVL, O2SAT, FIO2    Radiology:    Xr Abdomen (kub) (single Ap View)    Result Date: 9/27/2018  EXAMINATION: SINGLE SUPINE XRAY VIEW(S) OF THE ABDOMEN 9/27/2018 6:13 pm COMPARISON: 6 hours ago HISTORY: ORDERING SYSTEM PROVIDED HISTORY: NG placement verification TECHNOLOGIST PROVIDED HISTORY: NG placement verification Ordering Physician Provided Reason for Exam:

## 2018-10-01 ENCOUNTER — APPOINTMENT (OUTPATIENT)
Dept: ULTRASOUND IMAGING | Age: 53
DRG: 389 | End: 2018-10-01
Payer: COMMERCIAL

## 2018-10-01 LAB
ALBUMIN SERPL-MCNC: 4 G/DL (ref 3.5–5.2)
ALBUMIN/GLOBULIN RATIO: ABNORMAL (ref 1–2.5)
ALP BLD-CCNC: 66 U/L (ref 40–129)
ALT SERPL-CCNC: 125 U/L (ref 5–41)
ANION GAP SERPL CALCULATED.3IONS-SCNC: 12 MMOL/L (ref 9–17)
ANTI-NUCLEAR ANTIBODY (ANA): POSITIVE
AST SERPL-CCNC: 44 U/L
BILIRUB SERPL-MCNC: 2.68 MG/DL (ref 0.3–1.2)
BILIRUBIN DIRECT: 1.22 MG/DL
BILIRUBIN, INDIRECT: 1.46 MG/DL (ref 0–1)
BUN BLDV-MCNC: 8 MG/DL (ref 6–20)
BUN/CREAT BLD: 8 (ref 9–20)
CALCIUM SERPL-MCNC: 8.7 MG/DL (ref 8.6–10.4)
CHLORIDE BLD-SCNC: 99 MMOL/L (ref 98–107)
CO2: 26 MMOL/L (ref 20–31)
CREAT SERPL-MCNC: 0.97 MG/DL (ref 0.7–1.2)
GFR AFRICAN AMERICAN: >60 ML/MIN
GFR NON-AFRICAN AMERICAN: >60 ML/MIN
GFR SERPL CREATININE-BSD FRML MDRD: ABNORMAL ML/MIN/{1.73_M2}
GFR SERPL CREATININE-BSD FRML MDRD: ABNORMAL ML/MIN/{1.73_M2}
GLOBULIN: ABNORMAL G/DL (ref 1.5–3.8)
GLUCOSE BLD-MCNC: 114 MG/DL (ref 75–110)
GLUCOSE BLD-MCNC: 135 MG/DL (ref 75–110)
GLUCOSE BLD-MCNC: 144 MG/DL (ref 75–110)
GLUCOSE BLD-MCNC: 149 MG/DL (ref 70–99)
GLUCOSE BLD-MCNC: 151 MG/DL (ref 75–110)
GLUCOSE BLD-MCNC: 170 MG/DL (ref 75–110)
POTASSIUM SERPL-SCNC: 3.8 MMOL/L (ref 3.7–5.3)
SODIUM BLD-SCNC: 137 MMOL/L (ref 135–144)
TOTAL PROTEIN: 6.4 G/DL (ref 6.4–8.3)

## 2018-10-01 PROCEDURE — 2500000003 HC RX 250 WO HCPCS: Performed by: INTERNAL MEDICINE

## 2018-10-01 PROCEDURE — 1200000000 HC SEMI PRIVATE

## 2018-10-01 PROCEDURE — 76705 ECHO EXAM OF ABDOMEN: CPT

## 2018-10-01 PROCEDURE — 80048 BASIC METABOLIC PNL TOTAL CA: CPT

## 2018-10-01 PROCEDURE — 80076 HEPATIC FUNCTION PANEL: CPT

## 2018-10-01 PROCEDURE — 2580000003 HC RX 258: Performed by: SURGERY

## 2018-10-01 PROCEDURE — 2580000003 HC RX 258: Performed by: NURSE PRACTITIONER

## 2018-10-01 PROCEDURE — 36415 COLL VENOUS BLD VENIPUNCTURE: CPT

## 2018-10-01 PROCEDURE — 82947 ASSAY GLUCOSE BLOOD QUANT: CPT

## 2018-10-01 PROCEDURE — 99232 SBSQ HOSP IP/OBS MODERATE 35: CPT | Performed by: INTERNAL MEDICINE

## 2018-10-01 PROCEDURE — 6370000000 HC RX 637 (ALT 250 FOR IP): Performed by: NURSE PRACTITIONER

## 2018-10-01 PROCEDURE — 6360000002 HC RX W HCPCS: Performed by: NURSE PRACTITIONER

## 2018-10-01 PROCEDURE — 2580000003 HC RX 258: Performed by: INTERNAL MEDICINE

## 2018-10-01 PROCEDURE — 6360000002 HC RX W HCPCS: Performed by: INTERNAL MEDICINE

## 2018-10-01 PROCEDURE — C9113 INJ PANTOPRAZOLE SODIUM, VIA: HCPCS | Performed by: INTERNAL MEDICINE

## 2018-10-01 RX ORDER — METOPROLOL TARTRATE 5 MG/5ML
5 INJECTION INTRAVENOUS EVERY 6 HOURS PRN
Status: DISCONTINUED | OUTPATIENT
Start: 2018-10-01 | End: 2018-10-02 | Stop reason: HOSPADM

## 2018-10-01 RX ADMIN — APIXABAN 5 MG: 5 TABLET, FILM COATED ORAL at 08:47

## 2018-10-01 RX ADMIN — Medication 10 ML: at 08:48

## 2018-10-01 RX ADMIN — ALLOPURINOL 300 MG: 300 TABLET ORAL at 08:47

## 2018-10-01 RX ADMIN — ASPIRIN 81 MG 81 MG: 81 TABLET ORAL at 08:47

## 2018-10-01 RX ADMIN — INSULIN LISPRO 2 UNITS: 100 INJECTION, SOLUTION INTRAVENOUS; SUBCUTANEOUS at 08:52

## 2018-10-01 RX ADMIN — Medication 10 ML: at 08:47

## 2018-10-01 RX ADMIN — HYDROMORPHONE HYDROCHLORIDE 0.5 MG: 1 INJECTION, SOLUTION INTRAMUSCULAR; INTRAVENOUS; SUBCUTANEOUS at 08:49

## 2018-10-01 RX ADMIN — HYDROMORPHONE HYDROCHLORIDE 0.5 MG: 1 INJECTION, SOLUTION INTRAMUSCULAR; INTRAVENOUS; SUBCUTANEOUS at 13:06

## 2018-10-01 RX ADMIN — METOPROLOL TARTRATE 5 MG: 5 INJECTION, SOLUTION INTRAVENOUS at 00:45

## 2018-10-01 RX ADMIN — METOPROLOL TARTRATE 5 MG: 5 INJECTION, SOLUTION INTRAVENOUS at 06:17

## 2018-10-01 RX ADMIN — APIXABAN 5 MG: 5 TABLET, FILM COATED ORAL at 21:39

## 2018-10-01 RX ADMIN — LINAGLIPTIN 5 MG: 5 TABLET, FILM COATED ORAL at 08:47

## 2018-10-01 RX ADMIN — HYDROMORPHONE HYDROCHLORIDE 0.5 MG: 1 INJECTION, SOLUTION INTRAMUSCULAR; INTRAVENOUS; SUBCUTANEOUS at 00:45

## 2018-10-01 RX ADMIN — DEXTROSE AND SODIUM CHLORIDE: 5; 450 INJECTION, SOLUTION INTRAVENOUS at 18:48

## 2018-10-01 RX ADMIN — INSULIN LISPRO 1 UNITS: 100 INJECTION, SOLUTION INTRAVENOUS; SUBCUTANEOUS at 21:39

## 2018-10-01 RX ADMIN — DEXTROSE AND SODIUM CHLORIDE: 5; 450 INJECTION, SOLUTION INTRAVENOUS at 06:17

## 2018-10-01 RX ADMIN — SOTALOL HYDROCHLORIDE 160 MG: 80 TABLET ORAL at 08:47

## 2018-10-01 RX ADMIN — TAMSULOSIN HYDROCHLORIDE 0.4 MG: 0.4 CAPSULE ORAL at 08:47

## 2018-10-01 RX ADMIN — HYDROMORPHONE HYDROCHLORIDE 0.5 MG: 1 INJECTION, SOLUTION INTRAMUSCULAR; INTRAVENOUS; SUBCUTANEOUS at 18:45

## 2018-10-01 RX ADMIN — METOPROLOL TARTRATE 5 MG: 5 INJECTION, SOLUTION INTRAVENOUS at 18:44

## 2018-10-01 RX ADMIN — LISINOPRIL 10 MG: 10 TABLET ORAL at 08:46

## 2018-10-01 RX ADMIN — PANTOPRAZOLE SODIUM 40 MG: 40 INJECTION, POWDER, FOR SOLUTION INTRAVENOUS at 08:47

## 2018-10-01 RX ADMIN — HYDROMORPHONE HYDROCHLORIDE 0.5 MG: 1 INJECTION, SOLUTION INTRAMUSCULAR; INTRAVENOUS; SUBCUTANEOUS at 22:51

## 2018-10-01 RX ADMIN — INSULIN LISPRO 2 UNITS: 100 INJECTION, SOLUTION INTRAVENOUS; SUBCUTANEOUS at 13:07

## 2018-10-01 RX ADMIN — SOTALOL HYDROCHLORIDE 160 MG: 80 TABLET ORAL at 21:39

## 2018-10-01 RX ADMIN — METOPROLOL TARTRATE 5 MG: 5 INJECTION, SOLUTION INTRAVENOUS at 13:06

## 2018-10-01 ASSESSMENT — PAIN DESCRIPTION - LOCATION
LOCATION: ABDOMEN

## 2018-10-01 ASSESSMENT — PAIN SCALES - GENERAL
PAINLEVEL_OUTOF10: 7
PAINLEVEL_OUTOF10: 8
PAINLEVEL_OUTOF10: 3
PAINLEVEL_OUTOF10: 7
PAINLEVEL_OUTOF10: 7
PAINLEVEL_OUTOF10: 3
PAINLEVEL_OUTOF10: 4
PAINLEVEL_OUTOF10: 3
PAINLEVEL_OUTOF10: 7
PAINLEVEL_OUTOF10: 2

## 2018-10-01 ASSESSMENT — PAIN DESCRIPTION - FREQUENCY
FREQUENCY: CONTINUOUS

## 2018-10-01 ASSESSMENT — PAIN DESCRIPTION - ORIENTATION
ORIENTATION: RIGHT
ORIENTATION: RIGHT

## 2018-10-01 ASSESSMENT — PAIN DESCRIPTION - ONSET
ONSET: ON-GOING

## 2018-10-01 ASSESSMENT — PAIN DESCRIPTION - PROGRESSION
CLINICAL_PROGRESSION: GRADUALLY WORSENING
CLINICAL_PROGRESSION: NOT CHANGED
CLINICAL_PROGRESSION: GRADUALLY WORSENING
CLINICAL_PROGRESSION: GRADUALLY IMPROVING

## 2018-10-01 ASSESSMENT — PAIN DESCRIPTION - PAIN TYPE
TYPE: ACUTE PAIN

## 2018-10-01 ASSESSMENT — PAIN DESCRIPTION - DESCRIPTORS
DESCRIPTORS: ACHING

## 2018-10-01 NOTE — PROGRESS NOTES
Intravenous Daily    And    sodium chloride (PF)  10 mL Intravenous Daily    allopurinol  300 mg Oral Daily    apixaban  5 mg Oral BID    aspirin  81 mg Oral Daily    dapagliflozin  10 mg Oral QAM    lisinopril  10 mg Oral Daily    linagliptin  5 mg Oral Daily    sotalol  160 mg Oral BID    tamsulosin  0.4 mg Oral Daily    sodium chloride flush  10 mL Intravenous 2 times per day     Continuous Infusions:    dextrose 5 % and 0.45 % NaCl 50 mL/hr at 10/01/18 0919    dextrose       PRN Meds: HYDROmorphone **OR** HYDROmorphone, sodium chloride flush, potassium chloride **OR** potassium chloride **OR** potassium chloride, magnesium sulfate, magnesium hydroxide, bisacodyl, nicotine, glucose, dextrose, glucagon (rDNA), dextrose, ondansetron **OR** ondansetron    Data:     Past Medical History:   has a past medical history of ADHD, predominantly hyperactive type; Anemia; Blood clot due to device, implant, or graft; Bowel obstruction ([facility] [address] CAD (coronary artery disease); Cardiomyopathy ([facility] [address] Diabetes mellitus ([facility] [address] Diverticulitis; GERD (gastroesophageal reflux disease); History of atrial fibrillation; History of ventricular tachycardia; Kidney calculi; Obesity; and Sleep apnea. Social History:   reports that he quit smoking about 6 years ago. He has a 2.00 pack-year smoking history. He has never used smokeless tobacco. He reports that he drinks alcohol. He reports that he does not use drugs. Family History:   Family History   Problem Relation Age of Onset    Heart Disease Mother     Heart Disease Father        Vitals:  /65   Pulse 62   Temp 98.1 °F (36.7 °C) (Oral)   Resp 16   Ht 6' 1\" (1.854 m)   Wt 272 lb 4.8 oz (123.5 kg)   SpO2 98%   BMI 35.93 kg/m²   Temp (24hrs), Av.3 °F (36.8 °C), Min:98.1 °F (36.7 °C), Max:98.5 °F (36.9 °C)    Recent Labs      10/01/18   0001  10/01/18   0625  10/01/18   1120  10/01/18   1630   POCGLU  135*  144*  170*  114*       I/O (24Hr):     Intake/Output Summary (Last 24 hours) at 10/01/18 1755  Last data filed at 10/01/18 1630   Gross per 24 hour   Intake             1847 ml   Output             2100 ml   Net             -253 ml       Labs:    Hematology:  Recent Labs      09/29/18   0504  09/30/18   0611   WBC  6.5  6.7   HGB  14.2  14.0   HCT  41.7  40.7*   PLT  128*  136     Chemistry:  Recent Labs      09/29/18   0504  09/30/18   0611  10/01/18   0548   NA  134*  135  137   K  3.9  3.9  3.8   CL  98  98  99   CO2  28  28  26   GLUCOSE  161*  166*  149*   BUN  13  10  8   CREATININE  0.97  0.89  0.97   MG  1.8  1.7   --    ANIONGAP  8*  9  12   LABGLOM  >60  >60  >60   GFRAA  >60  >60  >60   CALCIUM  8.6  8.8  8.7     Recent Labs      09/29/18   0504  09/30/18   0611  10/01/18   0548   PROT  6.8  6.6  6.4   LABALBU  4.2  4.0  4.0   AST  37  66*  44*   ALT  69*  132*  125*   ALKPHOS  60  63  66   BILITOT  2.68*  5.48*  2.68*   BILIDIR  0.99*  2.44*  1.22*         Lab Results   Component Value Date/Time    SPECIAL NOT REPORTED 02/01/2018 06:00 PM     Lab Results   Component Value Date/Time    CULTURE NO SIGNIFICANT GROWTH 02/01/2018 06:00 PM    CULTURE  02/01/2018 06:00 PM     Performed at [address] [phone]     Radiology:    Xr Abdomen (kub) (single Ap View)    Result Date: 9/27/2018  EXAMINATION: SINGLE SUPINE XRAY VIEW(S) OF THE ABDOMEN 9/27/2018 6:13 pm COMPARISON: 6 hours ago HISTORY: ORDERING SYSTEM PROVIDED HISTORY: NG placement verification TECHNOLOGIST PROVIDED HISTORY: NG placement verification Ordering Physician Provided Reason for Exam: NG placement Acuity: Acute Type of Exam: Initial FINDINGS: The nasogastric tube tip is at the level of the gastric body. The distal side port is not visualized, likely at the fundus. Moderately dilated small bowel loops are noted in the mid abdomen. Relative lack of gas is noted in the colon. Cholecystectomy clips. No free air is seen.      Nasogastric tube projects to

## 2018-10-02 VITALS
DIASTOLIC BLOOD PRESSURE: 57 MMHG | RESPIRATION RATE: 16 BRPM | BODY MASS INDEX: 36.66 KG/M2 | OXYGEN SATURATION: 100 % | TEMPERATURE: 98.2 F | HEIGHT: 73 IN | HEART RATE: 81 BPM | SYSTOLIC BLOOD PRESSURE: 122 MMHG | WEIGHT: 276.6 LBS

## 2018-10-02 PROBLEM — E87.5 HYPERKALEMIA: Status: RESOLVED | Noted: 2018-09-26 | Resolved: 2018-10-02

## 2018-10-02 PROBLEM — K56.609 SBO (SMALL BOWEL OBSTRUCTION) (HCC): Status: RESOLVED | Noted: 2018-02-01 | Resolved: 2018-10-02

## 2018-10-02 PROBLEM — R79.89 ABNORMAL LFTS: Status: ACTIVE | Noted: 2018-10-02

## 2018-10-02 LAB
ALBUMIN SERPL-MCNC: 4.1 G/DL (ref 3.5–5.2)
ALBUMIN/GLOBULIN RATIO: ABNORMAL (ref 1–2.5)
ALP BLD-CCNC: 67 U/L (ref 40–129)
ALT SERPL-CCNC: 104 U/L (ref 5–41)
ANION GAP SERPL CALCULATED.3IONS-SCNC: 11 MMOL/L (ref 9–17)
AST SERPL-CCNC: 34 U/L
BILIRUB SERPL-MCNC: 1.46 MG/DL (ref 0.3–1.2)
BILIRUBIN DIRECT: 0.5 MG/DL
BILIRUBIN, INDIRECT: 0.96 MG/DL (ref 0–1)
BUN BLDV-MCNC: 8 MG/DL (ref 6–20)
BUN/CREAT BLD: 9 (ref 9–20)
CALCIUM SERPL-MCNC: 8.8 MG/DL (ref 8.6–10.4)
CHLORIDE BLD-SCNC: 98 MMOL/L (ref 98–107)
CO2: 25 MMOL/L (ref 20–31)
CREAT SERPL-MCNC: 0.94 MG/DL (ref 0.7–1.2)
EBV EARLY ANTIGEN IGG: 622 U/ML
EBV INTERPRETATION: ABNORMAL
EBV NUCLEAR AG AB: 418 U/ML
EPSTEIN-BARR VCA IGG: 1265 U/ML
EPSTEIN-BARR VCA IGM: 69 U/ML
GFR AFRICAN AMERICAN: >60 ML/MIN
GFR NON-AFRICAN AMERICAN: >60 ML/MIN
GFR SERPL CREATININE-BSD FRML MDRD: ABNORMAL ML/MIN/{1.73_M2}
GFR SERPL CREATININE-BSD FRML MDRD: ABNORMAL ML/MIN/{1.73_M2}
GLOBULIN: ABNORMAL G/DL (ref 1.5–3.8)
GLUCOSE BLD-MCNC: 140 MG/DL (ref 75–110)
GLUCOSE BLD-MCNC: 152 MG/DL (ref 70–99)
GLUCOSE BLD-MCNC: 154 MG/DL (ref 75–110)
MAGNESIUM: 1.7 MG/DL (ref 1.6–2.6)
POTASSIUM SERPL-SCNC: 3.9 MMOL/L (ref 3.7–5.3)
SODIUM BLD-SCNC: 134 MMOL/L (ref 135–144)
TOTAL PROTEIN: 6.7 G/DL (ref 6.4–8.3)

## 2018-10-02 PROCEDURE — 2580000003 HC RX 258: Performed by: INTERNAL MEDICINE

## 2018-10-02 PROCEDURE — 80076 HEPATIC FUNCTION PANEL: CPT

## 2018-10-02 PROCEDURE — 99232 SBSQ HOSP IP/OBS MODERATE 35: CPT | Performed by: INTERNAL MEDICINE

## 2018-10-02 PROCEDURE — 6370000000 HC RX 637 (ALT 250 FOR IP): Performed by: NURSE PRACTITIONER

## 2018-10-02 PROCEDURE — C9113 INJ PANTOPRAZOLE SODIUM, VIA: HCPCS | Performed by: INTERNAL MEDICINE

## 2018-10-02 PROCEDURE — 99239 HOSP IP/OBS DSCHRG MGMT >30: CPT | Performed by: INTERNAL MEDICINE

## 2018-10-02 PROCEDURE — 6360000002 HC RX W HCPCS: Performed by: NURSE PRACTITIONER

## 2018-10-02 PROCEDURE — 83735 ASSAY OF MAGNESIUM: CPT

## 2018-10-02 PROCEDURE — 80048 BASIC METABOLIC PNL TOTAL CA: CPT

## 2018-10-02 PROCEDURE — 82947 ASSAY GLUCOSE BLOOD QUANT: CPT

## 2018-10-02 PROCEDURE — 6360000002 HC RX W HCPCS: Performed by: INTERNAL MEDICINE

## 2018-10-02 PROCEDURE — 36415 COLL VENOUS BLD VENIPUNCTURE: CPT

## 2018-10-02 RX ORDER — HYDROCODONE BITARTRATE AND ACETAMINOPHEN 5; 325 MG/1; MG/1
1 TABLET ORAL EVERY 6 HOURS PRN
Qty: 12 TABLET | Refills: 0 | Status: SHIPPED | OUTPATIENT
Start: 2018-10-02 | End: 2018-10-05

## 2018-10-02 RX ADMIN — LINAGLIPTIN 5 MG: 5 TABLET, FILM COATED ORAL at 09:15

## 2018-10-02 RX ADMIN — Medication 10 ML: at 09:15

## 2018-10-02 RX ADMIN — HYDROMORPHONE HYDROCHLORIDE 0.5 MG: 1 INJECTION, SOLUTION INTRAMUSCULAR; INTRAVENOUS; SUBCUTANEOUS at 12:57

## 2018-10-02 RX ADMIN — APIXABAN 5 MG: 5 TABLET, FILM COATED ORAL at 09:15

## 2018-10-02 RX ADMIN — INSULIN LISPRO 2 UNITS: 100 INJECTION, SOLUTION INTRAVENOUS; SUBCUTANEOUS at 09:16

## 2018-10-02 RX ADMIN — LISINOPRIL 10 MG: 10 TABLET ORAL at 09:15

## 2018-10-02 RX ADMIN — ASPIRIN 81 MG 81 MG: 81 TABLET ORAL at 09:15

## 2018-10-02 RX ADMIN — INSULIN LISPRO 2 UNITS: 100 INJECTION, SOLUTION INTRAVENOUS; SUBCUTANEOUS at 12:57

## 2018-10-02 RX ADMIN — SOTALOL HYDROCHLORIDE 160 MG: 80 TABLET ORAL at 09:15

## 2018-10-02 RX ADMIN — PANTOPRAZOLE SODIUM 40 MG: 40 INJECTION, POWDER, FOR SOLUTION INTRAVENOUS at 09:15

## 2018-10-02 RX ADMIN — TAMSULOSIN HYDROCHLORIDE 0.4 MG: 0.4 CAPSULE ORAL at 09:15

## 2018-10-02 RX ADMIN — HYDROMORPHONE HYDROCHLORIDE 0.5 MG: 1 INJECTION, SOLUTION INTRAMUSCULAR; INTRAVENOUS; SUBCUTANEOUS at 09:16

## 2018-10-02 RX ADMIN — ALLOPURINOL 300 MG: 300 TABLET ORAL at 09:15

## 2018-10-02 ASSESSMENT — PAIN SCALES - GENERAL
PAINLEVEL_OUTOF10: 7
PAINLEVEL_OUTOF10: 5
PAINLEVEL_OUTOF10: 7

## 2018-10-02 ASSESSMENT — PAIN DESCRIPTION - LOCATION
LOCATION: ABDOMEN
LOCATION: ABDOMEN

## 2018-10-02 ASSESSMENT — PAIN DESCRIPTION - FREQUENCY
FREQUENCY: CONTINUOUS
FREQUENCY: CONTINUOUS

## 2018-10-02 ASSESSMENT — PAIN DESCRIPTION - DESCRIPTORS
DESCRIPTORS: ACHING;DISCOMFORT
DESCRIPTORS: ACHING;DISCOMFORT

## 2018-10-02 ASSESSMENT — PAIN DESCRIPTION - PAIN TYPE
TYPE: ACUTE PAIN
TYPE: ACUTE PAIN

## 2018-10-02 ASSESSMENT — PAIN DESCRIPTION - ONSET
ONSET: ON-GOING
ONSET: ON-GOING

## 2018-10-02 ASSESSMENT — PAIN DESCRIPTION - PROGRESSION
CLINICAL_PROGRESSION: GRADUALLY IMPROVING
CLINICAL_PROGRESSION: GRADUALLY IMPROVING

## 2018-10-02 ASSESSMENT — PAIN DESCRIPTION - ORIENTATION
ORIENTATION: RIGHT;LEFT
ORIENTATION: RIGHT;LEFT

## 2018-10-02 NOTE — PROGRESS NOTES
[facility]    Progress Note    10/2/2018    1:32 PM    Name:   Long Sierra  MRN:     [id number]     Kimja[place]side:      [de-identified]   Room:   2024/2024-01  IP Day:  6  Admit Date:  9/26/2018  6:13 PM    PCP:   Chetna Marshall MD  Code Status:  Full Code    Subjective:     C/C:   Chief Complaint   Patient presents with    Abdominal Pain     Interval History Status:   Continued improvement in abdominal pain last 2 days  Also able to eat regular diet today with ability to tolerate very well  No fevers or chills noted  Family at bedside during my visit    Brief History: \"The patient is a 48 y.o.  male who presents to the ER with complaints of abdominal pain and bloating. He also reports excessive belching with a foul odor. He has pain in his left upper and lower quadrant, and rates the 8-10 and sharp, and is aggravated with movement and palpation. He states he has had previous bowel obstructions that have resolved spontaneously or with the assistance of an NG tube. He reports this pain is consistent with his previous bowel obstructions. [facility] course Till yesterday:  Patient had greater than 1 L NG output. His abdomen, softer and he has less nausea however he still has high grade obstruction. Patient has unexplained bilirubin elevations. not certain about the chronicity of this however was present to at least February of this year. He may have an underlying Gilbert's syndrome. GI consultation will be obtained. Cardiology consultation has been obtained for his history of ventricular tachycardia/AICD and nonischemic CMP. He is currently on Lopressor 5 mg IV every 6 hours scheduled. He will need to be placed back on his oral metoprolol and sotalol when NG tube is discontinued. Patient has a relatively long history of intermittently recurring small bowel obstructions. Onset was following diverticular rupture and resection (1996).  Since that time the patient has had an elective cholecystectomy and was being followed for recurring incisional hernias (2006, 2008). The hernia was finally repaired in 2014 with a large retro-rectus mesh repair without recurrence. He continues to have problems with partial small bowel obstructions that respond to nasogastric drainage. He was tried on conservative therapy this admission however he had emesis at 4 AM. Case has been discussed with surgery. Nasogastric tube has been ordered.     Additional problems are of a nonischemic cardiomyopathy. His last ejection fraction in February 2018 was 55%. He reports VT for which he has had an AICD placed. The AICD has never fired however he has had multiple episodes of overdrive pacing to abolish episodes of VT. AICD was placed in 2012 and he is coming due for a battery replacement. He is normally on metoprolol 25 mg sotalol 160 mg twice a day.      The patient is a diabetic. He will be covered with medium dose sliding scale and adjusted accordingly. As he is nothing by mouth the patient will have his IV changed to D5W 0.45 normal saline at 125 ML's per hour. In addition he has mild hyperkalemia which will be rechecked as well as trending upward bilirubinemia of uncertain etiology. This too will be rechecked and addressed accordingly. Review of Systems:     Constitutional:  negative for chills, fevers, sweats  Respiratory:  negative for cough, dyspnea on exertion, shortness of breath, wheezing  Cardiovascular:  negative for chest pain, chest pressure/discomfort, lower extremity edema  Gastrointestinal: much improved abdominal pain as described  Neurological:  negative for dizziness, headache    Medications: Allergies:     Allergies   Allergen Reactions    Benadryl [Diphenhydramine] Hives    Pcn [Penicillins] Hives and Swelling       Current Meds:   Scheduled Meds:    insulin lispro  0-12 Units Subcutaneous TID WC    insulin lispro  0-6 Units Subcutaneous Nightly   

## 2018-10-02 NOTE — DISCHARGE SUMMARY
tablet  Commonly known as:  JANUVIA     sotalol 160 MG tablet  Commonly known as:  BETAPACE     tamsulosin 0.4 MG capsule  Commonly known as:  FLOMAX  Take 1 capsule by mouth daily     [facility]           Where to Get Your Medications      You can get these medications from any pharmacy    Bring a paper prescription for each of these medications  · HYDROcodone-acetaminophen 5-325 MG per tablet         Time Spent on discharge is  33 mins in patient examination, evaluation, counseling as well as medication reconciliation, prescriptions for required medications, discharge plan and follow up. Electronically signed by   Sangeeta Correa MD  10/2/2018  1:38 PM      Thank you Dr. Jessica Evans MD for the opportunity to be involved in this patient's care.

## 2018-10-02 NOTE — CARE COORDINATION
F/U appointment scheduled with Dr. Jake Souza 10-4-18 at 11am and pt informed. Pt will make own appointment with Dr. Meghan Garcia for 2 week F/U.

## 2018-10-02 NOTE — PROGRESS NOTES
Pt discharged to home in stable condition with belongings  Discharge instructions given  Discharge checklist reviewed and completed with pt and family.   \"Meds To Beds\" medication at bedside  Pt denies having any further questions at this time

## 2018-10-02 NOTE — PROGRESS NOTES
CLINICAL PHARMACY NOTE: MEDS TO [address] Select Patient?: No  Total # of Prescriptions Filled: 1   The following medications were delivered to the patient:  · Leny Zayas 5-325  Total # of Interventions Completed: 0  Time Spent (min): 15    Additional Documentation:

## 2018-10-03 LAB
MITOCHONDRIAL ANTIBODY: 6.5 UNITS (ref 0–20)
SEND OUT REPORT: NORMAL
TEST NAME: NORMAL

## 2018-10-04 LAB
ALPHA-1 ANTITRYPSIN PHENOTYPE: NORMAL
ALPHA-1 ANTITRYPSIN: 152 MG/DL (ref 90–200)

## 2018-12-14 ENCOUNTER — HOSPITAL ENCOUNTER (EMERGENCY)
Age: 53
Discharge: HOME | End: 2018-12-14
Attending: EMERGENCY MEDICINE
Payer: COMMERCIAL

## 2018-12-14 ENCOUNTER — APPOINTMENT (OUTPATIENT)
Dept: GENERAL RADIOLOGY | Age: 53
End: 2018-12-14
Payer: COMMERCIAL

## 2018-12-14 VITALS
TEMPERATURE: 97.9 F | DIASTOLIC BLOOD PRESSURE: 60 MMHG | WEIGHT: 275.1 LBS | RESPIRATION RATE: 18 BRPM | SYSTOLIC BLOOD PRESSURE: 129 MMHG | HEIGHT: 73 IN | BODY MASS INDEX: 36.46 KG/M2 | OXYGEN SATURATION: 99 % | HEART RATE: 88 BPM

## 2018-12-14 DIAGNOSIS — S40.012A CONTUSION OF LEFT SHOULDER, INITIAL ENCOUNTER: Primary | ICD-10-CM

## 2018-12-14 DIAGNOSIS — S80.212A ABRASION OF LEFT KNEE, INITIAL ENCOUNTER: ICD-10-CM

## 2018-12-14 DIAGNOSIS — S80.02XA CONTUSION OF LEFT KNEE, INITIAL ENCOUNTER: ICD-10-CM

## 2018-12-14 PROCEDURE — 90471 IMMUNIZATION ADMIN: CPT | Performed by: EMERGENCY MEDICINE

## 2018-12-14 PROCEDURE — 73562 X-RAY EXAM OF KNEE 3: CPT

## 2018-12-14 PROCEDURE — 73030 X-RAY EXAM OF SHOULDER: CPT

## 2018-12-14 PROCEDURE — 99283 EMERGENCY DEPT VISIT LOW MDM: CPT

## 2018-12-14 PROCEDURE — 90715 TDAP VACCINE 7 YRS/> IM: CPT | Performed by: EMERGENCY MEDICINE

## 2018-12-14 PROCEDURE — 6360000002 HC RX W HCPCS: Performed by: EMERGENCY MEDICINE

## 2018-12-14 RX ORDER — HYDROCODONE BITARTRATE AND ACETAMINOPHEN 5; 325 MG/1; MG/1
1 TABLET ORAL 3 TIMES DAILY PRN
Qty: 12 TABLET | Refills: 0 | Status: SHIPPED | OUTPATIENT
Start: 2018-12-14 | End: 2018-12-18

## 2018-12-14 RX ADMIN — TETANUS TOXOID, REDUCED DIPHTHERIA TOXOID AND ACELLULAR PERTUSSIS VACCINE, ADSORBED 0.5 ML: 5; 2.5; 8; 8; 2.5 SUSPENSION INTRAMUSCULAR at 22:57

## 2018-12-15 NOTE — ED PROVIDER NOTES
MESH    APPENDECTOMY      CARDIAC DEFIBRILLATOR PLACEMENT      CHOLECYSTECTOMY      COLECTOMY  1996    d/t ruptured diverticuli    CYSTOSCOPY N/A     stent in place taped to penis head    CYSTOSCOPY  3/4/16    with bilateral pyelogram    HERNIA REPAIR      umbilical    LITHOTRIPSY  3/4/16    laser lithotripsy with ureteral dilation    PACEMAKER PLACEMENT      pacemaker/defib         CURRENT MEDICATIONS       Discharge Medication List as of 12/14/2018 11:16 PM      CONTINUE these medications which have NOT CHANGED    Details   colchicine (COLCRYS) 0.6 MG tablet Take 0.6 mg by mouth 2 times daily as needed (gout flare)Historical Med      apixaban (ELIQUIS) 5 MG TABS tablet Take 5 mg by mouth 2 times daily Historical Med      pravastatin (PRAVACHOL) 10 MG tablet Take 10 mg by mouth daily Historical Med      allopurinol (ZYLOPRIM) 300 MG tablet Take 300 mg by mouth daily, R-2Historical Med      metFORMIN (GLUCOPHAGE) 1000 MG tablet Take 1,000 mg by mouth dailyHistorical Med      Dulaglutide (TRULICITY SC) Inject 2.91 mg into the skin once a week Historical Med      tamsulosin (FLOMAX) 0.4 MG capsule Take 1 capsule by mouth daily, Disp-30 capsule, R-3      dapagliflozin (FARXIGA) 10 MG tablet Take 10 mg by mouth every morning      metoprolol (LOPRESSOR) 25 MG tablet Take 25 mg by mouth 2 times daily. sitaGLIPtan (JANUVIA) 100 MG tablet Take 100 mg by mouth daily. omeprazole (PRILOSEC) 20 MG capsule Take 20 mg by mouth daily. aspirin 81 MG tablet Take 81 mg by mouth daily. sotalol (BETAPACE) 160 MG tablet Take 160 mg by mouth 2 times daily Historical Med      lisinopril (PRINIVIL;ZESTRIL) 10 MG tablet Take 10 mg by mouth daily.       ibuprofen (ADVIL;MOTRIN) 200 MG tablet Take 200 mg by mouth daily as needed for PainHistorical Med             ALLERGIES     Benadryl [diphenhydramine] and Pcn [penicillins]    FAMILY HISTORY       Family History   Problem Relation Age of Onset    Heart Disease

## 2019-01-03 ENCOUNTER — HOSPITAL ENCOUNTER (INPATIENT)
Age: 54
LOS: 6 days | Discharge: HOME | DRG: 389 | End: 2019-01-09
Attending: EMERGENCY MEDICINE | Admitting: FAMILY MEDICINE
Payer: COMMERCIAL

## 2019-01-03 ENCOUNTER — APPOINTMENT (OUTPATIENT)
Dept: GENERAL RADIOLOGY | Age: 54
DRG: 389 | End: 2019-01-03
Payer: COMMERCIAL

## 2019-01-03 DIAGNOSIS — K56.609 INTESTINAL OBSTRUCTION, UNSPECIFIED CAUSE, UNSPECIFIED WHETHER PARTIAL OR COMPLETE (HCC): Primary | ICD-10-CM

## 2019-01-03 LAB
ABSOLUTE EOS #: 0.2 K/UL (ref 0–0.4)
ABSOLUTE IMMATURE GRANULOCYTE: ABNORMAL K/UL (ref 0–0.3)
ABSOLUTE LYMPH #: 1.2 K/UL (ref 1–4.8)
ABSOLUTE MONO #: 0.9 K/UL (ref 0.2–0.8)
ALBUMIN SERPL-MCNC: 5.1 G/DL (ref 3.5–5.2)
ALBUMIN/GLOBULIN RATIO: ABNORMAL (ref 1–2.5)
ALP BLD-CCNC: 69 U/L (ref 40–129)
ALT SERPL-CCNC: 28 U/L (ref 5–41)
ANION GAP SERPL CALCULATED.3IONS-SCNC: 12 MMOL/L (ref 9–17)
AST SERPL-CCNC: 22 U/L
BASOPHILS # BLD: 0 % (ref 0–2)
BASOPHILS ABSOLUTE: 0 K/UL (ref 0–0.2)
BILIRUB SERPL-MCNC: 1.91 MG/DL (ref 0.3–1.2)
BUN BLDV-MCNC: 20 MG/DL (ref 6–20)
BUN/CREAT BLD: 19 (ref 9–20)
CALCIUM SERPL-MCNC: 9.8 MG/DL (ref 8.6–10.4)
CHLORIDE BLD-SCNC: 98 MMOL/L (ref 98–107)
CO2: 30 MMOL/L (ref 20–31)
CREAT SERPL-MCNC: 1.06 MG/DL (ref 0.7–1.2)
DIFFERENTIAL TYPE: ABNORMAL
EOSINOPHILS RELATIVE PERCENT: 2 % (ref 1–4)
GFR AFRICAN AMERICAN: >60 ML/MIN
GFR NON-AFRICAN AMERICAN: >60 ML/MIN
GFR SERPL CREATININE-BSD FRML MDRD: ABNORMAL ML/MIN/{1.73_M2}
GFR SERPL CREATININE-BSD FRML MDRD: ABNORMAL ML/MIN/{1.73_M2}
GLUCOSE BLD-MCNC: 111 MG/DL (ref 75–110)
GLUCOSE BLD-MCNC: 118 MG/DL (ref 75–110)
GLUCOSE BLD-MCNC: 152 MG/DL (ref 75–110)
GLUCOSE BLD-MCNC: 158 MG/DL (ref 70–99)
HCT VFR BLD CALC: 46.9 % (ref 41–53)
HEMOGLOBIN: 16.3 G/DL (ref 13.5–17.5)
IMMATURE GRANULOCYTES: ABNORMAL %
LIPASE: 21 U/L (ref 13–60)
LYMPHOCYTES # BLD: 11 % (ref 24–44)
MCH RBC QN AUTO: 29.1 PG (ref 26–34)
MCHC RBC AUTO-ENTMCNC: 34.7 G/DL (ref 31–37)
MCV RBC AUTO: 83.7 FL (ref 80–100)
MONOCYTES # BLD: 9 % (ref 1–7)
NRBC AUTOMATED: ABNORMAL PER 100 WBC
PDW BLD-RTO: 17.4 % (ref 11.5–14.5)
PLATELET # BLD: 166 K/UL (ref 130–400)
PLATELET ESTIMATE: ABNORMAL
PMV BLD AUTO: ABNORMAL FL (ref 6–12)
POTASSIUM SERPL-SCNC: 4.9 MMOL/L (ref 3.7–5.3)
RBC # BLD: 5.6 M/UL (ref 4.5–5.9)
RBC # BLD: ABNORMAL 10*6/UL
SEG NEUTROPHILS: 78 % (ref 36–66)
SEGMENTED NEUTROPHILS ABSOLUTE COUNT: 8.3 K/UL (ref 1.8–7.7)
SODIUM BLD-SCNC: 140 MMOL/L (ref 135–144)
TOTAL PROTEIN: 8.3 G/DL (ref 6.4–8.3)
WBC # BLD: 10.6 K/UL (ref 3.5–11)
WBC # BLD: ABNORMAL 10*3/UL

## 2019-01-03 PROCEDURE — 1200000000 HC SEMI PRIVATE

## 2019-01-03 PROCEDURE — 83690 ASSAY OF LIPASE: CPT

## 2019-01-03 PROCEDURE — 96375 TX/PRO/DX INJ NEW DRUG ADDON: CPT

## 2019-01-03 PROCEDURE — 94760 N-INVAS EAR/PLS OXIMETRY 1: CPT

## 2019-01-03 PROCEDURE — 80053 COMPREHEN METABOLIC PANEL: CPT

## 2019-01-03 PROCEDURE — 2580000003 HC RX 258: Performed by: INTERNAL MEDICINE

## 2019-01-03 PROCEDURE — 6360000002 HC RX W HCPCS: Performed by: INTERNAL MEDICINE

## 2019-01-03 PROCEDURE — 2580000003 HC RX 258: Performed by: EMERGENCY MEDICINE

## 2019-01-03 PROCEDURE — 96374 THER/PROPH/DIAG INJ IV PUSH: CPT

## 2019-01-03 PROCEDURE — 2500000003 HC RX 250 WO HCPCS: Performed by: FAMILY MEDICINE

## 2019-01-03 PROCEDURE — 74022 RADEX COMPL AQT ABD SERIES: CPT

## 2019-01-03 PROCEDURE — 6360000002 HC RX W HCPCS: Performed by: EMERGENCY MEDICINE

## 2019-01-03 PROCEDURE — 82947 ASSAY GLUCOSE BLOOD QUANT: CPT

## 2019-01-03 PROCEDURE — 85025 COMPLETE CBC W/AUTO DIFF WBC: CPT

## 2019-01-03 PROCEDURE — 99223 1ST HOSP IP/OBS HIGH 75: CPT | Performed by: FAMILY MEDICINE

## 2019-01-03 PROCEDURE — 2500000003 HC RX 250 WO HCPCS: Performed by: EMERGENCY MEDICINE

## 2019-01-03 PROCEDURE — S0028 INJECTION, FAMOTIDINE, 20 MG: HCPCS | Performed by: EMERGENCY MEDICINE

## 2019-01-03 PROCEDURE — 99285 EMERGENCY DEPT VISIT HI MDM: CPT

## 2019-01-03 PROCEDURE — 6370000000 HC RX 637 (ALT 250 FOR IP): Performed by: INTERNAL MEDICINE

## 2019-01-03 PROCEDURE — S0028 INJECTION, FAMOTIDINE, 20 MG: HCPCS | Performed by: INTERNAL MEDICINE

## 2019-01-03 RX ORDER — NICOTINE POLACRILEX 4 MG
15 LOZENGE BUCCAL PRN
Status: DISCONTINUED | OUTPATIENT
Start: 2019-01-03 | End: 2019-01-09 | Stop reason: HOSPADM

## 2019-01-03 RX ORDER — INSULIN GLARGINE 100 [IU]/ML
15 INJECTION, SOLUTION SUBCUTANEOUS NIGHTLY
Status: DISCONTINUED | OUTPATIENT
Start: 2019-01-03 | End: 2019-01-09 | Stop reason: HOSPADM

## 2019-01-03 RX ORDER — MAGNESIUM SULFATE 1 G/100ML
1 INJECTION INTRAVENOUS PRN
Status: DISCONTINUED | OUTPATIENT
Start: 2019-01-03 | End: 2019-01-09 | Stop reason: HOSPADM

## 2019-01-03 RX ORDER — DEXTROSE MONOHYDRATE 25 G/50ML
12.5 INJECTION, SOLUTION INTRAVENOUS PRN
Status: DISCONTINUED | OUTPATIENT
Start: 2019-01-03 | End: 2019-01-09 | Stop reason: HOSPADM

## 2019-01-03 RX ORDER — POTASSIUM CHLORIDE 7.45 MG/ML
10 INJECTION INTRAVENOUS PRN
Status: DISCONTINUED | OUTPATIENT
Start: 2019-01-03 | End: 2019-01-09 | Stop reason: HOSPADM

## 2019-01-03 RX ORDER — METOPROLOL TARTRATE 5 MG/5ML
2.5 INJECTION INTRAVENOUS EVERY 6 HOURS SCHEDULED
Status: DISCONTINUED | OUTPATIENT
Start: 2019-01-03 | End: 2019-01-04

## 2019-01-03 RX ORDER — DEXTROSE MONOHYDRATE 50 MG/ML
100 INJECTION, SOLUTION INTRAVENOUS PRN
Status: DISCONTINUED | OUTPATIENT
Start: 2019-01-03 | End: 2019-01-09 | Stop reason: HOSPADM

## 2019-01-03 RX ORDER — MORPHINE SULFATE 4 MG/ML
4 INJECTION, SOLUTION INTRAMUSCULAR; INTRAVENOUS ONCE
Status: DISCONTINUED | OUTPATIENT
Start: 2019-01-03 | End: 2019-01-03

## 2019-01-03 RX ORDER — SODIUM CHLORIDE 0.9 % (FLUSH) 0.9 %
10 SYRINGE (ML) INJECTION PRN
Status: DISCONTINUED | OUTPATIENT
Start: 2019-01-03 | End: 2019-01-09 | Stop reason: HOSPADM

## 2019-01-03 RX ORDER — 0.9 % SODIUM CHLORIDE 0.9 %
1000 INTRAVENOUS SOLUTION INTRAVENOUS ONCE
Status: COMPLETED | OUTPATIENT
Start: 2019-01-03 | End: 2019-01-03

## 2019-01-03 RX ORDER — SODIUM CHLORIDE 9 MG/ML
INJECTION, SOLUTION INTRAVENOUS CONTINUOUS
Status: DISCONTINUED | OUTPATIENT
Start: 2019-01-03 | End: 2019-01-05

## 2019-01-03 RX ORDER — ONDANSETRON 2 MG/ML
4 INJECTION INTRAMUSCULAR; INTRAVENOUS EVERY 6 HOURS PRN
Status: DISCONTINUED | OUTPATIENT
Start: 2019-01-03 | End: 2019-01-09 | Stop reason: HOSPADM

## 2019-01-03 RX ORDER — SODIUM CHLORIDE 0.9 % (FLUSH) 0.9 %
10 SYRINGE (ML) INJECTION EVERY 12 HOURS SCHEDULED
Status: DISCONTINUED | OUTPATIENT
Start: 2019-01-03 | End: 2019-01-09 | Stop reason: HOSPADM

## 2019-01-03 RX ORDER — ONDANSETRON 2 MG/ML
8 INJECTION INTRAMUSCULAR; INTRAVENOUS ONCE
Status: COMPLETED | OUTPATIENT
Start: 2019-01-03 | End: 2019-01-03

## 2019-01-03 RX ADMIN — ENOXAPARIN SODIUM 120 MG: 150 INJECTION SUBCUTANEOUS at 20:39

## 2019-01-03 RX ADMIN — SODIUM CHLORIDE 1000 ML: 9 INJECTION, SOLUTION INTRAVENOUS at 07:45

## 2019-01-03 RX ADMIN — HYDROMORPHONE HYDROCHLORIDE 0.5 MG: 1 INJECTION, SOLUTION INTRAMUSCULAR; INTRAVENOUS; SUBCUTANEOUS at 22:04

## 2019-01-03 RX ADMIN — ONDANSETRON 4 MG: 2 INJECTION INTRAMUSCULAR; INTRAVENOUS at 17:58

## 2019-01-03 RX ADMIN — HYDROMORPHONE HYDROCHLORIDE 0.5 MG: 1 INJECTION, SOLUTION INTRAMUSCULAR; INTRAVENOUS; SUBCUTANEOUS at 09:53

## 2019-01-03 RX ADMIN — METOPROLOL TARTRATE 2.5 MG: 5 INJECTION, SOLUTION INTRAVENOUS at 23:30

## 2019-01-03 RX ADMIN — ONDANSETRON 8 MG: 2 INJECTION INTRAMUSCULAR; INTRAVENOUS at 08:24

## 2019-01-03 RX ADMIN — HYDROMORPHONE HYDROCHLORIDE 0.5 MG: 1 INJECTION, SOLUTION INTRAMUSCULAR; INTRAVENOUS; SUBCUTANEOUS at 15:42

## 2019-01-03 RX ADMIN — FAMOTIDINE 20 MG: 10 INJECTION, SOLUTION INTRAVENOUS at 08:23

## 2019-01-03 RX ADMIN — HYDROMORPHONE HYDROCHLORIDE 1 MG: 1 INJECTION, SOLUTION INTRAMUSCULAR; INTRAVENOUS; SUBCUTANEOUS at 08:26

## 2019-01-03 RX ADMIN — SODIUM CHLORIDE: 9 INJECTION, SOLUTION INTRAVENOUS at 17:55

## 2019-01-03 RX ADMIN — HYDROMORPHONE HYDROCHLORIDE 0.5 MG: 1 INJECTION, SOLUTION INTRAMUSCULAR; INTRAVENOUS; SUBCUTANEOUS at 18:49

## 2019-01-03 RX ADMIN — HYDROMORPHONE HYDROCHLORIDE 0.5 MG: 1 INJECTION, SOLUTION INTRAMUSCULAR; INTRAVENOUS; SUBCUTANEOUS at 12:39

## 2019-01-03 RX ADMIN — METOPROLOL TARTRATE 2.5 MG: 5 INJECTION, SOLUTION INTRAVENOUS at 15:50

## 2019-01-03 RX ADMIN — INSULIN LISPRO 2 UNITS: 100 INJECTION, SOLUTION INTRAVENOUS; SUBCUTANEOUS at 13:05

## 2019-01-03 RX ADMIN — FAMOTIDINE 20 MG: 10 INJECTION, SOLUTION INTRAVENOUS at 20:40

## 2019-01-03 RX ADMIN — SODIUM CHLORIDE 1000 ML: 9 INJECTION, SOLUTION INTRAVENOUS at 09:50

## 2019-01-03 ASSESSMENT — PAIN SCALES - GENERAL
PAINLEVEL_OUTOF10: 7
PAINLEVEL_OUTOF10: 10
PAINLEVEL_OUTOF10: 8
PAINLEVEL_OUTOF10: 10
PAINLEVEL_OUTOF10: 10
PAINLEVEL_OUTOF10: 8
PAINLEVEL_OUTOF10: 9
PAINLEVEL_OUTOF10: 10
PAINLEVEL_OUTOF10: 8
PAINLEVEL_OUTOF10: 10
PAINLEVEL_OUTOF10: 9

## 2019-01-03 ASSESSMENT — ENCOUNTER SYMPTOMS
COUGH: 0
WHEEZING: 0
VOICE CHANGE: 0
CONSTIPATION: 0
BACK PAIN: 0
SHORTNESS OF BREATH: 0
BLOOD IN STOOL: 0
ABDOMINAL PAIN: 1
ABDOMINAL DISTENTION: 1
NAUSEA: 1
SORE THROAT: 0
VOMITING: 1
STRIDOR: 0
DIARRHEA: 0
NAUSEA: 0
SINUS PRESSURE: 0

## 2019-01-03 ASSESSMENT — PAIN DESCRIPTION - PAIN TYPE: TYPE: ACUTE PAIN

## 2019-01-03 ASSESSMENT — PAIN DESCRIPTION - LOCATION: LOCATION: ABDOMEN

## 2019-01-04 ENCOUNTER — APPOINTMENT (OUTPATIENT)
Dept: GENERAL RADIOLOGY | Age: 54
DRG: 389 | End: 2019-01-04
Payer: COMMERCIAL

## 2019-01-04 LAB
ALBUMIN SERPL-MCNC: 4 G/DL (ref 3.5–5.2)
ALBUMIN/GLOBULIN RATIO: ABNORMAL (ref 1–2.5)
ALP BLD-CCNC: 60 U/L (ref 40–129)
ALT SERPL-CCNC: 23 U/L (ref 5–41)
AMYLASE: 23 U/L (ref 28–100)
ANION GAP SERPL CALCULATED.3IONS-SCNC: 11 MMOL/L (ref 9–17)
AST SERPL-CCNC: 18 U/L
BILIRUB SERPL-MCNC: 2.83 MG/DL (ref 0.3–1.2)
BUN BLDV-MCNC: 20 MG/DL (ref 6–20)
BUN/CREAT BLD: 22 (ref 9–20)
CALCIUM SERPL-MCNC: 8.4 MG/DL (ref 8.6–10.4)
CHLORIDE BLD-SCNC: 103 MMOL/L (ref 98–107)
CO2: 24 MMOL/L (ref 20–31)
CREAT SERPL-MCNC: 0.89 MG/DL (ref 0.7–1.2)
GFR AFRICAN AMERICAN: >60 ML/MIN
GFR NON-AFRICAN AMERICAN: >60 ML/MIN
GFR SERPL CREATININE-BSD FRML MDRD: ABNORMAL ML/MIN/{1.73_M2}
GFR SERPL CREATININE-BSD FRML MDRD: ABNORMAL ML/MIN/{1.73_M2}
GLUCOSE BLD-MCNC: 105 MG/DL (ref 75–110)
GLUCOSE BLD-MCNC: 110 MG/DL (ref 75–110)
GLUCOSE BLD-MCNC: 125 MG/DL (ref 70–99)
GLUCOSE BLD-MCNC: 91 MG/DL (ref 75–110)
GLUCOSE BLD-MCNC: 92 MG/DL (ref 75–110)
HCT VFR BLD CALC: 42.6 % (ref 41–53)
HEMOGLOBIN: 14.6 G/DL (ref 13.5–17.5)
INR BLD: 1.2
LIPASE: 15 U/L (ref 13–60)
MAGNESIUM: 1.9 MG/DL (ref 1.6–2.6)
MCH RBC QN AUTO: 28.4 PG (ref 26–34)
MCHC RBC AUTO-ENTMCNC: 34.2 G/DL (ref 31–37)
MCV RBC AUTO: 83.1 FL (ref 80–100)
NRBC AUTOMATED: ABNORMAL PER 100 WBC
PDW BLD-RTO: 18.3 % (ref 11.5–14.5)
PHOSPHORUS: 2.8 MG/DL (ref 2.5–4.5)
PLATELET # BLD: 146 K/UL (ref 130–400)
PMV BLD AUTO: 8.6 FL (ref 6–12)
POTASSIUM SERPL-SCNC: 4.1 MMOL/L (ref 3.7–5.3)
PROTHROMBIN TIME: 12.2 SEC (ref 9.7–11.6)
RBC # BLD: 5.13 M/UL (ref 4.5–5.9)
SODIUM BLD-SCNC: 138 MMOL/L (ref 135–144)
TOTAL PROTEIN: 6.7 G/DL (ref 6.4–8.3)
WBC # BLD: 8.5 K/UL (ref 3.5–11)

## 2019-01-04 PROCEDURE — 82947 ASSAY GLUCOSE BLOOD QUANT: CPT

## 2019-01-04 PROCEDURE — 99232 SBSQ HOSP IP/OBS MODERATE 35: CPT | Performed by: FAMILY MEDICINE

## 2019-01-04 PROCEDURE — 74019 RADEX ABDOMEN 2 VIEWS: CPT

## 2019-01-04 PROCEDURE — 1200000000 HC SEMI PRIVATE

## 2019-01-04 PROCEDURE — 2500000003 HC RX 250 WO HCPCS: Performed by: FAMILY MEDICINE

## 2019-01-04 PROCEDURE — S0028 INJECTION, FAMOTIDINE, 20 MG: HCPCS | Performed by: INTERNAL MEDICINE

## 2019-01-04 PROCEDURE — 6360000002 HC RX W HCPCS: Performed by: INTERNAL MEDICINE

## 2019-01-04 PROCEDURE — 2580000003 HC RX 258: Performed by: INTERNAL MEDICINE

## 2019-01-04 PROCEDURE — 83735 ASSAY OF MAGNESIUM: CPT

## 2019-01-04 PROCEDURE — 85027 COMPLETE CBC AUTOMATED: CPT

## 2019-01-04 PROCEDURE — 36415 COLL VENOUS BLD VENIPUNCTURE: CPT

## 2019-01-04 PROCEDURE — 84100 ASSAY OF PHOSPHORUS: CPT

## 2019-01-04 PROCEDURE — 83690 ASSAY OF LIPASE: CPT

## 2019-01-04 PROCEDURE — 85610 PROTHROMBIN TIME: CPT

## 2019-01-04 PROCEDURE — 80053 COMPREHEN METABOLIC PANEL: CPT

## 2019-01-04 PROCEDURE — 82150 ASSAY OF AMYLASE: CPT

## 2019-01-04 RX ORDER — METOPROLOL TARTRATE 5 MG/5ML
5 INJECTION INTRAVENOUS EVERY 8 HOURS
Status: DISCONTINUED | OUTPATIENT
Start: 2019-01-04 | End: 2019-01-07

## 2019-01-04 RX ADMIN — SODIUM CHLORIDE: 9 INJECTION, SOLUTION INTRAVENOUS at 15:22

## 2019-01-04 RX ADMIN — ONDANSETRON 4 MG: 2 INJECTION INTRAMUSCULAR; INTRAVENOUS at 00:09

## 2019-01-04 RX ADMIN — HYDROMORPHONE HYDROCHLORIDE 0.5 MG: 1 INJECTION, SOLUTION INTRAMUSCULAR; INTRAVENOUS; SUBCUTANEOUS at 07:48

## 2019-01-04 RX ADMIN — ENOXAPARIN SODIUM 120 MG: 150 INJECTION SUBCUTANEOUS at 20:36

## 2019-01-04 RX ADMIN — FAMOTIDINE 20 MG: 10 INJECTION, SOLUTION INTRAVENOUS at 20:38

## 2019-01-04 RX ADMIN — SODIUM CHLORIDE: 9 INJECTION, SOLUTION INTRAVENOUS at 07:50

## 2019-01-04 RX ADMIN — HYDROMORPHONE HYDROCHLORIDE 0.5 MG: 1 INJECTION, SOLUTION INTRAMUSCULAR; INTRAVENOUS; SUBCUTANEOUS at 02:55

## 2019-01-04 RX ADMIN — HYDROMORPHONE HYDROCHLORIDE 0.5 MG: 1 INJECTION, SOLUTION INTRAMUSCULAR; INTRAVENOUS; SUBCUTANEOUS at 17:20

## 2019-01-04 RX ADMIN — HYDROMORPHONE HYDROCHLORIDE 0.5 MG: 1 INJECTION, SOLUTION INTRAMUSCULAR; INTRAVENOUS; SUBCUTANEOUS at 14:15

## 2019-01-04 RX ADMIN — METOPROLOL TARTRATE 2.5 MG: 5 INJECTION, SOLUTION INTRAVENOUS at 11:31

## 2019-01-04 RX ADMIN — HYDROMORPHONE HYDROCHLORIDE 0.5 MG: 1 INJECTION, SOLUTION INTRAMUSCULAR; INTRAVENOUS; SUBCUTANEOUS at 11:11

## 2019-01-04 RX ADMIN — HYDROMORPHONE HYDROCHLORIDE 0.5 MG: 1 INJECTION, SOLUTION INTRAMUSCULAR; INTRAVENOUS; SUBCUTANEOUS at 20:37

## 2019-01-04 RX ADMIN — ENOXAPARIN SODIUM 120 MG: 150 INJECTION SUBCUTANEOUS at 07:48

## 2019-01-04 RX ADMIN — Medication 10 ML: at 20:38

## 2019-01-04 RX ADMIN — FAMOTIDINE 20 MG: 10 INJECTION, SOLUTION INTRAVENOUS at 07:48

## 2019-01-04 RX ADMIN — SODIUM CHLORIDE: 9 INJECTION, SOLUTION INTRAVENOUS at 01:04

## 2019-01-04 ASSESSMENT — PAIN SCALES - GENERAL
PAINLEVEL_OUTOF10: 8
PAINLEVEL_OUTOF10: 10
PAINLEVEL_OUTOF10: 8
PAINLEVEL_OUTOF10: 9
PAINLEVEL_OUTOF10: 8
PAINLEVEL_OUTOF10: 10
PAINLEVEL_OUTOF10: 9
PAINLEVEL_OUTOF10: 7
PAINLEVEL_OUTOF10: 8
PAINLEVEL_OUTOF10: 7
PAINLEVEL_OUTOF10: 7

## 2019-01-04 ASSESSMENT — PAIN DESCRIPTION - DESCRIPTORS
DESCRIPTORS: ACHING;DISCOMFORT
DESCRIPTORS: ACHING

## 2019-01-04 ASSESSMENT — PAIN DESCRIPTION - ONSET
ONSET: ON-GOING

## 2019-01-04 ASSESSMENT — ENCOUNTER SYMPTOMS
VOMITING: 0
CONSTIPATION: 0
SORE THROAT: 0
BLOOD IN STOOL: 0
ABDOMINAL PAIN: 1
SINUS PRESSURE: 0
NAUSEA: 0
COUGH: 0
WHEEZING: 0
VOICE CHANGE: 0
DIARRHEA: 0
SHORTNESS OF BREATH: 0

## 2019-01-04 ASSESSMENT — PAIN DESCRIPTION - LOCATION
LOCATION: ABDOMEN

## 2019-01-04 ASSESSMENT — PAIN DESCRIPTION - FREQUENCY
FREQUENCY: CONTINUOUS

## 2019-01-04 ASSESSMENT — PAIN DESCRIPTION - PROGRESSION
CLINICAL_PROGRESSION: NOT CHANGED

## 2019-01-04 ASSESSMENT — PAIN DESCRIPTION - PAIN TYPE
TYPE: ACUTE PAIN

## 2019-01-04 ASSESSMENT — PAIN DESCRIPTION - ORIENTATION: ORIENTATION: UPPER;RIGHT

## 2019-01-05 ENCOUNTER — APPOINTMENT (OUTPATIENT)
Dept: GENERAL RADIOLOGY | Age: 54
DRG: 389 | End: 2019-01-05
Payer: COMMERCIAL

## 2019-01-05 LAB
ABSOLUTE EOS #: 0.1 K/UL (ref 0–0.4)
ABSOLUTE IMMATURE GRANULOCYTE: ABNORMAL K/UL (ref 0–0.3)
ABSOLUTE LYMPH #: 0.7 K/UL (ref 1–4.8)
ABSOLUTE MONO #: 0.7 K/UL (ref 0.2–0.8)
ALBUMIN SERPL-MCNC: 3.7 G/DL (ref 3.5–5.2)
ALBUMIN/GLOBULIN RATIO: ABNORMAL (ref 1–2.5)
ALP BLD-CCNC: 60 U/L (ref 40–129)
ALT SERPL-CCNC: 20 U/L (ref 5–41)
ANION GAP SERPL CALCULATED.3IONS-SCNC: 12 MMOL/L (ref 9–17)
AST SERPL-CCNC: 14 U/L
BASOPHILS # BLD: 1 % (ref 0–2)
BASOPHILS ABSOLUTE: 0 K/UL (ref 0–0.2)
BILIRUB SERPL-MCNC: 2.76 MG/DL (ref 0.3–1.2)
BILIRUBIN DIRECT: 1.05 MG/DL
BILIRUBIN, INDIRECT: 1.71 MG/DL (ref 0–1)
BUN BLDV-MCNC: 16 MG/DL (ref 6–20)
CALCIUM SERPL-MCNC: 8.3 MG/DL (ref 8.6–10.4)
CHLORIDE BLD-SCNC: 104 MMOL/L (ref 98–107)
CO2: 24 MMOL/L (ref 20–31)
CREAT SERPL-MCNC: 0.88 MG/DL (ref 0.7–1.2)
DIFFERENTIAL TYPE: ABNORMAL
EOSINOPHILS RELATIVE PERCENT: 2 % (ref 1–4)
GFR AFRICAN AMERICAN: >60 ML/MIN
GFR NON-AFRICAN AMERICAN: >60 ML/MIN
GFR SERPL CREATININE-BSD FRML MDRD: ABNORMAL ML/MIN/{1.73_M2}
GFR SERPL CREATININE-BSD FRML MDRD: ABNORMAL ML/MIN/{1.73_M2}
GLUCOSE BLD-MCNC: 106 MG/DL (ref 75–110)
GLUCOSE BLD-MCNC: 109 MG/DL (ref 75–110)
GLUCOSE BLD-MCNC: 136 MG/DL (ref 75–110)
GLUCOSE BLD-MCNC: 82 MG/DL (ref 75–110)
GLUCOSE BLD-MCNC: 82 MG/DL (ref 75–110)
GLUCOSE BLD-MCNC: 87 MG/DL (ref 75–110)
GLUCOSE BLD-MCNC: 96 MG/DL (ref 70–99)
HCT VFR BLD CALC: 41.3 % (ref 41–53)
HEMOGLOBIN: 14 G/DL (ref 13.5–17.5)
IMMATURE GRANULOCYTES: ABNORMAL %
LYMPHOCYTES # BLD: 10 % (ref 24–44)
MAGNESIUM: 1.8 MG/DL (ref 1.6–2.6)
MCH RBC QN AUTO: 28.2 PG (ref 26–34)
MCHC RBC AUTO-ENTMCNC: 33.9 G/DL (ref 31–37)
MCV RBC AUTO: 83.2 FL (ref 80–100)
MONOCYTES # BLD: 9 % (ref 1–7)
NRBC AUTOMATED: ABNORMAL PER 100 WBC
PDW BLD-RTO: 18.2 % (ref 11.5–14.5)
PLATELET # BLD: 133 K/UL (ref 130–400)
PLATELET ESTIMATE: ABNORMAL
PMV BLD AUTO: 8.3 FL (ref 6–12)
POTASSIUM SERPL-SCNC: 3.7 MMOL/L (ref 3.7–5.3)
RBC # BLD: 4.96 M/UL (ref 4.5–5.9)
RBC # BLD: ABNORMAL 10*6/UL
SEG NEUTROPHILS: 78 % (ref 36–66)
SEGMENTED NEUTROPHILS ABSOLUTE COUNT: 5.6 K/UL (ref 1.8–7.7)
SODIUM BLD-SCNC: 140 MMOL/L (ref 135–144)
TOTAL PROTEIN: 6.4 G/DL (ref 6.4–8.3)
WBC # BLD: 7.2 K/UL (ref 3.5–11)
WBC # BLD: ABNORMAL 10*3/UL

## 2019-01-05 PROCEDURE — 6360000002 HC RX W HCPCS: Performed by: SURGERY

## 2019-01-05 PROCEDURE — 36415 COLL VENOUS BLD VENIPUNCTURE: CPT

## 2019-01-05 PROCEDURE — 99232 SBSQ HOSP IP/OBS MODERATE 35: CPT | Performed by: FAMILY MEDICINE

## 2019-01-05 PROCEDURE — 2500000003 HC RX 250 WO HCPCS: Performed by: FAMILY MEDICINE

## 2019-01-05 PROCEDURE — 2500000003 HC RX 250 WO HCPCS: Performed by: SURGERY

## 2019-01-05 PROCEDURE — 2580000003 HC RX 258: Performed by: INTERNAL MEDICINE

## 2019-01-05 PROCEDURE — S0028 INJECTION, FAMOTIDINE, 20 MG: HCPCS | Performed by: INTERNAL MEDICINE

## 2019-01-05 PROCEDURE — 83735 ASSAY OF MAGNESIUM: CPT

## 2019-01-05 PROCEDURE — 1200000000 HC SEMI PRIVATE

## 2019-01-05 PROCEDURE — 6360000002 HC RX W HCPCS: Performed by: INTERNAL MEDICINE

## 2019-01-05 PROCEDURE — 74019 RADEX ABDOMEN 2 VIEWS: CPT

## 2019-01-05 PROCEDURE — 82248 BILIRUBIN DIRECT: CPT

## 2019-01-05 PROCEDURE — 82947 ASSAY GLUCOSE BLOOD QUANT: CPT

## 2019-01-05 PROCEDURE — 85025 COMPLETE CBC W/AUTO DIFF WBC: CPT

## 2019-01-05 PROCEDURE — 80053 COMPREHEN METABOLIC PANEL: CPT

## 2019-01-05 RX ORDER — DEXTROSE, SODIUM CHLORIDE, AND POTASSIUM CHLORIDE 5; .45; .15 G/100ML; G/100ML; G/100ML
INJECTION INTRAVENOUS CONTINUOUS
Status: DISCONTINUED | OUTPATIENT
Start: 2019-01-05 | End: 2019-01-09

## 2019-01-05 RX ORDER — MAGNESIUM SULFATE 1 G/100ML
1 INJECTION INTRAVENOUS
Status: COMPLETED | OUTPATIENT
Start: 2019-01-05 | End: 2019-01-05

## 2019-01-05 RX ORDER — BISACODYL 10 MG
10 SUPPOSITORY, RECTAL RECTAL ONCE
Status: DISCONTINUED | OUTPATIENT
Start: 2019-01-05 | End: 2019-01-09 | Stop reason: HOSPADM

## 2019-01-05 RX ADMIN — HYDROMORPHONE HYDROCHLORIDE 0.5 MG: 1 INJECTION, SOLUTION INTRAMUSCULAR; INTRAVENOUS; SUBCUTANEOUS at 06:42

## 2019-01-05 RX ADMIN — FAMOTIDINE 20 MG: 10 INJECTION, SOLUTION INTRAVENOUS at 08:28

## 2019-01-05 RX ADMIN — HYDROMORPHONE HYDROCHLORIDE 0.5 MG: 1 INJECTION, SOLUTION INTRAMUSCULAR; INTRAVENOUS; SUBCUTANEOUS at 20:00

## 2019-01-05 RX ADMIN — SODIUM CHLORIDE: 9 INJECTION, SOLUTION INTRAVENOUS at 00:08

## 2019-01-05 RX ADMIN — ENOXAPARIN SODIUM 120 MG: 150 INJECTION SUBCUTANEOUS at 20:01

## 2019-01-05 RX ADMIN — DEXTROSE MONOHYDRATE, SODIUM CHLORIDE, AND POTASSIUM CHLORIDE: 50; 4.5; 1.49 INJECTION, SOLUTION INTRAVENOUS at 11:18

## 2019-01-05 RX ADMIN — METOPROLOL TARTRATE 5 MG: 5 INJECTION, SOLUTION INTRAVENOUS at 20:01

## 2019-01-05 RX ADMIN — MAGNESIUM SULFATE HEPTAHYDRATE 1 G: 1 INJECTION, SOLUTION INTRAVENOUS at 14:52

## 2019-01-05 RX ADMIN — ENOXAPARIN SODIUM 120 MG: 150 INJECTION SUBCUTANEOUS at 08:28

## 2019-01-05 RX ADMIN — Medication 10 ML: at 20:04

## 2019-01-05 RX ADMIN — HYDROMORPHONE HYDROCHLORIDE 0.5 MG: 1 INJECTION, SOLUTION INTRAMUSCULAR; INTRAVENOUS; SUBCUTANEOUS at 00:08

## 2019-01-05 RX ADMIN — HYDROMORPHONE HYDROCHLORIDE 0.5 MG: 1 INJECTION, SOLUTION INTRAMUSCULAR; INTRAVENOUS; SUBCUTANEOUS at 10:12

## 2019-01-05 RX ADMIN — DEXTROSE MONOHYDRATE, SODIUM CHLORIDE, AND POTASSIUM CHLORIDE: 50; 4.5; 1.49 INJECTION, SOLUTION INTRAVENOUS at 22:32

## 2019-01-05 RX ADMIN — METOPROLOL TARTRATE 5 MG: 5 INJECTION, SOLUTION INTRAVENOUS at 12:34

## 2019-01-05 RX ADMIN — HYDROMORPHONE HYDROCHLORIDE 0.5 MG: 1 INJECTION, SOLUTION INTRAMUSCULAR; INTRAVENOUS; SUBCUTANEOUS at 03:22

## 2019-01-05 RX ADMIN — HYDROMORPHONE HYDROCHLORIDE 0.5 MG: 1 INJECTION, SOLUTION INTRAMUSCULAR; INTRAVENOUS; SUBCUTANEOUS at 23:08

## 2019-01-05 RX ADMIN — MAGNESIUM SULFATE HEPTAHYDRATE 1 G: 1 INJECTION, SOLUTION INTRAVENOUS at 12:35

## 2019-01-05 RX ADMIN — FAMOTIDINE 20 MG: 10 INJECTION, SOLUTION INTRAVENOUS at 20:00

## 2019-01-05 RX ADMIN — HYDROMORPHONE HYDROCHLORIDE 0.5 MG: 1 INJECTION, SOLUTION INTRAMUSCULAR; INTRAVENOUS; SUBCUTANEOUS at 13:39

## 2019-01-05 RX ADMIN — SODIUM CHLORIDE: 9 INJECTION, SOLUTION INTRAVENOUS at 08:29

## 2019-01-05 RX ADMIN — METOPROLOL TARTRATE 5 MG: 5 INJECTION, SOLUTION INTRAVENOUS at 03:30

## 2019-01-05 RX ADMIN — HYDROMORPHONE HYDROCHLORIDE 0.5 MG: 1 INJECTION, SOLUTION INTRAMUSCULAR; INTRAVENOUS; SUBCUTANEOUS at 16:42

## 2019-01-05 ASSESSMENT — ENCOUNTER SYMPTOMS
WHEEZING: 0
BLOOD IN STOOL: 0
VOICE CHANGE: 0
DIARRHEA: 0
NAUSEA: 0
COUGH: 0
SORE THROAT: 0
VOMITING: 0
SINUS PRESSURE: 0
SHORTNESS OF BREATH: 0
CONSTIPATION: 0
ABDOMINAL PAIN: 1

## 2019-01-05 ASSESSMENT — PAIN DESCRIPTION - PROGRESSION
CLINICAL_PROGRESSION: NOT CHANGED

## 2019-01-05 ASSESSMENT — PAIN DESCRIPTION - LOCATION
LOCATION: ABDOMEN

## 2019-01-05 ASSESSMENT — PAIN DESCRIPTION - ONSET
ONSET: ON-GOING

## 2019-01-05 ASSESSMENT — PAIN SCALES - GENERAL
PAINLEVEL_OUTOF10: 8
PAINLEVEL_OUTOF10: 9
PAINLEVEL_OUTOF10: 6
PAINLEVEL_OUTOF10: 8
PAINLEVEL_OUTOF10: 7
PAINLEVEL_OUTOF10: 7
PAINLEVEL_OUTOF10: 8
PAINLEVEL_OUTOF10: 7

## 2019-01-05 ASSESSMENT — PAIN DESCRIPTION - FREQUENCY
FREQUENCY: CONTINUOUS

## 2019-01-05 ASSESSMENT — PAIN DESCRIPTION - DESCRIPTORS
DESCRIPTORS: ACHING;DISCOMFORT

## 2019-01-05 ASSESSMENT — PAIN DESCRIPTION - ORIENTATION: ORIENTATION: UPPER;LEFT

## 2019-01-05 ASSESSMENT — PAIN DESCRIPTION - PAIN TYPE
TYPE: ACUTE PAIN

## 2019-01-06 LAB
ALBUMIN SERPL-MCNC: 3.8 G/DL (ref 3.5–5.2)
ALBUMIN/GLOBULIN RATIO: ABNORMAL (ref 1–2.5)
ALP BLD-CCNC: 58 U/L (ref 40–129)
ALT SERPL-CCNC: 22 U/L (ref 5–41)
ANION GAP SERPL CALCULATED.3IONS-SCNC: 9 MMOL/L (ref 9–17)
AST SERPL-CCNC: 19 U/L
BILIRUB SERPL-MCNC: 1.64 MG/DL (ref 0.3–1.2)
BILIRUBIN DIRECT: 0.69 MG/DL
BILIRUBIN, INDIRECT: 0.95 MG/DL (ref 0–1)
BUN BLDV-MCNC: 10 MG/DL (ref 6–20)
CALCIUM SERPL-MCNC: 8.1 MG/DL (ref 8.6–10.4)
CHLORIDE BLD-SCNC: 101 MMOL/L (ref 98–107)
CO2: 26 MMOL/L (ref 20–31)
CREAT SERPL-MCNC: 0.81 MG/DL (ref 0.7–1.2)
GFR AFRICAN AMERICAN: >60 ML/MIN
GFR NON-AFRICAN AMERICAN: >60 ML/MIN
GFR SERPL CREATININE-BSD FRML MDRD: ABNORMAL ML/MIN/{1.73_M2}
GFR SERPL CREATININE-BSD FRML MDRD: ABNORMAL ML/MIN/{1.73_M2}
GLUCOSE BLD-MCNC: 109 MG/DL (ref 75–110)
GLUCOSE BLD-MCNC: 117 MG/DL (ref 75–110)
GLUCOSE BLD-MCNC: 127 MG/DL (ref 75–110)
GLUCOSE BLD-MCNC: 133 MG/DL (ref 70–99)
GLUCOSE BLD-MCNC: 136 MG/DL (ref 75–110)
GLUCOSE BLD-MCNC: 140 MG/DL (ref 75–110)
MAGNESIUM: 1.9 MG/DL (ref 1.6–2.6)
POTASSIUM SERPL-SCNC: 3.7 MMOL/L (ref 3.7–5.3)
SODIUM BLD-SCNC: 136 MMOL/L (ref 135–144)
TOTAL PROTEIN: 6.4 G/DL (ref 6.4–8.3)

## 2019-01-06 PROCEDURE — 6360000002 HC RX W HCPCS: Performed by: INTERNAL MEDICINE

## 2019-01-06 PROCEDURE — 6360000002 HC RX W HCPCS: Performed by: SURGERY

## 2019-01-06 PROCEDURE — 2500000003 HC RX 250 WO HCPCS: Performed by: FAMILY MEDICINE

## 2019-01-06 PROCEDURE — 82947 ASSAY GLUCOSE BLOOD QUANT: CPT

## 2019-01-06 PROCEDURE — 36415 COLL VENOUS BLD VENIPUNCTURE: CPT

## 2019-01-06 PROCEDURE — 1200000000 HC SEMI PRIVATE

## 2019-01-06 PROCEDURE — 83735 ASSAY OF MAGNESIUM: CPT

## 2019-01-06 PROCEDURE — 2500000003 HC RX 250 WO HCPCS: Performed by: SURGERY

## 2019-01-06 PROCEDURE — 99232 SBSQ HOSP IP/OBS MODERATE 35: CPT | Performed by: FAMILY MEDICINE

## 2019-01-06 PROCEDURE — 82248 BILIRUBIN DIRECT: CPT

## 2019-01-06 PROCEDURE — S0028 INJECTION, FAMOTIDINE, 20 MG: HCPCS | Performed by: INTERNAL MEDICINE

## 2019-01-06 PROCEDURE — 6370000000 HC RX 637 (ALT 250 FOR IP): Performed by: INTERNAL MEDICINE

## 2019-01-06 PROCEDURE — 80053 COMPREHEN METABOLIC PANEL: CPT

## 2019-01-06 RX ADMIN — INSULIN LISPRO 2 UNITS: 100 INJECTION, SOLUTION INTRAVENOUS; SUBCUTANEOUS at 12:33

## 2019-01-06 RX ADMIN — DEXTROSE MONOHYDRATE, SODIUM CHLORIDE, AND POTASSIUM CHLORIDE: 50; 4.5; 1.49 INJECTION, SOLUTION INTRAVENOUS at 23:33

## 2019-01-06 RX ADMIN — ENOXAPARIN SODIUM 120 MG: 150 INJECTION SUBCUTANEOUS at 01:00

## 2019-01-06 RX ADMIN — HYDROMORPHONE HYDROCHLORIDE 0.5 MG: 1 INJECTION, SOLUTION INTRAMUSCULAR; INTRAVENOUS; SUBCUTANEOUS at 02:08

## 2019-01-06 RX ADMIN — HYDROMORPHONE HYDROCHLORIDE 0.25 MG: 1 INJECTION, SOLUTION INTRAMUSCULAR; INTRAVENOUS; SUBCUTANEOUS at 23:36

## 2019-01-06 RX ADMIN — HYDROMORPHONE HYDROCHLORIDE 0.25 MG: 1 INJECTION, SOLUTION INTRAMUSCULAR; INTRAVENOUS; SUBCUTANEOUS at 20:35

## 2019-01-06 RX ADMIN — HYDROMORPHONE HYDROCHLORIDE 0.5 MG: 1 INJECTION, SOLUTION INTRAMUSCULAR; INTRAVENOUS; SUBCUTANEOUS at 05:08

## 2019-01-06 RX ADMIN — HYDROMORPHONE HYDROCHLORIDE 0.25 MG: 1 INJECTION, SOLUTION INTRAMUSCULAR; INTRAVENOUS; SUBCUTANEOUS at 16:31

## 2019-01-06 RX ADMIN — DEXTROSE MONOHYDRATE, SODIUM CHLORIDE, AND POTASSIUM CHLORIDE: 50; 4.5; 1.49 INJECTION, SOLUTION INTRAVENOUS at 05:07

## 2019-01-06 RX ADMIN — METOPROLOL TARTRATE 5 MG: 5 INJECTION, SOLUTION INTRAVENOUS at 12:34

## 2019-01-06 RX ADMIN — FAMOTIDINE 20 MG: 10 INJECTION, SOLUTION INTRAVENOUS at 20:35

## 2019-01-06 RX ADMIN — ENOXAPARIN SODIUM 120 MG: 150 INJECTION SUBCUTANEOUS at 08:01

## 2019-01-06 RX ADMIN — METOPROLOL TARTRATE 5 MG: 5 INJECTION, SOLUTION INTRAVENOUS at 05:07

## 2019-01-06 RX ADMIN — ONDANSETRON 4 MG: 2 INJECTION INTRAMUSCULAR; INTRAVENOUS at 01:39

## 2019-01-06 RX ADMIN — FAMOTIDINE 20 MG: 10 INJECTION, SOLUTION INTRAVENOUS at 08:01

## 2019-01-06 RX ADMIN — HYDROMORPHONE HYDROCHLORIDE 0.25 MG: 1 INJECTION, SOLUTION INTRAMUSCULAR; INTRAVENOUS; SUBCUTANEOUS at 12:38

## 2019-01-06 RX ADMIN — HYDROMORPHONE HYDROCHLORIDE 0.5 MG: 1 INJECTION, SOLUTION INTRAMUSCULAR; INTRAVENOUS; SUBCUTANEOUS at 08:02

## 2019-01-06 RX ADMIN — DEXTROSE MONOHYDRATE, SODIUM CHLORIDE, AND POTASSIUM CHLORIDE: 50; 4.5; 1.49 INJECTION, SOLUTION INTRAVENOUS at 15:18

## 2019-01-06 RX ADMIN — METOPROLOL TARTRATE 5 MG: 5 INJECTION, SOLUTION INTRAVENOUS at 20:36

## 2019-01-06 ASSESSMENT — PAIN DESCRIPTION - PROGRESSION
CLINICAL_PROGRESSION: NOT CHANGED

## 2019-01-06 ASSESSMENT — PAIN DESCRIPTION - DESCRIPTORS
DESCRIPTORS: ACHING;DISCOMFORT
DESCRIPTORS: CRAMPING;DISCOMFORT
DESCRIPTORS: CRAMPING;DISCOMFORT
DESCRIPTORS: ACHING;DISCOMFORT

## 2019-01-06 ASSESSMENT — PAIN DESCRIPTION - FREQUENCY
FREQUENCY: CONTINUOUS
FREQUENCY: CONTINUOUS
FREQUENCY: INTERMITTENT
FREQUENCY: INTERMITTENT

## 2019-01-06 ASSESSMENT — ENCOUNTER SYMPTOMS
DIARRHEA: 0
CONSTIPATION: 0
COUGH: 0
VOMITING: 0
SHORTNESS OF BREATH: 0
SORE THROAT: 0
NAUSEA: 1
SINUS PRESSURE: 0
WHEEZING: 0
VOICE CHANGE: 0
ABDOMINAL PAIN: 1
BLOOD IN STOOL: 0

## 2019-01-06 ASSESSMENT — PAIN SCALES - GENERAL
PAINLEVEL_OUTOF10: 3
PAINLEVEL_OUTOF10: 4
PAINLEVEL_OUTOF10: 7
PAINLEVEL_OUTOF10: 6
PAINLEVEL_OUTOF10: 8
PAINLEVEL_OUTOF10: 7
PAINLEVEL_OUTOF10: 8
PAINLEVEL_OUTOF10: 7
PAINLEVEL_OUTOF10: 7
PAINLEVEL_OUTOF10: 8

## 2019-01-06 ASSESSMENT — PAIN DESCRIPTION - ONSET
ONSET: ON-GOING

## 2019-01-06 ASSESSMENT — PAIN DESCRIPTION - PAIN TYPE
TYPE: ACUTE PAIN

## 2019-01-06 ASSESSMENT — PAIN DESCRIPTION - LOCATION
LOCATION: ABDOMEN

## 2019-01-07 ENCOUNTER — APPOINTMENT (OUTPATIENT)
Dept: GENERAL RADIOLOGY | Age: 54
DRG: 389 | End: 2019-01-07
Payer: COMMERCIAL

## 2019-01-07 LAB
ABSOLUTE EOS #: 0.2 K/UL (ref 0–0.4)
ABSOLUTE IMMATURE GRANULOCYTE: ABNORMAL K/UL (ref 0–0.3)
ABSOLUTE LYMPH #: 0.9 K/UL (ref 1–4.8)
ABSOLUTE MONO #: 0.6 K/UL (ref 0.2–0.8)
ALBUMIN SERPL-MCNC: 4.2 G/DL (ref 3.5–5.2)
ALBUMIN/GLOBULIN RATIO: ABNORMAL (ref 1–2.5)
ALP BLD-CCNC: 71 U/L (ref 40–129)
ALT SERPL-CCNC: 72 U/L (ref 5–41)
ANION GAP SERPL CALCULATED.3IONS-SCNC: 12 MMOL/L (ref 9–17)
AST SERPL-CCNC: 61 U/L
BASOPHILS # BLD: 1 % (ref 0–2)
BASOPHILS ABSOLUTE: 0 K/UL (ref 0–0.2)
BILIRUB SERPL-MCNC: 3.08 MG/DL (ref 0.3–1.2)
BILIRUBIN DIRECT: 1.34 MG/DL
BILIRUBIN, INDIRECT: 1.74 MG/DL (ref 0–1)
BUN BLDV-MCNC: 7 MG/DL (ref 6–20)
CALCIUM SERPL-MCNC: 8.9 MG/DL (ref 8.6–10.4)
CHLORIDE BLD-SCNC: 99 MMOL/L (ref 98–107)
CO2: 26 MMOL/L (ref 20–31)
CREAT SERPL-MCNC: 0.84 MG/DL (ref 0.7–1.2)
DIFFERENTIAL TYPE: ABNORMAL
EOSINOPHILS RELATIVE PERCENT: 3 % (ref 1–4)
GFR AFRICAN AMERICAN: >60 ML/MIN
GFR NON-AFRICAN AMERICAN: >60 ML/MIN
GFR SERPL CREATININE-BSD FRML MDRD: ABNORMAL ML/MIN/{1.73_M2}
GFR SERPL CREATININE-BSD FRML MDRD: ABNORMAL ML/MIN/{1.73_M2}
GLUCOSE BLD-MCNC: 110 MG/DL (ref 75–110)
GLUCOSE BLD-MCNC: 123 MG/DL (ref 70–99)
GLUCOSE BLD-MCNC: 162 MG/DL (ref 75–110)
GLUCOSE BLD-MCNC: 166 MG/DL (ref 75–110)
HCT VFR BLD CALC: 41.9 % (ref 41–53)
HEMOGLOBIN: 14.3 G/DL (ref 13.5–17.5)
IMMATURE GRANULOCYTES: ABNORMAL %
LYMPHOCYTES # BLD: 15 % (ref 24–44)
MAGNESIUM: 2 MG/DL (ref 1.6–2.6)
MCH RBC QN AUTO: 28 PG (ref 26–34)
MCHC RBC AUTO-ENTMCNC: 34 G/DL (ref 31–37)
MCV RBC AUTO: 82.3 FL (ref 80–100)
MONOCYTES # BLD: 10 % (ref 1–7)
NRBC AUTOMATED: ABNORMAL PER 100 WBC
PDW BLD-RTO: 18.4 % (ref 11.5–14.5)
PLATELET # BLD: 161 K/UL (ref 130–400)
PLATELET ESTIMATE: ABNORMAL
PMV BLD AUTO: 8.1 FL (ref 6–12)
POTASSIUM SERPL-SCNC: 3.6 MMOL/L (ref 3.7–5.3)
RBC # BLD: 5.09 M/UL (ref 4.5–5.9)
RBC # BLD: ABNORMAL 10*6/UL
SEG NEUTROPHILS: 71 % (ref 36–66)
SEGMENTED NEUTROPHILS ABSOLUTE COUNT: 4.3 K/UL (ref 1.8–7.7)
SODIUM BLD-SCNC: 137 MMOL/L (ref 135–144)
TOTAL PROTEIN: 7.3 G/DL (ref 6.4–8.3)
WBC # BLD: 6 K/UL (ref 3.5–11)
WBC # BLD: ABNORMAL 10*3/UL

## 2019-01-07 PROCEDURE — 6370000000 HC RX 637 (ALT 250 FOR IP): Performed by: INTERNAL MEDICINE

## 2019-01-07 PROCEDURE — 82947 ASSAY GLUCOSE BLOOD QUANT: CPT

## 2019-01-07 PROCEDURE — 2580000003 HC RX 258: Performed by: SURGERY

## 2019-01-07 PROCEDURE — 2500000003 HC RX 250 WO HCPCS: Performed by: SURGERY

## 2019-01-07 PROCEDURE — 2500000003 HC RX 250 WO HCPCS: Performed by: FAMILY MEDICINE

## 2019-01-07 PROCEDURE — 36415 COLL VENOUS BLD VENIPUNCTURE: CPT

## 2019-01-07 PROCEDURE — 6360000002 HC RX W HCPCS: Performed by: INTERNAL MEDICINE

## 2019-01-07 PROCEDURE — 74019 RADEX ABDOMEN 2 VIEWS: CPT

## 2019-01-07 PROCEDURE — 80053 COMPREHEN METABOLIC PANEL: CPT

## 2019-01-07 PROCEDURE — C9113 INJ PANTOPRAZOLE SODIUM, VIA: HCPCS | Performed by: SURGERY

## 2019-01-07 PROCEDURE — 6360000002 HC RX W HCPCS: Performed by: SURGERY

## 2019-01-07 PROCEDURE — 1200000000 HC SEMI PRIVATE

## 2019-01-07 PROCEDURE — 85025 COMPLETE CBC W/AUTO DIFF WBC: CPT

## 2019-01-07 PROCEDURE — 82248 BILIRUBIN DIRECT: CPT

## 2019-01-07 PROCEDURE — 6370000000 HC RX 637 (ALT 250 FOR IP): Performed by: FAMILY MEDICINE

## 2019-01-07 PROCEDURE — 99232 SBSQ HOSP IP/OBS MODERATE 35: CPT | Performed by: FAMILY MEDICINE

## 2019-01-07 PROCEDURE — 83735 ASSAY OF MAGNESIUM: CPT

## 2019-01-07 RX ORDER — 0.9 % SODIUM CHLORIDE 0.9 %
10 VIAL (ML) INJECTION DAILY
Status: DISCONTINUED | OUTPATIENT
Start: 2019-01-07 | End: 2019-01-07

## 2019-01-07 RX ORDER — POTASSIUM CHLORIDE 7.45 MG/ML
10 INJECTION INTRAVENOUS
Status: COMPLETED | OUTPATIENT
Start: 2019-01-07 | End: 2019-01-07

## 2019-01-07 RX ORDER — PANTOPRAZOLE SODIUM 40 MG/10ML
40 INJECTION, POWDER, LYOPHILIZED, FOR SOLUTION INTRAVENOUS DAILY
Status: DISCONTINUED | OUTPATIENT
Start: 2019-01-07 | End: 2019-01-07

## 2019-01-07 RX ORDER — POTASSIUM CHLORIDE 7.45 MG/ML
10 INJECTION INTRAVENOUS
Status: DISCONTINUED | OUTPATIENT
Start: 2019-01-07 | End: 2019-01-07

## 2019-01-07 RX ORDER — TAMSULOSIN HYDROCHLORIDE 0.4 MG/1
0.4 CAPSULE ORAL DAILY
Status: DISCONTINUED | OUTPATIENT
Start: 2019-01-07 | End: 2019-01-09 | Stop reason: HOSPADM

## 2019-01-07 RX ORDER — PANTOPRAZOLE SODIUM 40 MG/1
40 TABLET, DELAYED RELEASE ORAL
Status: DISCONTINUED | OUTPATIENT
Start: 2019-01-08 | End: 2019-01-09 | Stop reason: HOSPADM

## 2019-01-07 RX ORDER — SOTALOL HYDROCHLORIDE 80 MG/1
160 TABLET ORAL 2 TIMES DAILY
Status: DISCONTINUED | OUTPATIENT
Start: 2019-01-07 | End: 2019-01-09 | Stop reason: HOSPADM

## 2019-01-07 RX ADMIN — HYDROMORPHONE HYDROCHLORIDE 0.25 MG: 1 INJECTION, SOLUTION INTRAMUSCULAR; INTRAVENOUS; SUBCUTANEOUS at 03:05

## 2019-01-07 RX ADMIN — POTASSIUM CHLORIDE 10 MEQ: 7.46 INJECTION, SOLUTION INTRAVENOUS at 15:08

## 2019-01-07 RX ADMIN — ENOXAPARIN SODIUM 120 MG: 150 INJECTION SUBCUTANEOUS at 12:36

## 2019-01-07 RX ADMIN — PANTOPRAZOLE SODIUM 40 MG: 40 INJECTION, POWDER, FOR SOLUTION INTRAVENOUS at 12:33

## 2019-01-07 RX ADMIN — INSULIN LISPRO 2 UNITS: 100 INJECTION, SOLUTION INTRAVENOUS; SUBCUTANEOUS at 12:32

## 2019-01-07 RX ADMIN — ENOXAPARIN SODIUM 120 MG: 150 INJECTION SUBCUTANEOUS at 22:24

## 2019-01-07 RX ADMIN — DEXTROSE MONOHYDRATE, SODIUM CHLORIDE, AND POTASSIUM CHLORIDE: 50; 4.5; 1.49 INJECTION, SOLUTION INTRAVENOUS at 22:00

## 2019-01-07 RX ADMIN — TAMSULOSIN HYDROCHLORIDE 0.4 MG: 0.4 CAPSULE ORAL at 18:11

## 2019-01-07 RX ADMIN — POTASSIUM CHLORIDE 10 MEQ: 7.46 INJECTION, SOLUTION INTRAVENOUS at 14:06

## 2019-01-07 RX ADMIN — HYDROMORPHONE HYDROCHLORIDE 0.25 MG: 1 INJECTION, SOLUTION INTRAMUSCULAR; INTRAVENOUS; SUBCUTANEOUS at 09:23

## 2019-01-07 RX ADMIN — Medication 10 ML: at 12:34

## 2019-01-07 RX ADMIN — HYDROMORPHONE HYDROCHLORIDE 0.25 MG: 1 INJECTION, SOLUTION INTRAMUSCULAR; INTRAVENOUS; SUBCUTANEOUS at 13:31

## 2019-01-07 RX ADMIN — INSULIN LISPRO 2 UNITS: 100 INJECTION, SOLUTION INTRAVENOUS; SUBCUTANEOUS at 06:17

## 2019-01-07 RX ADMIN — SOTALOL HYDROCHLORIDE 160 MG: 80 TABLET ORAL at 22:24

## 2019-01-07 RX ADMIN — DEXTROSE MONOHYDRATE, SODIUM CHLORIDE, AND POTASSIUM CHLORIDE: 50; 4.5; 1.49 INJECTION, SOLUTION INTRAVENOUS at 07:49

## 2019-01-07 RX ADMIN — METOPROLOL TARTRATE 5 MG: 5 INJECTION, SOLUTION INTRAVENOUS at 04:12

## 2019-01-07 RX ADMIN — METOPROLOL TARTRATE 5 MG: 5 INJECTION, SOLUTION INTRAVENOUS at 12:41

## 2019-01-07 RX ADMIN — METOPROLOL TARTRATE 25 MG: 25 TABLET ORAL at 22:24

## 2019-01-07 ASSESSMENT — PAIN DESCRIPTION - FREQUENCY
FREQUENCY: INTERMITTENT

## 2019-01-07 ASSESSMENT — PAIN SCALES - GENERAL
PAINLEVEL_OUTOF10: 5
PAINLEVEL_OUTOF10: 3
PAINLEVEL_OUTOF10: 5
PAINLEVEL_OUTOF10: 7
PAINLEVEL_OUTOF10: 6
PAINLEVEL_OUTOF10: 7
PAINLEVEL_OUTOF10: 5

## 2019-01-07 ASSESSMENT — PAIN DESCRIPTION - LOCATION
LOCATION: ABDOMEN

## 2019-01-07 ASSESSMENT — ENCOUNTER SYMPTOMS
CONSTIPATION: 0
SHORTNESS OF BREATH: 0
BLOOD IN STOOL: 0
SINUS PRESSURE: 0
VOICE CHANGE: 0
NAUSEA: 0
DIARRHEA: 0
WHEEZING: 0
VOMITING: 0
ABDOMINAL PAIN: 1
COUGH: 0
SORE THROAT: 0

## 2019-01-07 ASSESSMENT — PAIN DESCRIPTION - ONSET
ONSET: ON-GOING

## 2019-01-07 ASSESSMENT — PAIN DESCRIPTION - PAIN TYPE
TYPE: ACUTE PAIN

## 2019-01-07 ASSESSMENT — PAIN DESCRIPTION - PROGRESSION
CLINICAL_PROGRESSION: GRADUALLY IMPROVING
CLINICAL_PROGRESSION: NOT CHANGED
CLINICAL_PROGRESSION: GRADUALLY IMPROVING
CLINICAL_PROGRESSION: GRADUALLY WORSENING

## 2019-01-07 ASSESSMENT — PAIN DESCRIPTION - DESCRIPTORS
DESCRIPTORS: ACHING
DESCRIPTORS: CRAMPING;DISCOMFORT
DESCRIPTORS: ACHING

## 2019-01-08 ENCOUNTER — APPOINTMENT (OUTPATIENT)
Dept: ULTRASOUND IMAGING | Age: 54
DRG: 389 | End: 2019-01-08
Payer: COMMERCIAL

## 2019-01-08 LAB
ALBUMIN SERPL-MCNC: 4 G/DL (ref 3.5–5.2)
ALBUMIN/GLOBULIN RATIO: ABNORMAL (ref 1–2.5)
ALP BLD-CCNC: 64 U/L (ref 40–129)
ALT SERPL-CCNC: 106 U/L (ref 5–41)
ANION GAP SERPL CALCULATED.3IONS-SCNC: 14 MMOL/L (ref 9–17)
AST SERPL-CCNC: 76 U/L
BILIRUB SERPL-MCNC: 1.76 MG/DL (ref 0.3–1.2)
BILIRUBIN DIRECT: 0.51 MG/DL
BILIRUBIN, INDIRECT: 1.25 MG/DL (ref 0–1)
BUN BLDV-MCNC: 6 MG/DL (ref 6–20)
CALCIUM SERPL-MCNC: 8.7 MG/DL (ref 8.6–10.4)
CHLORIDE BLD-SCNC: 103 MMOL/L (ref 98–107)
CO2: 22 MMOL/L (ref 20–31)
CREAT SERPL-MCNC: 0.76 MG/DL (ref 0.7–1.2)
GFR AFRICAN AMERICAN: >60 ML/MIN
GFR NON-AFRICAN AMERICAN: >60 ML/MIN
GFR SERPL CREATININE-BSD FRML MDRD: ABNORMAL ML/MIN/{1.73_M2}
GFR SERPL CREATININE-BSD FRML MDRD: ABNORMAL ML/MIN/{1.73_M2}
GLUCOSE BLD-MCNC: 104 MG/DL (ref 75–110)
GLUCOSE BLD-MCNC: 111 MG/DL (ref 75–110)
GLUCOSE BLD-MCNC: 114 MG/DL (ref 70–99)
GLUCOSE BLD-MCNC: 116 MG/DL (ref 75–110)
GLUCOSE BLD-MCNC: 118 MG/DL (ref 75–110)
GLUCOSE BLD-MCNC: 122 MG/DL (ref 75–110)
HAV IGM SER IA-ACNC: NONREACTIVE
HEPATITIS B CORE IGM ANTIBODY: NONREACTIVE
HEPATITIS B SURFACE ANTIGEN: NONREACTIVE
HEPATITIS C ANTIBODY: NONREACTIVE
POTASSIUM SERPL-SCNC: 3.8 MMOL/L (ref 3.7–5.3)
SODIUM BLD-SCNC: 139 MMOL/L (ref 135–144)
TOTAL PROTEIN: 6.6 G/DL (ref 6.4–8.3)

## 2019-01-08 PROCEDURE — 99232 SBSQ HOSP IP/OBS MODERATE 35: CPT | Performed by: FAMILY MEDICINE

## 2019-01-08 PROCEDURE — 6360000002 HC RX W HCPCS: Performed by: INTERNAL MEDICINE

## 2019-01-08 PROCEDURE — 76705 ECHO EXAM OF ABDOMEN: CPT

## 2019-01-08 PROCEDURE — 36415 COLL VENOUS BLD VENIPUNCTURE: CPT

## 2019-01-08 PROCEDURE — 82947 ASSAY GLUCOSE BLOOD QUANT: CPT

## 2019-01-08 PROCEDURE — 2500000003 HC RX 250 WO HCPCS: Performed by: SURGERY

## 2019-01-08 PROCEDURE — 6370000000 HC RX 637 (ALT 250 FOR IP): Performed by: FAMILY MEDICINE

## 2019-01-08 PROCEDURE — 1200000000 HC SEMI PRIVATE

## 2019-01-08 PROCEDURE — 82248 BILIRUBIN DIRECT: CPT

## 2019-01-08 PROCEDURE — 80074 ACUTE HEPATITIS PANEL: CPT

## 2019-01-08 PROCEDURE — 80053 COMPREHEN METABOLIC PANEL: CPT

## 2019-01-08 RX ADMIN — ENOXAPARIN SODIUM 120 MG: 150 INJECTION SUBCUTANEOUS at 08:54

## 2019-01-08 RX ADMIN — METOPROLOL TARTRATE 25 MG: 25 TABLET ORAL at 22:13

## 2019-01-08 RX ADMIN — METOPROLOL TARTRATE 25 MG: 25 TABLET ORAL at 08:54

## 2019-01-08 RX ADMIN — SOTALOL HYDROCHLORIDE 160 MG: 80 TABLET ORAL at 22:13

## 2019-01-08 RX ADMIN — DEXTROSE MONOHYDRATE, SODIUM CHLORIDE, AND POTASSIUM CHLORIDE: 50; 4.5; 1.49 INJECTION, SOLUTION INTRAVENOUS at 11:00

## 2019-01-08 RX ADMIN — ENOXAPARIN SODIUM 120 MG: 150 INJECTION SUBCUTANEOUS at 22:13

## 2019-01-08 RX ADMIN — PANTOPRAZOLE SODIUM 40 MG: 40 TABLET, DELAYED RELEASE ORAL at 06:13

## 2019-01-08 RX ADMIN — TAMSULOSIN HYDROCHLORIDE 0.4 MG: 0.4 CAPSULE ORAL at 08:54

## 2019-01-08 RX ADMIN — SOTALOL HYDROCHLORIDE 160 MG: 80 TABLET ORAL at 08:54

## 2019-01-08 ASSESSMENT — PAIN DESCRIPTION - PROGRESSION: CLINICAL_PROGRESSION: GRADUALLY IMPROVING

## 2019-01-08 ASSESSMENT — PAIN SCALES - GENERAL
PAINLEVEL_OUTOF10: 4
PAINLEVEL_OUTOF10: 3

## 2019-01-08 ASSESSMENT — PAIN DESCRIPTION - FREQUENCY: FREQUENCY: INTERMITTENT

## 2019-01-08 ASSESSMENT — PAIN DESCRIPTION - PAIN TYPE: TYPE: ACUTE PAIN

## 2019-01-08 ASSESSMENT — PAIN DESCRIPTION - LOCATION: LOCATION: ABDOMEN

## 2019-01-08 ASSESSMENT — PAIN DESCRIPTION - DESCRIPTORS: DESCRIPTORS: HEAVINESS

## 2019-01-08 ASSESSMENT — PAIN DESCRIPTION - ONSET: ONSET: ON-GOING

## 2019-01-08 ASSESSMENT — PAIN DESCRIPTION - ORIENTATION: ORIENTATION: LEFT;UPPER

## 2019-01-09 VITALS
WEIGHT: 270 LBS | HEART RATE: 69 BPM | DIASTOLIC BLOOD PRESSURE: 67 MMHG | SYSTOLIC BLOOD PRESSURE: 134 MMHG | TEMPERATURE: 97.9 F | BODY MASS INDEX: 35.78 KG/M2 | OXYGEN SATURATION: 97 % | RESPIRATION RATE: 14 BRPM | HEIGHT: 73 IN

## 2019-01-09 LAB
ALBUMIN SERPL-MCNC: 3.8 G/DL (ref 3.5–5.2)
ALBUMIN/GLOBULIN RATIO: ABNORMAL (ref 1–2.5)
ALP BLD-CCNC: 59 U/L (ref 40–129)
ALT SERPL-CCNC: 110 U/L (ref 5–41)
ANION GAP SERPL CALCULATED.3IONS-SCNC: 12 MMOL/L (ref 9–17)
AST SERPL-CCNC: 55 U/L
BILIRUB SERPL-MCNC: 1.15 MG/DL (ref 0.3–1.2)
BILIRUBIN DIRECT: 0.34 MG/DL
BILIRUBIN, INDIRECT: 0.81 MG/DL (ref 0–1)
BUN BLDV-MCNC: 7 MG/DL (ref 6–20)
CALCIUM SERPL-MCNC: 8.9 MG/DL (ref 8.6–10.4)
CHLORIDE BLD-SCNC: 104 MMOL/L (ref 98–107)
CO2: 26 MMOL/L (ref 20–31)
CREAT SERPL-MCNC: 0.79 MG/DL (ref 0.7–1.2)
GFR AFRICAN AMERICAN: >60 ML/MIN
GFR NON-AFRICAN AMERICAN: >60 ML/MIN
GFR SERPL CREATININE-BSD FRML MDRD: ABNORMAL ML/MIN/{1.73_M2}
GFR SERPL CREATININE-BSD FRML MDRD: ABNORMAL ML/MIN/{1.73_M2}
GLUCOSE BLD-MCNC: 110 MG/DL (ref 75–110)
GLUCOSE BLD-MCNC: 117 MG/DL (ref 70–99)
GLUCOSE BLD-MCNC: 84 MG/DL (ref 75–110)
MAGNESIUM: 1.8 MG/DL (ref 1.6–2.6)
POTASSIUM SERPL-SCNC: 4 MMOL/L (ref 3.7–5.3)
SODIUM BLD-SCNC: 142 MMOL/L (ref 135–144)
TOTAL PROTEIN: 6.4 G/DL (ref 6.4–8.3)

## 2019-01-09 PROCEDURE — 36415 COLL VENOUS BLD VENIPUNCTURE: CPT

## 2019-01-09 PROCEDURE — 83735 ASSAY OF MAGNESIUM: CPT

## 2019-01-09 PROCEDURE — 6370000000 HC RX 637 (ALT 250 FOR IP): Performed by: FAMILY MEDICINE

## 2019-01-09 PROCEDURE — 6360000002 HC RX W HCPCS: Performed by: INTERNAL MEDICINE

## 2019-01-09 PROCEDURE — 99232 SBSQ HOSP IP/OBS MODERATE 35: CPT | Performed by: FAMILY MEDICINE

## 2019-01-09 PROCEDURE — 80053 COMPREHEN METABOLIC PANEL: CPT

## 2019-01-09 PROCEDURE — 82947 ASSAY GLUCOSE BLOOD QUANT: CPT

## 2019-01-09 PROCEDURE — 2500000003 HC RX 250 WO HCPCS: Performed by: SURGERY

## 2019-01-09 PROCEDURE — 82248 BILIRUBIN DIRECT: CPT

## 2019-01-09 RX ADMIN — METOPROLOL TARTRATE 25 MG: 25 TABLET ORAL at 07:58

## 2019-01-09 RX ADMIN — ENOXAPARIN SODIUM 120 MG: 150 INJECTION SUBCUTANEOUS at 07:59

## 2019-01-09 RX ADMIN — TAMSULOSIN HYDROCHLORIDE 0.4 MG: 0.4 CAPSULE ORAL at 07:58

## 2019-01-09 RX ADMIN — SOTALOL HYDROCHLORIDE 160 MG: 80 TABLET ORAL at 07:58

## 2019-01-09 RX ADMIN — PANTOPRAZOLE SODIUM 40 MG: 40 TABLET, DELAYED RELEASE ORAL at 06:21

## 2019-01-09 RX ADMIN — DEXTROSE MONOHYDRATE, SODIUM CHLORIDE, AND POTASSIUM CHLORIDE: 50; 4.5; 1.49 INJECTION, SOLUTION INTRAVENOUS at 07:57

## 2019-01-09 ASSESSMENT — PAIN DESCRIPTION - PROGRESSION
CLINICAL_PROGRESSION: GRADUALLY IMPROVING

## 2019-01-09 ASSESSMENT — PAIN DESCRIPTION - LOCATION: LOCATION: ABDOMEN

## 2019-01-09 ASSESSMENT — PAIN DESCRIPTION - PAIN TYPE: TYPE: ACUTE PAIN

## 2019-01-09 ASSESSMENT — PAIN DESCRIPTION - DESCRIPTORS: DESCRIPTORS: DISCOMFORT

## 2019-01-09 ASSESSMENT — PAIN DESCRIPTION - ORIENTATION: ORIENTATION: LEFT;UPPER

## 2019-01-09 ASSESSMENT — PAIN DESCRIPTION - ONSET: ONSET: ON-GOING

## 2019-01-09 ASSESSMENT — PAIN DESCRIPTION - FREQUENCY: FREQUENCY: INTERMITTENT

## 2019-01-09 ASSESSMENT — PAIN SCALES - GENERAL: PAINLEVEL_OUTOF10: 3

## 2019-01-15 ENCOUNTER — APPOINTMENT (OUTPATIENT)
Dept: GENERAL RADIOLOGY | Age: 54
DRG: 389 | End: 2019-01-15
Payer: COMMERCIAL

## 2019-01-15 ENCOUNTER — HOSPITAL ENCOUNTER (INPATIENT)
Age: 54
LOS: 5 days | Discharge: HOME | DRG: 389 | End: 2019-01-21
Attending: EMERGENCY MEDICINE | Admitting: INTERNAL MEDICINE
Payer: COMMERCIAL

## 2019-01-15 DIAGNOSIS — K56.609 SBO (SMALL BOWEL OBSTRUCTION) (HCC): Primary | ICD-10-CM

## 2019-01-15 LAB
ABSOLUTE EOS #: 0.2 K/UL (ref 0–0.4)
ABSOLUTE IMMATURE GRANULOCYTE: ABNORMAL K/UL (ref 0–0.3)
ABSOLUTE LYMPH #: 1.2 K/UL (ref 1–4.8)
ABSOLUTE MONO #: 0.7 K/UL (ref 0.2–0.8)
ALBUMIN SERPL-MCNC: 4.8 G/DL (ref 3.5–5.2)
ALBUMIN/GLOBULIN RATIO: ABNORMAL (ref 1–2.5)
ALP BLD-CCNC: 68 U/L (ref 40–129)
ALT SERPL-CCNC: 40 U/L (ref 5–41)
ANION GAP SERPL CALCULATED.3IONS-SCNC: 12 MMOL/L (ref 9–17)
AST SERPL-CCNC: 19 U/L
BASOPHILS # BLD: 1 % (ref 0–2)
BASOPHILS ABSOLUTE: 0.1 K/UL (ref 0–0.2)
BILIRUB SERPL-MCNC: 0.89 MG/DL (ref 0.3–1.2)
BUN BLDV-MCNC: 13 MG/DL (ref 6–20)
BUN/CREAT BLD: 14 (ref 9–20)
CALCIUM SERPL-MCNC: 9.5 MG/DL (ref 8.6–10.4)
CHLORIDE BLD-SCNC: 102 MMOL/L (ref 98–107)
CO2: 26 MMOL/L (ref 20–31)
CREAT SERPL-MCNC: 0.96 MG/DL (ref 0.7–1.2)
DIFFERENTIAL TYPE: ABNORMAL
EOSINOPHILS RELATIVE PERCENT: 2 % (ref 1–4)
GFR AFRICAN AMERICAN: >60 ML/MIN
GFR NON-AFRICAN AMERICAN: >60 ML/MIN
GFR SERPL CREATININE-BSD FRML MDRD: ABNORMAL ML/MIN/{1.73_M2}
GFR SERPL CREATININE-BSD FRML MDRD: ABNORMAL ML/MIN/{1.73_M2}
GLUCOSE BLD-MCNC: 130 MG/DL (ref 70–99)
HCT VFR BLD CALC: 44.8 % (ref 41–53)
HEMOGLOBIN: 15.3 G/DL (ref 13.5–17.5)
IMMATURE GRANULOCYTES: ABNORMAL %
LACTIC ACID, SEPSIS WHOLE BLOOD: NORMAL MMOL/L (ref 0.5–1.9)
LACTIC ACID, SEPSIS: 1.9 MMOL/L (ref 0.5–1.9)
LIPASE: 37 U/L (ref 13–60)
LYMPHOCYTES # BLD: 14 % (ref 24–44)
MCH RBC QN AUTO: 28.2 PG (ref 26–34)
MCHC RBC AUTO-ENTMCNC: 34 G/DL (ref 31–37)
MCV RBC AUTO: 82.8 FL (ref 80–100)
MONOCYTES # BLD: 8 % (ref 1–7)
NRBC AUTOMATED: ABNORMAL PER 100 WBC
PDW BLD-RTO: 18.5 % (ref 11.5–14.5)
PLATELET # BLD: 183 K/UL (ref 130–400)
PLATELET ESTIMATE: ABNORMAL
PMV BLD AUTO: 8 FL (ref 6–12)
POTASSIUM SERPL-SCNC: 4.3 MMOL/L (ref 3.7–5.3)
RBC # BLD: 5.41 M/UL (ref 4.5–5.9)
RBC # BLD: ABNORMAL 10*6/UL
SEG NEUTROPHILS: 75 % (ref 36–66)
SEGMENTED NEUTROPHILS ABSOLUTE COUNT: 6.5 K/UL (ref 1.8–7.7)
SODIUM BLD-SCNC: 140 MMOL/L (ref 135–144)
TOTAL PROTEIN: 7.9 G/DL (ref 6.4–8.3)
WBC # BLD: 8.7 K/UL (ref 3.5–11)
WBC # BLD: ABNORMAL 10*3/UL

## 2019-01-15 PROCEDURE — 83605 ASSAY OF LACTIC ACID: CPT

## 2019-01-15 PROCEDURE — 99285 EMERGENCY DEPT VISIT HI MDM: CPT

## 2019-01-15 PROCEDURE — 83690 ASSAY OF LIPASE: CPT

## 2019-01-15 PROCEDURE — 96375 TX/PRO/DX INJ NEW DRUG ADDON: CPT

## 2019-01-15 PROCEDURE — 80053 COMPREHEN METABOLIC PANEL: CPT

## 2019-01-15 PROCEDURE — 85025 COMPLETE CBC W/AUTO DIFF WBC: CPT

## 2019-01-15 PROCEDURE — 74018 RADEX ABDOMEN 1 VIEW: CPT

## 2019-01-15 PROCEDURE — 96376 TX/PRO/DX INJ SAME DRUG ADON: CPT

## 2019-01-15 PROCEDURE — 2580000003 HC RX 258: Performed by: PHYSICIAN ASSISTANT

## 2019-01-15 PROCEDURE — 96374 THER/PROPH/DIAG INJ IV PUSH: CPT

## 2019-01-15 PROCEDURE — 36415 COLL VENOUS BLD VENIPUNCTURE: CPT

## 2019-01-15 PROCEDURE — 74022 RADEX COMPL AQT ABD SERIES: CPT

## 2019-01-15 PROCEDURE — 6360000002 HC RX W HCPCS: Performed by: PHYSICIAN ASSISTANT

## 2019-01-15 RX ORDER — MORPHINE SULFATE 4 MG/ML
4 INJECTION, SOLUTION INTRAMUSCULAR; INTRAVENOUS ONCE
Status: COMPLETED | OUTPATIENT
Start: 2019-01-15 | End: 2019-01-15

## 2019-01-15 RX ORDER — ONDANSETRON 2 MG/ML
4 INJECTION INTRAMUSCULAR; INTRAVENOUS ONCE
Status: COMPLETED | OUTPATIENT
Start: 2019-01-15 | End: 2019-01-15

## 2019-01-15 RX ORDER — 0.9 % SODIUM CHLORIDE 0.9 %
1000 INTRAVENOUS SOLUTION INTRAVENOUS ONCE
Status: COMPLETED | OUTPATIENT
Start: 2019-01-15 | End: 2019-01-15

## 2019-01-15 RX ADMIN — HYDROMORPHONE HYDROCHLORIDE 1 MG: 1 INJECTION, SOLUTION INTRAMUSCULAR; INTRAVENOUS; SUBCUTANEOUS at 23:03

## 2019-01-15 RX ADMIN — SODIUM CHLORIDE 1000 ML: 9 INJECTION, SOLUTION INTRAVENOUS at 22:24

## 2019-01-15 RX ADMIN — MORPHINE SULFATE 4 MG: 4 INJECTION INTRAVENOUS at 22:24

## 2019-01-15 RX ADMIN — ONDANSETRON 4 MG: 2 INJECTION INTRAMUSCULAR; INTRAVENOUS at 23:03

## 2019-01-15 RX ADMIN — ONDANSETRON 4 MG: 2 INJECTION INTRAMUSCULAR; INTRAVENOUS at 22:24

## 2019-01-15 ASSESSMENT — PAIN SCALES - GENERAL
PAINLEVEL_OUTOF10: 10

## 2019-01-16 ENCOUNTER — APPOINTMENT (OUTPATIENT)
Dept: CT IMAGING | Age: 54
DRG: 389 | End: 2019-01-16
Payer: COMMERCIAL

## 2019-01-16 LAB
GLUCOSE BLD-MCNC: 134 MG/DL (ref 75–110)
GLUCOSE BLD-MCNC: 138 MG/DL (ref 75–110)
GLUCOSE BLD-MCNC: 147 MG/DL (ref 75–110)
GLUCOSE BLD-MCNC: 149 MG/DL (ref 75–110)
GLUCOSE BLD-MCNC: 150 MG/DL (ref 75–110)
GLUCOSE BLD-MCNC: 99 MG/DL (ref 75–110)
LACTIC ACID, SEPSIS WHOLE BLOOD: NORMAL MMOL/L (ref 0.5–1.9)
LACTIC ACID, SEPSIS: 0.7 MMOL/L (ref 0.5–1.9)

## 2019-01-16 PROCEDURE — 2580000003 HC RX 258: Performed by: INTERNAL MEDICINE

## 2019-01-16 PROCEDURE — 6370000000 HC RX 637 (ALT 250 FOR IP): Performed by: NURSE PRACTITIONER

## 2019-01-16 PROCEDURE — APPNB15 APP NON BILLABLE TIME 0-15 MINS: Performed by: NURSE PRACTITIONER

## 2019-01-16 PROCEDURE — 97161 PT EVAL LOW COMPLEX 20 MIN: CPT

## 2019-01-16 PROCEDURE — 6360000002 HC RX W HCPCS: Performed by: INTERNAL MEDICINE

## 2019-01-16 PROCEDURE — 83605 ASSAY OF LACTIC ACID: CPT

## 2019-01-16 PROCEDURE — 2500000003 HC RX 250 WO HCPCS: Performed by: INTERNAL MEDICINE

## 2019-01-16 PROCEDURE — 36415 COLL VENOUS BLD VENIPUNCTURE: CPT

## 2019-01-16 PROCEDURE — 6360000002 HC RX W HCPCS: Performed by: NURSE PRACTITIONER

## 2019-01-16 PROCEDURE — 99223 1ST HOSP IP/OBS HIGH 75: CPT | Performed by: INTERNAL MEDICINE

## 2019-01-16 PROCEDURE — 2580000003 HC RX 258: Performed by: NURSE PRACTITIONER

## 2019-01-16 PROCEDURE — 6360000004 HC RX CONTRAST MEDICATION: Performed by: INTERNAL MEDICINE

## 2019-01-16 PROCEDURE — 2060000000 HC ICU INTERMEDIATE R&B

## 2019-01-16 PROCEDURE — 6360000002 HC RX W HCPCS: Performed by: PHYSICIAN ASSISTANT

## 2019-01-16 PROCEDURE — 82947 ASSAY GLUCOSE BLOOD QUANT: CPT

## 2019-01-16 PROCEDURE — 97535 SELF CARE MNGMENT TRAINING: CPT

## 2019-01-16 PROCEDURE — 74178 CT ABD&PLV WO CNTR FLWD CNTR: CPT

## 2019-01-16 PROCEDURE — 97165 OT EVAL LOW COMPLEX 30 MIN: CPT

## 2019-01-16 PROCEDURE — 97116 GAIT TRAINING THERAPY: CPT

## 2019-01-16 RX ORDER — POTASSIUM CHLORIDE 20MEQ/15ML
40 LIQUID (ML) ORAL PRN
Status: DISCONTINUED | OUTPATIENT
Start: 2019-01-16 | End: 2019-01-21 | Stop reason: HOSPADM

## 2019-01-16 RX ORDER — NICOTINE POLACRILEX 4 MG
15 LOZENGE BUCCAL PRN
Status: DISCONTINUED | OUTPATIENT
Start: 2019-01-16 | End: 2019-01-21 | Stop reason: HOSPADM

## 2019-01-16 RX ORDER — SOTALOL HYDROCHLORIDE 80 MG/1
160 TABLET ORAL 2 TIMES DAILY
Status: DISCONTINUED | OUTPATIENT
Start: 2019-01-16 | End: 2019-01-18

## 2019-01-16 RX ORDER — SODIUM CHLORIDE 0.9 % (FLUSH) 0.9 %
10 SYRINGE (ML) INJECTION
Status: COMPLETED | OUTPATIENT
Start: 2019-01-16 | End: 2019-01-16

## 2019-01-16 RX ORDER — POTASSIUM CHLORIDE 20 MEQ/1
40 TABLET, EXTENDED RELEASE ORAL PRN
Status: DISCONTINUED | OUTPATIENT
Start: 2019-01-16 | End: 2019-01-21 | Stop reason: HOSPADM

## 2019-01-16 RX ORDER — ONDANSETRON 2 MG/ML
4 INJECTION INTRAMUSCULAR; INTRAVENOUS EVERY 6 HOURS PRN
Status: DISCONTINUED | OUTPATIENT
Start: 2019-01-16 | End: 2019-01-16

## 2019-01-16 RX ORDER — NICOTINE 21 MG/24HR
1 PATCH, TRANSDERMAL 24 HOURS TRANSDERMAL DAILY
Status: DISCONTINUED | OUTPATIENT
Start: 2019-01-16 | End: 2019-01-20

## 2019-01-16 RX ORDER — DEXTROSE MONOHYDRATE 25 G/50ML
12.5 INJECTION, SOLUTION INTRAVENOUS PRN
Status: DISCONTINUED | OUTPATIENT
Start: 2019-01-16 | End: 2019-01-21 | Stop reason: HOSPADM

## 2019-01-16 RX ORDER — ONDANSETRON 2 MG/ML
4 INJECTION INTRAMUSCULAR; INTRAVENOUS EVERY 6 HOURS PRN
Status: DISCONTINUED | OUTPATIENT
Start: 2019-01-16 | End: 2019-01-21 | Stop reason: HOSPADM

## 2019-01-16 RX ORDER — ACETAMINOPHEN 325 MG/1
650 TABLET ORAL EVERY 4 HOURS PRN
Status: DISCONTINUED | OUTPATIENT
Start: 2019-01-16 | End: 2019-01-16 | Stop reason: SDUPTHER

## 2019-01-16 RX ORDER — MAGNESIUM SULFATE 1 G/100ML
1 INJECTION INTRAVENOUS PRN
Status: DISCONTINUED | OUTPATIENT
Start: 2019-01-16 | End: 2019-01-21 | Stop reason: HOSPADM

## 2019-01-16 RX ORDER — PANTOPRAZOLE SODIUM 40 MG/1
40 TABLET, DELAYED RELEASE ORAL
Status: DISCONTINUED | OUTPATIENT
Start: 2019-01-16 | End: 2019-01-17

## 2019-01-16 RX ORDER — ONDANSETRON 4 MG/1
4 TABLET, ORALLY DISINTEGRATING ORAL EVERY 6 HOURS PRN
Status: DISCONTINUED | OUTPATIENT
Start: 2019-01-16 | End: 2019-01-21 | Stop reason: HOSPADM

## 2019-01-16 RX ORDER — DEXTROSE MONOHYDRATE 50 MG/ML
100 INJECTION, SOLUTION INTRAVENOUS PRN
Status: DISCONTINUED | OUTPATIENT
Start: 2019-01-16 | End: 2019-01-21 | Stop reason: HOSPADM

## 2019-01-16 RX ORDER — BISACODYL 10 MG
10 SUPPOSITORY, RECTAL RECTAL DAILY PRN
Status: DISCONTINUED | OUTPATIENT
Start: 2019-01-16 | End: 2019-01-21

## 2019-01-16 RX ORDER — POTASSIUM CHLORIDE 7.45 MG/ML
10 INJECTION INTRAVENOUS PRN
Status: DISCONTINUED | OUTPATIENT
Start: 2019-01-16 | End: 2019-01-21 | Stop reason: HOSPADM

## 2019-01-16 RX ORDER — SODIUM CHLORIDE 0.9 % (FLUSH) 0.9 %
10 SYRINGE (ML) INJECTION PRN
Status: DISCONTINUED | OUTPATIENT
Start: 2019-01-16 | End: 2019-01-21 | Stop reason: HOSPADM

## 2019-01-16 RX ORDER — SODIUM CHLORIDE 9 MG/ML
INJECTION, SOLUTION INTRAVENOUS CONTINUOUS
Status: DISCONTINUED | OUTPATIENT
Start: 2019-01-16 | End: 2019-01-20

## 2019-01-16 RX ORDER — TAMSULOSIN HYDROCHLORIDE 0.4 MG/1
0.4 CAPSULE ORAL DAILY
Status: DISCONTINUED | OUTPATIENT
Start: 2019-01-16 | End: 2019-01-21 | Stop reason: HOSPADM

## 2019-01-16 RX ORDER — SODIUM CHLORIDE 0.9 % (FLUSH) 0.9 %
10 SYRINGE (ML) INJECTION EVERY 12 HOURS SCHEDULED
Status: DISCONTINUED | OUTPATIENT
Start: 2019-01-16 | End: 2019-01-21 | Stop reason: HOSPADM

## 2019-01-16 RX ORDER — ACETAMINOPHEN 325 MG/1
650 TABLET ORAL EVERY 4 HOURS PRN
Status: DISCONTINUED | OUTPATIENT
Start: 2019-01-16 | End: 2019-01-21 | Stop reason: HOSPADM

## 2019-01-16 RX ORDER — 0.9 % SODIUM CHLORIDE 0.9 %
50 INTRAVENOUS SOLUTION INTRAVENOUS ONCE
Status: COMPLETED | OUTPATIENT
Start: 2019-01-16 | End: 2019-01-16

## 2019-01-16 RX ORDER — METOPROLOL TARTRATE 5 MG/5ML
5 INJECTION INTRAVENOUS EVERY 6 HOURS
Status: DISCONTINUED | OUTPATIENT
Start: 2019-01-16 | End: 2019-01-20

## 2019-01-16 RX ORDER — PRAVASTATIN SODIUM 10 MG
10 TABLET ORAL DAILY
Status: DISCONTINUED | OUTPATIENT
Start: 2019-01-16 | End: 2019-01-21 | Stop reason: HOSPADM

## 2019-01-16 RX ADMIN — SODIUM CHLORIDE 50 ML: 9 INJECTION, SOLUTION INTRAVENOUS at 17:26

## 2019-01-16 RX ADMIN — METOPROLOL TARTRATE 5 MG: 5 INJECTION, SOLUTION INTRAVENOUS at 11:00

## 2019-01-16 RX ADMIN — IOPAMIDOL 75 ML: 755 INJECTION, SOLUTION INTRAVENOUS at 17:27

## 2019-01-16 RX ADMIN — HYDROMORPHONE HYDROCHLORIDE 0.5 MG: 1 INJECTION, SOLUTION INTRAMUSCULAR; INTRAVENOUS; SUBCUTANEOUS at 09:01

## 2019-01-16 RX ADMIN — METOPROLOL TARTRATE 5 MG: 5 INJECTION, SOLUTION INTRAVENOUS at 17:10

## 2019-01-16 RX ADMIN — ENOXAPARIN SODIUM 30 MG: 30 INJECTION SUBCUTANEOUS at 21:28

## 2019-01-16 RX ADMIN — HYDROMORPHONE HYDROCHLORIDE 0.5 MG: 1 INJECTION, SOLUTION INTRAMUSCULAR; INTRAVENOUS; SUBCUTANEOUS at 18:47

## 2019-01-16 RX ADMIN — HYDROMORPHONE HYDROCHLORIDE 1 MG: 1 INJECTION, SOLUTION INTRAMUSCULAR; INTRAVENOUS; SUBCUTANEOUS at 01:04

## 2019-01-16 RX ADMIN — ONDANSETRON 4 MG: 2 INJECTION INTRAMUSCULAR; INTRAVENOUS at 18:07

## 2019-01-16 RX ADMIN — INSULIN LISPRO 1 UNITS: 100 INJECTION, SOLUTION INTRAVENOUS; SUBCUTANEOUS at 02:53

## 2019-01-16 RX ADMIN — HYDROMORPHONE HYDROCHLORIDE 0.5 MG: 1 INJECTION, SOLUTION INTRAMUSCULAR; INTRAVENOUS; SUBCUTANEOUS at 12:04

## 2019-01-16 RX ADMIN — HYDROMORPHONE HYDROCHLORIDE 0.5 MG: 1 INJECTION, SOLUTION INTRAMUSCULAR; INTRAVENOUS; SUBCUTANEOUS at 15:42

## 2019-01-16 RX ADMIN — HYDROMORPHONE HYDROCHLORIDE 0.5 MG: 1 INJECTION, SOLUTION INTRAMUSCULAR; INTRAVENOUS; SUBCUTANEOUS at 05:22

## 2019-01-16 RX ADMIN — ENOXAPARIN SODIUM 30 MG: 30 INJECTION SUBCUTANEOUS at 09:03

## 2019-01-16 RX ADMIN — METOPROLOL TARTRATE 5 MG: 5 INJECTION, SOLUTION INTRAVENOUS at 23:44

## 2019-01-16 RX ADMIN — INSULIN LISPRO 1 UNITS: 100 INJECTION, SOLUTION INTRAVENOUS; SUBCUTANEOUS at 05:59

## 2019-01-16 RX ADMIN — SODIUM CHLORIDE: 9 INJECTION, SOLUTION INTRAVENOUS at 14:26

## 2019-01-16 RX ADMIN — SODIUM CHLORIDE: 9 INJECTION, SOLUTION INTRAVENOUS at 02:07

## 2019-01-16 RX ADMIN — Medication 10 ML: at 17:27

## 2019-01-16 ASSESSMENT — PAIN DESCRIPTION - LOCATION
LOCATION: ABDOMEN
LOCATION: ABDOMEN

## 2019-01-16 ASSESSMENT — PAIN SCALES - GENERAL
PAINLEVEL_OUTOF10: 7
PAINLEVEL_OUTOF10: 10
PAINLEVEL_OUTOF10: 8
PAINLEVEL_OUTOF10: 7
PAINLEVEL_OUTOF10: 8
PAINLEVEL_OUTOF10: 10
PAINLEVEL_OUTOF10: 8

## 2019-01-16 ASSESSMENT — PAIN DESCRIPTION - PAIN TYPE: TYPE: ACUTE PAIN

## 2019-01-17 ENCOUNTER — APPOINTMENT (OUTPATIENT)
Dept: GENERAL RADIOLOGY | Age: 54
DRG: 389 | End: 2019-01-17
Payer: COMMERCIAL

## 2019-01-17 LAB
ANION GAP SERPL CALCULATED.3IONS-SCNC: 12 MMOL/L (ref 9–17)
BUN BLDV-MCNC: 15 MG/DL (ref 6–20)
BUN/CREAT BLD: 21 (ref 9–20)
CALCIUM SERPL-MCNC: 8.6 MG/DL (ref 8.6–10.4)
CHLORIDE BLD-SCNC: 100 MMOL/L (ref 98–107)
CO2: 26 MMOL/L (ref 20–31)
CREAT SERPL-MCNC: 0.71 MG/DL (ref 0.7–1.2)
GFR AFRICAN AMERICAN: >60 ML/MIN
GFR NON-AFRICAN AMERICAN: >60 ML/MIN
GFR SERPL CREATININE-BSD FRML MDRD: ABNORMAL ML/MIN/{1.73_M2}
GFR SERPL CREATININE-BSD FRML MDRD: ABNORMAL ML/MIN/{1.73_M2}
GLUCOSE BLD-MCNC: 112 MG/DL (ref 75–110)
GLUCOSE BLD-MCNC: 118 MG/DL (ref 75–110)
GLUCOSE BLD-MCNC: 121 MG/DL (ref 70–99)
GLUCOSE BLD-MCNC: 93 MG/DL (ref 75–110)
GLUCOSE BLD-MCNC: 94 MG/DL (ref 75–110)
HCT VFR BLD CALC: 38.5 % (ref 41–53)
HEMOGLOBIN: 13.4 G/DL (ref 13.5–17.5)
INR BLD: 1.1
MAGNESIUM: 1.7 MG/DL (ref 1.6–2.6)
MCH RBC QN AUTO: 28.8 PG (ref 26–34)
MCHC RBC AUTO-ENTMCNC: 34.9 G/DL (ref 31–37)
MCV RBC AUTO: 82.4 FL (ref 80–100)
NRBC AUTOMATED: ABNORMAL PER 100 WBC
PDW BLD-RTO: 18.1 % (ref 11.5–14.5)
PLATELET # BLD: 154 K/UL (ref 130–400)
PMV BLD AUTO: 7.5 FL (ref 6–12)
POTASSIUM SERPL-SCNC: 4 MMOL/L (ref 3.7–5.3)
PROTHROMBIN TIME: 11.8 SEC (ref 9.7–11.6)
RBC # BLD: 4.68 M/UL (ref 4.5–5.9)
SODIUM BLD-SCNC: 138 MMOL/L (ref 135–144)
WBC # BLD: 6.7 K/UL (ref 3.5–11)

## 2019-01-17 PROCEDURE — 99232 SBSQ HOSP IP/OBS MODERATE 35: CPT | Performed by: INTERNAL MEDICINE

## 2019-01-17 PROCEDURE — 85027 COMPLETE CBC AUTOMATED: CPT

## 2019-01-17 PROCEDURE — 2580000003 HC RX 258: Performed by: INTERNAL MEDICINE

## 2019-01-17 PROCEDURE — 82947 ASSAY GLUCOSE BLOOD QUANT: CPT

## 2019-01-17 PROCEDURE — 80048 BASIC METABOLIC PNL TOTAL CA: CPT

## 2019-01-17 PROCEDURE — 83735 ASSAY OF MAGNESIUM: CPT

## 2019-01-17 PROCEDURE — 36415 COLL VENOUS BLD VENIPUNCTURE: CPT

## 2019-01-17 PROCEDURE — C9113 INJ PANTOPRAZOLE SODIUM, VIA: HCPCS | Performed by: INTERNAL MEDICINE

## 2019-01-17 PROCEDURE — 6360000002 HC RX W HCPCS: Performed by: NURSE PRACTITIONER

## 2019-01-17 PROCEDURE — 2580000003 HC RX 258: Performed by: NURSE PRACTITIONER

## 2019-01-17 PROCEDURE — 2500000003 HC RX 250 WO HCPCS: Performed by: INTERNAL MEDICINE

## 2019-01-17 PROCEDURE — 6360000002 HC RX W HCPCS: Performed by: INTERNAL MEDICINE

## 2019-01-17 PROCEDURE — 85610 PROTHROMBIN TIME: CPT

## 2019-01-17 PROCEDURE — 2060000000 HC ICU INTERMEDIATE R&B

## 2019-01-17 PROCEDURE — 74019 RADEX ABDOMEN 2 VIEWS: CPT

## 2019-01-17 RX ORDER — PANTOPRAZOLE SODIUM 40 MG/10ML
40 INJECTION, POWDER, LYOPHILIZED, FOR SOLUTION INTRAVENOUS 2 TIMES DAILY
Status: DISCONTINUED | OUTPATIENT
Start: 2019-01-17 | End: 2019-01-20

## 2019-01-17 RX ORDER — 0.9 % SODIUM CHLORIDE 0.9 %
10 VIAL (ML) INJECTION 2 TIMES DAILY
Status: DISCONTINUED | OUTPATIENT
Start: 2019-01-17 | End: 2019-01-20

## 2019-01-17 RX ORDER — 0.9 % SODIUM CHLORIDE 0.9 %
10 VIAL (ML) INJECTION DAILY
Status: DISCONTINUED | OUTPATIENT
Start: 2019-01-17 | End: 2019-01-17

## 2019-01-17 RX ORDER — PANTOPRAZOLE SODIUM 40 MG/10ML
40 INJECTION, POWDER, LYOPHILIZED, FOR SOLUTION INTRAVENOUS DAILY
Status: DISCONTINUED | OUTPATIENT
Start: 2019-01-17 | End: 2019-01-17

## 2019-01-17 RX ADMIN — Medication 10 ML: at 20:47

## 2019-01-17 RX ADMIN — ENOXAPARIN SODIUM 30 MG: 30 INJECTION SUBCUTANEOUS at 20:46

## 2019-01-17 RX ADMIN — HYDROMORPHONE HYDROCHLORIDE 0.5 MG: 1 INJECTION, SOLUTION INTRAMUSCULAR; INTRAVENOUS; SUBCUTANEOUS at 22:44

## 2019-01-17 RX ADMIN — HYDROMORPHONE HYDROCHLORIDE 0.5 MG: 1 INJECTION, SOLUTION INTRAMUSCULAR; INTRAVENOUS; SUBCUTANEOUS at 00:49

## 2019-01-17 RX ADMIN — METOPROLOL TARTRATE 5 MG: 5 INJECTION, SOLUTION INTRAVENOUS at 05:47

## 2019-01-17 RX ADMIN — HYDROMORPHONE HYDROCHLORIDE 0.5 MG: 1 INJECTION, SOLUTION INTRAMUSCULAR; INTRAVENOUS; SUBCUTANEOUS at 12:06

## 2019-01-17 RX ADMIN — SODIUM CHLORIDE: 9 INJECTION, SOLUTION INTRAVENOUS at 05:01

## 2019-01-17 RX ADMIN — HYDROMORPHONE HYDROCHLORIDE 0.5 MG: 1 INJECTION, SOLUTION INTRAMUSCULAR; INTRAVENOUS; SUBCUTANEOUS at 08:58

## 2019-01-17 RX ADMIN — PANTOPRAZOLE SODIUM 40 MG: 40 INJECTION, POWDER, FOR SOLUTION INTRAVENOUS at 20:46

## 2019-01-17 RX ADMIN — METOPROLOL TARTRATE 5 MG: 5 INJECTION, SOLUTION INTRAVENOUS at 22:47

## 2019-01-17 RX ADMIN — HYDROMORPHONE HYDROCHLORIDE 0.5 MG: 1 INJECTION, SOLUTION INTRAMUSCULAR; INTRAVENOUS; SUBCUTANEOUS at 15:50

## 2019-01-17 RX ADMIN — PANTOPRAZOLE SODIUM 40 MG: 40 INJECTION, POWDER, FOR SOLUTION INTRAVENOUS at 12:09

## 2019-01-17 RX ADMIN — ENOXAPARIN SODIUM 30 MG: 30 INJECTION SUBCUTANEOUS at 08:58

## 2019-01-17 RX ADMIN — Medication 10 ML: at 12:10

## 2019-01-17 RX ADMIN — ONDANSETRON 4 MG: 2 INJECTION INTRAMUSCULAR; INTRAVENOUS at 12:09

## 2019-01-17 RX ADMIN — METOPROLOL TARTRATE 5 MG: 5 INJECTION, SOLUTION INTRAVENOUS at 17:12

## 2019-01-17 RX ADMIN — METOPROLOL TARTRATE 5 MG: 5 INJECTION, SOLUTION INTRAVENOUS at 12:09

## 2019-01-17 RX ADMIN — HYDROMORPHONE HYDROCHLORIDE 0.5 MG: 1 INJECTION, SOLUTION INTRAMUSCULAR; INTRAVENOUS; SUBCUTANEOUS at 19:13

## 2019-01-17 RX ADMIN — HYDROMORPHONE HYDROCHLORIDE 0.25 MG: 1 INJECTION, SOLUTION INTRAMUSCULAR; INTRAVENOUS; SUBCUTANEOUS at 05:48

## 2019-01-17 ASSESSMENT — PAIN DESCRIPTION - LOCATION
LOCATION: ABDOMEN

## 2019-01-17 ASSESSMENT — PAIN SCALES - GENERAL
PAINLEVEL_OUTOF10: 8
PAINLEVEL_OUTOF10: 7
PAINLEVEL_OUTOF10: 7
PAINLEVEL_OUTOF10: 8
PAINLEVEL_OUTOF10: 7

## 2019-01-17 ASSESSMENT — PAIN DESCRIPTION - FREQUENCY: FREQUENCY: CONTINUOUS

## 2019-01-17 ASSESSMENT — PAIN DESCRIPTION - PAIN TYPE
TYPE: ACUTE PAIN

## 2019-01-17 ASSESSMENT — PAIN DESCRIPTION - ONSET: ONSET: ON-GOING

## 2019-01-17 ASSESSMENT — PAIN DESCRIPTION - ORIENTATION
ORIENTATION: LEFT;UPPER
ORIENTATION: LEFT;UPPER

## 2019-01-17 ASSESSMENT — PAIN DESCRIPTION - DESCRIPTORS
DESCRIPTORS: DISCOMFORT
DESCRIPTORS: DISCOMFORT

## 2019-01-18 ENCOUNTER — APPOINTMENT (OUTPATIENT)
Dept: GENERAL RADIOLOGY | Age: 54
DRG: 389 | End: 2019-01-18
Payer: COMMERCIAL

## 2019-01-18 LAB
ABSOLUTE EOS #: 0.1 K/UL (ref 0–0.4)
ABSOLUTE IMMATURE GRANULOCYTE: ABNORMAL K/UL (ref 0–0.3)
ABSOLUTE LYMPH #: 0.8 K/UL (ref 1–4.8)
ABSOLUTE MONO #: 0.5 K/UL (ref 0.2–0.8)
ANION GAP SERPL CALCULATED.3IONS-SCNC: 12 MMOL/L (ref 9–17)
BASOPHILS # BLD: 1 % (ref 0–2)
BASOPHILS ABSOLUTE: 0 K/UL (ref 0–0.2)
BUN BLDV-MCNC: 13 MG/DL (ref 6–20)
BUN/CREAT BLD: 18 (ref 9–20)
CALCIUM SERPL-MCNC: 8.7 MG/DL (ref 8.6–10.4)
CHLORIDE BLD-SCNC: 97 MMOL/L (ref 98–107)
CO2: 26 MMOL/L (ref 20–31)
CREAT SERPL-MCNC: 0.74 MG/DL (ref 0.7–1.2)
DIFFERENTIAL TYPE: ABNORMAL
EOSINOPHILS RELATIVE PERCENT: 2 % (ref 1–4)
GFR AFRICAN AMERICAN: >60 ML/MIN
GFR NON-AFRICAN AMERICAN: >60 ML/MIN
GFR SERPL CREATININE-BSD FRML MDRD: ABNORMAL ML/MIN/{1.73_M2}
GFR SERPL CREATININE-BSD FRML MDRD: ABNORMAL ML/MIN/{1.73_M2}
GLUCOSE BLD-MCNC: 96 MG/DL (ref 75–110)
GLUCOSE BLD-MCNC: 97 MG/DL (ref 70–99)
GLUCOSE BLD-MCNC: 97 MG/DL (ref 75–110)
HCT VFR BLD CALC: 38.8 % (ref 41–53)
HEMOGLOBIN: 13.6 G/DL (ref 13.5–17.5)
IMMATURE GRANULOCYTES: ABNORMAL %
LYMPHOCYTES # BLD: 15 % (ref 24–44)
MAGNESIUM: 1.6 MG/DL (ref 1.6–2.6)
MCH RBC QN AUTO: 28.8 PG (ref 26–34)
MCHC RBC AUTO-ENTMCNC: 34.9 G/DL (ref 31–37)
MCV RBC AUTO: 82.6 FL (ref 80–100)
MONOCYTES # BLD: 8 % (ref 1–7)
NRBC AUTOMATED: ABNORMAL PER 100 WBC
PDW BLD-RTO: 17.7 % (ref 11.5–14.5)
PLATELET # BLD: 149 K/UL (ref 130–400)
PLATELET ESTIMATE: ABNORMAL
PMV BLD AUTO: 7.7 FL (ref 6–12)
POTASSIUM SERPL-SCNC: 3.7 MMOL/L (ref 3.7–5.3)
RBC # BLD: 4.7 M/UL (ref 4.5–5.9)
RBC # BLD: ABNORMAL 10*6/UL
SEG NEUTROPHILS: 74 % (ref 36–66)
SEGMENTED NEUTROPHILS ABSOLUTE COUNT: 4.4 K/UL (ref 1.8–7.7)
SODIUM BLD-SCNC: 135 MMOL/L (ref 135–144)
WBC # BLD: 5.9 K/UL (ref 3.5–11)
WBC # BLD: ABNORMAL 10*3/UL

## 2019-01-18 PROCEDURE — 2060000000 HC ICU INTERMEDIATE R&B

## 2019-01-18 PROCEDURE — 2580000003 HC RX 258: Performed by: INTERNAL MEDICINE

## 2019-01-18 PROCEDURE — 6370000000 HC RX 637 (ALT 250 FOR IP): Performed by: INTERNAL MEDICINE

## 2019-01-18 PROCEDURE — 99232 SBSQ HOSP IP/OBS MODERATE 35: CPT | Performed by: INTERNAL MEDICINE

## 2019-01-18 PROCEDURE — 2500000003 HC RX 250 WO HCPCS: Performed by: INTERNAL MEDICINE

## 2019-01-18 PROCEDURE — C9113 INJ PANTOPRAZOLE SODIUM, VIA: HCPCS | Performed by: INTERNAL MEDICINE

## 2019-01-18 PROCEDURE — 82947 ASSAY GLUCOSE BLOOD QUANT: CPT

## 2019-01-18 PROCEDURE — 80048 BASIC METABOLIC PNL TOTAL CA: CPT

## 2019-01-18 PROCEDURE — 74019 RADEX ABDOMEN 2 VIEWS: CPT

## 2019-01-18 PROCEDURE — 6360000002 HC RX W HCPCS: Performed by: INTERNAL MEDICINE

## 2019-01-18 PROCEDURE — 6370000000 HC RX 637 (ALT 250 FOR IP): Performed by: SURGERY

## 2019-01-18 PROCEDURE — 83735 ASSAY OF MAGNESIUM: CPT

## 2019-01-18 PROCEDURE — 6360000002 HC RX W HCPCS: Performed by: NURSE PRACTITIONER

## 2019-01-18 PROCEDURE — 36415 COLL VENOUS BLD VENIPUNCTURE: CPT

## 2019-01-18 PROCEDURE — 85025 COMPLETE CBC W/AUTO DIFF WBC: CPT

## 2019-01-18 RX ORDER — SOTALOL HYDROCHLORIDE 80 MG/1
160 TABLET ORAL EVERY 12 HOURS SCHEDULED
Status: DISCONTINUED | OUTPATIENT
Start: 2019-01-18 | End: 2019-01-21 | Stop reason: HOSPADM

## 2019-01-18 RX ORDER — BISACODYL 10 MG
10 SUPPOSITORY, RECTAL RECTAL ONCE
Status: COMPLETED | OUTPATIENT
Start: 2019-01-18 | End: 2019-01-18

## 2019-01-18 RX ADMIN — HYDROMORPHONE HYDROCHLORIDE 0.5 MG: 1 INJECTION, SOLUTION INTRAMUSCULAR; INTRAVENOUS; SUBCUTANEOUS at 12:14

## 2019-01-18 RX ADMIN — HYDROMORPHONE HYDROCHLORIDE 0.5 MG: 1 INJECTION, SOLUTION INTRAMUSCULAR; INTRAVENOUS; SUBCUTANEOUS at 05:42

## 2019-01-18 RX ADMIN — ENOXAPARIN SODIUM 30 MG: 30 INJECTION SUBCUTANEOUS at 22:15

## 2019-01-18 RX ADMIN — HYDROMORPHONE HYDROCHLORIDE 0.5 MG: 1 INJECTION, SOLUTION INTRAMUSCULAR; INTRAVENOUS; SUBCUTANEOUS at 19:11

## 2019-01-18 RX ADMIN — Medication 10 ML: at 09:07

## 2019-01-18 RX ADMIN — SOTALOL HYDROCHLORIDE 160 MG: 80 TABLET ORAL at 19:11

## 2019-01-18 RX ADMIN — HYDROMORPHONE HYDROCHLORIDE 0.5 MG: 1 INJECTION, SOLUTION INTRAMUSCULAR; INTRAVENOUS; SUBCUTANEOUS at 15:18

## 2019-01-18 RX ADMIN — ENOXAPARIN SODIUM 30 MG: 30 INJECTION SUBCUTANEOUS at 09:06

## 2019-01-18 RX ADMIN — PANTOPRAZOLE SODIUM 40 MG: 40 INJECTION, POWDER, FOR SOLUTION INTRAVENOUS at 09:06

## 2019-01-18 RX ADMIN — HYDROMORPHONE HYDROCHLORIDE 0.5 MG: 1 INJECTION, SOLUTION INTRAMUSCULAR; INTRAVENOUS; SUBCUTANEOUS at 02:09

## 2019-01-18 RX ADMIN — Medication 10 ML: at 22:16

## 2019-01-18 RX ADMIN — METOPROLOL TARTRATE 5 MG: 5 INJECTION, SOLUTION INTRAVENOUS at 22:15

## 2019-01-18 RX ADMIN — ONDANSETRON 4 MG: 2 INJECTION INTRAMUSCULAR; INTRAVENOUS at 02:03

## 2019-01-18 RX ADMIN — HYDROMORPHONE HYDROCHLORIDE 0.5 MG: 1 INJECTION, SOLUTION INTRAMUSCULAR; INTRAVENOUS; SUBCUTANEOUS at 09:06

## 2019-01-18 RX ADMIN — BISACODYL 10 MG: 10 SUPPOSITORY RECTAL at 15:26

## 2019-01-18 RX ADMIN — INSULIN LISPRO 1 UNITS: 100 INJECTION, SOLUTION INTRAVENOUS; SUBCUTANEOUS at 12:14

## 2019-01-18 RX ADMIN — METOPROLOL TARTRATE 5 MG: 5 INJECTION, SOLUTION INTRAVENOUS at 11:19

## 2019-01-18 RX ADMIN — METOPROLOL TARTRATE 5 MG: 5 INJECTION, SOLUTION INTRAVENOUS at 17:27

## 2019-01-18 RX ADMIN — HYDROMORPHONE HYDROCHLORIDE 0.5 MG: 1 INJECTION, SOLUTION INTRAMUSCULAR; INTRAVENOUS; SUBCUTANEOUS at 22:15

## 2019-01-18 RX ADMIN — PANTOPRAZOLE SODIUM 40 MG: 40 INJECTION, POWDER, FOR SOLUTION INTRAVENOUS at 22:15

## 2019-01-18 RX ADMIN — METOPROLOL TARTRATE 5 MG: 5 INJECTION, SOLUTION INTRAVENOUS at 05:23

## 2019-01-18 ASSESSMENT — PAIN SCALES - GENERAL
PAINLEVEL_OUTOF10: 8
PAINLEVEL_OUTOF10: 7
PAINLEVEL_OUTOF10: 8
PAINLEVEL_OUTOF10: 7

## 2019-01-18 ASSESSMENT — PAIN DESCRIPTION - LOCATION
LOCATION: ABDOMEN

## 2019-01-18 ASSESSMENT — PAIN DESCRIPTION - ONSET
ONSET: ON-GOING

## 2019-01-18 ASSESSMENT — PAIN DESCRIPTION - PAIN TYPE
TYPE: ACUTE PAIN

## 2019-01-18 ASSESSMENT — PAIN DESCRIPTION - FREQUENCY: FREQUENCY: CONTINUOUS

## 2019-01-18 ASSESSMENT — PAIN DESCRIPTION - ORIENTATION: ORIENTATION: RIGHT;MID;LOWER

## 2019-01-18 ASSESSMENT — PAIN DESCRIPTION - DESCRIPTORS
DESCRIPTORS: DISCOMFORT
DESCRIPTORS: DISCOMFORT

## 2019-01-19 ENCOUNTER — APPOINTMENT (OUTPATIENT)
Dept: GENERAL RADIOLOGY | Age: 54
DRG: 389 | End: 2019-01-19
Payer: COMMERCIAL

## 2019-01-19 LAB
ABSOLUTE EOS #: 0.2 K/UL (ref 0–0.4)
ABSOLUTE IMMATURE GRANULOCYTE: ABNORMAL K/UL (ref 0–0.3)
ABSOLUTE LYMPH #: 0.4 K/UL (ref 1–4.8)
ABSOLUTE MONO #: 0.5 K/UL (ref 0.2–0.8)
ALBUMIN SERPL-MCNC: 3.7 G/DL (ref 3.5–5.2)
ALBUMIN/GLOBULIN RATIO: ABNORMAL (ref 1–2.5)
ALP BLD-CCNC: 55 U/L (ref 40–129)
ALT SERPL-CCNC: 27 U/L (ref 5–41)
ANION GAP SERPL CALCULATED.3IONS-SCNC: 11 MMOL/L (ref 9–17)
AST SERPL-CCNC: 20 U/L
BASOPHILS # BLD: 0 % (ref 0–2)
BASOPHILS ABSOLUTE: 0 K/UL (ref 0–0.2)
BILIRUB SERPL-MCNC: 2.83 MG/DL (ref 0.3–1.2)
BILIRUBIN DIRECT: 0.94 MG/DL
BILIRUBIN, INDIRECT: 1.89 MG/DL (ref 0–1)
BNP INTERPRETATION: NORMAL
BUN BLDV-MCNC: 13 MG/DL (ref 6–20)
CALCIUM SERPL-MCNC: 8.7 MG/DL (ref 8.6–10.4)
CHLORIDE BLD-SCNC: 101 MMOL/L (ref 98–107)
CO2: 26 MMOL/L (ref 20–31)
CREAT SERPL-MCNC: 0.72 MG/DL (ref 0.7–1.2)
DIFFERENTIAL TYPE: ABNORMAL
EOSINOPHILS RELATIVE PERCENT: 3 % (ref 1–4)
GFR AFRICAN AMERICAN: >60 ML/MIN
GFR NON-AFRICAN AMERICAN: >60 ML/MIN
GFR SERPL CREATININE-BSD FRML MDRD: ABNORMAL ML/MIN/{1.73_M2}
GFR SERPL CREATININE-BSD FRML MDRD: ABNORMAL ML/MIN/{1.73_M2}
GLUCOSE BLD-MCNC: 122 MG/DL (ref 75–110)
GLUCOSE BLD-MCNC: 127 MG/DL (ref 75–110)
GLUCOSE BLD-MCNC: 133 MG/DL (ref 75–110)
GLUCOSE BLD-MCNC: 142 MG/DL (ref 70–99)
GLUCOSE BLD-MCNC: 158 MG/DL (ref 75–110)
HCT VFR BLD CALC: 35 % (ref 41–53)
HEMOGLOBIN: 12.1 G/DL (ref 13.5–17.5)
IMMATURE GRANULOCYTES: ABNORMAL %
LYMPHOCYTES # BLD: 7 % (ref 24–44)
MAGNESIUM: 1.5 MG/DL (ref 1.6–2.6)
MCH RBC QN AUTO: 28.7 PG (ref 26–34)
MCHC RBC AUTO-ENTMCNC: 34.7 G/DL (ref 31–37)
MCV RBC AUTO: 82.7 FL (ref 80–100)
MONOCYTES # BLD: 9 % (ref 1–7)
NRBC AUTOMATED: ABNORMAL PER 100 WBC
PDW BLD-RTO: 18 % (ref 11.5–14.5)
PLATELET # BLD: 142 K/UL (ref 130–400)
PLATELET ESTIMATE: ABNORMAL
PMV BLD AUTO: 7.7 FL (ref 6–12)
POTASSIUM SERPL-SCNC: 3.5 MMOL/L (ref 3.7–5.3)
PRO-BNP: 129 PG/ML
RBC # BLD: 4.23 M/UL (ref 4.5–5.9)
RBC # BLD: ABNORMAL 10*6/UL
SEG NEUTROPHILS: 81 % (ref 36–66)
SEGMENTED NEUTROPHILS ABSOLUTE COUNT: 4.9 K/UL (ref 1.8–7.7)
SODIUM BLD-SCNC: 138 MMOL/L (ref 135–144)
TOTAL PROTEIN: 6.1 G/DL (ref 6.4–8.3)
WBC # BLD: 6 K/UL (ref 3.5–11)
WBC # BLD: ABNORMAL 10*3/UL

## 2019-01-19 PROCEDURE — 36415 COLL VENOUS BLD VENIPUNCTURE: CPT

## 2019-01-19 PROCEDURE — 81050 URINALYSIS VOLUME MEASURE: CPT

## 2019-01-19 PROCEDURE — 6360000002 HC RX W HCPCS: Performed by: NURSE PRACTITIONER

## 2019-01-19 PROCEDURE — 83735 ASSAY OF MAGNESIUM: CPT

## 2019-01-19 PROCEDURE — 2580000003 HC RX 258: Performed by: INTERNAL MEDICINE

## 2019-01-19 PROCEDURE — 74019 RADEX ABDOMEN 2 VIEWS: CPT

## 2019-01-19 PROCEDURE — 6360000002 HC RX W HCPCS: Performed by: INTERNAL MEDICINE

## 2019-01-19 PROCEDURE — 80053 COMPREHEN METABOLIC PANEL: CPT

## 2019-01-19 PROCEDURE — 6370000000 HC RX 637 (ALT 250 FOR IP): Performed by: INTERNAL MEDICINE

## 2019-01-19 PROCEDURE — 99232 SBSQ HOSP IP/OBS MODERATE 35: CPT | Performed by: INTERNAL MEDICINE

## 2019-01-19 PROCEDURE — 85025 COMPLETE CBC W/AUTO DIFF WBC: CPT

## 2019-01-19 PROCEDURE — 2060000000 HC ICU INTERMEDIATE R&B

## 2019-01-19 PROCEDURE — 82248 BILIRUBIN DIRECT: CPT

## 2019-01-19 PROCEDURE — C9113 INJ PANTOPRAZOLE SODIUM, VIA: HCPCS | Performed by: INTERNAL MEDICINE

## 2019-01-19 PROCEDURE — 2500000003 HC RX 250 WO HCPCS: Performed by: INTERNAL MEDICINE

## 2019-01-19 PROCEDURE — 83880 ASSAY OF NATRIURETIC PEPTIDE: CPT

## 2019-01-19 PROCEDURE — 2580000003 HC RX 258: Performed by: NURSE PRACTITIONER

## 2019-01-19 PROCEDURE — 82947 ASSAY GLUCOSE BLOOD QUANT: CPT

## 2019-01-19 RX ADMIN — HYDROMORPHONE HYDROCHLORIDE 0.5 MG: 1 INJECTION, SOLUTION INTRAMUSCULAR; INTRAVENOUS; SUBCUTANEOUS at 08:20

## 2019-01-19 RX ADMIN — Medication 10 ML: at 20:37

## 2019-01-19 RX ADMIN — HYDROMORPHONE HYDROCHLORIDE 0.5 MG: 1 INJECTION, SOLUTION INTRAMUSCULAR; INTRAVENOUS; SUBCUTANEOUS at 18:01

## 2019-01-19 RX ADMIN — HYDROMORPHONE HYDROCHLORIDE 0.5 MG: 1 INJECTION, SOLUTION INTRAMUSCULAR; INTRAVENOUS; SUBCUTANEOUS at 05:19

## 2019-01-19 RX ADMIN — MAGNESIUM SULFATE HEPTAHYDRATE 1 G: 1 INJECTION, SOLUTION INTRAVENOUS at 15:13

## 2019-01-19 RX ADMIN — METOPROLOL TARTRATE 5 MG: 5 INJECTION, SOLUTION INTRAVENOUS at 05:19

## 2019-01-19 RX ADMIN — HYDROMORPHONE HYDROCHLORIDE 0.5 MG: 1 INJECTION, SOLUTION INTRAMUSCULAR; INTRAVENOUS; SUBCUTANEOUS at 14:49

## 2019-01-19 RX ADMIN — ENOXAPARIN SODIUM 30 MG: 30 INJECTION SUBCUTANEOUS at 08:15

## 2019-01-19 RX ADMIN — PANTOPRAZOLE SODIUM 40 MG: 40 INJECTION, POWDER, FOR SOLUTION INTRAVENOUS at 20:37

## 2019-01-19 RX ADMIN — METOPROLOL TARTRATE 5 MG: 5 INJECTION, SOLUTION INTRAVENOUS at 12:24

## 2019-01-19 RX ADMIN — PANTOPRAZOLE SODIUM 40 MG: 40 INJECTION, POWDER, FOR SOLUTION INTRAVENOUS at 08:16

## 2019-01-19 RX ADMIN — POTASSIUM CHLORIDE 10 MEQ: 7.46 INJECTION, SOLUTION INTRAVENOUS at 08:16

## 2019-01-19 RX ADMIN — METOPROLOL TARTRATE 5 MG: 5 INJECTION, SOLUTION INTRAVENOUS at 18:01

## 2019-01-19 RX ADMIN — INSULIN LISPRO 1 UNITS: 100 INJECTION, SOLUTION INTRAVENOUS; SUBCUTANEOUS at 20:38

## 2019-01-19 RX ADMIN — SOTALOL HYDROCHLORIDE 160 MG: 80 TABLET ORAL at 08:15

## 2019-01-19 RX ADMIN — SODIUM CHLORIDE: 9 INJECTION, SOLUTION INTRAVENOUS at 08:16

## 2019-01-19 RX ADMIN — MAGNESIUM SULFATE HEPTAHYDRATE 1 G: 1 INJECTION, SOLUTION INTRAVENOUS at 13:32

## 2019-01-19 RX ADMIN — Medication 10 ML: at 08:16

## 2019-01-19 RX ADMIN — POTASSIUM CHLORIDE 10 MEQ: 7.46 INJECTION, SOLUTION INTRAVENOUS at 09:40

## 2019-01-19 RX ADMIN — SOTALOL HYDROCHLORIDE 160 MG: 80 TABLET ORAL at 20:37

## 2019-01-19 RX ADMIN — POTASSIUM CHLORIDE 10 MEQ: 7.46 INJECTION, SOLUTION INTRAVENOUS at 12:17

## 2019-01-19 RX ADMIN — HYDROMORPHONE HYDROCHLORIDE 0.5 MG: 1 INJECTION, SOLUTION INTRAMUSCULAR; INTRAVENOUS; SUBCUTANEOUS at 11:49

## 2019-01-19 RX ADMIN — HYDROMORPHONE HYDROCHLORIDE 0.5 MG: 1 INJECTION, SOLUTION INTRAMUSCULAR; INTRAVENOUS; SUBCUTANEOUS at 21:15

## 2019-01-19 RX ADMIN — POTASSIUM CHLORIDE 10 MEQ: 7.46 INJECTION, SOLUTION INTRAVENOUS at 10:57

## 2019-01-19 ASSESSMENT — PAIN SCALES - GENERAL
PAINLEVEL_OUTOF10: 7
PAINLEVEL_OUTOF10: 8
PAINLEVEL_OUTOF10: 7
PAINLEVEL_OUTOF10: 8

## 2019-01-20 LAB
ANION GAP SERPL CALCULATED.3IONS-SCNC: 11 MMOL/L (ref 9–17)
BUN BLDV-MCNC: 8 MG/DL (ref 6–20)
BUN/CREAT BLD: 11 (ref 9–20)
CALCIUM SERPL-MCNC: 8.7 MG/DL (ref 8.6–10.4)
CHLORIDE BLD-SCNC: 103 MMOL/L (ref 98–107)
CO2: 26 MMOL/L (ref 20–31)
CREAT SERPL-MCNC: 0.76 MG/DL (ref 0.7–1.2)
GFR AFRICAN AMERICAN: >60 ML/MIN
GFR NON-AFRICAN AMERICAN: >60 ML/MIN
GFR SERPL CREATININE-BSD FRML MDRD: ABNORMAL ML/MIN/{1.73_M2}
GFR SERPL CREATININE-BSD FRML MDRD: ABNORMAL ML/MIN/{1.73_M2}
GLUCOSE BLD-MCNC: 126 MG/DL (ref 75–110)
GLUCOSE BLD-MCNC: 134 MG/DL (ref 75–110)
GLUCOSE BLD-MCNC: 136 MG/DL (ref 70–99)
GLUCOSE BLD-MCNC: 150 MG/DL (ref 75–110)
GLUCOSE BLD-MCNC: 217 MG/DL (ref 75–110)
HCT VFR BLD CALC: 33.8 % (ref 41–53)
HEMOGLOBIN: 11.6 G/DL (ref 13.5–17.5)
MAGNESIUM: 1.8 MG/DL (ref 1.6–2.6)
MCH RBC QN AUTO: 28.2 PG (ref 26–34)
MCHC RBC AUTO-ENTMCNC: 34.1 G/DL (ref 31–37)
MCV RBC AUTO: 82.7 FL (ref 80–100)
NRBC AUTOMATED: ABNORMAL PER 100 WBC
PDW BLD-RTO: 18.2 % (ref 11.5–14.5)
PLATELET # BLD: 143 K/UL (ref 130–400)
PMV BLD AUTO: 7.7 FL (ref 6–12)
POTASSIUM SERPL-SCNC: 3.9 MMOL/L (ref 3.7–5.3)
RBC # BLD: 4.09 M/UL (ref 4.5–5.9)
SODIUM BLD-SCNC: 140 MMOL/L (ref 135–144)
WBC # BLD: 4.3 K/UL (ref 3.5–11)

## 2019-01-20 PROCEDURE — 2580000003 HC RX 258: Performed by: SURGERY

## 2019-01-20 PROCEDURE — C9113 INJ PANTOPRAZOLE SODIUM, VIA: HCPCS | Performed by: INTERNAL MEDICINE

## 2019-01-20 PROCEDURE — 80048 BASIC METABOLIC PNL TOTAL CA: CPT

## 2019-01-20 PROCEDURE — 6370000000 HC RX 637 (ALT 250 FOR IP): Performed by: INTERNAL MEDICINE

## 2019-01-20 PROCEDURE — 6360000002 HC RX W HCPCS: Performed by: INTERNAL MEDICINE

## 2019-01-20 PROCEDURE — 6360000002 HC RX W HCPCS: Performed by: NURSE PRACTITIONER

## 2019-01-20 PROCEDURE — 99232 SBSQ HOSP IP/OBS MODERATE 35: CPT | Performed by: INTERNAL MEDICINE

## 2019-01-20 PROCEDURE — 82947 ASSAY GLUCOSE BLOOD QUANT: CPT

## 2019-01-20 PROCEDURE — 36415 COLL VENOUS BLD VENIPUNCTURE: CPT

## 2019-01-20 PROCEDURE — 83735 ASSAY OF MAGNESIUM: CPT

## 2019-01-20 PROCEDURE — 2060000000 HC ICU INTERMEDIATE R&B

## 2019-01-20 PROCEDURE — 2500000003 HC RX 250 WO HCPCS: Performed by: INTERNAL MEDICINE

## 2019-01-20 PROCEDURE — 85027 COMPLETE CBC AUTOMATED: CPT

## 2019-01-20 PROCEDURE — 2580000003 HC RX 258: Performed by: NURSE PRACTITIONER

## 2019-01-20 RX ORDER — PANTOPRAZOLE SODIUM 40 MG/1
40 TABLET, DELAYED RELEASE ORAL
Status: DISCONTINUED | OUTPATIENT
Start: 2019-01-21 | End: 2019-01-21 | Stop reason: HOSPADM

## 2019-01-20 RX ADMIN — SOTALOL HYDROCHLORIDE 160 MG: 80 TABLET ORAL at 09:16

## 2019-01-20 RX ADMIN — Medication 10 ML: at 20:46

## 2019-01-20 RX ADMIN — METOPROLOL TARTRATE 25 MG: 25 TABLET ORAL at 20:46

## 2019-01-20 RX ADMIN — PANTOPRAZOLE SODIUM 40 MG: 40 INJECTION, POWDER, FOR SOLUTION INTRAVENOUS at 09:16

## 2019-01-20 RX ADMIN — HYDROMORPHONE HYDROCHLORIDE 0.5 MG: 1 INJECTION, SOLUTION INTRAMUSCULAR; INTRAVENOUS; SUBCUTANEOUS at 17:49

## 2019-01-20 RX ADMIN — HYDROMORPHONE HYDROCHLORIDE 0.5 MG: 1 INJECTION, SOLUTION INTRAMUSCULAR; INTRAVENOUS; SUBCUTANEOUS at 05:01

## 2019-01-20 RX ADMIN — METOPROLOL TARTRATE 5 MG: 5 INJECTION, SOLUTION INTRAVENOUS at 04:58

## 2019-01-20 RX ADMIN — HYDROMORPHONE HYDROCHLORIDE 0.5 MG: 1 INJECTION, SOLUTION INTRAMUSCULAR; INTRAVENOUS; SUBCUTANEOUS at 13:45

## 2019-01-20 RX ADMIN — INSULIN LISPRO 2 UNITS: 100 INJECTION, SOLUTION INTRAVENOUS; SUBCUTANEOUS at 21:01

## 2019-01-20 RX ADMIN — HYDROMORPHONE HYDROCHLORIDE 0.5 MG: 1 INJECTION, SOLUTION INTRAMUSCULAR; INTRAVENOUS; SUBCUTANEOUS at 01:25

## 2019-01-20 RX ADMIN — HYDROMORPHONE HYDROCHLORIDE 0.5 MG: 1 INJECTION, SOLUTION INTRAMUSCULAR; INTRAVENOUS; SUBCUTANEOUS at 21:01

## 2019-01-20 RX ADMIN — METOPROLOL TARTRATE 25 MG: 25 TABLET ORAL at 13:44

## 2019-01-20 RX ADMIN — Medication 10 ML: at 09:16

## 2019-01-20 RX ADMIN — SODIUM CHLORIDE: 9 INJECTION, SOLUTION INTRAVENOUS at 00:18

## 2019-01-20 RX ADMIN — HYDROMORPHONE HYDROCHLORIDE 0.5 MG: 1 INJECTION, SOLUTION INTRAMUSCULAR; INTRAVENOUS; SUBCUTANEOUS at 09:16

## 2019-01-20 RX ADMIN — SOTALOL HYDROCHLORIDE 160 MG: 80 TABLET ORAL at 20:46

## 2019-01-20 RX ADMIN — METOPROLOL TARTRATE 5 MG: 5 INJECTION, SOLUTION INTRAVENOUS at 00:18

## 2019-01-20 ASSESSMENT — PAIN SCALES - GENERAL
PAINLEVEL_OUTOF10: 7
PAINLEVEL_OUTOF10: 7
PAINLEVEL_OUTOF10: 6
PAINLEVEL_OUTOF10: 7
PAINLEVEL_OUTOF10: 6
PAINLEVEL_OUTOF10: 6

## 2019-01-21 VITALS
HEART RATE: 69 BPM | HEIGHT: 73 IN | WEIGHT: 269.8 LBS | DIASTOLIC BLOOD PRESSURE: 71 MMHG | SYSTOLIC BLOOD PRESSURE: 121 MMHG | BODY MASS INDEX: 35.76 KG/M2 | RESPIRATION RATE: 16 BRPM | TEMPERATURE: 99.5 F | OXYGEN SATURATION: 99 %

## 2019-01-21 LAB
ANION GAP SERPL CALCULATED.3IONS-SCNC: 11 MMOL/L (ref 9–17)
BUN BLDV-MCNC: 8 MG/DL (ref 6–20)
BUN/CREAT BLD: 10 (ref 9–20)
CALCIUM SERPL-MCNC: 8.4 MG/DL (ref 8.6–10.4)
CHLORIDE BLD-SCNC: 101 MMOL/L (ref 98–107)
CO2: 27 MMOL/L (ref 20–31)
CREAT SERPL-MCNC: 0.78 MG/DL (ref 0.7–1.2)
GFR AFRICAN AMERICAN: >60 ML/MIN
GFR NON-AFRICAN AMERICAN: >60 ML/MIN
GFR SERPL CREATININE-BSD FRML MDRD: ABNORMAL ML/MIN/{1.73_M2}
GFR SERPL CREATININE-BSD FRML MDRD: ABNORMAL ML/MIN/{1.73_M2}
GLUCOSE BLD-MCNC: 142 MG/DL (ref 75–110)
GLUCOSE BLD-MCNC: 175 MG/DL (ref 70–99)
GLUCOSE BLD-MCNC: 181 MG/DL (ref 75–110)
GLUCOSE BLD-MCNC: 97 MG/DL (ref 75–110)
HOURS COLLECTED: 24 H
MAGNESIUM 24 HOUR URINE: 154 MG/24 H
MAGNESIUM TIMED UR: NORMAL MG/X H
MAGNESIUM URINE: 4.8 MG/DL
MAGNESIUM: 1.8 MG/DL (ref 1.6–2.6)
POTASSIUM SERPL-SCNC: 3.9 MMOL/L (ref 3.7–5.3)
SODIUM BLD-SCNC: 139 MMOL/L (ref 135–144)
VOLUME: 3208 ML

## 2019-01-21 PROCEDURE — 6360000002 HC RX W HCPCS: Performed by: NURSE PRACTITIONER

## 2019-01-21 PROCEDURE — 6370000000 HC RX 637 (ALT 250 FOR IP): Performed by: INTERNAL MEDICINE

## 2019-01-21 PROCEDURE — 99232 SBSQ HOSP IP/OBS MODERATE 35: CPT | Performed by: INTERNAL MEDICINE

## 2019-01-21 PROCEDURE — 80048 BASIC METABOLIC PNL TOTAL CA: CPT

## 2019-01-21 PROCEDURE — 83735 ASSAY OF MAGNESIUM: CPT

## 2019-01-21 PROCEDURE — 2580000003 HC RX 258: Performed by: NURSE PRACTITIONER

## 2019-01-21 PROCEDURE — 6370000000 HC RX 637 (ALT 250 FOR IP): Performed by: NURSE PRACTITIONER

## 2019-01-21 PROCEDURE — 36415 COLL VENOUS BLD VENIPUNCTURE: CPT

## 2019-01-21 RX ORDER — BISACODYL 10 MG
10 SUPPOSITORY, RECTAL RECTAL ONCE
Status: DISCONTINUED | OUTPATIENT
Start: 2019-01-21 | End: 2019-01-21 | Stop reason: HOSPADM

## 2019-01-21 RX ORDER — BISACODYL 10 MG
10 SUPPOSITORY, RECTAL RECTAL DAILY PRN
Status: DISCONTINUED | OUTPATIENT
Start: 2019-01-21 | End: 2019-01-21 | Stop reason: SDUPTHER

## 2019-01-21 RX ADMIN — PRAVASTATIN SODIUM 10 MG: 10 TABLET ORAL at 09:11

## 2019-01-21 RX ADMIN — HYDROMORPHONE HYDROCHLORIDE 0.5 MG: 1 INJECTION, SOLUTION INTRAMUSCULAR; INTRAVENOUS; SUBCUTANEOUS at 01:26

## 2019-01-21 RX ADMIN — Medication 10 ML: at 09:12

## 2019-01-21 RX ADMIN — LINAGLIPTIN 5 MG: 5 TABLET, FILM COATED ORAL at 09:11

## 2019-01-21 RX ADMIN — INSULIN LISPRO 1 UNITS: 100 INJECTION, SOLUTION INTRAVENOUS; SUBCUTANEOUS at 12:19

## 2019-01-21 RX ADMIN — METOPROLOL TARTRATE 25 MG: 25 TABLET ORAL at 09:11

## 2019-01-21 RX ADMIN — INSULIN LISPRO 1 UNITS: 100 INJECTION, SOLUTION INTRAVENOUS; SUBCUTANEOUS at 09:11

## 2019-01-21 RX ADMIN — PANTOPRAZOLE SODIUM 40 MG: 40 TABLET, DELAYED RELEASE ORAL at 06:01

## 2019-01-21 RX ADMIN — HYDROMORPHONE HYDROCHLORIDE 0.5 MG: 1 INJECTION, SOLUTION INTRAMUSCULAR; INTRAVENOUS; SUBCUTANEOUS at 06:01

## 2019-01-21 RX ADMIN — SOTALOL HYDROCHLORIDE 160 MG: 80 TABLET ORAL at 09:11

## 2019-01-21 RX ADMIN — HYDROMORPHONE HYDROCHLORIDE 0.5 MG: 1 INJECTION, SOLUTION INTRAMUSCULAR; INTRAVENOUS; SUBCUTANEOUS at 09:48

## 2019-01-21 RX ADMIN — TAMSULOSIN HYDROCHLORIDE 0.4 MG: 0.4 CAPSULE ORAL at 09:11

## 2019-01-21 ASSESSMENT — PAIN DESCRIPTION - ONSET: ONSET: ON-GOING

## 2019-01-21 ASSESSMENT — PAIN SCALES - GENERAL
PAINLEVEL_OUTOF10: 8
PAINLEVEL_OUTOF10: 7
PAINLEVEL_OUTOF10: 7
PAINLEVEL_OUTOF10: 2

## 2019-01-21 ASSESSMENT — PAIN DESCRIPTION - PROGRESSION: CLINICAL_PROGRESSION: GRADUALLY IMPROVING

## 2019-01-21 ASSESSMENT — PAIN DESCRIPTION - PAIN TYPE: TYPE: ACUTE PAIN

## 2019-01-21 ASSESSMENT — PAIN DESCRIPTION - DESCRIPTORS: DESCRIPTORS: DISCOMFORT

## 2019-01-21 ASSESSMENT — PAIN DESCRIPTION - ORIENTATION: ORIENTATION: RIGHT

## 2019-01-21 ASSESSMENT — PAIN DESCRIPTION - LOCATION: LOCATION: ABDOMEN

## 2019-01-21 ASSESSMENT — PAIN DESCRIPTION - FREQUENCY: FREQUENCY: CONTINUOUS

## 2019-01-23 ENCOUNTER — HOSPITAL ENCOUNTER (EMERGENCY)
Age: 54
Discharge: HOME | End: 2019-01-24
Attending: EMERGENCY MEDICINE
Payer: COMMERCIAL

## 2019-01-23 ENCOUNTER — APPOINTMENT (OUTPATIENT)
Dept: GENERAL RADIOLOGY | Age: 54
End: 2019-01-23
Payer: COMMERCIAL

## 2019-01-23 VITALS
HEIGHT: 73 IN | OXYGEN SATURATION: 99 % | SYSTOLIC BLOOD PRESSURE: 122 MMHG | HEART RATE: 103 BPM | TEMPERATURE: 98.1 F | WEIGHT: 269.2 LBS | BODY MASS INDEX: 35.68 KG/M2 | DIASTOLIC BLOOD PRESSURE: 73 MMHG | RESPIRATION RATE: 20 BRPM

## 2019-01-23 DIAGNOSIS — K56.7 ILEUS (HCC): ICD-10-CM

## 2019-01-23 DIAGNOSIS — R10.9 ABDOMINAL PAIN, UNSPECIFIED ABDOMINAL LOCATION: Primary | ICD-10-CM

## 2019-01-23 LAB
ABSOLUTE EOS #: 0.1 K/UL (ref 0–0.4)
ABSOLUTE IMMATURE GRANULOCYTE: ABNORMAL K/UL (ref 0–0.3)
ABSOLUTE LYMPH #: 1.2 K/UL (ref 1–4.8)
ABSOLUTE MONO #: 0.5 K/UL (ref 0.2–0.8)
ANION GAP SERPL CALCULATED.3IONS-SCNC: 14 MMOL/L (ref 9–17)
BASOPHILS # BLD: 2 % (ref 0–2)
BASOPHILS ABSOLUTE: 0.2 K/UL (ref 0–0.2)
BUN BLDV-MCNC: 15 MG/DL (ref 6–20)
BUN/CREAT BLD: 16 (ref 9–20)
CALCIUM SERPL-MCNC: 9 MG/DL (ref 8.6–10.4)
CHLORIDE BLD-SCNC: 97 MMOL/L (ref 98–107)
CO2: 24 MMOL/L (ref 20–31)
CREAT SERPL-MCNC: 0.92 MG/DL (ref 0.7–1.2)
DIFFERENTIAL TYPE: ABNORMAL
EOSINOPHILS RELATIVE PERCENT: 2 % (ref 1–4)
GFR AFRICAN AMERICAN: >60 ML/MIN
GFR NON-AFRICAN AMERICAN: >60 ML/MIN
GFR SERPL CREATININE-BSD FRML MDRD: ABNORMAL ML/MIN/{1.73_M2}
GFR SERPL CREATININE-BSD FRML MDRD: ABNORMAL ML/MIN/{1.73_M2}
GLUCOSE BLD-MCNC: 273 MG/DL (ref 70–99)
HCT VFR BLD CALC: 37.8 % (ref 41–53)
HEMOGLOBIN: 13.2 G/DL (ref 13.5–17.5)
IMMATURE GRANULOCYTES: ABNORMAL %
LYMPHOCYTES # BLD: 17 % (ref 24–44)
MCH RBC QN AUTO: 28.6 PG (ref 26–34)
MCHC RBC AUTO-ENTMCNC: 35 G/DL (ref 31–37)
MCV RBC AUTO: 81.7 FL (ref 80–100)
MONOCYTES # BLD: 7 % (ref 1–7)
NRBC AUTOMATED: ABNORMAL PER 100 WBC
PDW BLD-RTO: 18.6 % (ref 11.5–14.5)
PLATELET # BLD: 167 K/UL (ref 130–400)
PLATELET ESTIMATE: ABNORMAL
PMV BLD AUTO: 8.1 FL (ref 6–12)
POTASSIUM SERPL-SCNC: 4.1 MMOL/L (ref 3.7–5.3)
RBC # BLD: 4.62 M/UL (ref 4.5–5.9)
RBC # BLD: ABNORMAL 10*6/UL
SEG NEUTROPHILS: 72 % (ref 36–66)
SEGMENTED NEUTROPHILS ABSOLUTE COUNT: 4.9 K/UL (ref 1.8–7.7)
SODIUM BLD-SCNC: 135 MMOL/L (ref 135–144)
WBC # BLD: 6.9 K/UL (ref 3.5–11)
WBC # BLD: ABNORMAL 10*3/UL

## 2019-01-23 PROCEDURE — 74022 RADEX COMPL AQT ABD SERIES: CPT

## 2019-01-23 PROCEDURE — 99284 EMERGENCY DEPT VISIT MOD MDM: CPT

## 2019-01-23 PROCEDURE — 96374 THER/PROPH/DIAG INJ IV PUSH: CPT

## 2019-01-23 PROCEDURE — 80048 BASIC METABOLIC PNL TOTAL CA: CPT

## 2019-01-23 PROCEDURE — 6360000002 HC RX W HCPCS: Performed by: NURSE PRACTITIONER

## 2019-01-23 PROCEDURE — 85025 COMPLETE CBC W/AUTO DIFF WBC: CPT

## 2019-01-23 PROCEDURE — 96375 TX/PRO/DX INJ NEW DRUG ADDON: CPT

## 2019-01-23 RX ORDER — ONDANSETRON 2 MG/ML
4 INJECTION INTRAMUSCULAR; INTRAVENOUS ONCE
Status: COMPLETED | OUTPATIENT
Start: 2019-01-23 | End: 2019-01-23

## 2019-01-23 RX ADMIN — ONDANSETRON 4 MG: 2 INJECTION INTRAMUSCULAR; INTRAVENOUS at 23:02

## 2019-01-23 RX ADMIN — HYDROMORPHONE HYDROCHLORIDE 1 MG: 1 INJECTION, SOLUTION INTRAMUSCULAR; INTRAVENOUS; SUBCUTANEOUS at 23:03

## 2019-01-23 ASSESSMENT — PAIN DESCRIPTION - LOCATION
LOCATION: ABDOMEN
LOCATION: ABDOMEN

## 2019-01-23 ASSESSMENT — PAIN DESCRIPTION - FREQUENCY
FREQUENCY: CONTINUOUS
FREQUENCY: CONTINUOUS

## 2019-01-23 ASSESSMENT — PAIN DESCRIPTION - PAIN TYPE
TYPE: ACUTE PAIN
TYPE: ACUTE PAIN

## 2019-01-23 ASSESSMENT — PAIN DESCRIPTION - ORIENTATION: ORIENTATION: ANTERIOR

## 2019-01-23 ASSESSMENT — PAIN DESCRIPTION - ONSET
ONSET: ON-GOING
ONSET: ON-GOING

## 2019-01-23 ASSESSMENT — PAIN DESCRIPTION - PROGRESSION
CLINICAL_PROGRESSION: NOT CHANGED
CLINICAL_PROGRESSION: GRADUALLY IMPROVING

## 2019-01-23 ASSESSMENT — PAIN SCALES - GENERAL
PAINLEVEL_OUTOF10: 10
PAINLEVEL_OUTOF10: 10
PAINLEVEL_OUTOF10: 4

## 2019-01-23 ASSESSMENT — PAIN DESCRIPTION - DESCRIPTORS
DESCRIPTORS: CRAMPING;NAGGING;DISCOMFORT
DESCRIPTORS: CRAMPING

## 2019-01-24 ASSESSMENT — ENCOUNTER SYMPTOMS
DIARRHEA: 0
VOMITING: 0
SINUS PRESSURE: 0
COUGH: 0
SHORTNESS OF BREATH: 0
CONSTIPATION: 0
ABDOMINAL PAIN: 0
NAUSEA: 0
COLOR CHANGE: 0
RHINORRHEA: 0
WHEEZING: 0
SORE THROAT: 0

## 2019-04-04 ASSESSMENT — PAIN SCALES - GENERAL: PAINLEVEL_OUTOF10: 10

## 2019-04-05 ENCOUNTER — APPOINTMENT (OUTPATIENT)
Dept: GENERAL RADIOLOGY | Age: 54
DRG: 389 | End: 2019-04-05
Payer: COMMERCIAL

## 2019-04-05 ENCOUNTER — APPOINTMENT (OUTPATIENT)
Dept: CT IMAGING | Age: 54
DRG: 389 | End: 2019-04-05
Payer: COMMERCIAL

## 2019-04-05 ENCOUNTER — HOSPITAL ENCOUNTER (INPATIENT)
Age: 54
LOS: 5 days | Discharge: HOME | DRG: 389 | End: 2019-04-10
Attending: EMERGENCY MEDICINE | Admitting: INTERNAL MEDICINE
Payer: COMMERCIAL

## 2019-04-05 DIAGNOSIS — K56.609 SBO (SMALL BOWEL OBSTRUCTION) (HCC): Primary | ICD-10-CM

## 2019-04-05 PROBLEM — Z87.19 HISTORY OF SMALL BOWEL OBSTRUCTION: Chronic | Status: ACTIVE | Noted: 2019-04-05

## 2019-04-05 LAB
ABSOLUTE EOS #: 0.2 K/UL (ref 0–0.4)
ABSOLUTE IMMATURE GRANULOCYTE: ABNORMAL K/UL (ref 0–0.3)
ABSOLUTE LYMPH #: 0.8 K/UL (ref 1–4.8)
ABSOLUTE MONO #: 0.6 K/UL (ref 0.2–0.8)
ALBUMIN SERPL-MCNC: 5 G/DL (ref 3.5–5.2)
ALBUMIN/GLOBULIN RATIO: ABNORMAL (ref 1–2.5)
ALP BLD-CCNC: 80 U/L (ref 40–129)
ALT SERPL-CCNC: 31 U/L (ref 5–41)
AMYLASE: 38 U/L (ref 28–100)
ANION GAP SERPL CALCULATED.3IONS-SCNC: 14 MMOL/L (ref 9–17)
AST SERPL-CCNC: 23 U/L
BASOPHILS # BLD: 1 % (ref 0–2)
BASOPHILS ABSOLUTE: 0.1 K/UL (ref 0–0.2)
BILIRUB SERPL-MCNC: 1.69 MG/DL (ref 0.3–1.2)
BILIRUBIN DIRECT: 0.34 MG/DL
BILIRUBIN, INDIRECT: 1.35 MG/DL (ref 0–1)
BNP INTERPRETATION: NORMAL
BUN BLDV-MCNC: 14 MG/DL (ref 6–20)
BUN/CREAT BLD: 14 (ref 9–20)
CALCIUM SERPL-MCNC: 9.6 MG/DL (ref 8.6–10.4)
CHLORIDE BLD-SCNC: 101 MMOL/L (ref 98–107)
CO2: 23 MMOL/L (ref 20–31)
CREAT SERPL-MCNC: 0.97 MG/DL (ref 0.7–1.2)
DIFFERENTIAL TYPE: ABNORMAL
EOSINOPHILS RELATIVE PERCENT: 2 % (ref 1–4)
GFR AFRICAN AMERICAN: >60 ML/MIN
GFR NON-AFRICAN AMERICAN: >60 ML/MIN
GFR SERPL CREATININE-BSD FRML MDRD: ABNORMAL ML/MIN/{1.73_M2}
GFR SERPL CREATININE-BSD FRML MDRD: ABNORMAL ML/MIN/{1.73_M2}
GLOBULIN: ABNORMAL G/DL (ref 1.5–3.8)
GLUCOSE BLD-MCNC: 129 MG/DL (ref 75–110)
GLUCOSE BLD-MCNC: 131 MG/DL (ref 75–110)
GLUCOSE BLD-MCNC: 135 MG/DL (ref 75–110)
GLUCOSE BLD-MCNC: 150 MG/DL (ref 70–99)
GLUCOSE BLD-MCNC: 167 MG/DL (ref 75–110)
GLUCOSE BLD-MCNC: 191 MG/DL (ref 75–110)
HCT VFR BLD CALC: 46.7 % (ref 41–53)
HEMOGLOBIN: 15.7 G/DL (ref 13.5–17.5)
IMMATURE GRANULOCYTES: ABNORMAL %
LACTIC ACID: 1.1 MMOL/L (ref 0.5–2.2)
LIPASE: 27 U/L (ref 13–60)
LYMPHOCYTES # BLD: 7 % (ref 24–44)
MCH RBC QN AUTO: 28.4 PG (ref 26–34)
MCHC RBC AUTO-ENTMCNC: 33.7 G/DL (ref 31–37)
MCV RBC AUTO: 84.3 FL (ref 80–100)
MONOCYTES # BLD: 6 % (ref 1–7)
NRBC AUTOMATED: ABNORMAL PER 100 WBC
PDW BLD-RTO: 18.7 % (ref 11.5–14.5)
PLATELET # BLD: 164 K/UL (ref 130–400)
PLATELET ESTIMATE: ABNORMAL
PMV BLD AUTO: 7.8 FL (ref 6–12)
POTASSIUM SERPL-SCNC: 4.3 MMOL/L (ref 3.7–5.3)
PRO-BNP: 51 PG/ML
RBC # BLD: 5.54 M/UL (ref 4.5–5.9)
RBC # BLD: ABNORMAL 10*6/UL
SEG NEUTROPHILS: 84 % (ref 36–66)
SEGMENTED NEUTROPHILS ABSOLUTE COUNT: 8.7 K/UL (ref 1.8–7.7)
SODIUM BLD-SCNC: 138 MMOL/L (ref 135–144)
TOTAL PROTEIN: 8.4 G/DL (ref 6.4–8.3)
WBC # BLD: 10.3 K/UL (ref 3.5–11)
WBC # BLD: ABNORMAL 10*3/UL

## 2019-04-05 PROCEDURE — 80048 BASIC METABOLIC PNL TOTAL CA: CPT

## 2019-04-05 PROCEDURE — APPSS60 APP SPLIT SHARED TIME 46-60 MINUTES: Performed by: NURSE PRACTITIONER

## 2019-04-05 PROCEDURE — 2500000003 HC RX 250 WO HCPCS: Performed by: SURGERY

## 2019-04-05 PROCEDURE — 99223 1ST HOSP IP/OBS HIGH 75: CPT | Performed by: INTERNAL MEDICINE

## 2019-04-05 PROCEDURE — 6360000004 HC RX CONTRAST MEDICATION: Performed by: EMERGENCY MEDICINE

## 2019-04-05 PROCEDURE — 99285 EMERGENCY DEPT VISIT HI MDM: CPT

## 2019-04-05 PROCEDURE — 2500000003 HC RX 250 WO HCPCS: Performed by: NURSE PRACTITIONER

## 2019-04-05 PROCEDURE — 96376 TX/PRO/DX INJ SAME DRUG ADON: CPT

## 2019-04-05 PROCEDURE — 6360000002 HC RX W HCPCS: Performed by: INTERNAL MEDICINE

## 2019-04-05 PROCEDURE — C9113 INJ PANTOPRAZOLE SODIUM, VIA: HCPCS | Performed by: EMERGENCY MEDICINE

## 2019-04-05 PROCEDURE — 6360000002 HC RX W HCPCS: Performed by: EMERGENCY MEDICINE

## 2019-04-05 PROCEDURE — 83605 ASSAY OF LACTIC ACID: CPT

## 2019-04-05 PROCEDURE — 2580000003 HC RX 258: Performed by: NURSE PRACTITIONER

## 2019-04-05 PROCEDURE — 83880 ASSAY OF NATRIURETIC PEPTIDE: CPT

## 2019-04-05 PROCEDURE — 6360000002 HC RX W HCPCS: Performed by: NURSE PRACTITIONER

## 2019-04-05 PROCEDURE — 85025 COMPLETE CBC W/AUTO DIFF WBC: CPT

## 2019-04-05 PROCEDURE — 82947 ASSAY GLUCOSE BLOOD QUANT: CPT

## 2019-04-05 PROCEDURE — 6370000000 HC RX 637 (ALT 250 FOR IP): Performed by: NURSE PRACTITIONER

## 2019-04-05 PROCEDURE — 2580000003 HC RX 258: Performed by: EMERGENCY MEDICINE

## 2019-04-05 PROCEDURE — 1200000000 HC SEMI PRIVATE

## 2019-04-05 PROCEDURE — 82150 ASSAY OF AMYLASE: CPT

## 2019-04-05 PROCEDURE — 96375 TX/PRO/DX INJ NEW DRUG ADDON: CPT

## 2019-04-05 PROCEDURE — 74019 RADEX ABDOMEN 2 VIEWS: CPT

## 2019-04-05 PROCEDURE — 6360000004 HC RX CONTRAST MEDICATION: Performed by: SURGERY

## 2019-04-05 PROCEDURE — 80076 HEPATIC FUNCTION PANEL: CPT

## 2019-04-05 PROCEDURE — 83690 ASSAY OF LIPASE: CPT

## 2019-04-05 PROCEDURE — 96374 THER/PROPH/DIAG INJ IV PUSH: CPT

## 2019-04-05 PROCEDURE — 74177 CT ABD & PELVIS W/CONTRAST: CPT

## 2019-04-05 PROCEDURE — 6370000000 HC RX 637 (ALT 250 FOR IP): Performed by: INTERNAL MEDICINE

## 2019-04-05 PROCEDURE — 74250 X-RAY XM SM INT 1CNTRST STD: CPT

## 2019-04-05 RX ORDER — DEXTROSE MONOHYDRATE 50 MG/ML
100 INJECTION, SOLUTION INTRAVENOUS PRN
Status: DISCONTINUED | OUTPATIENT
Start: 2019-04-05 | End: 2019-04-10 | Stop reason: HOSPADM

## 2019-04-05 RX ORDER — 0.9 % SODIUM CHLORIDE 0.9 %
1000 INTRAVENOUS SOLUTION INTRAVENOUS ONCE
Status: COMPLETED | OUTPATIENT
Start: 2019-04-05 | End: 2019-04-05

## 2019-04-05 RX ORDER — PANTOPRAZOLE SODIUM 40 MG/10ML
40 INJECTION, POWDER, LYOPHILIZED, FOR SOLUTION INTRAVENOUS ONCE
Status: COMPLETED | OUTPATIENT
Start: 2019-04-05 | End: 2019-04-05

## 2019-04-05 RX ORDER — ONDANSETRON 4 MG/1
4 TABLET, ORALLY DISINTEGRATING ORAL EVERY 6 HOURS PRN
Status: DISCONTINUED | OUTPATIENT
Start: 2019-04-05 | End: 2019-04-10 | Stop reason: HOSPADM

## 2019-04-05 RX ORDER — SODIUM CHLORIDE 0.9 % (FLUSH) 0.9 %
10 SYRINGE (ML) INJECTION PRN
Status: DISCONTINUED | OUTPATIENT
Start: 2019-04-05 | End: 2019-04-10 | Stop reason: HOSPADM

## 2019-04-05 RX ORDER — MORPHINE SULFATE 4 MG/ML
4 INJECTION, SOLUTION INTRAMUSCULAR; INTRAVENOUS
Status: DISCONTINUED | OUTPATIENT
Start: 2019-04-05 | End: 2019-04-05

## 2019-04-05 RX ORDER — HYDROMORPHONE HYDROCHLORIDE 1 MG/ML
1 INJECTION, SOLUTION INTRAMUSCULAR; INTRAVENOUS; SUBCUTANEOUS ONCE
Status: COMPLETED | OUTPATIENT
Start: 2019-04-05 | End: 2019-04-05

## 2019-04-05 RX ORDER — SODIUM CHLORIDE 0.9 % (FLUSH) 0.9 %
10 SYRINGE (ML) INJECTION PRN
Status: DISCONTINUED | OUTPATIENT
Start: 2019-04-05 | End: 2019-04-05 | Stop reason: SDUPTHER

## 2019-04-05 RX ORDER — ONDANSETRON 2 MG/ML
4 INJECTION INTRAMUSCULAR; INTRAVENOUS ONCE
Status: COMPLETED | OUTPATIENT
Start: 2019-04-05 | End: 2019-04-05

## 2019-04-05 RX ORDER — HYDRALAZINE HYDROCHLORIDE 20 MG/ML
10 INJECTION INTRAMUSCULAR; INTRAVENOUS EVERY 6 HOURS PRN
Status: DISCONTINUED | OUTPATIENT
Start: 2019-04-05 | End: 2019-04-10 | Stop reason: HOSPADM

## 2019-04-05 RX ORDER — ACETAMINOPHEN 325 MG/1
650 TABLET ORAL EVERY 4 HOURS PRN
Status: DISCONTINUED | OUTPATIENT
Start: 2019-04-05 | End: 2019-04-10 | Stop reason: HOSPADM

## 2019-04-05 RX ORDER — DEXTROSE, SODIUM CHLORIDE, AND POTASSIUM CHLORIDE 5; .45; .15 G/100ML; G/100ML; G/100ML
INJECTION INTRAVENOUS CONTINUOUS
Status: DISCONTINUED | OUTPATIENT
Start: 2019-04-05 | End: 2019-04-09

## 2019-04-05 RX ORDER — POTASSIUM CHLORIDE 7.45 MG/ML
10 INJECTION INTRAVENOUS PRN
Status: DISCONTINUED | OUTPATIENT
Start: 2019-04-05 | End: 2019-04-10 | Stop reason: HOSPADM

## 2019-04-05 RX ORDER — METOPROLOL TARTRATE 5 MG/5ML
2.5 INJECTION INTRAVENOUS EVERY 6 HOURS
Status: DISCONTINUED | OUTPATIENT
Start: 2019-04-05 | End: 2019-04-07

## 2019-04-05 RX ORDER — 0.9 % SODIUM CHLORIDE 0.9 %
10 VIAL (ML) INJECTION ONCE
Status: COMPLETED | OUTPATIENT
Start: 2019-04-05 | End: 2019-04-05

## 2019-04-05 RX ORDER — HYDROMORPHONE HYDROCHLORIDE 1 MG/ML
1 INJECTION, SOLUTION INTRAMUSCULAR; INTRAVENOUS; SUBCUTANEOUS EVERY 4 HOURS PRN
Status: DISCONTINUED | OUTPATIENT
Start: 2019-04-05 | End: 2019-04-10 | Stop reason: HOSPADM

## 2019-04-05 RX ORDER — DEXTROSE MONOHYDRATE 25 G/50ML
12.5 INJECTION, SOLUTION INTRAVENOUS PRN
Status: DISCONTINUED | OUTPATIENT
Start: 2019-04-05 | End: 2019-04-10 | Stop reason: HOSPADM

## 2019-04-05 RX ORDER — 0.9 % SODIUM CHLORIDE 0.9 %
80 INTRAVENOUS SOLUTION INTRAVENOUS ONCE
Status: COMPLETED | OUTPATIENT
Start: 2019-04-05 | End: 2019-04-05

## 2019-04-05 RX ORDER — SODIUM CHLORIDE 0.9 % (FLUSH) 0.9 %
10 SYRINGE (ML) INJECTION EVERY 12 HOURS SCHEDULED
Status: DISCONTINUED | OUTPATIENT
Start: 2019-04-05 | End: 2019-04-10 | Stop reason: HOSPADM

## 2019-04-05 RX ORDER — NICOTINE 21 MG/24HR
1 PATCH, TRANSDERMAL 24 HOURS TRANSDERMAL DAILY PRN
Status: DISCONTINUED | OUTPATIENT
Start: 2019-04-05 | End: 2019-04-05

## 2019-04-05 RX ORDER — MAGNESIUM SULFATE 1 G/100ML
1 INJECTION INTRAVENOUS PRN
Status: DISCONTINUED | OUTPATIENT
Start: 2019-04-05 | End: 2019-04-10 | Stop reason: HOSPADM

## 2019-04-05 RX ORDER — MORPHINE SULFATE 2 MG/ML
2 INJECTION, SOLUTION INTRAMUSCULAR; INTRAVENOUS
Status: DISCONTINUED | OUTPATIENT
Start: 2019-04-05 | End: 2019-04-05

## 2019-04-05 RX ORDER — NICOTINE POLACRILEX 4 MG
15 LOZENGE BUCCAL PRN
Status: DISCONTINUED | OUTPATIENT
Start: 2019-04-05 | End: 2019-04-10 | Stop reason: HOSPADM

## 2019-04-05 RX ORDER — POTASSIUM CHLORIDE 20 MEQ/1
40 TABLET, EXTENDED RELEASE ORAL PRN
Status: DISCONTINUED | OUTPATIENT
Start: 2019-04-05 | End: 2019-04-10 | Stop reason: HOSPADM

## 2019-04-05 RX ORDER — ONDANSETRON 2 MG/ML
4 INJECTION INTRAMUSCULAR; INTRAVENOUS EVERY 6 HOURS PRN
Status: DISCONTINUED | OUTPATIENT
Start: 2019-04-05 | End: 2019-04-05 | Stop reason: SDUPTHER

## 2019-04-05 RX ORDER — ONDANSETRON 2 MG/ML
4 INJECTION INTRAMUSCULAR; INTRAVENOUS EVERY 6 HOURS PRN
Status: DISCONTINUED | OUTPATIENT
Start: 2019-04-05 | End: 2019-04-10 | Stop reason: HOSPADM

## 2019-04-05 RX ADMIN — PANTOPRAZOLE SODIUM 40 MG: 40 INJECTION, POWDER, FOR SOLUTION INTRAVENOUS at 01:24

## 2019-04-05 RX ADMIN — HYDROMORPHONE HYDROCHLORIDE 1 MG: 1 INJECTION, SOLUTION INTRAMUSCULAR; INTRAVENOUS; SUBCUTANEOUS at 18:48

## 2019-04-05 RX ADMIN — POTASSIUM CHLORIDE, DEXTROSE MONOHYDRATE AND SODIUM CHLORIDE: 150; 5; 450 INJECTION, SOLUTION INTRAVENOUS at 14:29

## 2019-04-05 RX ADMIN — POTASSIUM CHLORIDE, DEXTROSE MONOHYDRATE AND SODIUM CHLORIDE: 150; 5; 450 INJECTION, SOLUTION INTRAVENOUS at 04:27

## 2019-04-05 RX ADMIN — Medication 10 ML: at 01:06

## 2019-04-05 RX ADMIN — FAMOTIDINE 20 MG: 10 INJECTION, SOLUTION INTRAVENOUS at 20:56

## 2019-04-05 RX ADMIN — HYDROMORPHONE HYDROCHLORIDE 1 MG: 1 INJECTION, SOLUTION INTRAMUSCULAR; INTRAVENOUS; SUBCUTANEOUS at 14:29

## 2019-04-05 RX ADMIN — FAMOTIDINE 20 MG: 10 INJECTION, SOLUTION INTRAVENOUS at 09:03

## 2019-04-05 RX ADMIN — SODIUM CHLORIDE 80 ML: 9 INJECTION, SOLUTION INTRAVENOUS at 01:06

## 2019-04-05 RX ADMIN — HYDROMORPHONE HYDROCHLORIDE 1 MG: 1 INJECTION, SOLUTION INTRAMUSCULAR; INTRAVENOUS; SUBCUTANEOUS at 23:09

## 2019-04-05 RX ADMIN — ENOXAPARIN SODIUM 30 MG: 40 INJECTION SUBCUTANEOUS at 20:55

## 2019-04-05 RX ADMIN — HYDROMORPHONE HYDROCHLORIDE 1 MG: 1 INJECTION, SOLUTION INTRAMUSCULAR; INTRAVENOUS; SUBCUTANEOUS at 02:43

## 2019-04-05 RX ADMIN — METOPROLOL TARTRATE 2.5 MG: 5 INJECTION INTRAVENOUS at 17:02

## 2019-04-05 RX ADMIN — DIATRIZOATE MEGLUMINE AND DIATRIZOATE SODIUM 355 ML: 660; 100 LIQUID ORAL; RECTAL at 13:43

## 2019-04-05 RX ADMIN — ONDANSETRON HYDROCHLORIDE 4 MG: 2 INJECTION, SOLUTION INTRAVENOUS at 01:24

## 2019-04-05 RX ADMIN — Medication 10 ML: at 01:24

## 2019-04-05 RX ADMIN — INSULIN LISPRO 2 UNITS: 100 INJECTION, SOLUTION INTRAVENOUS; SUBCUTANEOUS at 17:02

## 2019-04-05 RX ADMIN — HYDROMORPHONE HYDROCHLORIDE 1 MG: 1 INJECTION, SOLUTION INTRAMUSCULAR; INTRAVENOUS; SUBCUTANEOUS at 06:21

## 2019-04-05 RX ADMIN — INSULIN LISPRO 1 UNITS: 100 INJECTION, SOLUTION INTRAVENOUS; SUBCUTANEOUS at 12:36

## 2019-04-05 RX ADMIN — METOPROLOL TARTRATE 2.5 MG: 5 INJECTION INTRAVENOUS at 21:16

## 2019-04-05 RX ADMIN — Medication 10 ML: at 20:56

## 2019-04-05 RX ADMIN — METOPROLOL TARTRATE 2.5 MG: 5 INJECTION INTRAVENOUS at 10:49

## 2019-04-05 RX ADMIN — ENOXAPARIN SODIUM 30 MG: 40 INJECTION SUBCUTANEOUS at 09:03

## 2019-04-05 RX ADMIN — POTASSIUM CHLORIDE, DEXTROSE MONOHYDRATE AND SODIUM CHLORIDE: 150; 5; 450 INJECTION, SOLUTION INTRAVENOUS at 23:10

## 2019-04-05 RX ADMIN — HYDROMORPHONE HYDROCHLORIDE 1 MG: 1 INJECTION, SOLUTION INTRAMUSCULAR; INTRAVENOUS; SUBCUTANEOUS at 01:24

## 2019-04-05 RX ADMIN — IOPAMIDOL 75 ML: 755 INJECTION, SOLUTION INTRAVENOUS at 01:06

## 2019-04-05 RX ADMIN — MORPHINE SULFATE 4 MG: 4 INJECTION INTRAVENOUS at 04:28

## 2019-04-05 RX ADMIN — HYDROMORPHONE HYDROCHLORIDE 1 MG: 1 INJECTION, SOLUTION INTRAMUSCULAR; INTRAVENOUS; SUBCUTANEOUS at 10:26

## 2019-04-05 RX ADMIN — ONDANSETRON 4 MG: 2 INJECTION INTRAMUSCULAR; INTRAVENOUS at 02:43

## 2019-04-05 RX ADMIN — SODIUM CHLORIDE 1000 ML: 9 INJECTION, SOLUTION INTRAVENOUS at 01:24

## 2019-04-05 ASSESSMENT — PAIN DESCRIPTION - ONSET
ONSET: ON-GOING

## 2019-04-05 ASSESSMENT — PAIN DESCRIPTION - LOCATION
LOCATION: ABDOMEN

## 2019-04-05 ASSESSMENT — ENCOUNTER SYMPTOMS
WHEEZING: 0
BACK PAIN: 0
SHORTNESS OF BREATH: 0
COUGH: 0
VOICE CHANGE: 0
STRIDOR: 0

## 2019-04-05 ASSESSMENT — PAIN - FUNCTIONAL ASSESSMENT
PAIN_FUNCTIONAL_ASSESSMENT: ACTIVITIES ARE NOT PREVENTED

## 2019-04-05 ASSESSMENT — PAIN DESCRIPTION - FREQUENCY
FREQUENCY: CONTINUOUS

## 2019-04-05 ASSESSMENT — PAIN SCALES - GENERAL
PAINLEVEL_OUTOF10: 8
PAINLEVEL_OUTOF10: 8
PAINLEVEL_OUTOF10: 7
PAINLEVEL_OUTOF10: 9
PAINLEVEL_OUTOF10: 10
PAINLEVEL_OUTOF10: 9
PAINLEVEL_OUTOF10: 7
PAINLEVEL_OUTOF10: 8
PAINLEVEL_OUTOF10: 7
PAINLEVEL_OUTOF10: 8
PAINLEVEL_OUTOF10: 7
PAINLEVEL_OUTOF10: 8

## 2019-04-05 ASSESSMENT — PAIN DESCRIPTION - ORIENTATION
ORIENTATION: LEFT
ORIENTATION: LEFT;LOWER;UPPER
ORIENTATION: LEFT
ORIENTATION: LEFT;LOWER;UPPER
ORIENTATION: LEFT

## 2019-04-05 ASSESSMENT — PAIN DESCRIPTION - PROGRESSION
CLINICAL_PROGRESSION: NOT CHANGED
CLINICAL_PROGRESSION: NOT CHANGED
CLINICAL_PROGRESSION: GRADUALLY IMPROVING
CLINICAL_PROGRESSION: NOT CHANGED
CLINICAL_PROGRESSION: NOT CHANGED

## 2019-04-05 ASSESSMENT — PAIN DESCRIPTION - DESCRIPTORS
DESCRIPTORS: ACHING
DESCRIPTORS: DISCOMFORT
DESCRIPTORS: DISCOMFORT
DESCRIPTORS: ACHING;CRAMPING
DESCRIPTORS: ACHING;CRAMPING
DESCRIPTORS: DISCOMFORT

## 2019-04-05 ASSESSMENT — PAIN DESCRIPTION - PAIN TYPE
TYPE: ACUTE PAIN

## 2019-04-05 NOTE — ED PROVIDER NOTES
Diagnosis Date    ADHD, predominantly hyperactive type     Anemia     Blood clot due to device, implant, or graft     IV site L forearm    Bowel obstruction (HCC)     CAD (coronary artery disease)     Cardiomyopathy ([address]     Diabetes mellitus ([address]     Diverticulitis 1996    GERD (gastroesophageal reflux disease)     History of atrial fibrillation     History of ventricular tachycardia     Kidney calculi     Obesity     Sleep apnea     uses CPAP nightly       SURGICAL HISTORY           Procedure Laterality Date    ABDOMEN SURGERY  3353 & 6616    umbillical hernia with mesh and lysis of adhesions    ABDOMINAL HERNIA REPAIR  7/7/2014    RECURRENT HERNIA REPAIR WITH MESH    APPENDECTOMY      CARDIAC DEFIBRILLATOR PLACEMENT      CHOLECYSTECTOMY      COLECTOMY  1996    d/t ruptured diverticuli    CYSTOSCOPY N/A     stent in place taped to penis head    CYSTOSCOPY  3/4/16    with bilateral pyelogram    HERNIA REPAIR      umbilical    LITHOTRIPSY  3/4/16    laser lithotripsy with ureteral dilation    PACEMAKER PLACEMENT      pacemaker/defib         FAMILY HISTORY           Problem Relation Age of Onset    Heart Disease Mother     Heart Disease Father      Family Status   Relation Name Status    Mother  Alive    Father  Alive        SOCIAL HISTORY      reports that he quit smoking about 7 years ago. He has a 2.00 pack-year smoking history. He has never used smokeless tobacco. He reports that he drinks alcohol. He reports that he does not use drugs. REVIEW OF SYSTEMS    (2-9 systems for level 4, 10 or more for level 5)     Review of Systems   All other systems reviewed and are negative. Except as noted above the remainder of the review of systems was reviewed and negative.      PHYSICAL EXAM    (up to 7 for level 4, 8 or more for level 5)     Vitals:    04/04/19 2344   BP: 136/74   Pulse: 82   Resp: 16   Temp: 98.1 °F (36.7 °C)   TempSrc: Oral   SpO2: 99%   Weight: 270 lb (122.5 kg) acute abnormality.       Organs: Diffuse hepatic steatosis.  Cholecystectomy.  No focal liver lesion. Splenomegaly.  No focal splenic lesion.  Enhanced attenuation pattern of the   pancreas and adrenal glands is unremarkable.  The kidneys appear normal in   size and demonstrate symmetric enhancement.  No focal renal mass.  No   hydronephrosis. No perinephric stranding.  3 cm simple right kidney exophytic   cyst.       GI/Bowel: Again seen are multiple dilated loops of proximal and mid small   bowel, up to the 4.5 cm in diameter with transition point in the mid abdomen   where there is a stenotic loop of proximal ileum.  There are bowel contents   distal to this point and a small amount of bowel contents in the proximal   large bowel which is not distended.  Again seen are intact appearing   anastomotic sutures in the sigmoid colon.       Pelvis: The urinary bladder appears unremarkable.  The pelvic organs   demonstrate no acute abnormality.       Peritoneum/Retroperitoneum: The abdominal aorta is normal in caliber.  SMA is   patent.  No fluid collection.  No free air.  No gross lymphadenopathy.       Bones/Soft Tissues: No acute findings.           Impression   Partial small bowel obstruction as result of stenotic loop of proximal ileum.                 Interpretation per the Radiologist below, if available at the time of this note:    CT ABDOMEN PELVIS W IV CONTRAST   Final Result   Partial small bowel obstruction as result of stenotic loop of proximal ileum.                  LABS:  Labs Reviewed   BASIC METABOLIC PANEL - Abnormal; Notable for the following components:       Result Value    Glucose 150 (*)     All other components within normal limits   CBC WITH AUTO DIFFERENTIAL - Abnormal; Notable for the following components:    RDW 18.7 (*)     Seg Neutrophils 84 (*)     Lymphocytes 7 (*)     Segs Absolute 8.70 (*)     Absolute Lymph # 0.80 (*)     All other components within normal limits   HEPATIC FUNCTION PANEL - Abnormal; Notable for the following components: Total Bilirubin 1.69 (*)     Bilirubin, Direct 0.34 (*)     Bilirubin, Indirect 1.35 (*)     Total Protein 8.4 (*)     All other components within normal limits   AMYLASE   LIPASE   LACTIC ACID     Results for Yanni Victor (MRN [id number]) as of 4/5/2019 01:27   Ref.  Range 4/5/2019 00:52 4/5/2019 01:26   Sodium Latest Ref Range: 135 - 144 mmol/L 138    Potassium Latest Ref Range: 3.7 - 5.3 mmol/L 4.3    Chloride Latest Ref Range: 98 - 107 mmol/L 101    CO2 Latest Ref Range: 20 - 31 mmol/L 23    BUN Latest Ref Range: 6 - 20 mg/dL 14    Creatinine Latest Ref Range: 0.70 - 1.20 mg/dL 0.97    Bun/Cre Ratio Latest Ref Range: 9 - 20  14    Anion Gap Latest Ref Range: 9 - 17 mmol/L 14    GFR Non- Latest Ref Range: >60 mL/min >60    GFR African American Latest Ref Range: >60 mL/min >60    Lactic Acid Latest Ref Range: 0.5 - 2.2 mmol/L 1.1    Glucose Latest Ref Range: 70 - 99 mg/dL 150 (H)    Calcium Latest Ref Range: 8.6 - 10.4 mg/dL 9.6    Albumin/Globulin Ratio Latest Ref Range: 1.0 - 2.5  NOT REPORTED    Total Protein Latest Ref Range: 6.4 - 8.3 g/dL 8.4 (H)    GFR Comment Unknown Pend    GFR Staging Unknown NOT REPORTED    Albumin Latest Ref Range: 3.5 - 5.2 g/dL 5.0    Globulin Latest Ref Range: 1.5 - 3.8 g/dL NOT REPORTED    Alk Phos Latest Ref Range: 40 - 129 U/L 80    ALT Latest Ref Range: 5 - 41 U/L 31    Amylase Latest Ref Range: 28 - 100 U/L 38    AST Latest Ref Range: <40 U/L 23    Bilirubin Latest Ref Range: 0.3 - 1.2 mg/dL 1.69 (H)    Bilirubin, Direct Latest Ref Range: <0.31 mg/dL 0.34 (H)    Bilirubin, Indirect Latest Ref Range: 0.00 - 1.00 mg/dL 1.35 (H)    Lipase Latest Ref Range: 13 - 60 U/L 27    WBC Latest Ref Range: 3.5 - 11.0 k/uL 10.3    RBC Latest Ref Range: 4.5 - 5.9 m/uL 5.54    Hemoglobin Quant Latest Ref Range: 13.5 - 17.5 g/dL 15.7    Hematocrit Latest Ref Range: 41 - 53 % 46.7    MCV Latest Ref Range: 80 - 100 fL 84.3 MCH Latest Ref Range: 26 - 34 pg 28.4    MCHC Latest Ref Range: 31 - 37 g/dL 33.7    MPV Latest Ref Range: 6.0 - 12.0 fL 7.8    RDW Latest Ref Range: 11.5 - 14.5 % 18.7 (H)    Platelet Count Latest Ref Range: 130 - 400 k/uL 164    Platelet Estimate Unknown NOT REPORTED    Absolute Mono # Latest Ref Range: 0.2 - 0.8 k/uL 0.60    Eosinophils % Latest Ref Range: 1 - 4 % 2    Basophils # Latest Ref Range: 0.0 - 0.2 k/uL 0.10    Differential Type Unknown NOT REPORTED    Seg Neutrophils Latest Ref Range: 36 - 66 % 84 (H)    Segs Absolute Latest Ref Range: 1.8 - 7.7 k/uL 8.70 (H)    Lymphocytes Latest Ref Range: 24 - 44 % 7 (L)    Absolute Lymph # Latest Ref Range: 1.0 - 4.8 k/uL 0.80 (L)    Monocytes Latest Ref Range: 1 - 7 % 6    Absolute Eos # Latest Ref Range: 0.0 - 0.4 k/uL 0.20    Basophils Latest Ref Range: 0 - 2 % 1    Immature Granulocytes Latest Ref Range: 0 % NOT REPORTED    WBC Morphology Unknown NOT REPORTED      All other labs were within normal range or not returned as of this dictation. EMERGENCY DEPARTMENT COURSE and DIFFERENTIAL DIAGNOSIS/MDM:   Vitals:    Vitals:    04/04/19 2344   BP: 136/74   Pulse: 82   Resp: 16   Temp: 98.1 °F (36.7 °C)   TempSrc: Oral   SpO2: 99%   Weight: 270 lb (122.5 kg)   Height: 6' 1\" (1.854 m)     Patient is evaluated. Laboratory and imaging studies are reviewed. Patient does show evidence of partial small bowel obstruction. He has had continued nausea vomiting. NG tube was placed. He is followed with Dr. Dejan Hyatt in the past.  Dr. Dejan Hyatt is paged with regard to disposition. Patient will be admitted for further care    CONSULTS:  IP CONSULT TO GENERAL SURGERY    PROCEDURES:  None    FINAL IMPRESSION      1. [facility] obstruction) ([facility]          DISPOSITION/PLAN   DISPOSITION Decision To Admit 04/05/2019 01:43:07 AM      PATIENT REFERRED TO:   No follow-up provider specified.     DISCHARGE MEDICATIONS:     New Prescriptions    No medications on file Controlled Substances Monitoring:     RX Monitoring 4/5/2019   Attestation The Prescription Monitoring Report for this patient was reviewed today.    Chronic Pain Routine Monitoring No signs of potential drug abuse or diversion identified: otherwise, see note documentation         (Please note that portions of this note were completed with a voice recognition program.  Efforts were made to edit the dictations but occasionally words are mis-transcribed.)    Yadira Lopez MD  Attending Emergency Physician          Yadira Lopez MD  04/05/19 [id number]

## 2019-04-05 NOTE — CONSULTS
General Surgery Consult        Name: Sol Brody  MRN: [id number]     [place]: [de-identified]  [de-identified]  Admit Date: 4/5/2019    PCP: Hector Ingram MD    CHIEF COMPLAINT:    Chief Complaint   Patient presents with    Abdominal Pain     h/o bowel obst       History Obtained From:  patient, electronic medical record    HISTORY OF PRESENT ILLNESS:    Sol Brody is a 48 y.o.  male who presents with N&V and findings of high grade partial SBO by CT scan. He is well known to me from a long history of similar problems going back to 2011. He has had several admissions for this problem but this is now his fourth admission since September 2018. Prior to that it had been close to two years since an episode of SBO requiring admission. He is well known to me as I followed him between 2011 and 2014 for a recurrent incisional hernia.  This hernia was finally repaired in 2014 with a large retrorectus mesh repair. It was thought this may alleviate the problem He has had no recurrent hernias since. [facility] he continues to have problems with partial small bowel obstructions and had several admissions in 2014 and 2015 for that.  These episodes have become less frequent over the years until the past 6 months. Leonel Menchaca was seen in late 2016 and then did not have another problem until last year when he was admitted in February 2018 and late September 2018 for similar problems. He was then admitted twice in January 2019 within a short interval.  His most recent episode responded to NG drainage. That episode was associated with hyperbilirubinemia which resolved as the SBO resolved. A similar phenomenon was observed in September 2018. He has just very mild hyperbilirubinemia currently. His problems with frequent bowel dysfunction all started after colon resection for diverticular disease in 1996 are believed to be adhesion related. Often some dietary indiscretion has been linked to precipitation of an episode. He does have significant co-morbidity, primarily cardiac, but no new cardiac problems or symptoms and BNP is currently WNL. His current episode started two days ago. He states no BM during that time, vomited several times yesterday. Currently he feels better with NG in place. No flatus so far. Abdomen still distended, no pain complaints. Past Medical History:    Past Medical History:   Diagnosis Date    ADHD, predominantly hyperactive type     Anemia     Blood clot due to device, implant, or graft     IV site L forearm    Bowel obstruction (HCC)     CAD (coronary artery disease)     Cardiomyopathy ([address]     Diabetes mellitus ([facility]     Diverticulitis 1996    GERD (gastroesophageal reflux disease)     History of atrial fibrillation     History of ventricular tachycardia     Kidney calculi     Obesity     Sleep apnea     uses CPAP nightly        Past Surgical History:    Past Surgical History:   Procedure Laterality Date    ABDOMEN SURGERY  2480 & 7808    umbillical hernia with mesh and lysis of adhesions    ABDOMINAL HERNIA REPAIR  7/7/2014    RECURRENT HERNIA REPAIR WITH MESH    APPENDECTOMY      CARDIAC DEFIBRILLATOR PLACEMENT      CHOLECYSTECTOMY      COLECTOMY  1996    d/t ruptured diverticuli    CYSTOSCOPY N/A     stent in place taped to penis head    CYSTOSCOPY  3/4/16    with bilateral pyelogram    HERNIA REPAIR      umbilical    LITHOTRIPSY  3/4/16    laser lithotripsy with ureteral dilation    PACEMAKER PLACEMENT      pacemaker/defib        Medications Prior to Admission:     Prior to Admission medications    Medication Sig Start Date End Date Taking?  Authorizing Provider   colchicine (COLCRYS) 0.6 MG tablet Take 0.6 mg by mouth 2 times daily as needed (gout flare)   Yes Historical Provider, MD   apixaban (ELIQUIS) 5 MG TABS tablet Take 5 mg by mouth 2 times daily  6/21/18  Yes Historical Provider, MD   pravastatin (PRAVACHOL) 10 MG tablet Take 10 mg by mouth daily  9/6/18 Yes Historical Provider, MD   allopurinol (ZYLOPRIM) 300 MG tablet Take 300 mg by mouth daily 1/12/18  Yes Historical Provider, MD   metFORMIN (GLUCOPHAGE) 1000 MG tablet Take 1,000 mg by mouth daily   Yes Historical Provider, MD   Dulaglutide (TRULICITY SC) Inject 2.77 mg into the skin once a week Mondays   Yes Historical Provider, MD   tamsulosin (FLOMAX) 0.4 MG capsule Take 1 capsule by mouth daily 3/4/16  Yes Korey Brewer MD   dapagliflozin (FARXIGA) 10 MG tablet Take 10 mg by mouth every morning   Yes Historical Provider, MD   metoprolol (LOPRESSOR) 25 MG tablet Take 25 mg by mouth 2 times daily. Yes Historical Provider, MD   sitaGLIPtan (JANUVIA) 100 MG tablet Take 100 mg by mouth daily. Yes Historical Provider, MD   omeprazole (PRILOSEC) 20 MG capsule Take 20 mg by mouth daily. Yes Historical Provider, MD   aspirin 81 MG tablet Take 81 mg by mouth daily. Yes Historical Provider, MD   sotalol (BETAPACE) 160 MG tablet Take 160 mg by mouth 2 times daily    Yes Historical Provider, MD   lisinopril (PRINIVIL;ZESTRIL) 10 MG tablet Take 10 mg by mouth daily. Yes Historical Provider, MD        Allergies:  Benadryl [diphenhydramine]; Ciprofloxacin; and Pcn [penicillins]    Social History:   Tobacco:    reports that he quit smoking about 7 years ago. He has a 2.00 pack-year smoking history. He has never used smokeless tobacco.  Alcohol:      reports that he drinks alcohol. Drug Use:  reports that he does not use drugs. Family History:   Family History   Problem Relation Age of Onset    Heart Disease Mother     Heart Disease Father        REVIEW OF SYSTEMS:  Review of Systems   Constitutional: Negative for diaphoresis, fever and unexpected weight change. HENT: Negative for congestion and voice change. Eyes: Negative for visual disturbance. Or blurry vision   Respiratory: Negative for cough, shortness of breath, wheezing and stridor.          Or sputum production   Cardiovascular: Negative for chest pain, palpitations and leg swelling. Gastrointestinal:        See [place]. Denies symptoms since last admission until currently. Endocrine: Negative for cold intolerance, heat intolerance, polydipsia and polyuria. Genitourinary: Negative for dysuria, frequency, hematuria and urgency. Musculoskeletal: Negative for arthralgias, back pain, joint swelling and neck stiffness. Skin: Negative for rash. Hematological: Does not bruise/bleed easily. Code Status:  Full Code    PHYSICAL EXAM:  Vitals:  /66   Pulse 76   Temp 97.7 °F (36.5 °C) (Oral)   Resp 16   Ht 6' 1\" (1.854 m)   Wt 270 lb (122.5 kg)   SpO2 97%   BMI 35.62 kg/m²   Temp (24hrs), Av.1 °F (36.7 °C), Min:97.7 °F (36.5 °C), Max:98.5 °F (36.9 °C)    Physical Exam   Constitutional: He is oriented to person, place, and time. He appears well-developed and well-nourished. No distress. HENT:   Head: Normocephalic and atraumatic. Mouth/Throat: Oropharynx is clear and moist.   Eyes: Pupils are equal, round, and reactive to light. Conjunctivae are normal. No scleral icterus. Neck: No JVD present. No tracheal deviation present. No thyromegaly present. Cardiovascular: Normal rate, regular rhythm and normal heart sounds. No murmur heard. Pulmonary/Chest: Breath sounds normal. No respiratory distress. He has no wheezes. He has no rales. Abdominal: Soft. He exhibits distension. He exhibits no mass. Bowel sounds are increased. There is no tenderness. No hernia. Broad midline scare without hernia. Generalized distention    Musculoskeletal: He exhibits no edema or deformity. Lymphadenopathy:     He has no cervical adenopathy. Neurological: He is alert and oriented to person, place, and time. Skin: Skin is warm and dry. No rash noted. Psychiatric: He has a normal mood and affect. Vitals reviewed.       DATA:    Hematology:  Recent Labs     19  0052   WBC 10.3   RBC 5.54   HGB 15.7   HCT 46.7   MCV 84.3 MCH 28.4   MCHC 33.7   RDW 18.7*      MPV 7.8     Chemistry:  Recent Labs     04/05/19 0052      K 4.3      CO2 23   GLUCOSE 150*   BUN 14   CREATININE 0.97   ANIONGAP 14   LABGLOM >60   GFRAA >60   CALCIUM 9.6     Recent Labs     04/05/19  0052 04/05/19  0400 04/05/19  0645   PROT 8.4*  --   --    LABALBU 5.0  --   --    AST 23  --   --    ALT 31  --   --    ALKPHOS 80  --   --    BILITOT 1.69*  --   --    BILIDIR 0.34*  --   --    AMYLASE 38  --   --    LIPASE 27  --   --    POCGLU  --  131* 135*     IMAGING:    EXAMINATION:   CT OF THE ABDOMEN AND PELVIS WITH CONTRAST 4/5/2019 12:51 am       TECHNIQUE:   CT of the abdomen and pelvis was performed with the administration of   intravenous contrast. Multiplanar reformatted images are provided for review. Dose modulation, iterative reconstruction, and/or weight based adjustment of   the mA/kV was utilized to reduce the radiation dose to as low as reasonably   achievable.       COMPARISON:   01/16/2019       HISTORY:   ORDERING SYSTEM PROVIDED HISTORY: ABDOMINAL PAIN   TECHNOLOGIST PROVIDED HISTORY:   IV Only Contrast       FINDINGS:   Lower Chest: ICD in place Visualized portion of the lower chest demonstrates   no acute abnormality.       Organs: Diffuse hepatic steatosis.  Cholecystectomy.  No focal liver lesion. Splenomegaly.  No focal splenic lesion.  Enhanced attenuation pattern of the   pancreas and adrenal glands is unremarkable.  The kidneys appear normal in   size and demonstrate symmetric enhancement.  No focal renal mass.  No   hydronephrosis.  No perinephric stranding.  3 cm simple right kidney exophytic   cyst.       GI/Bowel: Again seen are multiple dilated loops of proximal and mid small   bowel, up to the 4.5 cm in diameter with transition point in the mid abdomen   where there is a stenotic loop of proximal ileum.  There are bowel contents   distal to this point and a small amount of bowel contents in the proximal   large bowel which is not distended.  Again seen are intact appearing   anastomotic sutures in the sigmoid colon.       Pelvis: The urinary bladder appears unremarkable.  The pelvic organs   demonstrate no acute abnormality.       Peritoneum/Retroperitoneum: The abdominal aorta is normal in caliber.  SMA is   patent.  No fluid collection.  No free air.  No gross lymphadenopathy.       Bones/Soft Tissues: No acute findings.           Impression   Partial small bowel obstruction as result of stenotic loop of proximal ileum.           ASSESSMENT:   He has another episode of high grade partial obstruction. Until September of 2018 these were occurring at fairly long intervals. Since then his episodes have increased in frequency. Between Dec. 2018 and mid January 2019 he had three admissions. At that time it was discussed with him that if he continued with the same frequency of admissions that laparotomy for adhesiolysis may be warranted. It has now been 2 1/2 months since most recent previous episode. Not quite as persistent as earlier this year but more than over the past 3 years or so. He states he is anxious to have some resolution of his recurring problems. He does understand that adhesiolysis is no guarantee against future obstructions however. It does seem reasonable to pursue further evaluation at this time with SFBT and then consider possible operative intervention depending upon the findings. I do not see definite precipitating medical problems at this time. Primary Problem  SBO (small bowel obstruction) ([facility]    Active Hospital Problems    Diagnosis Date Noted    History of small bowel obstruction [Z87.19] 04/05/2019    Blood clot due to device, implant, or graft [T85.818A]     History of atrial fibrillation [Z86.79]     [facility]BO (small bowel obstruction) ([facility] [K56.609] 01/03/2019    Essential hypertension [I10] 09/26/2018    Obesity (BMI 30-39. 9) [E66.9] 09/26/2018    Type 2 diabetes mellitus, with long-term current use of insulin ([facility] [E11.9, Z79.4] 07/20/2014    AICD (automatic cardioverter/defibrillator) present [Z95.810] 07/10/2014       PLAN:  1. Will order SBFT and then let current clinical course and findings guide therapy. It may be warranted to consider intervention in the near future although this would not appear to be ermergent. 2. Dr. Leann Whyte will be covering for me over the weekend 4/6 and 4/7. [employer]     Electronically signed by Dori Rodriguez MD on 4/5/2019 at 9:12 AM     Copy sent to Dr. Kim Miller MD

## 2019-04-05 NOTE — PLAN OF CARE
Problem: Pain:  Goal: Pain level will decrease  Description  Pain level will decrease  Outcome: Ongoing  Goal: Control of acute pain  Description  Control of acute pain  Outcome: Ongoing

## 2019-04-05 NOTE — PROGRESS NOTES
Patient admitted to room 2024  From ER via bed/stretcher with belongings and family at bedside. VSS  Patient oriented to room and call light. Assessment as charted. Admission database done. Orders and plan of care reviewed. Call light in reach. Will continue to monitor. Patient is a former smoker. Patient smoked 1 pk/daily for 2 years and quit on 2/15/12. Smoking cessation education given.

## 2019-04-05 NOTE — H&P
reflux disease)     History of atrial fibrillation     History of ventricular tachycardia     Kidney calculi     Obesity     Sleep apnea     uses CPAP nightly        Past Surgical History:     Past Surgical History:   Procedure Laterality Date    ABDOMEN SURGERY  4996 & 7968    umbillical hernia with mesh and lysis of adhesions    ABDOMINAL HERNIA REPAIR  7/7/2014    RECURRENT HERNIA REPAIR WITH MESH    APPENDECTOMY      CARDIAC DEFIBRILLATOR PLACEMENT      CHOLECYSTECTOMY      COLECTOMY  1996    d/t ruptured diverticuli    CYSTOSCOPY N/A     stent in place taped to penis head    CYSTOSCOPY  3/4/16    with bilateral pyelogram    HERNIA REPAIR      umbilical    LITHOTRIPSY  3/4/16    laser lithotripsy with ureteral dilation    PACEMAKER PLACEMENT      pacemaker/defib        Medications Prior to Admission:     Prior to Admission medications    Medication Sig Start Date End Date Taking? Authorizing Provider   colchicine (COLCRYS) 0.6 MG tablet Take 0.6 mg by mouth 2 times daily as needed (gout flare)   Yes Historical Provider, MD   apixaban (ELIQUIS) 5 MG TABS tablet Take 5 mg by mouth 2 times daily  6/21/18  Yes Historical Provider, MD   pravastatin (PRAVACHOL) 10 MG tablet Take 10 mg by mouth daily  9/6/18  Yes Historical Provider, MD   allopurinol (ZYLOPRIM) 300 MG tablet Take 300 mg by mouth daily 1/12/18  Yes Historical Provider, MD   metFORMIN (GLUCOPHAGE) 1000 MG tablet Take 1,000 mg by mouth daily   Yes Historical Provider, MD   Dulaglutide (TRULICITY SC) Inject 2.72 mg into the skin once a week Mondays   Yes Historical Provider, MD   tamsulosin (FLOMAX) 0.4 MG capsule Take 1 capsule by mouth daily 3/4/16  Yes Lana Rivera MD   dapagliflozin (FARXIGA) 10 MG tablet Take 10 mg by mouth every morning   Yes Historical Provider, MD   metoprolol (LOPRESSOR) 25 MG tablet Take 25 mg by mouth 2 times daily. Yes Historical Provider, MD   sitaGLIPtan (JANUVIA) 100 MG tablet Take 100 mg by mouth daily. Yes Historical Provider, MD   omeprazole (PRILOSEC) 20 MG capsule Take 20 mg by mouth daily. Yes Historical Provider, MD   aspirin 81 MG tablet Take 81 mg by mouth daily. Yes Historical Provider, MD   sotalol (BETAPACE) 160 MG tablet Take 160 mg by mouth 2 times daily    Yes Historical Provider, MD   lisinopril (PRINIVIL;ZESTRIL) 10 MG tablet Take 10 mg by mouth daily. Yes Historical Provider, MD        Allergies:     Benadryl [diphenhydramine]; Ciprofloxacin; and Pcn [penicillins]    Social History:     Tobacco:    reports that he quit smoking about 7 years ago. He has a 2.00 pack-year smoking history. He has never used smokeless tobacco.  Alcohol:      reports that he drinks alcohol. Drug Use:  reports that he does not use drugs. Family History:     Family History   Problem Relation Age of Onset    Heart Disease Mother     Heart Disease Father        Review of Systems:     Positive and Negative as described in HPI. CONSTITUTIONAL:  negative for fevers, chills, sweats, fatigue, weight loss  HEENT:  negative for vision, hearing changes, runny nose, throat pain  RESPIRATORY:  negative for shortness of breath, cough, congestion, wheezing. CARDIOVASCULAR:  negative for chest pain, palpitations.   GASTROINTESTINAL:  Positive of N/V, bloating and abdominal pain  GENITOURINARY:  negative for difficulty of urination, burning with urination, frequency   INTEGUMENT:  negative for rash, skin lesions, easy bruising   HEMATOLOGIC/LYMPHATIC:  negative for swelling/edema   ALLERGIC/IMMUNOLOGIC:  negative for urticaria , itching  ENDOCRINE:  negative increase in drinking, increase in urination, hot or cold intolerance  MUSCULOSKELETAL:  negative joint pains, muscle aches, swelling of joints  NEUROLOGICAL:  negative for headaches, dizziness, lightheadedness, numbness, pain, tingling extremities  BEHAVIOR/PSYCH:  negative for depression, anxiety    Physical Exam:   /81   Pulse 88   Temp 98.5 °F (36.9 °C) Value Ref Range    POC Glucose 131 (H) 75 - 110 mg/dL       Imaging/Diagnostics:    CT OF THE ABDOMEN AND PELVIS WITH CONTRAST 4/5/2019 12:51 am   FINDINGS:  Lower Chest: ICD in place Visualized portion of the lower chest demonstrates  no acute abnormality. Organs: Diffuse hepatic steatosis. Cholecystectomy. No focal liver lesion. Splenomegaly. No focal splenic lesion. Enhanced attenuation pattern of the  pancreas and adrenal glands is unremarkable. The kidneys appear normal in  size and demonstrate symmetric enhancement. No focal renal mass. No  hydronephrosis. No perinephric stranding. 3 cm simple right kidney exophytic  cyst.     GI/Bowel: Again seen are multiple dilated loops of proximal and mid small  bowel, up to the 4.5 cm in diameter with transition point in the mid abdomen  where there is a stenotic loop of proximal ileum. There are bowel contents  distal to this point and a small amount of bowel contents in the proximal  large bowel which is not distended. Again seen are intact appearing  anastomotic sutures in the sigmoid colon. Pelvis: The urinary bladder appears unremarkable. The pelvic organs  demonstrate no acute abnormality. Peritoneum/Retroperitoneum: The abdominal aorta is normal in caliber. SMA is  patent. No fluid collection. No free air. No gross lymphadenopathy. Bones/Soft Tissues: No acute findings. Partial small bowel obstruction as result of stenotic loop of proximal ileum. Assessment :      Primary Problem  [facility]    Active Hospital Problems    Diagnosis Date Noted    History of small bowel obstruction [Z87.19] 04/05/2019    Blood clot due to device, implant, or graft [T85.818A]     History of atrial fibrillation [Z86.79]     [facility]BO (small bowel obstruction) ([facility] [K56.609] 01/03/2019    Essential hypertension [I10] 09/26/2018    Obesity (BMI 30-39. 9) [E66.9] 09/26/2018    Type 2 diabetes mellitus, with long-term current use of insulin ([facility] [E11.9, Z79.4] 07/20/2014       Plan:     Patient status Admit as observation in the  Med/Surge    1. DVT prophylaxis  2. Pepcid IV twice a day  3. Insulin sliding scale for glucose control  4. D5.45 with 20 K at 125 ML's an hour  5. Dilaudid as needed for pain  6. Zofran for nausea as needed  7. Follow-up labs in a.m.  8. NPO  9. Monitor I&O and daily weight  10. Monitor telemetry  11. Monitor vitals  12. Plan discussed with the patient and nursing    Consultations:   IP CONSULT TO GENERAL SURGERY  IP CONSULT TO INTERNAL MEDICINE  IP CONSULT TO GENERAL SURGERY     Patient is admitted as inpatient status because of co-morbidities listed above, severity of signs and symptoms as outlined, requirement for current medical therapies and most importantly because of direct risk to patient if care not provided in a hospital setting. Comfort Cooper, FABIANO - CNP  4/5/2019  5:48 AM    Copy sent to Dr. Layo Valdez MD     I have seen and examined Shayla Chávez and the salazar elements of all parts of the encounter have been performed by me. I agree with the assessment, plan and orders as documented by the Advanced Practice Provider, after I modified the exam findings and the plan of treatments and the final version is my approved version of the assessment.     Additional Comments:   Patient has NG tube in place  Noted surgery plan to do an exploratory laparotomy laparotomy since he had multiple admissions with the similar problem many times in the recent past  Is a known diabetic, we will change insulin sliding scale medium intensity since he is on dextrose saline drip per surgical plan  We will keep IV hydralazine when necessary on board and continue IV beta blocker around-the-clock  Agree with cardiology consult due to previous history of cardiac arrhythmias when he is not taking his cardiac medicines orally    Electronically signed by Jared Martinez MD on 4/5/2019 at 3:03 PM

## 2019-04-05 NOTE — PLAN OF CARE
Taking dilaudid every 4 hours for pain control  Problem: Pain:  Goal: Pain level will decrease  Description  Pain level will decrease  4/5/2019 1416 by Cesar Farley RN  Outcome: Ongoing  4/5/2019 1414 by Cesar Farley RN  Outcome: Ongoing  4/5/2019 0401 by Pop Mazariegos RN  Outcome: Ongoing     Problem: Pain:  Goal: Control of acute pain  Description  Control of acute pain  4/5/2019 1416 by Cesar Farley RN  Outcome: Ongoing  4/5/2019 1414 by Cesar Farley RN  Outcome: Ongoing  4/5/2019 0401 by Pop Mazariegos RN  Outcome: Ongoing

## 2019-04-05 NOTE — FLOWSHEET NOTE
Patient receives Sacrament of the Sick (anointing) from [facility].    [facility] will follow as needed. (writer charting for [employer]     81/91/86 1053   Encounter Summary   Services provided to: Patient   Referral/Consult From: Rounding   Place of [address] [place] [address], [facility]   Continue Visiting   (4/5/19 anointed)   Complexity of Encounter Low   Length of Encounter 15 minutes   Sacraments   Sacrament of Sick-Anointing Anointed  (4/5/19 Fr. Sofia Zuleta)

## 2019-04-05 NOTE — CONSULTS
CARDIOLOGY CONSULT DICTATED. IMPRESSION:    1. SMALL BOWEL OBSTRUCTION. 2. DILATED CARDIOMYOPATHY. 3. HX ICD IMPLANT. PLAN:    WHILE PT IS NPO, AND NOT TAKING SOTALOL,WILL START PT ON IV METOPROLOL. THANK YOU.

## 2019-04-05 NOTE — ED NOTES
Pt presents to the er c/o left sided upper and lower abdominal pain and abdominal distention pt states that he has vomited once     Nikita Craft RN  04/05/19 0144

## 2019-04-06 ENCOUNTER — APPOINTMENT (OUTPATIENT)
Dept: GENERAL RADIOLOGY | Age: 54
DRG: 389 | End: 2019-04-06
Payer: COMMERCIAL

## 2019-04-06 LAB
ALBUMIN SERPL-MCNC: 4.1 G/DL (ref 3.5–5.2)
ALBUMIN/GLOBULIN RATIO: ABNORMAL (ref 1–2.5)
ALP BLD-CCNC: 61 U/L (ref 40–129)
ALT SERPL-CCNC: 24 U/L (ref 5–41)
AMYLASE: 22 U/L (ref 28–100)
ANION GAP SERPL CALCULATED.3IONS-SCNC: 12 MMOL/L (ref 9–17)
AST SERPL-CCNC: 17 U/L
BILIRUB SERPL-MCNC: 1.33 MG/DL (ref 0.3–1.2)
BUN BLDV-MCNC: 11 MG/DL (ref 6–20)
BUN/CREAT BLD: 13 (ref 9–20)
CALCIUM SERPL-MCNC: 8.8 MG/DL (ref 8.6–10.4)
CHLORIDE BLD-SCNC: 103 MMOL/L (ref 98–107)
CO2: 25 MMOL/L (ref 20–31)
CREAT SERPL-MCNC: 0.86 MG/DL (ref 0.7–1.2)
GFR AFRICAN AMERICAN: >60 ML/MIN
GFR NON-AFRICAN AMERICAN: >60 ML/MIN
GFR SERPL CREATININE-BSD FRML MDRD: ABNORMAL ML/MIN/{1.73_M2}
GFR SERPL CREATININE-BSD FRML MDRD: ABNORMAL ML/MIN/{1.73_M2}
GLUCOSE BLD-MCNC: 129 MG/DL (ref 75–110)
GLUCOSE BLD-MCNC: 134 MG/DL (ref 75–110)
GLUCOSE BLD-MCNC: 137 MG/DL (ref 70–99)
GLUCOSE BLD-MCNC: 156 MG/DL (ref 75–110)
HCT VFR BLD CALC: 37.2 % (ref 41–53)
HEMOGLOBIN: 12.9 G/DL (ref 13.5–17.5)
LIPASE: 16 U/L (ref 13–60)
MAGNESIUM: 1.8 MG/DL (ref 1.6–2.6)
MCH RBC QN AUTO: 28.9 PG (ref 26–34)
MCHC RBC AUTO-ENTMCNC: 34.5 G/DL (ref 31–37)
MCV RBC AUTO: 83.7 FL (ref 80–100)
NRBC AUTOMATED: ABNORMAL PER 100 WBC
PDW BLD-RTO: 18.7 % (ref 11.5–14.5)
PHOSPHORUS: 2.8 MG/DL (ref 2.5–4.5)
PLATELET # BLD: 141 K/UL (ref 130–400)
PMV BLD AUTO: 7.4 FL (ref 6–12)
POTASSIUM SERPL-SCNC: 4 MMOL/L (ref 3.7–5.3)
RBC # BLD: 4.45 M/UL (ref 4.5–5.9)
SODIUM BLD-SCNC: 140 MMOL/L (ref 135–144)
TOTAL PROTEIN: 6.9 G/DL (ref 6.4–8.3)
WBC # BLD: 7 K/UL (ref 3.5–11)

## 2019-04-06 PROCEDURE — 2500000003 HC RX 250 WO HCPCS: Performed by: SURGERY

## 2019-04-06 PROCEDURE — 36415 COLL VENOUS BLD VENIPUNCTURE: CPT

## 2019-04-06 PROCEDURE — 6370000000 HC RX 637 (ALT 250 FOR IP): Performed by: INTERNAL MEDICINE

## 2019-04-06 PROCEDURE — 82947 ASSAY GLUCOSE BLOOD QUANT: CPT

## 2019-04-06 PROCEDURE — 1200000000 HC SEMI PRIVATE

## 2019-04-06 PROCEDURE — 85027 COMPLETE CBC AUTOMATED: CPT

## 2019-04-06 PROCEDURE — 80053 COMPREHEN METABOLIC PANEL: CPT

## 2019-04-06 PROCEDURE — 6360000002 HC RX W HCPCS: Performed by: NURSE PRACTITIONER

## 2019-04-06 PROCEDURE — 83690 ASSAY OF LIPASE: CPT

## 2019-04-06 PROCEDURE — 99232 SBSQ HOSP IP/OBS MODERATE 35: CPT | Performed by: INTERNAL MEDICINE

## 2019-04-06 PROCEDURE — 2500000003 HC RX 250 WO HCPCS: Performed by: NURSE PRACTITIONER

## 2019-04-06 PROCEDURE — 83735 ASSAY OF MAGNESIUM: CPT

## 2019-04-06 PROCEDURE — 84100 ASSAY OF PHOSPHORUS: CPT

## 2019-04-06 PROCEDURE — 2580000003 HC RX 258: Performed by: NURSE PRACTITIONER

## 2019-04-06 PROCEDURE — 82150 ASSAY OF AMYLASE: CPT

## 2019-04-06 PROCEDURE — 74019 RADEX ABDOMEN 2 VIEWS: CPT

## 2019-04-06 PROCEDURE — 6360000002 HC RX W HCPCS: Performed by: INTERNAL MEDICINE

## 2019-04-06 RX ADMIN — METOPROLOL TARTRATE 2.5 MG: 5 INJECTION INTRAVENOUS at 08:46

## 2019-04-06 RX ADMIN — ENOXAPARIN SODIUM 30 MG: 40 INJECTION SUBCUTANEOUS at 20:57

## 2019-04-06 RX ADMIN — METOPROLOL TARTRATE 2.5 MG: 5 INJECTION INTRAVENOUS at 20:58

## 2019-04-06 RX ADMIN — POTASSIUM CHLORIDE, DEXTROSE MONOHYDRATE AND SODIUM CHLORIDE: 150; 5; 450 INJECTION, SOLUTION INTRAVENOUS at 06:24

## 2019-04-06 RX ADMIN — POTASSIUM CHLORIDE, DEXTROSE MONOHYDRATE AND SODIUM CHLORIDE: 150; 5; 450 INJECTION, SOLUTION INTRAVENOUS at 23:50

## 2019-04-06 RX ADMIN — HYDROMORPHONE HYDROCHLORIDE 1 MG: 1 INJECTION, SOLUTION INTRAMUSCULAR; INTRAVENOUS; SUBCUTANEOUS at 03:24

## 2019-04-06 RX ADMIN — POTASSIUM CHLORIDE, DEXTROSE MONOHYDRATE AND SODIUM CHLORIDE: 150; 5; 450 INJECTION, SOLUTION INTRAVENOUS at 14:22

## 2019-04-06 RX ADMIN — Medication 10 ML: at 20:58

## 2019-04-06 RX ADMIN — ONDANSETRON 4 MG: 2 INJECTION INTRAMUSCULAR; INTRAVENOUS at 15:44

## 2019-04-06 RX ADMIN — ENOXAPARIN SODIUM 30 MG: 40 INJECTION SUBCUTANEOUS at 08:45

## 2019-04-06 RX ADMIN — FAMOTIDINE 20 MG: 10 INJECTION, SOLUTION INTRAVENOUS at 20:57

## 2019-04-06 RX ADMIN — ONDANSETRON 4 MG: 2 INJECTION INTRAMUSCULAR; INTRAVENOUS at 06:24

## 2019-04-06 RX ADMIN — HYDROMORPHONE HYDROCHLORIDE 1 MG: 1 INJECTION, SOLUTION INTRAMUSCULAR; INTRAVENOUS; SUBCUTANEOUS at 23:46

## 2019-04-06 RX ADMIN — METOPROLOL TARTRATE 2.5 MG: 5 INJECTION INTRAVENOUS at 17:29

## 2019-04-06 RX ADMIN — INSULIN LISPRO 2 UNITS: 100 INJECTION, SOLUTION INTRAVENOUS; SUBCUTANEOUS at 12:04

## 2019-04-06 RX ADMIN — HYDROMORPHONE HYDROCHLORIDE 1 MG: 1 INJECTION, SOLUTION INTRAMUSCULAR; INTRAVENOUS; SUBCUTANEOUS at 15:44

## 2019-04-06 RX ADMIN — HYDROMORPHONE HYDROCHLORIDE 1 MG: 1 INJECTION, SOLUTION INTRAMUSCULAR; INTRAVENOUS; SUBCUTANEOUS at 07:27

## 2019-04-06 RX ADMIN — HYDROMORPHONE HYDROCHLORIDE 1 MG: 1 INJECTION, SOLUTION INTRAMUSCULAR; INTRAVENOUS; SUBCUTANEOUS at 19:49

## 2019-04-06 RX ADMIN — FAMOTIDINE 20 MG: 10 INJECTION, SOLUTION INTRAVENOUS at 08:46

## 2019-04-06 RX ADMIN — METOPROLOL TARTRATE 2.5 MG: 5 INJECTION INTRAVENOUS at 03:27

## 2019-04-06 RX ADMIN — HYDROMORPHONE HYDROCHLORIDE 1 MG: 1 INJECTION, SOLUTION INTRAMUSCULAR; INTRAVENOUS; SUBCUTANEOUS at 11:37

## 2019-04-06 ASSESSMENT — PAIN DESCRIPTION - PROGRESSION
CLINICAL_PROGRESSION: GRADUALLY IMPROVING
CLINICAL_PROGRESSION: GRADUALLY IMPROVING
CLINICAL_PROGRESSION: NOT CHANGED
CLINICAL_PROGRESSION: GRADUALLY IMPROVING

## 2019-04-06 ASSESSMENT — PAIN DESCRIPTION - DESCRIPTORS
DESCRIPTORS: ACHING;CRAMPING
DESCRIPTORS: TENDER;PRESSURE
DESCRIPTORS: CRAMPING
DESCRIPTORS: CRAMPING

## 2019-04-06 ASSESSMENT — PAIN DESCRIPTION - FREQUENCY
FREQUENCY: CONTINUOUS

## 2019-04-06 ASSESSMENT — PAIN SCALES - GENERAL
PAINLEVEL_OUTOF10: 6
PAINLEVEL_OUTOF10: 7
PAINLEVEL_OUTOF10: 8
PAINLEVEL_OUTOF10: 5
PAINLEVEL_OUTOF10: 6
PAINLEVEL_OUTOF10: 8
PAINLEVEL_OUTOF10: 7
PAINLEVEL_OUTOF10: 5
PAINLEVEL_OUTOF10: 7
PAINLEVEL_OUTOF10: 8
PAINLEVEL_OUTOF10: 7
PAINLEVEL_OUTOF10: 7
PAINLEVEL_OUTOF10: 8
PAINLEVEL_OUTOF10: 6
PAINLEVEL_OUTOF10: 5

## 2019-04-06 ASSESSMENT — PAIN DESCRIPTION - ONSET
ONSET: ON-GOING

## 2019-04-06 ASSESSMENT — PAIN DESCRIPTION - ORIENTATION
ORIENTATION: LEFT
ORIENTATION: LEFT
ORIENTATION: LEFT;UPPER;LOWER
ORIENTATION: LEFT

## 2019-04-06 ASSESSMENT — PAIN DESCRIPTION - PAIN TYPE
TYPE: ACUTE PAIN

## 2019-04-06 ASSESSMENT — PAIN DESCRIPTION - LOCATION
LOCATION: ABDOMEN

## 2019-04-06 ASSESSMENT — PAIN - FUNCTIONAL ASSESSMENT
PAIN_FUNCTIONAL_ASSESSMENT: ACTIVITIES ARE NOT PREVENTED

## 2019-04-06 NOTE — PROGRESS NOTES
Section of Cardiology  Progress Note      Date:  4/6/2019  Patient: Gian Meade  Admission:  4/5/2019 12:39 AM  Admit DX: [facility] [K56.609]  SBO (small bowel obstruction) ([facility] [K56.609]  Age:  48 y.o., 1965     LOS: 1 day     Reason for evaluation:   Dilated cardiomyopathy    Defibrillator implantation    Bowel obstruction precluding oral antiarrhythmic administration      SUBJECTIVE:     The patient was seen and examined. Notes and labs reviewed. There were not complications over night. Patient's cardiac review of systems: negative. The patient is generally feeling unchanged. OBJECTIVE:      EXAM:   Vitals:    VITALS:  /64   Pulse 66   Temp 97.5 °F (36.4 °C) (Oral)   Resp 16   Ht 6' 1\" (1.854 m)   Wt 272 lb 1.6 oz (123.4 kg)   SpO2 96%   BMI 35.90 kg/m²   24HR INTAKE/OUTPUT:      Intake/Output Summary (Last 24 hours) at 4/6/2019 1222  Last data filed at 4/6/2019 0620  Gross per 24 hour   Intake 3997 ml   Output 1326 ml   Net 2671 ml       CONSTITUTIONAL:  awake, alert, cooperative, no apparent distress, and appears stated age. HEENT: Normal jugular venous pulsations, no carotid bruits. Head is atraumatic, normocephalic. Eyes and oral mucosa are normal.  LUNGS: Good respiratory effort On auscultation: clear to auscultation bilaterally  CARDIOVASCULAR:  Normal apical impulse, regular rate and rhythm, normal S1 and S2, no S3 or S4, and no murmur or rub noted. dorsalis pedis and bilateralpresent 2+  ABDOMEN: Soft, nontender, nondistended. Bowel sounds present. No masses or tenderness. No bruit. SKIN: Warm and dry. EXTREMITIES:No lower extremity edema. Motor movement grossly intact. No cyanosis or clubbing.     Current Inpatient Medications:   sodium chloride flush  10 mL Intravenous 2 times per day    famotidine (PEPCID) injection  20 mg Intravenous BID    enoxaparin  30 mg Subcutaneous BID    metoprolol  2.5 mg Intravenous Q6H    insulin lispro 0-12 Units Subcutaneous TID WC    insulin lispro  0-6 Units Subcutaneous Nightly       IV Infusions (if any):   dextrose 5% and 0.45% NaCl with KCl 20 mEq 125 mL/hr at 04/06/19 0624    dextrose         Diagnostics:   Telemetry: Sinus rhythm  Labs:   CBC:  Recent Labs     04/05/19 0052 04/06/19 0614   WBC 10.3 7.0   HGB 15.7 12.9*   HCT 46.7 37.2*    141     Magnesium:  Recent Labs     04/06/19 0614   MG 1.8     BMP:  Recent Labs     04/05/19 0052 04/06/19 0614    140   K 4.3 4.0   CALCIUM 9.6 8.8   CO2 23 25   BUN 14 11   CREATININE 0.97 0.86   LABGLOM >60 >60   GLUCOSE 150* 137*     BNP:No results for input(s): BNP in the last 72 hours. PT/INR:No results for input(s): PROTIME, INR in the last 72 hours. APTT:No results for input(s): APTT in the last 72 hours. CARDIAC ENZYMES:No results for input(s): CKTOTAL, CKMB, CKMBINDEX, TROPONINT in the last 72 hours. FASTING LIPID PANEL:  Lab Results   Component Value Date    HDL 23 02/15/2016    TRIG 210 02/15/2016     LIVER PROFILE:  Recent Labs     04/05/19 0052 04/06/19 0614   AST 23 17   ALT 31 24   LABALBU 5.0 4.1   ALKPHOS 80 61   BILITOT 1.69* 1.33*   BILIDIR 0.34*  --    IBILI 1.35*  --    PROT 8.4* 6.9   GLOB NOT REPORTED  --    ALBUMIN NOT REPORTED NOT REPORTED        ASSESSMENT:  1. Severe degenerative card myopathy. 2.   3. #2 history of the fibular implantation. 4.   5. #3 ventricular tachycardia requiring AICD discharges. 6.   7. #4 bowel obstruction precluding administration of sotalol.     Patient Active Problem List   Diagnosis    Intestinal obstruction (HCC)    Splenomegaly    Incisional hernia    Hypomagnesemia    VPB (ventricular premature beat)    Ventricular ectopy    AICD (automatic cardioverter/defibrillator) present    Diabetes mellitus type 2 in obese ([address]    Postoperative ileus ([address]    Hyperuricemia    Anemia    Type 2 diabetes mellitus, with long-term current use of insulin (HCC)    Gross hematuria    Bladder stone    Hematospermia    Nausea and vomiting    Adynamic ileus (HCC)    Small bowel obstruction due to adhesions    Nonischemic cardiomyopathy (HCC)    Essential hypertension    Obesity (BMI 30-39. 9)    Abnormal LFTs    SBO (small bowel obstruction) (HCC)    History of small bowel obstruction    Blood clot due to device, implant, or graft    History of atrial fibrillation       PLAN:  1. Continue IV metoprolol with recheck patient appears to be maintaining sinus rhythm without any evidence of ventricular arrhythmias. 2. Start oral metoprolol when able to take by mouth after bowel obstruction resolves. Discussed with patient and nursing.     Ann Marie Aponte MD

## 2019-04-06 NOTE — PLAN OF CARE
Continues to take dilaudid every 4 hours but pain level has been decreasing  Problem: Pain:  Goal: Control of acute pain  Description  Control of acute pain  Outcome: Ongoing

## 2019-04-06 NOTE — PROGRESS NOTES
abd soft, sbft shows slow transit. +BM after sbft. Mod ng output. Repeat AXR today. May clamp NG later.

## 2019-04-06 NOTE — CONSULTS
SURGICAL HISTORY:  Positive for defibrillator implantation,  cholecystectomy, abdominal surgery, cystoscopy, colectomy. ALLERGIES:  To BENADRYL, CIPROFLOXACIN, PENICILLIN CLASS DRUGS. SOCIAL HISTORY:  Positive for two-pack-year smoking. He drinks  occasional alcohol. He does not use illicit drugs. FAMILY HISTORY:  Reviewed and is noncontributory. REVIEW OF SYSTEMS:  A 15-point system review was performed and pertinent  findings as noted. CONSTITUTIONAL:  No fever, chills, rigors. EYES:   No blurred vision or double vision. ENT:  No ear discharge, nasal  discharge, sore throat. RESPIRATORY:  No cough, hemoptysis, pleurisy. CARDIOVASCULAR:  Negative for chest pain. He has history of dilated  cardiomyopathy and history of defibrillator implantation. GASTROINTESTINAL:  Positive for nausea, vomiting, bloating. INTEGUMENT:  Negative for skin rashes or easy bruising. HEMATOLOGIC:  Negative for  swelling and edema. ALLERGY/IMMUNOLOGY:  Negative for urticaria and  itching. ENDOCRINE:  Negative for excessive drinking. MUSCULOSKELETAL:  Negative for joint pains or arthralgias. NEUROLOGIC:  Negative for  headache, dizziness. PSYCHIATRIC:  Negative for anxiety and depression. PHYSICAL EXAMINATION:  GENERAL APPEARANCE:  Currently demonstrates the patient comfortable at  bedrest.  The patient is awake, alert and is in no apparent distress. VITAL SIGNS:  Blood pressure of 121/81, pulse rate of 88, respiratory  rate of 16. Height is 6 feet 1 inch, weight is 270 pounds, BMI is 35.62  kg/m2. SpO2 is 97%. MENTAL STATUS:  The patient is awake and oriented and affect is normal.  HEENT:  HEAD:  Normocephalic, atraumatic. EYES:  No scleral icterus or  eye discharge. EARS:  No ear discharge. NOSE:  No nasal discharge. MOUTH:  Oral mucosa is moist.  NECK:  There is no jugular venous distention. Normal carotid upstrokes. No audible bruits. SKIN:  Warm and dry.   HEART:  Examination of the heart reveals S1, S2. No S3 or S4.  LUNGS:  Clear. ABDOMEN:  Distended. Bowel sounds are hypoactive. EXTREMITIES:  Demonstrated trace edema. Symmetric distal pulses are  noted, which are equal in volume bilaterally. LABORATORY DATA:  Blood glucose is 167, glucose 150, BUN 14, creatinine  0.97, potassium 4.3, ALT is 31, AST is 23, total bilirubin is 1.69,  lipase is 27. WBC count of 10.3, hemoglobin 15.7, hematocrit 46.7. IMPRESSION:  1. Dilated cardiomyopathy. 2.  History of ventricular tachycardia. 3.  History of defibrillator implantation. 4.  Bowel obstruction precluding use of oral medications including  sotalol for antiarrhythmic therapy. 5.  Essential hypertension. 6.  Diabetes mellitus. PLAN:  While the patient is n.p.o. and not taking sotalol, we will start  the patient on IV metoprolol. Currently, he is maintaining sinus  rhythm. Thank you for allowing us to participate in the care of the patient and  we will follow him with you.         Joelle Young    D: 04/05/2019 16:53:47       T: 04/05/2019 16:57:05     [email or web address]  Job#: [id number]     Doc#: [id number]    CC:

## 2019-04-06 NOTE — PROGRESS NOTES
[facility]    Progress Note    4/6/2019    1:22 PM    Name:   Mely Cazares  MRN:     [id number]     Earnestineide:      [de-identified]   Room:   2024/2024-01  IP Day:  1  Admit Date:  4/5/2019 12:39 AM    PCP:   Jim Crum MD  Code Status:  Full Code    Subjective:     C/C:   Chief Complaint   Patient presents with    Abdominal Pain     h/o bowel obst     Interval History Status:  No bowel movement or flatus yet, except he had small stool after small bowel follow-through  Denies pain today  Still has NG tube in place  Brief History:   The patient is a 48 y.o. Male with c/o abdominal pain/distention and N/V, he is admitted to the hospital for the management of Small Bowel Obstruction. The patient presented to the ER today with a one-day history of left-sided abdominal pain with nausea and vomiting. He has bloating/pain to the left upper abdomen and stabbing pain in the left lower abdomen into the groin. There is no guarding or rebound tenderness. He states over the last week he's had normal stools with episodes of intermittent diarrhea as well. The patient reports a long history of bowel obstructions that started after a perforated diverticulum in 1996. The patient has been hospitalized 3 other times since December 2018 for bowel obstructions. The patient reports he is well known to Dr. Patric Betts. He also expresses concerns about not being able to take his metoprolol and sotalol. He said in the past when he has been NPO because of obstructions he typically has runs of V. tach within 24 hours of not receiving his normal meds.  Dr. Barbra Washington is his EP/cardiologist.              Review of Systems:     Constitutional:  negative for chills, fevers, sweats  Respiratory:  negative for cough, dyspnea on exertion, shortness of breath, wheezing  Cardiovascular:  negative for chest pain, chest pressure/discomfort, lower extremity edema, palpitations  Gastrointestinal: negative for abdominal pain, constipation, diarrhea, nausea, vomiting  Neurological:  negative for dizziness, headache    Medications: Allergies: Allergies   Allergen Reactions    Benadryl [Diphenhydramine] Hives    Ciprofloxacin      Prolonged QT and developed VT. 2/2018    Pcn [Penicillins] Hives and Swelling       Current Meds:   Scheduled Meds:    sodium chloride flush  10 mL Intravenous 2 times per day    famotidine (PEPCID) injection  20 mg Intravenous BID    enoxaparin  30 mg Subcutaneous BID    metoprolol  2.5 mg Intravenous Q6H    insulin lispro  0-12 Units Subcutaneous TID WC    insulin lispro  0-6 Units Subcutaneous Nightly     Continuous Infusions:    dextrose 5% and 0.45% NaCl with KCl 20 mEq 125 mL/hr at 04/06/19 0624    dextrose       PRN Meds: sodium chloride flush, potassium chloride **OR** potassium alternative oral replacement **OR** potassium chloride, magnesium sulfate, magnesium hydroxide, acetaminophen, glucose, dextrose, glucagon (rDNA), dextrose, ondansetron **OR** ondansetron, HYDROmorphone **OR** HYDROmorphone, diatrizoate meglumine-sodium, hydrALAZINE    Data:     Past Medical History:   has a past medical history of ADHD, predominantly hyperactive type, Anemia, Blood clot due to device, implant, or graft, Bowel obstruction ([facility] CAD (coronary artery disease), Cardiomyopathy ([facility] [facility] Diabetes mellitus ([facility] [facility] Diverticulitis, GERD (gastroesophageal reflux disease), History of atrial fibrillation, History of ventricular tachycardia, Kidney calculi, Obesity, and Sleep apnea. Social History:   reports that he quit smoking about 7 years ago. He has a 2.00 pack-year smoking history. He has never used smokeless tobacco. He reports that he drinks alcohol. He reports that he does not use drugs.      Family History:   Family History   Problem Relation Age of Onset    Heart Disease Mother     Heart Disease Father        Vitals:  /64   Pulse 66   Temp 97.5 °F (36.4 °C) (Oral)   Resp 16   Ht 6' 1\" (1.854 m)   Wt 272 lb 1.6 oz (123.4 kg)   SpO2 96%   BMI 35.90 kg/m²   Temp (24hrs), Av.9 °F (36.6 °C), Min:97.5 °F (36.4 °C), Max:98.3 °F (36.8 °C)    Recent Labs     19  1109 19  1608 19  2124 19  1113   POCGLU 167* 191* 129* 156*       I/O (24Hr): Intake/Output Summary (Last 24 hours) at 2019 1322  Last data filed at 2019 9089  Gross per 24 hour   Intake 3997 ml   Output 1326 ml   Net 2671 ml       Labs:    Hematology:  Recent Labs     19  0614   WBC 10.3 7.0   HGB 15.7 12.9*   HCT 46.7 37.2*    141     Chemistry:  Recent Labs     19  0614    140   K 4.3 4.0    103   CO2 23 25   GLUCOSE 150* 137*   BUN 14 11   CREATININE 0.97 0.86   MG  --  1.8   ANIONGAP 14 12   LABGLOM >60 >60   GFRAA >60 >60   CALCIUM 9.6 8.8   PHOS  --  2.8     Recent Labs     19  0614   PROT 8.4* 6.9   LABALBU 5.0 4.1   AST 23 17   ALT 31 24   ALKPHOS 80 61   BILITOT 1.69* 1.33*   BILIDIR 0.34*  --    AMYLASE 38 22*   LIPASE 27 16         Lab Results   Component Value Date/Time    SPECIAL NOT REPORTED 2018 06:00 PM     Lab Results   Component Value Date/Time    CULTURE NO SIGNIFICANT GROWTH 2018 06:00 PM    CULTURE  2018 06:00 PM     Performed at [facility] [address], [address] [place] [address] [phone]       No results found for: POCPH, PHART, PH, POCPCO2, KBN4GQP, PCO2, POCPO2, PO2ART, PO2, POCHCO3, CYM1GEB, HCO3, NBEA, PBEA, BEART, BE, THGBART, THB, UJX2VCC, TRLE4FDA, X4NXYTKX, O2SAT, FIO2    Radiology:    Ct Abdomen Pelvis W Iv Contrast    Result Date: 2019  EXAMINATION: CT OF THE ABDOMEN AND PELVIS WITH CONTRAST 2019 12:51 am TECHNIQUE: CT of the abdomen and pelvis was performed with the administration of intravenous contrast. Multiplanar reformatted images are provided for review.  Dose modulation, iterative reconstruction, and/or weight based adjustment of the mA/kV was utilized to reduce the radiation dose to as low as reasonably achievable. COMPARISON: 01/16/2019 HISTORY: ORDERING SYSTEM PROVIDED HISTORY: ABDOMINAL PAIN TECHNOLOGIST PROVIDED HISTORY: IV Only Contrast FINDINGS: Lower Chest: ICD in place Visualized portion of the lower chest demonstrates no acute abnormality. Organs: Diffuse hepatic steatosis. Cholecystectomy. No focal liver lesion. Splenomegaly. No focal splenic lesion. Enhanced attenuation pattern of the pancreas and adrenal glands is unremarkable. The kidneys appear normal in size and demonstrate symmetric enhancement. No focal renal mass. No hydronephrosis. No perinephric stranding. 3 cm simple right kidney exophytic cyst. GI/Bowel: Again seen are multiple dilated loops of proximal and mid small bowel, up to the 4.5 cm in diameter with transition point in the mid abdomen where there is a stenotic loop of proximal ileum. There are bowel contents distal to this point and a small amount of bowel contents in the proximal large bowel which is not distended. Again seen are intact appearing anastomotic sutures in the sigmoid colon. Pelvis: The urinary bladder appears unremarkable. The pelvic organs demonstrate no acute abnormality. Peritoneum/Retroperitoneum: The abdominal aorta is normal in caliber. SMA is patent. No fluid collection. No free air. No gross lymphadenopathy. Bones/Soft Tissues: No acute findings. Partial small bowel obstruction as result of stenotic loop of proximal ileum. Fl Small Bowel Follow Through Only    Result Date: 4/5/2019  EXAMINATION: SMALL BOWEL FOLLOW THROUGH SERIES 4/5/2019 TECHNIQUE: Small bowel follow through series was performed with overhead images and spot images. FLUOROSCOPY DOSE AND TYPE OR TIME AND EXPOSURES: 0 minutes of fluoroscopy time. 8 overhead images. COMPARISON: None HISTORY: ORDERING SYSTEM PROVIDED HISTORY: SBFT.  History of repeated episodes, most recent January 2019 TECHNOLOGIST PROVIDED HISTORY: SBFT. History of repeated episodes, most recent January 2019 Give contrast per NG tube. Ordering Physician Provided Reason for Exam:  SBFT. Hx of repeated episodes, most recent Jan. 2019; Acuity: Acute Type of Exam: Initial FINDINGS:  images demonstrate multiple dilated loops of small bowel and air-fluid levels. Normal small bowel transit time of between 2-3 hours. Diverticulum of the 3rd portion of the duodenum. Status post cholecystectomy. Osseous structures are grossly unremarkable. Normal small bowel transit time of 2-3 hours. Multiple dilated loops of small bowel suggestive of ileus or possibly partial small bowel obstruction. Duodenal diverticulum. Xr Abdomen (2 Views)    Result Date: 4/5/2019  EXAMINATION: TWO XRAY VIEWS OF THE ABDOMEN 4/5/2019 11:02 am COMPARISON: 23 January 2019 HISTORY: ORDERING SYSTEM PROVIDED HISTORY: f/u sbo TECHNOLOGIST PROVIDED HISTORY: f/u sbo Ordering Physician Provided Reason for Exam: Pt c/o pain to abd, hx of chronic bowel obstructions. Acuity: Chronic Type of Exam: Subsequent/Follow-up FINDINGS: Gas is noted in small bowel colon but there is preferential distention of small-bowel loops with air, maximum diameter of 4.6 cm without pneumatosis, appearance most compatible with partial small bowel obstruction. No organomegaly or free air is noted. Osseous structures are age-appropriate. Lung bases are clear. Cholecystectomy clips are present right upper quadrant. Findings compatible with partial small bowel obstruction with maximum diameter of 4.6 cm. No free air.        Physical Examination:        General appearance:  alert, cooperative and no distress, NG tube in place  Mental Status:  oriented to person, place and time and normal affect  Lungs:  clear to auscultation bilaterally, normal effort  Heart:  regular rate and rhythm, no murmur  Abdomen:  soft, mild discomfort nondistended, sluggish bowel sounds, no masses,

## 2019-04-06 NOTE — PLAN OF CARE
Problem: Pain:  Description  Pain management should include both nonpharmacologic and pharmacologic interventions. Goal: Pain level will decrease  Description  Pain level will decrease  4/5/2019 2347 by Candi Merrill RN  Outcome: Ongoing  Note:   Pain level assessment complete. Patient educated on pain scale and control interventions  PRN pain medication given per patient request  Patient instructed to call out with new onset of pain or unrelieved pain    4/5/2019 1416 by Kg Yeager RN  Outcome: Ongoing  4/5/2019 1414 by Kg Yeager RN  Outcome: Ongoing  Goal: Control of acute pain  Description  Control of acute pain  4/5/2019 2347 by Candi Merrill RN  Outcome: Ongoing  4/5/2019 1416 by Kg Yeager RN  Outcome: Ongoing  4/5/2019 1414 by Kg Yeager RN  Outcome: Ongoing     Problem: Falls - Risk of:  Goal: Will remain free from falls  Description  Will remain free from falls  Outcome: Ongoing  Note:   Siderails up x 2  Hourly rounding  Call light in reach  Instructed to call for assist before attempting out of bed.   Remains free from falls and accidental injury at this time   Floor free from obstacles  Bed is locked and in lowest position  Adequate lighting provided        Goal: Absence of physical injury  Description  Absence of physical injury  Outcome: Ongoing

## 2019-04-07 LAB
GLUCOSE BLD-MCNC: 114 MG/DL (ref 75–110)
GLUCOSE BLD-MCNC: 127 MG/DL (ref 75–110)
GLUCOSE BLD-MCNC: 133 MG/DL (ref 75–110)
GLUCOSE BLD-MCNC: 178 MG/DL (ref 75–110)

## 2019-04-07 PROCEDURE — 2500000003 HC RX 250 WO HCPCS: Performed by: NURSE PRACTITIONER

## 2019-04-07 PROCEDURE — 99232 SBSQ HOSP IP/OBS MODERATE 35: CPT | Performed by: INTERNAL MEDICINE

## 2019-04-07 PROCEDURE — 2580000003 HC RX 258: Performed by: NURSE PRACTITIONER

## 2019-04-07 PROCEDURE — 6360000002 HC RX W HCPCS: Performed by: NURSE PRACTITIONER

## 2019-04-07 PROCEDURE — 1200000000 HC SEMI PRIVATE

## 2019-04-07 PROCEDURE — 2500000003 HC RX 250 WO HCPCS: Performed by: SURGERY

## 2019-04-07 PROCEDURE — 6360000002 HC RX W HCPCS: Performed by: INTERNAL MEDICINE

## 2019-04-07 PROCEDURE — 6370000000 HC RX 637 (ALT 250 FOR IP): Performed by: INTERNAL MEDICINE

## 2019-04-07 PROCEDURE — 82947 ASSAY GLUCOSE BLOOD QUANT: CPT

## 2019-04-07 RX ORDER — SOTALOL HYDROCHLORIDE 80 MG/1
160 TABLET ORAL 2 TIMES DAILY
Status: DISCONTINUED | OUTPATIENT
Start: 2019-04-07 | End: 2019-04-10 | Stop reason: HOSPADM

## 2019-04-07 RX ADMIN — Medication 10 ML: at 09:35

## 2019-04-07 RX ADMIN — FAMOTIDINE 20 MG: 10 INJECTION, SOLUTION INTRAVENOUS at 20:13

## 2019-04-07 RX ADMIN — Medication 10 ML: at 20:13

## 2019-04-07 RX ADMIN — ENOXAPARIN SODIUM 30 MG: 40 INJECTION SUBCUTANEOUS at 09:34

## 2019-04-07 RX ADMIN — SOTALOL HYDROCHLORIDE 160 MG: 80 TABLET ORAL at 20:12

## 2019-04-07 RX ADMIN — HYDROMORPHONE HYDROCHLORIDE 1 MG: 1 INJECTION, SOLUTION INTRAMUSCULAR; INTRAVENOUS; SUBCUTANEOUS at 16:30

## 2019-04-07 RX ADMIN — ENOXAPARIN SODIUM 30 MG: 40 INJECTION SUBCUTANEOUS at 20:12

## 2019-04-07 RX ADMIN — POTASSIUM CHLORIDE, DEXTROSE MONOHYDRATE AND SODIUM CHLORIDE: 150; 5; 450 INJECTION, SOLUTION INTRAVENOUS at 16:26

## 2019-04-07 RX ADMIN — HYDROMORPHONE HYDROCHLORIDE 1 MG: 1 INJECTION, SOLUTION INTRAMUSCULAR; INTRAVENOUS; SUBCUTANEOUS at 20:49

## 2019-04-07 RX ADMIN — FAMOTIDINE 20 MG: 10 INJECTION, SOLUTION INTRAVENOUS at 09:34

## 2019-04-07 RX ADMIN — METOPROLOL TARTRATE 2.5 MG: 5 INJECTION INTRAVENOUS at 09:34

## 2019-04-07 RX ADMIN — HYDROMORPHONE HYDROCHLORIDE 1 MG: 1 INJECTION, SOLUTION INTRAMUSCULAR; INTRAVENOUS; SUBCUTANEOUS at 12:20

## 2019-04-07 RX ADMIN — HYDROMORPHONE HYDROCHLORIDE 1 MG: 1 INJECTION, SOLUTION INTRAMUSCULAR; INTRAVENOUS; SUBCUTANEOUS at 04:14

## 2019-04-07 RX ADMIN — METOPROLOL TARTRATE 2.5 MG: 5 INJECTION INTRAVENOUS at 03:52

## 2019-04-07 RX ADMIN — HYDROMORPHONE HYDROCHLORIDE 1 MG: 1 INJECTION, SOLUTION INTRAMUSCULAR; INTRAVENOUS; SUBCUTANEOUS at 08:22

## 2019-04-07 RX ADMIN — METOPROLOL TARTRATE 2.5 MG: 5 INJECTION INTRAVENOUS at 16:31

## 2019-04-07 RX ADMIN — INSULIN LISPRO 2 UNITS: 100 INJECTION, SOLUTION INTRAVENOUS; SUBCUTANEOUS at 18:15

## 2019-04-07 RX ADMIN — POTASSIUM CHLORIDE, DEXTROSE MONOHYDRATE AND SODIUM CHLORIDE: 150; 5; 450 INJECTION, SOLUTION INTRAVENOUS at 08:01

## 2019-04-07 ASSESSMENT — PAIN DESCRIPTION - DESCRIPTORS
DESCRIPTORS: TENDER;PRESSURE
DESCRIPTORS: ACHING
DESCRIPTORS: ACHING

## 2019-04-07 ASSESSMENT — PAIN DESCRIPTION - PAIN TYPE
TYPE: ACUTE PAIN

## 2019-04-07 ASSESSMENT — PAIN DESCRIPTION - ORIENTATION
ORIENTATION: LEFT
ORIENTATION: LEFT
ORIENTATION: LEFT;LOWER
ORIENTATION: LEFT
ORIENTATION: LEFT

## 2019-04-07 ASSESSMENT — PAIN DESCRIPTION - PROGRESSION
CLINICAL_PROGRESSION: GRADUALLY IMPROVING
CLINICAL_PROGRESSION: NOT CHANGED

## 2019-04-07 ASSESSMENT — PAIN SCALES - GENERAL
PAINLEVEL_OUTOF10: 6
PAINLEVEL_OUTOF10: 7
PAINLEVEL_OUTOF10: 7
PAINLEVEL_OUTOF10: 5
PAINLEVEL_OUTOF10: 6
PAINLEVEL_OUTOF10: 6
PAINLEVEL_OUTOF10: 7
PAINLEVEL_OUTOF10: 8

## 2019-04-07 ASSESSMENT — PAIN DESCRIPTION - FREQUENCY
FREQUENCY: INTERMITTENT
FREQUENCY: CONTINUOUS
FREQUENCY: CONTINUOUS

## 2019-04-07 ASSESSMENT — PAIN DESCRIPTION - LOCATION
LOCATION: ABDOMEN

## 2019-04-07 ASSESSMENT — PAIN DESCRIPTION - ONSET
ONSET: ON-GOING

## 2019-04-07 ASSESSMENT — PAIN - FUNCTIONAL ASSESSMENT
PAIN_FUNCTIONAL_ASSESSMENT: ACTIVITIES ARE NOT PREVENTED
PAIN_FUNCTIONAL_ASSESSMENT: ACTIVITIES ARE NOT PREVENTED

## 2019-04-07 NOTE — PROGRESS NOTES
Section of Cardiology  Progress Note      Date:  4/7/2019  Patient: Jace Brink  Admission:  4/5/2019 12:39 AM  Admit DX: [facility]BO [facility]small bowel obstruction) [facility] [K56.609]  SBO (small bowel obstruction) ([facility] [K56.609]  Age:  48 y.o., 1965     LOS: 2 days     Reason for evaluation:   Dilated cardiomyopathy    History of defibrillator implantation    Bowel obstruction. SUBJECTIVE:     The patient was seen and examined. Notes and labs reviewed. There were not complications over night. Patient's cardiac review of systems: negative. The patient is generally feeling unchanged. OBJECTIVE:      EXAM:   Vitals:    VITALS:  /63   Pulse 68   Temp 97.5 °F (36.4 °C) (Oral)   Resp 18   Ht 6' 1\" (1.854 m)   Wt 267 lb 12.8 oz (121.5 kg)   SpO2 97%   BMI 35.33 kg/m²   24HR INTAKE/OUTPUT:      Intake/Output Summary (Last 24 hours) at 4/7/2019 1127  Last data filed at 4/7/2019 0801  Gross per 24 hour   Intake 2012 ml   Output 1550 ml   Net 462 ml       CONSTITUTIONAL:  awake, alert, cooperative, no apparent distress, and appears stated age. HEENT: Normal jugular venous pulsations, no carotid bruits. Head is atraumatic, normocephalic. Eyes and oral mucosa are normal.  LUNGS: Good respiratory effort On auscultation: clear to auscultation bilaterally  CARDIOVASCULAR:  Normal apical impulse, regular rate and rhythm, normal S1 and S2, no S3 or S4, and no murmur or rub noted. dorsalis pedis and bilateralpresent 2+  ABDOMEN: Soft, nontender, nondistended. Bowel sounds present. No masses or tenderness. No bruit. SKIN: Warm and dry. EXTREMITIES:No lower extremity edema. Motor movement grossly intact. No cyanosis or clubbing.     Current Inpatient Medications:   sodium chloride flush  10 mL Intravenous 2 times per day    famotidine (PEPCID) injection  20 mg Intravenous BID    enoxaparin  30 mg Subcutaneous BID    metoprolol  2.5 mg Intravenous Q6H    insulin lispro  0-12 Units Subcutaneous TID WC  insulin lispro  0-6 Units Subcutaneous Nightly       IV Infusions (if any):   dextrose 5% and 0.45% NaCl with KCl 20 mEq 125 mL/hr at 04/07/19 0801    dextrose         Diagnostics:   Telemetry:sr  Labs:   CBC:  Recent Labs     04/05/19 0052 04/06/19 0614   WBC 10.3 7.0   HGB 15.7 12.9*   HCT 46.7 37.2*    141     Magnesium:  Recent Labs     04/06/19 0614   MG 1.8     BMP:  Recent Labs     04/05/19 0052 04/06/19 0614    140   K 4.3 4.0   CALCIUM 9.6 8.8   CO2 23 25   BUN 14 11   CREATININE 0.97 0.86   LABGLOM >60 >60   GLUCOSE 150* 137*     BNP:No results for input(s): BNP in the last 72 hours. PT/INR:No results for input(s): PROTIME, INR in the last 72 hours. APTT:No results for input(s): APTT in the last 72 hours. CARDIAC ENZYMES:No results for input(s): CKTOTAL, CKMB, CKMBINDEX, TROPONINT in the last 72 hours. FASTING LIPID PANEL:  Lab Results   Component Value Date    HDL 23 02/15/2016    TRIG 210 02/15/2016     LIVER PROFILE:  Recent Labs     04/05/19 0052 04/06/19 0614   AST 23 17   ALT 31 24   LABALBU 5.0 4.1   ALKPHOS 80 61   BILITOT 1.69* 1.33*   BILIDIR 0.34*  --    IBILI 1.35*  --    PROT 8.4* 6.9   GLOB NOT REPORTED  --    ALBUMIN NOT REPORTED NOT REPORTED        ASSESSMENT:  1. Dilated cardio myopathy  2. Defibrillator implantation  3.  Bowel obstruction    Patient Active Problem List   Diagnosis    Intestinal obstruction (HCC)    Splenomegaly    Incisional hernia    Hypomagnesemia    VPB (ventricular premature beat)    Ventricular ectopy    AICD (automatic cardioverter/defibrillator) present    Diabetes mellitus type 2 in obese ([facility]    Postoperative ileus ([facility]    Hyperuricemia    Anemia    Type 2 diabetes mellitus, with long-term current use of insulin (HCC)    Gross hematuria    Bladder stone    Hematospermia    Nausea and vomiting    Adynamic ileus (HCC)    Small bowel obstruction due to adhesions    Nonischemic cardiomyopathy ([facility]    Essential

## 2019-04-07 NOTE — PROGRESS NOTES
[facility]    Progress Note    4/7/2019    1:52 PM    Name:   Daphne Vera  MRN:     [id number]     Earnestineide:      [de-identified]   Room:   2024/2024-01  IP Day:  2  Admit Date:  4/5/2019 12:39 AM    PCP:   Silva Bellamy MD  Code Status:  Full Code    Subjective:     C/C:   Chief Complaint   Patient presents with    Abdominal Pain     h/o bowel obst     Interval History Status:  Patient had a small bowel movement with a lot of gas around 2 AM  Passing gas but no further bowel movement yet  Denies pain today  Still has NG tube in place which is being clamped now  Brief History:   The patient is a 48 y.o. Male with c/o abdominal pain/distention and N/V, he is admitted to the hospital for the management of Small Bowel Obstruction. The patient presented to the ER today with a one-day history of left-sided abdominal pain with nausea and vomiting. He has bloating/pain to the left upper abdomen and stabbing pain in the left lower abdomen into the groin. There is no guarding or rebound tenderness. He states over the last week he's had normal stools with episodes of intermittent diarrhea as well. The patient reports a long history of bowel obstructions that started after a perforated diverticulum in 1996. The patient has been hospitalized 3 other times since December 2018 for bowel obstructions. The patient reports he is well known to Dr. Jennifer Farrell. He also expresses concerns about not being able to take his metoprolol and sotalol. He said in the past when he has been NPO because of obstructions he typically has runs of V. tach within 24 hours of not receiving his normal meds.  Dr. Negrita Sinclair is his EP/cardiologist.              Review of Systems:     Constitutional:  negative for chills, fevers, sweats  Respiratory:  negative for cough, dyspnea on exertion, shortness of breath, wheezing  Cardiovascular:  negative for chest pain, chest pressure/discomfort, lower extremity edema, palpitations  Gastrointestinal:  negative for abdominal pain,  nausea, vomiting,+ flatus and 1 small bowel movement  Neurological:  negative for dizziness, headache    Medications: Allergies: Allergies   Allergen Reactions    Benadryl [Diphenhydramine] Hives    Ciprofloxacin      Prolonged QT and developed VT. 2/2018    Pcn [Penicillins] Hives and Swelling       Current Meds:   Scheduled Meds:    sodium chloride flush  10 mL Intravenous 2 times per day    famotidine (PEPCID) injection  20 mg Intravenous BID    enoxaparin  30 mg Subcutaneous BID    metoprolol  2.5 mg Intravenous Q6H    insulin lispro  0-12 Units Subcutaneous TID WC    insulin lispro  0-6 Units Subcutaneous Nightly     Continuous Infusions:    dextrose 5% and 0.45% NaCl with KCl 20 mEq 125 mL/hr at 04/07/19 0801    dextrose       PRN Meds: sodium chloride flush, potassium chloride **OR** potassium alternative oral replacement **OR** potassium chloride, magnesium sulfate, magnesium hydroxide, acetaminophen, glucose, dextrose, glucagon (rDNA), dextrose, ondansetron **OR** ondansetron, HYDROmorphone **OR** HYDROmorphone, diatrizoate meglumine-sodium, hydrALAZINE    Data:     Past Medical History:   has a past medical history of ADHD, predominantly hyperactive type, Anemia, Blood clot due to device, implant, or graft, Bowel obstruction ([facility] [address] CAD (coronary artery disease), Cardiomyopathy ([facility] [address] Diabetes mellitus ([facility] [address] Diverticulitis, GERD (gastroesophageal reflux disease), History of atrial fibrillation, History of ventricular tachycardia, Kidney calculi, Obesity, and Sleep apnea. Social History:   reports that he quit smoking about 7 years ago. He has a 2.00 pack-year smoking history. He has never used smokeless tobacco. He reports that he drinks alcohol. He reports that he does not use drugs.      Family History:   Family History   Problem Relation Age of Onset    Heart Disease Mother     Heart Disease Father Vitals:  /63   Pulse 68   Temp 97.5 °F (36.4 °C) (Oral)   Resp 18   Ht 6' 1\" (1.854 m)   Wt 267 lb 12.8 oz (121.5 kg)   SpO2 97%   BMI 35.33 kg/m²   Temp (24hrs), Av.8 °F (36.6 °C), Min:97.5 °F (36.4 °C), Max:97.9 °F (36.6 °C)    Recent Labs     19  16519  0712 19  1057   POCGLU 134* 129* 127* 133*       I/O (24Hr): Intake/Output Summary (Last 24 hours) at 2019 1352  Last data filed at 2019 0801  Gross per 24 hour   Intake 2012 ml   Output 1550 ml   Net 462 ml       Labs:    Hematology:  Recent Labs     19  06   WBC 10.3 7.0   HGB 15.7 12.9*   HCT 46.7 37.2*    141     Chemistry:  Recent Labs     19  0614    140   K 4.3 4.0    103   CO2 23 25   GLUCOSE 150* 137*   BUN 14 11   CREATININE 0.97 0.86   MG  --  1.8   ANIONGAP 14 12   LABGLOM >60 >60   GFRAA >60 >60   CALCIUM 9.6 8.8   PHOS  --  2.8     Recent Labs     19  0614   PROT 8.4* 6.9   LABALBU 5.0 4.1   AST 23 17   ALT 31 24   ALKPHOS 80 61   BILITOT 1.69* 1.33*   BILIDIR 0.34*  --    AMYLASE 38 22*   LIPASE 27 16         Lab Results   Component Value Date/Time    SPECIAL NOT REPORTED 2018 06:00 PM     Lab Results   Component Value Date/Time    CULTURE NO SIGNIFICANT GROWTH 2018 06:00 PM    CULTURE  2018 06:00 PM     Performed at [facility] [id number] [place] [address], [address] [place] [address] [phone]       No results found for: POCPH, PHART, PH, POCPCO2, NHL5DXE, PCO2, POCPO2, PO2ART, PO2, POCHCO3, SBI4TXS, HCO3, NBEA, PBEA, BEART, BE, THGBART, THB, CHC1EKR, EZIV4DZT, Y4PZPNQU, O2SAT, FIO2    Radiology:    Ct Abdomen Pelvis W Iv Contrast    Result Date: 2019  EXAMINATION: CT OF THE ABDOMEN AND PELVIS WITH CONTRAST 2019 12:51 am TECHNIQUE: CT of the abdomen and pelvis was performed with the administration of intravenous contrast. Multiplanar reformatted images are provided for review.  Dose modulation, iterative reconstruction, and/or weight based adjustment of the mA/kV was utilized to reduce the radiation dose to as low as reasonably achievable. COMPARISON: 01/16/2019 HISTORY: ORDERING SYSTEM PROVIDED HISTORY: ABDOMINAL PAIN TECHNOLOGIST PROVIDED HISTORY: IV Only Contrast FINDINGS: Lower Chest: ICD in place Visualized portion of the lower chest demonstrates no acute abnormality. Organs: Diffuse hepatic steatosis. Cholecystectomy. No focal liver lesion. Splenomegaly. No focal splenic lesion. Enhanced attenuation pattern of the pancreas and adrenal glands is unremarkable. The kidneys appear normal in size and demonstrate symmetric enhancement. No focal renal mass. No hydronephrosis. No perinephric stranding. 3 cm simple right kidney exophytic cyst. GI/Bowel: Again seen are multiple dilated loops of proximal and mid small bowel, up to the 4.5 cm in diameter with transition point in the mid abdomen where there is a stenotic loop of proximal ileum. There are bowel contents distal to this point and a small amount of bowel contents in the proximal large bowel which is not distended. Again seen are intact appearing anastomotic sutures in the sigmoid colon. Pelvis: The urinary bladder appears unremarkable. The pelvic organs demonstrate no acute abnormality. Peritoneum/Retroperitoneum: The abdominal aorta is normal in caliber. SMA is patent. No fluid collection. No free air. No gross lymphadenopathy. Bones/Soft Tissues: No acute findings. Partial small bowel obstruction as result of stenotic loop of proximal ileum. Fl Small Bowel Follow Through Only    Result Date: 4/5/2019  EXAMINATION: SMALL BOWEL FOLLOW THROUGH SERIES 4/5/2019 TECHNIQUE: Small bowel follow through series was performed with overhead images and spot images. FLUOROSCOPY DOSE AND TYPE OR TIME AND EXPOSURES: 0 minutes of fluoroscopy time. 8 overhead images. COMPARISON: None HISTORY: ORDERING SYSTEM PROVIDED HISTORY: SBFT. History of repeated episodes, most recent January 2019 TECHNOLOGIST PROVIDED HISTORY: SBFT. History of repeated episodes, most recent January 2019 Give contrast per NG tube. Ordering Physician Provided Reason for Exam:  SBFT. Hx of repeated episodes, most recent Jan. 2019; Acuity: Acute Type of Exam: Initial FINDINGS:  images demonstrate multiple dilated loops of small bowel and air-fluid levels. Normal small bowel transit time of between 2-3 hours. Diverticulum of the 3rd portion of the duodenum. Status post cholecystectomy. Osseous structures are grossly unremarkable. Normal small bowel transit time of 2-3 hours. Multiple dilated loops of small bowel suggestive of ileus or possibly partial small bowel obstruction. Duodenal diverticulum. Xr Abdomen (2 Views)    Result Date: 4/5/2019  EXAMINATION: TWO XRAY VIEWS OF THE ABDOMEN 4/5/2019 11:02 am COMPARISON: 23 January 2019 HISTORY: ORDERING SYSTEM PROVIDED HISTORY: f/u sbo TECHNOLOGIST PROVIDED HISTORY: f/u sbo Ordering Physician Provided Reason for Exam: Pt c/o pain to abd, hx of chronic bowel obstructions. Acuity: Chronic Type of Exam: Subsequent/Follow-up FINDINGS: Gas is noted in small bowel colon but there is preferential distention of small-bowel loops with air, maximum diameter of 4.6 cm without pneumatosis, appearance most compatible with partial small bowel obstruction. No organomegaly or free air is noted. Osseous structures are age-appropriate. Lung bases are clear. Cholecystectomy clips are present right upper quadrant. Findings compatible with partial small bowel obstruction with maximum diameter of 4.6 cm. No free air.        Physical Examination:        General appearance:  alert, cooperative and no distress, NG tube in place  Mental Status:  oriented to person, place and time and normal affect  Lungs:  clear to auscultation bilaterally, normal effort  Heart:  regular rate and rhythm, no murmur  Abdomen:  soft, mild discomfort nondistended, sluggish bowel sounds, no masses, hepatomegaly, splenomegaly  Extremities:  no edema, redness, tenderness in the calves  Skin:  no gross lesions, rashes, induration    Assessment:        Primary Problem  SBO (small bowel obstruction) (HCC)-NG tube being clamped    Active Hospital Problems    Diagnosis Date Noted    History of small bowel obstruction [Z87.19] 04/05/2019    Blood clot due to device, implant, or graft [T85.818A]     History of atrial fibrillation [Z86.79]     SBO (small bowel obstruction) ([facility] [address] [facility] [K56.609] 01/03/2019    Essential hypertension [I10] 09/26/2018    Obesity (BMI 30-39. 9) [E66.9] 09/26/2018    Type 2 diabetes mellitus, with long-term current use of insulin ([facility] [E11.9, Z79.4] 07/20/2014    AICD (automatic cardioverter/defibrillator) present [Z95.810] 07/10/2014       Plan:        1. Continue conservative treatment  2. Continue IV Lopressor until allowed to take beta blockers orally  3.  Pain control    Roderick Harada, MD  4/7/2019  1:52 PM

## 2019-04-07 NOTE — PROGRESS NOTES
Patient had a small bowel movement with lot of gas around 0200. Stool was very loose and green in color. After that, patient ambulated the hallway. Tolerated ambulation very well. Patient continues to have prn pain medications throughout the shift around the clock.

## 2019-04-08 LAB
ALBUMIN SERPL-MCNC: 4 G/DL (ref 3.5–5.2)
ALBUMIN/GLOBULIN RATIO: ABNORMAL (ref 1–2.5)
ALP BLD-CCNC: 66 U/L (ref 40–129)
ALT SERPL-CCNC: 37 U/L (ref 5–41)
ANION GAP SERPL CALCULATED.3IONS-SCNC: 8 MMOL/L (ref 9–17)
AST SERPL-CCNC: 28 U/L
BILIRUB SERPL-MCNC: 2.58 MG/DL (ref 0.3–1.2)
BILIRUBIN DIRECT: 0.85 MG/DL
BILIRUBIN, INDIRECT: 1.73 MG/DL (ref 0–1)
BUN BLDV-MCNC: 8 MG/DL (ref 6–20)
CALCIUM SERPL-MCNC: 8.7 MG/DL (ref 8.6–10.4)
CHLORIDE BLD-SCNC: 102 MMOL/L (ref 98–107)
CO2: 28 MMOL/L (ref 20–31)
CREAT SERPL-MCNC: 0.89 MG/DL (ref 0.7–1.2)
GFR AFRICAN AMERICAN: >60 ML/MIN
GFR NON-AFRICAN AMERICAN: >60 ML/MIN
GFR SERPL CREATININE-BSD FRML MDRD: ABNORMAL ML/MIN/{1.73_M2}
GFR SERPL CREATININE-BSD FRML MDRD: ABNORMAL ML/MIN/{1.73_M2}
GLUCOSE BLD-MCNC: 105 MG/DL (ref 75–110)
GLUCOSE BLD-MCNC: 127 MG/DL (ref 75–110)
GLUCOSE BLD-MCNC: 132 MG/DL (ref 70–99)
GLUCOSE BLD-MCNC: 136 MG/DL (ref 75–110)
GLUCOSE BLD-MCNC: 162 MG/DL (ref 75–110)
MAGNESIUM: 1.7 MG/DL (ref 1.6–2.6)
POTASSIUM SERPL-SCNC: 3.9 MMOL/L (ref 3.7–5.3)
SODIUM BLD-SCNC: 138 MMOL/L (ref 135–144)
TOTAL PROTEIN: 6.9 G/DL (ref 6.4–8.3)

## 2019-04-08 PROCEDURE — 82248 BILIRUBIN DIRECT: CPT

## 2019-04-08 PROCEDURE — 36415 COLL VENOUS BLD VENIPUNCTURE: CPT

## 2019-04-08 PROCEDURE — 99232 SBSQ HOSP IP/OBS MODERATE 35: CPT | Performed by: INTERNAL MEDICINE

## 2019-04-08 PROCEDURE — 6360000002 HC RX W HCPCS: Performed by: NURSE PRACTITIONER

## 2019-04-08 PROCEDURE — 2580000003 HC RX 258: Performed by: NURSE PRACTITIONER

## 2019-04-08 PROCEDURE — 1200000000 HC SEMI PRIVATE

## 2019-04-08 PROCEDURE — 83735 ASSAY OF MAGNESIUM: CPT

## 2019-04-08 PROCEDURE — 6370000000 HC RX 637 (ALT 250 FOR IP): Performed by: INTERNAL MEDICINE

## 2019-04-08 PROCEDURE — 6360000002 HC RX W HCPCS: Performed by: INTERNAL MEDICINE

## 2019-04-08 PROCEDURE — 6370000000 HC RX 637 (ALT 250 FOR IP): Performed by: SURGERY

## 2019-04-08 PROCEDURE — 6360000002 HC RX W HCPCS: Performed by: SURGERY

## 2019-04-08 PROCEDURE — 82947 ASSAY GLUCOSE BLOOD QUANT: CPT

## 2019-04-08 PROCEDURE — 80053 COMPREHEN METABOLIC PANEL: CPT

## 2019-04-08 PROCEDURE — 2500000003 HC RX 250 WO HCPCS: Performed by: NURSE PRACTITIONER

## 2019-04-08 RX ORDER — METOCLOPRAMIDE HYDROCHLORIDE 5 MG/ML
10 INJECTION INTRAMUSCULAR; INTRAVENOUS EVERY 6 HOURS
Status: DISCONTINUED | OUTPATIENT
Start: 2019-04-08 | End: 2019-04-09

## 2019-04-08 RX ORDER — ASPIRIN 81 MG/1
81 TABLET ORAL DAILY
COMMUNITY

## 2019-04-08 RX ORDER — BISACODYL 10 MG
10 SUPPOSITORY, RECTAL RECTAL ONCE
Status: COMPLETED | OUTPATIENT
Start: 2019-04-08 | End: 2019-04-08

## 2019-04-08 RX ORDER — MAGNESIUM SULFATE 1 G/100ML
1 INJECTION INTRAVENOUS
Status: DISPENSED | OUTPATIENT
Start: 2019-04-08 | End: 2019-04-08

## 2019-04-08 RX ADMIN — HYDROMORPHONE HYDROCHLORIDE 1 MG: 1 INJECTION, SOLUTION INTRAMUSCULAR; INTRAVENOUS; SUBCUTANEOUS at 10:42

## 2019-04-08 RX ADMIN — MAGNESIUM SULFATE HEPTAHYDRATE 1 G: 1 INJECTION, SOLUTION INTRAVENOUS at 11:03

## 2019-04-08 RX ADMIN — HYDROMORPHONE HYDROCHLORIDE 1 MG: 1 INJECTION, SOLUTION INTRAMUSCULAR; INTRAVENOUS; SUBCUTANEOUS at 23:25

## 2019-04-08 RX ADMIN — METOCLOPRAMIDE 10 MG: 5 INJECTION, SOLUTION INTRAMUSCULAR; INTRAVENOUS at 15:08

## 2019-04-08 RX ADMIN — METOCLOPRAMIDE 10 MG: 5 INJECTION, SOLUTION INTRAMUSCULAR; INTRAVENOUS at 21:40

## 2019-04-08 RX ADMIN — SOTALOL HYDROCHLORIDE 160 MG: 80 TABLET ORAL at 21:40

## 2019-04-08 RX ADMIN — HYDROMORPHONE HYDROCHLORIDE 1 MG: 1 INJECTION, SOLUTION INTRAMUSCULAR; INTRAVENOUS; SUBCUTANEOUS at 06:25

## 2019-04-08 RX ADMIN — Medication 10 ML: at 08:29

## 2019-04-08 RX ADMIN — ENOXAPARIN SODIUM 30 MG: 40 INJECTION SUBCUTANEOUS at 08:29

## 2019-04-08 RX ADMIN — HYDROMORPHONE HYDROCHLORIDE 1 MG: 1 INJECTION, SOLUTION INTRAMUSCULAR; INTRAVENOUS; SUBCUTANEOUS at 19:21

## 2019-04-08 RX ADMIN — FAMOTIDINE 20 MG: 10 INJECTION, SOLUTION INTRAVENOUS at 21:39

## 2019-04-08 RX ADMIN — BISACODYL 10 MG: 10 SUPPOSITORY RECTAL at 11:03

## 2019-04-08 RX ADMIN — INSULIN LISPRO 2 UNITS: 100 INJECTION, SOLUTION INTRAVENOUS; SUBCUTANEOUS at 12:05

## 2019-04-08 RX ADMIN — ENOXAPARIN SODIUM 30 MG: 40 INJECTION SUBCUTANEOUS at 21:40

## 2019-04-08 RX ADMIN — POTASSIUM CHLORIDE, DEXTROSE MONOHYDRATE AND SODIUM CHLORIDE: 150; 5; 450 INJECTION, SOLUTION INTRAVENOUS at 00:35

## 2019-04-08 RX ADMIN — MAGNESIUM SULFATE HEPTAHYDRATE 1 G: 1 INJECTION, SOLUTION INTRAVENOUS at 12:05

## 2019-04-08 RX ADMIN — FAMOTIDINE 20 MG: 10 INJECTION, SOLUTION INTRAVENOUS at 08:29

## 2019-04-08 RX ADMIN — HYDROMORPHONE HYDROCHLORIDE 1 MG: 1 INJECTION, SOLUTION INTRAMUSCULAR; INTRAVENOUS; SUBCUTANEOUS at 15:08

## 2019-04-08 RX ADMIN — METOCLOPRAMIDE 10 MG: 5 INJECTION, SOLUTION INTRAMUSCULAR; INTRAVENOUS at 11:01

## 2019-04-08 RX ADMIN — SOTALOL HYDROCHLORIDE 160 MG: 80 TABLET ORAL at 08:29

## 2019-04-08 RX ADMIN — HYDROMORPHONE HYDROCHLORIDE 1 MG: 1 INJECTION, SOLUTION INTRAMUSCULAR; INTRAVENOUS; SUBCUTANEOUS at 01:51

## 2019-04-08 ASSESSMENT — PAIN SCALES - GENERAL
PAINLEVEL_OUTOF10: 7
PAINLEVEL_OUTOF10: 5
PAINLEVEL_OUTOF10: 8
PAINLEVEL_OUTOF10: 8
PAINLEVEL_OUTOF10: 7
PAINLEVEL_OUTOF10: 5
PAINLEVEL_OUTOF10: 7
PAINLEVEL_OUTOF10: 9

## 2019-04-08 ASSESSMENT — PAIN DESCRIPTION - FREQUENCY
FREQUENCY: INTERMITTENT
FREQUENCY: INTERMITTENT

## 2019-04-08 ASSESSMENT — PAIN DESCRIPTION - DESCRIPTORS
DESCRIPTORS: ACHING
DESCRIPTORS: ACHING

## 2019-04-08 ASSESSMENT — PAIN DESCRIPTION - ONSET
ONSET: ON-GOING
ONSET: ON-GOING

## 2019-04-08 ASSESSMENT — PAIN DESCRIPTION - LOCATION
LOCATION: ABDOMEN
LOCATION: ABDOMEN

## 2019-04-08 ASSESSMENT — PAIN DESCRIPTION - ORIENTATION
ORIENTATION: LEFT;LOWER
ORIENTATION: LEFT;LOWER

## 2019-04-08 ASSESSMENT — PAIN DESCRIPTION - PAIN TYPE
TYPE: ACUTE PAIN
TYPE: ACUTE PAIN

## 2019-04-08 NOTE — PROGRESS NOTES
edema, palpitations  Gastrointestinal:  Positive for abdominal pain, constipation, negative for nausea, vomiting  Neurological:  negative for dizziness, headache    Medications: Allergies: Allergies   Allergen Reactions    Benadryl [Diphenhydramine] Hives    Ciprofloxacin      Prolonged QT and developed VT. 2/2018    Pcn [Penicillins] Hives and Swelling       Current Meds:   Scheduled Meds:    magnesium sulfate  1 g Intravenous Q1H    metoclopramide  10 mg Intravenous Q6H    sotalol  160 mg Oral BID    sodium chloride flush  10 mL Intravenous 2 times per day    famotidine (PEPCID) injection  20 mg Intravenous BID    enoxaparin  30 mg Subcutaneous BID    insulin lispro  0-12 Units Subcutaneous TID WC    insulin lispro  0-6 Units Subcutaneous Nightly     Continuous Infusions:    dextrose 5% and 0.45% NaCl with KCl 20 mEq 50 mL/hr at 04/08/19 1028    dextrose       PRN Meds: sodium chloride flush, potassium chloride **OR** potassium alternative oral replacement **OR** potassium chloride, magnesium sulfate, magnesium hydroxide, acetaminophen, glucose, dextrose, glucagon (rDNA), dextrose, ondansetron **OR** ondansetron, HYDROmorphone **OR** HYDROmorphone, diatrizoate meglumine-sodium, hydrALAZINE    Data:     Past Medical History:   has a past medical history of ADHD, predominantly hyperactive type, Anemia, Blood clot due to device, implant, or graft, Bowel obstruction ([facility] [address] CAD (coronary artery disease), Cardiomyopathy ([facility] [address] Diabetes mellitus ([facility] [address] Diverticulitis, GERD (gastroesophageal reflux disease), History of atrial fibrillation, History of ventricular tachycardia, Kidney calculi, Obesity, and Sleep apnea. Social History:   reports that he quit smoking about 7 years ago. He has a 2.00 pack-year smoking history. He has never used smokeless tobacco. He reports that he drinks alcohol. He reports that he does not use drugs.      Family History:   Family History   Problem Relation Age of Onset  Heart Disease Mother     Heart Disease Father        Vitals:  /65   Pulse 69   Temp 98.2 °F (36.8 °C) (Oral)   Resp 18   Ht 6' 1\" (1.854 m)   Wt 266 lb 12.8 oz (121 kg)   SpO2 96%   BMI 35.20 kg/m²   Temp (24hrs), Av.9 °F (36.6 °C), Min:97.7 °F (36.5 °C), Max:98.2 °F (36.8 °C)    Recent Labs     19  1057 19  0629   POCGLU 133* 178* 114* 127*       I/O (24Hr): Intake/Output Summary (Last 24 hours) at 2019 1118  Last data filed at 2019 0536  Gross per 24 hour   Intake 2867 ml   Output 2850 ml   Net 17 ml       Labs:    Hematology:  Recent Labs     19  0614   WBC 7.0   RBC 4.45*   HGB 12.9*   HCT 37.2*   MCV 83.7   MCH 28.9   MCHC 34.5   RDW 18.7*      MPV 7.4     Chemistry:  Recent Labs     19  0614 19  0546    138   K 4.0 3.9    102   CO2 25 28   GLUCOSE 137* 132*   BUN 11 8   CREATININE 0.86 0.89   MG 1.8 1.7   ANIONGAP 12 8*   LABGLOM >60 >60   GFRAA >60 >60   CALCIUM 8.8 8.7   PHOS 2.8  --      Recent Labs     19  0619  0712 19  1057 19  1733 04/07/19  2032 04/08/19  0546 19  0629   PROT 6.9  --   --   --   --   --   --  6.9  --    LABALBU 4.1  --   --   --   --   --   --  4.0  --    AST 17  --   --   --   --   --   --  28  --    ALT 24  --   --   --   --   --   --  37  --    ALKPHOS 61  --   --   --   --   --   --  66  --    BILITOT 1.33*  --   --   --   --   --   --  2.58*  --    BILIDIR  --   --   --   --   --   --   --  0.85*  --    AMYLASE 22*  --   --   --   --   --   --   --   --    LIPASE 16  --   --   --   --   --   --   --   --    POCGLU  --    < > 129* 127* 133* 178* 114*  --  127*    < > = values in this interval not displayed.          Lab Results   Component Value Date/Time    SPECIAL NOT REPORTED 2018 06:00 PM     Lab Results   Component Value Date/Time    CULTURE NO SIGNIFICANT GROWTH 2018 06:00 PM    CULTURE  2018 06:00 PM

## 2019-04-08 NOTE — PLAN OF CARE
Problem: Pain:  Goal: Pain level will decrease  Description  Pain level will decrease  4/8/2019 0847 by Curry Keith RN  Outcome: Ongoing  4/8/2019 0214 by Toby Wilson RN  Outcome: Ongoing  Note:   Pain level assessed and rated on a 0-10 scale  Assess characteristics of pain  PRN pain medication given per pt request  Non-pharmacological interventions implemented  Report ineffective pain management to physician  Update pt and family of any changes  Pt instructed to call out with new onset of pain  Continue to monitor    Goal: Control of acute pain  Description  Control of acute pain  Outcome: Ongoing     Problem: Falls - Risk of:  Goal: Will remain free from falls  Description  Will remain free from falls  4/8/2019 0847 by Curry Keith RN  Outcome: Ongoing  4/8/2019 0214 by Toby Wilson RN  Outcome: Ongoing  Note:   Room free of clutter  Hourly rounding   Non-skid socks worn  Side rails up x2  Bed low and locked  Call light in reach  Instructed to call out before getting out of bed  Anticipatory needs met  Bed alarm on  Falling star at the door and on wristband    Goal: Absence of physical injury  Description  Absence of physical injury  Outcome: Ongoing     Problem: SAFETY  Goal: LTG - patient will adhere to hip precautions during ADL's and transfers  Outcome: Ongoing  Goal: LTG - Patient will demonstrate safety requirements appropriate to situation/environment  Outcome: Ongoing  Goal: LTG - patient will utilize safety techniques  Outcome: Ongoing  Goal: STG - patient locks brakes on wheelchair  Outcome: Ongoing  Goal: STG - Patient uses call light consistently to request assistance with transfers  Outcome: Ongoing  Goal: STG - patient uses gait belt during all transfers  Outcome: Ongoing     Problem: Nausea/Vomiting:  Goal: Absence of nausea/vomiting  Description  Absence of nausea/vomiting  Outcome: Ongoing  Goal: Able to drink  Description  Able to drink  Outcome: Ongoing  Goal: Able to eat  Description  Able to eat  Outcome: Ongoing  Goal: Ability to achieve adequate nutritional intake will improve  Description  Ability to achieve adequate nutritional intake will improve  Outcome: Ongoing

## 2019-04-08 NOTE — PLAN OF CARE
Problem: Pain:  Goal: Pain level will decrease  Description  Pain level will decrease  Outcome: Ongoing  Note:   Pain level assessed and rated on a 0-10 scale  Assess characteristics of pain  PRN pain medication given per pt request  Non-pharmacological interventions implemented  Report ineffective pain management to physician  Update pt and family of any changes  Pt instructed to call out with new onset of pain  Continue to monitor       Problem: Falls - Risk of:  Goal: Will remain free from falls  Description  Will remain free from falls  4/8/2019 0214 by Odalis Chanel RN  Outcome: Ongoing  Note:   Room free of clutter  Hourly rounding   Non-skid socks worn  Side rails up x2  Bed low and locked  Call light in reach  Instructed to call out before getting out of bed  Anticipatory needs met  Bed alarm on  Falling star at the door and on wristband    4/7/2019 1844 by Dinora Almeida RN  Outcome: Ongoing

## 2019-04-09 LAB
ANION GAP SERPL CALCULATED.3IONS-SCNC: 11 MMOL/L (ref 9–17)
BUN BLDV-MCNC: 8 MG/DL (ref 6–20)
BUN/CREAT BLD: 9 (ref 9–20)
CALCIUM SERPL-MCNC: 9 MG/DL (ref 8.6–10.4)
CHLORIDE BLD-SCNC: 102 MMOL/L (ref 98–107)
CO2: 27 MMOL/L (ref 20–31)
CREAT SERPL-MCNC: 0.93 MG/DL (ref 0.7–1.2)
GFR AFRICAN AMERICAN: >60 ML/MIN
GFR NON-AFRICAN AMERICAN: >60 ML/MIN
GFR SERPL CREATININE-BSD FRML MDRD: ABNORMAL ML/MIN/{1.73_M2}
GFR SERPL CREATININE-BSD FRML MDRD: ABNORMAL ML/MIN/{1.73_M2}
GLUCOSE BLD-MCNC: 111 MG/DL (ref 75–110)
GLUCOSE BLD-MCNC: 119 MG/DL (ref 75–110)
GLUCOSE BLD-MCNC: 121 MG/DL (ref 75–110)
GLUCOSE BLD-MCNC: 131 MG/DL (ref 70–99)
GLUCOSE BLD-MCNC: 174 MG/DL (ref 75–110)
MAGNESIUM: 2 MG/DL (ref 1.6–2.6)
POTASSIUM SERPL-SCNC: 4.4 MMOL/L (ref 3.7–5.3)
SODIUM BLD-SCNC: 140 MMOL/L (ref 135–144)

## 2019-04-09 PROCEDURE — 6360000002 HC RX W HCPCS: Performed by: INTERNAL MEDICINE

## 2019-04-09 PROCEDURE — 1200000000 HC SEMI PRIVATE

## 2019-04-09 PROCEDURE — 6360000002 HC RX W HCPCS: Performed by: SURGERY

## 2019-04-09 PROCEDURE — 6370000000 HC RX 637 (ALT 250 FOR IP): Performed by: INTERNAL MEDICINE

## 2019-04-09 PROCEDURE — 6360000002 HC RX W HCPCS: Performed by: NURSE PRACTITIONER

## 2019-04-09 PROCEDURE — 83735 ASSAY OF MAGNESIUM: CPT

## 2019-04-09 PROCEDURE — 99232 SBSQ HOSP IP/OBS MODERATE 35: CPT | Performed by: INTERNAL MEDICINE

## 2019-04-09 PROCEDURE — 80048 BASIC METABOLIC PNL TOTAL CA: CPT

## 2019-04-09 PROCEDURE — 82947 ASSAY GLUCOSE BLOOD QUANT: CPT

## 2019-04-09 PROCEDURE — 2580000003 HC RX 258: Performed by: NURSE PRACTITIONER

## 2019-04-09 PROCEDURE — 6370000000 HC RX 637 (ALT 250 FOR IP): Performed by: SURGERY

## 2019-04-09 PROCEDURE — 36415 COLL VENOUS BLD VENIPUNCTURE: CPT

## 2019-04-09 PROCEDURE — 2500000003 HC RX 250 WO HCPCS: Performed by: NURSE PRACTITIONER

## 2019-04-09 PROCEDURE — 2500000003 HC RX 250 WO HCPCS: Performed by: SURGERY

## 2019-04-09 RX ORDER — OXYCODONE HYDROCHLORIDE AND ACETAMINOPHEN 5; 325 MG/1; MG/1
2 TABLET ORAL EVERY 4 HOURS PRN
Status: DISCONTINUED | OUTPATIENT
Start: 2019-04-09 | End: 2019-04-10 | Stop reason: HOSPADM

## 2019-04-09 RX ORDER — METOCLOPRAMIDE 10 MG/1
10 TABLET ORAL
Status: DISCONTINUED | OUTPATIENT
Start: 2019-04-09 | End: 2019-04-10 | Stop reason: HOSPADM

## 2019-04-09 RX ORDER — OXYCODONE HYDROCHLORIDE AND ACETAMINOPHEN 5; 325 MG/1; MG/1
1 TABLET ORAL EVERY 4 HOURS PRN
Status: DISCONTINUED | OUTPATIENT
Start: 2019-04-09 | End: 2019-04-10 | Stop reason: HOSPADM

## 2019-04-09 RX ADMIN — SOTALOL HYDROCHLORIDE 160 MG: 80 TABLET ORAL at 21:47

## 2019-04-09 RX ADMIN — ENOXAPARIN SODIUM 30 MG: 40 INJECTION SUBCUTANEOUS at 21:47

## 2019-04-09 RX ADMIN — Medication 10 ML: at 21:47

## 2019-04-09 RX ADMIN — FAMOTIDINE 20 MG: 10 INJECTION, SOLUTION INTRAVENOUS at 09:07

## 2019-04-09 RX ADMIN — SODIUM CHLORIDE 10 ML: 9 INJECTION INTRAMUSCULAR; INTRAVENOUS; SUBCUTANEOUS at 09:07

## 2019-04-09 RX ADMIN — POTASSIUM CHLORIDE, DEXTROSE MONOHYDRATE AND SODIUM CHLORIDE: 150; 5; 450 INJECTION, SOLUTION INTRAVENOUS at 03:16

## 2019-04-09 RX ADMIN — INSULIN LISPRO 1 UNITS: 100 INJECTION, SOLUTION INTRAVENOUS; SUBCUTANEOUS at 21:46

## 2019-04-09 RX ADMIN — METOCLOPRAMIDE 10 MG: 5 INJECTION, SOLUTION INTRAMUSCULAR; INTRAVENOUS at 05:37

## 2019-04-09 RX ADMIN — HYDROMORPHONE HYDROCHLORIDE 1 MG: 1 INJECTION, SOLUTION INTRAMUSCULAR; INTRAVENOUS; SUBCUTANEOUS at 11:46

## 2019-04-09 RX ADMIN — METOCLOPRAMIDE 10 MG: 10 TABLET ORAL at 11:48

## 2019-04-09 RX ADMIN — HYDROMORPHONE HYDROCHLORIDE 1 MG: 1 INJECTION, SOLUTION INTRAMUSCULAR; INTRAVENOUS; SUBCUTANEOUS at 15:57

## 2019-04-09 RX ADMIN — METOCLOPRAMIDE 10 MG: 5 INJECTION, SOLUTION INTRAMUSCULAR; INTRAVENOUS at 10:42

## 2019-04-09 RX ADMIN — OXYCODONE AND ACETAMINOPHEN 2 TABLET: 5; 325 TABLET ORAL at 20:03

## 2019-04-09 RX ADMIN — METOCLOPRAMIDE 10 MG: 10 TABLET ORAL at 17:30

## 2019-04-09 RX ADMIN — HYDROMORPHONE HYDROCHLORIDE 1 MG: 1 INJECTION, SOLUTION INTRAMUSCULAR; INTRAVENOUS; SUBCUTANEOUS at 07:36

## 2019-04-09 RX ADMIN — FAMOTIDINE 20 MG: 10 INJECTION, SOLUTION INTRAVENOUS at 21:47

## 2019-04-09 RX ADMIN — ENOXAPARIN SODIUM 30 MG: 40 INJECTION SUBCUTANEOUS at 09:06

## 2019-04-09 RX ADMIN — SOTALOL HYDROCHLORIDE 160 MG: 80 TABLET ORAL at 09:07

## 2019-04-09 RX ADMIN — METOCLOPRAMIDE 10 MG: 10 TABLET ORAL at 21:47

## 2019-04-09 RX ADMIN — HYDROMORPHONE HYDROCHLORIDE 1 MG: 1 INJECTION, SOLUTION INTRAMUSCULAR; INTRAVENOUS; SUBCUTANEOUS at 03:32

## 2019-04-09 ASSESSMENT — PAIN DESCRIPTION - ONSET
ONSET: GRADUAL
ONSET: ON-GOING

## 2019-04-09 ASSESSMENT — PAIN DESCRIPTION - FREQUENCY
FREQUENCY: INTERMITTENT
FREQUENCY: INTERMITTENT
FREQUENCY: CONTINUOUS
FREQUENCY: INTERMITTENT

## 2019-04-09 ASSESSMENT — PAIN DESCRIPTION - ORIENTATION
ORIENTATION: LEFT;LOWER
ORIENTATION: UPPER
ORIENTATION: LEFT;LOWER

## 2019-04-09 ASSESSMENT — PAIN DESCRIPTION - PAIN TYPE
TYPE: ACUTE PAIN

## 2019-04-09 ASSESSMENT — PAIN SCALES - GENERAL
PAINLEVEL_OUTOF10: 8
PAINLEVEL_OUTOF10: 5
PAINLEVEL_OUTOF10: 7
PAINLEVEL_OUTOF10: 4
PAINLEVEL_OUTOF10: 7
PAINLEVEL_OUTOF10: 7
PAINLEVEL_OUTOF10: 8
PAINLEVEL_OUTOF10: 7
PAINLEVEL_OUTOF10: 5

## 2019-04-09 ASSESSMENT — PAIN DESCRIPTION - PROGRESSION
CLINICAL_PROGRESSION: GRADUALLY IMPROVING
CLINICAL_PROGRESSION: GRADUALLY IMPROVING
CLINICAL_PROGRESSION: GRADUALLY WORSENING
CLINICAL_PROGRESSION: GRADUALLY IMPROVING

## 2019-04-09 ASSESSMENT — PAIN DESCRIPTION - DESCRIPTORS
DESCRIPTORS: ACHING
DESCRIPTORS: DISCOMFORT;BURNING;ACHING
DESCRIPTORS: ACHING

## 2019-04-09 ASSESSMENT — PAIN DESCRIPTION - LOCATION
LOCATION: ABDOMEN

## 2019-04-09 ASSESSMENT — PAIN - FUNCTIONAL ASSESSMENT: PAIN_FUNCTIONAL_ASSESSMENT: ACTIVITIES ARE NOT PREVENTED

## 2019-04-09 NOTE — CARE COORDINATION
Possible discharge tomorrow. Spoke to Dr. Kwame Stokes office earlier and was told to have pt call office to schedule post op appointment. Pt informed.

## 2019-04-09 NOTE — PLAN OF CARE
Problem: Pain:  Goal: Pain level will decrease  Description  Pain level will decrease  Outcome: Ongoing  Goal: Control of acute pain  Description  Control of acute pain  Outcome: Ongoing     Problem: Falls - Risk of:  Goal: Will remain free from falls  Description  Will remain free from falls  Outcome: Ongoing  Goal: Absence of physical injury  Description  Absence of physical injury  Outcome: Ongoing     Problem: SAFETY  Goal: LTG - patient will adhere to hip precautions during ADL's and transfers  Outcome: Ongoing  Goal: LTG - Patient will demonstrate safety requirements appropriate to situation/environment  Outcome: Ongoing  Goal: LTG - patient will utilize safety techniques  Outcome: Ongoing  Goal: STG - patient locks brakes on wheelchair  Outcome: Ongoing  Goal: STG - Patient uses call light consistently to request assistance with transfers  Outcome: Ongoing  Goal: STG - patient uses gait belt during all transfers  Outcome: Ongoing     Problem: Nausea/Vomiting:  Goal: Absence of nausea/vomiting  Description  Absence of nausea/vomiting  Outcome: Ongoing  Goal: Able to drink  Description  Able to drink  Outcome: Ongoing  Goal: Able to eat  Description  Able to eat  Outcome: Ongoing  Goal: Ability to achieve adequate nutritional intake will improve  Description  Ability to achieve adequate nutritional intake will improve  Outcome: Ongoing     Problem:  Bowel/Gastric:  Goal: Control of bowel function will improve  Description  Control of bowel function will improve  Outcome: Ongoing  Goal: Ability to achieve a regular elimination pattern will improve  Description  Ability to achieve a regular elimination pattern will improve  Outcome: Ongoing     Problem: Nutritional:  Goal: Ability to follow a diet with enough fiber (20 to 30 grams) for normal bowel function will improve  Description  Ability to follow a diet with enough fiber (20 to 30 grams) for normal bowel function will improve  Outcome: Ongoing     Problem: Skin Integrity:  Goal: Risk for impaired skin integrity will decrease  Description  Risk for impaired skin integrity will decrease  Outcome: Ongoing

## 2019-04-09 NOTE — PROGRESS NOTES
[facility]    Progress Note    4/9/2019    10:45 AM    Name:   Johnathon Mart  MRN:     [id number]     Rod:      [de-identified]   Room:   2024/2024-01  IP Day:  4  Admit Date:  4/5/2019 12:39 AM    PCP:   Norma Swift MD  Code Status:  Full Code    Subjective:     C/C:   Chief Complaint   Patient presents with    Abdominal Pain     h/o bowel obst     Interval History Status: improved. No acute issues overnight  Had BM last night  Passing flatus this AM  Tolerating PO  No N/V  Abdominal pain controlled      Brief History:     The patient is a 50 y. o.  Male with c/o abdominal pain/distention and N/V, he is admitted to the hospital for the management of [address] patient presented to the ER today with a one-day history of left-sided abdominal pain with nausea and vomiting. He has bloating/pain to the left upper abdomen and stabbing pain in the left lower abdomen into the groin. There is no guarding or rebound tenderness.  He states over the last week he's had normal stools with episodes of intermittent diarrhea as well. The patient reports a long history of bowel obstructions that started after a perforated diverticulum in 1996. The patient has been hospitalized 3 other times since December 2018 for bowel obstructions. The patient reports he is well known to Dr. Veena Narayanan also expresses concerns about not being able to take his metoprolol and sotalol. He said in the past when he has been NPO because of obstructions he typically has runs of V. tach within 24 hours of not receiving his normal meds. Dr. Posey is his EP/cardiologist.         Review of Systems:     Constitutional:  negative for chills, fevers, sweats  Respiratory:  negative for cough, dyspnea on exertion, shortness of breath, wheezing  Cardiovascular:  negative for chest pain, chest pressure/discomfort, lower extremity edema, palpitations  Gastrointestinal: Positive for abdominal pain, constipation, negative for nausea, vomiting  Neurological:  negative for dizziness, headache    Medications: Allergies: Allergies   Allergen Reactions    Benadryl [Diphenhydramine] Hives    Ciprofloxacin      Prolonged QT and developed VT. 2/2018    Pcn [Penicillins] Hives and Swelling       Current Meds:   Scheduled Meds:    metoclopramide  10 mg Intravenous Q6H    sotalol  160 mg Oral BID    sodium chloride flush  10 mL Intravenous 2 times per day    famotidine (PEPCID) injection  20 mg Intravenous BID    enoxaparin  30 mg Subcutaneous BID    insulin lispro  0-12 Units Subcutaneous TID WC    insulin lispro  0-6 Units Subcutaneous Nightly     Continuous Infusions:    dextrose 5% and 0.45% NaCl with KCl 20 mEq 50 mL/hr at 04/09/19 0316    dextrose       PRN Meds: sodium chloride flush, potassium chloride **OR** potassium alternative oral replacement **OR** potassium chloride, magnesium sulfate, magnesium hydroxide, acetaminophen, glucose, dextrose, glucagon (rDNA), dextrose, ondansetron **OR** ondansetron, HYDROmorphone **OR** HYDROmorphone, diatrizoate meglumine-sodium, hydrALAZINE    Data:     Past Medical History:   has a past medical history of ADHD, predominantly hyperactive type, Anemia, Blood clot due to device, implant, or graft, Bowel obstruction ([address] CAD (coronary artery disease), Cardiomyopathy ([facility] Diabetes mellitus ([facility] Diverticulitis, GERD (gastroesophageal reflux disease), History of atrial fibrillation, History of ventricular tachycardia, Kidney calculi, Obesity, and Sleep apnea. Social History:   reports that he quit smoking about 7 years ago. He has a 2.00 pack-year smoking history. He has never used smokeless tobacco. He reports that he drinks alcohol. He reports that he does not use drugs.      Family History:   Family History   Problem Relation Age of Onset    Heart Disease Mother     Heart Disease Father        Vitals:  BP (!) 113/58 Pulse 64   Temp 97.7 °F (36.5 °C) (Oral)   Resp 16   Ht 6' 1\" (1.854 m)   Wt 267 lb 3.2 oz (121.2 kg)   SpO2 96%   BMI 35.25 kg/m²   Temp (24hrs), Av.8 °F (36.6 °C), Min:97.5 °F (36.4 °C), Max:98.1 °F (36.7 °C)    Recent Labs     19  0644   POCGLU 162* 136* 105 119*       I/O (24Hr): Intake/Output Summary (Last 24 hours) at 2019 1045  Last data filed at 2019 0900  Gross per 24 hour   Intake 1080 ml   Output 2200 ml   Net -1120 ml       Labs:    Hematology:  No results for input(s): WBC, RBC, HGB, HCT, MCV, MCH, MCHC, RDW, PLT, MPV, SEDRATE, CRP, INR, DDIMER, RR3HBKCJ, LABABSO in the last 72 hours.     Invalid input(s): PT  Chemistry:  Recent Labs     19  0546 19  0642    140   K 3.9 4.4    102   CO2 28 27   GLUCOSE 132* 131*   BUN 8 8   CREATININE 0.89 0.93   MG 1.7 2.0   ANIONGAP 8* 11   LABGLOM >60 >60   GFRAA >60 >60   CALCIUM 8.7 9.0     Recent Labs     19  0546 19  0629 19  0644   PROT  --  6.9  --   --   --   --   --    LABALBU  --  4.0  --   --   --   --   --    AST  --  28  --   --   --   --   --    ALT  --  37  --   --   --   --   --    ALKPHOS  --  66  --   --   --   --   --    BILITOT  --  2.58*  --   --   --   --   --    BILIDIR  --  0.85*  --   --   --   --   --    POCGLU 114*  --  127* 162* 136* 105 119*         Lab Results   Component Value Date/Time    SPECIAL NOT REPORTED 2018 06:00 PM     Lab Results   Component Value Date/Time    CULTURE NO SIGNIFICANT GROWTH 2018 06:00 PM    CULTURE  2018 06:00 PM     Performed at [facility] [id number] [place] [address], [address] [place] [address] [phone]       No results found for: POCPH, PHART, PH, POCPCO2, PIR1DJJ, PCO2, POCPO2, PO2ART, PO2, POCHCO3, QYD4URI, HCO3, NBEA, PBEA, BEART, BE, THGBART, THB, IVG3OXW, TVTR9XVY, P1JIZYEX, O2SAT, FIO2      Physical Examination:        General appearance:  alert, cooperative and no distress  Mental Status:  oriented to person, place and time and normal affect  Lungs:  clear to auscultation bilaterally, normal effort  Heart:  regular rate and rhythm  Abdomen:  soft, slightly tender, distended, bowel sounds decreased   Extremities:  no edema, redness, tenderness in the calves  Skin:  no gross lesions, rashes, induration    Assessment:        Primary Problem  SBO (small bowel obstruction) ([facility]    Active Hospital Problems    Diagnosis Date Noted    History of small bowel obstruction [Z87.19] 04/05/2019    Blood clot due to device, implant, or graft [T85.818A]     History of atrial fibrillation [Z86.79]     SBO (small bowel obstruction) ([facility] [K56.609] 01/03/2019    Essential hypertension [I10] 09/26/2018    Obesity (BMI 30-39. 9) [E66.9] 09/26/2018    Type 2 diabetes mellitus, with long-term current use of insulin ([facility] [E11.9, Z79.4] 07/20/2014    AICD (automatic cardioverter/defibrillator) present [Z95.810] 07/10/2014       Plan:        - Surgery following - NG removed, advance diet, reglan  - Cardiology following - IV BB while NPO, sotalol when able to take PO  - Labs/imaging reviewed  - Diet advancement per surgery  - Monitor and replace electrolytes  - Wean IV pain medications  - Resume PO home medications as tolerated  - Follow up labs as ordered  - DC when cleared by surgery         Holli Bernard MD  4/9/2019  10:45 AM

## 2019-04-10 VITALS
HEIGHT: 73 IN | WEIGHT: 266.7 LBS | TEMPERATURE: 97.7 F | HEART RATE: 68 BPM | DIASTOLIC BLOOD PRESSURE: 71 MMHG | RESPIRATION RATE: 16 BRPM | BODY MASS INDEX: 35.35 KG/M2 | SYSTOLIC BLOOD PRESSURE: 121 MMHG | OXYGEN SATURATION: 99 %

## 2019-04-10 LAB
ALBUMIN SERPL-MCNC: 4 G/DL (ref 3.5–5.2)
ALBUMIN/GLOBULIN RATIO: ABNORMAL (ref 1–2.5)
ALP BLD-CCNC: 64 U/L (ref 40–129)
ALT SERPL-CCNC: 53 U/L (ref 5–41)
ANION GAP SERPL CALCULATED.3IONS-SCNC: 9 MMOL/L (ref 9–17)
AST SERPL-CCNC: 27 U/L
BILIRUB SERPL-MCNC: 1.35 MG/DL (ref 0.3–1.2)
BILIRUBIN DIRECT: 0.31 MG/DL
BILIRUBIN, INDIRECT: 1.04 MG/DL (ref 0–1)
BUN BLDV-MCNC: 12 MG/DL (ref 6–20)
CALCIUM SERPL-MCNC: 8.8 MG/DL (ref 8.6–10.4)
CHLORIDE BLD-SCNC: 100 MMOL/L (ref 98–107)
CO2: 27 MMOL/L (ref 20–31)
CREAT SERPL-MCNC: 0.88 MG/DL (ref 0.7–1.2)
GFR AFRICAN AMERICAN: >60 ML/MIN
GFR NON-AFRICAN AMERICAN: >60 ML/MIN
GFR SERPL CREATININE-BSD FRML MDRD: ABNORMAL ML/MIN/{1.73_M2}
GFR SERPL CREATININE-BSD FRML MDRD: ABNORMAL ML/MIN/{1.73_M2}
GLUCOSE BLD-MCNC: 124 MG/DL (ref 75–110)
GLUCOSE BLD-MCNC: 146 MG/DL (ref 70–99)
GLUCOSE BLD-MCNC: 193 MG/DL (ref 75–110)
MAGNESIUM: 1.9 MG/DL (ref 1.6–2.6)
POTASSIUM SERPL-SCNC: 4.3 MMOL/L (ref 3.7–5.3)
SODIUM BLD-SCNC: 136 MMOL/L (ref 135–144)
TOTAL PROTEIN: 6.7 G/DL (ref 6.4–8.3)

## 2019-04-10 PROCEDURE — 2500000003 HC RX 250 WO HCPCS: Performed by: NURSE PRACTITIONER

## 2019-04-10 PROCEDURE — 82947 ASSAY GLUCOSE BLOOD QUANT: CPT

## 2019-04-10 PROCEDURE — 2580000003 HC RX 258: Performed by: NURSE PRACTITIONER

## 2019-04-10 PROCEDURE — 6370000000 HC RX 637 (ALT 250 FOR IP): Performed by: INTERNAL MEDICINE

## 2019-04-10 PROCEDURE — 6370000000 HC RX 637 (ALT 250 FOR IP): Performed by: SURGERY

## 2019-04-10 PROCEDURE — 6360000002 HC RX W HCPCS: Performed by: INTERNAL MEDICINE

## 2019-04-10 PROCEDURE — 80053 COMPREHEN METABOLIC PANEL: CPT

## 2019-04-10 PROCEDURE — 99238 HOSP IP/OBS DSCHRG MGMT 30/<: CPT | Performed by: INTERNAL MEDICINE

## 2019-04-10 PROCEDURE — 83735 ASSAY OF MAGNESIUM: CPT

## 2019-04-10 PROCEDURE — 82248 BILIRUBIN DIRECT: CPT

## 2019-04-10 PROCEDURE — 36415 COLL VENOUS BLD VENIPUNCTURE: CPT

## 2019-04-10 RX ORDER — METOCLOPRAMIDE 10 MG/1
10 TABLET ORAL
Qty: 120 TABLET | Refills: 1 | Status: SHIPPED | OUTPATIENT
Start: 2019-04-10 | End: 2019-05-05

## 2019-04-10 RX ADMIN — SOTALOL HYDROCHLORIDE 160 MG: 80 TABLET ORAL at 09:01

## 2019-04-10 RX ADMIN — Medication 10 ML: at 09:01

## 2019-04-10 RX ADMIN — METOCLOPRAMIDE 10 MG: 10 TABLET ORAL at 11:02

## 2019-04-10 RX ADMIN — ENOXAPARIN SODIUM 30 MG: 40 INJECTION SUBCUTANEOUS at 09:04

## 2019-04-10 RX ADMIN — FAMOTIDINE 20 MG: 10 INJECTION, SOLUTION INTRAVENOUS at 09:01

## 2019-04-10 RX ADMIN — OXYCODONE AND ACETAMINOPHEN 2 TABLET: 5; 325 TABLET ORAL at 02:25

## 2019-04-10 RX ADMIN — METOCLOPRAMIDE 10 MG: 10 TABLET ORAL at 06:33

## 2019-04-10 RX ADMIN — OXYCODONE AND ACETAMINOPHEN 1 TABLET: 5; 325 TABLET ORAL at 09:01

## 2019-04-10 ASSESSMENT — PAIN DESCRIPTION - ONSET
ONSET: ON-GOING

## 2019-04-10 ASSESSMENT — PAIN DESCRIPTION - PROGRESSION
CLINICAL_PROGRESSION: GRADUALLY IMPROVING

## 2019-04-10 ASSESSMENT — PAIN SCALES - GENERAL
PAINLEVEL_OUTOF10: 7
PAINLEVEL_OUTOF10: 5
PAINLEVEL_OUTOF10: 6

## 2019-04-10 ASSESSMENT — PAIN DESCRIPTION - PAIN TYPE
TYPE: ACUTE PAIN

## 2019-04-10 ASSESSMENT — PAIN DESCRIPTION - DESCRIPTORS
DESCRIPTORS: ACHING;PRESSURE
DESCRIPTORS: ACHING
DESCRIPTORS: ACHING;PRESSURE

## 2019-04-10 ASSESSMENT — PAIN DESCRIPTION - ORIENTATION
ORIENTATION: LEFT;LOWER

## 2019-04-10 ASSESSMENT — PAIN DESCRIPTION - LOCATION
LOCATION: ABDOMEN

## 2019-04-10 ASSESSMENT — PAIN DESCRIPTION - FREQUENCY
FREQUENCY: INTERMITTENT

## 2019-04-10 NOTE — PROGRESS NOTES
4/10/2019  9:57 AM  Surgery    T 97.4  P 60  BP  106/61  He continues to do well on a solid diet. No nausea or pain, passing flatus. Abdomen seems less distended on exam. No focal tenderness. Bilirubin down to 1.35.  Mg++ 1.9    IMPRESSION: Ileus/partial SBO resolved. I remain intrigued by the correlation of isolated elevation of bilirubin each time he has an exacerbation. Could there be some substance triggering hemolysis that results in some more generalized dysfunction? Unknown obviously. PLAN: OK for discharge home. Would recommend he continue on the Reglan. I will see him in 3-4 weeks for f/u, especially in regard to the Reglan. Whether any further w/u regarding the hyperbilirubinemia is to be done I will leave to his medical specialists.     Ayo Conte

## 2019-04-10 NOTE — PLAN OF CARE
intake will improve  Description  Ability to achieve adequate nutritional intake will improve  4/10/2019 0958 by Travis Pope RN  Outcome: Met This Shift  4/10/2019 0323 by Carlton Olmedo RN  Outcome: Ongoing     Problem:  Bowel/Gastric:  Goal: Control of bowel function will improve  Description  Control of bowel function will improve  4/10/2019 0958 by Travis Pope RN  Outcome: Met This Shift  4/10/2019 0323 by Carlton Olmedo RN  Outcome: Ongoing  Goal: Ability to achieve a regular elimination pattern will improve  Description  Ability to achieve a regular elimination pattern will improve  4/10/2019 0958 by Travis Pope RN  Outcome: Met This Shift  4/10/2019 0323 by Carlton Olmedo RN  Outcome: Ongoing     Problem: Nutritional:  Goal: Ability to follow a diet with enough fiber (20 to 30 grams) for normal bowel function will improve  Description  Ability to follow a diet with enough fiber (20 to 30 grams) for normal bowel function will improve  4/10/2019 0958 by Travis Pope RN  Outcome: Met This Shift  4/10/2019 0323 by Carlton Olmedo RN  Outcome: Ongoing     Problem: Skin Integrity:  Goal: Risk for impaired skin integrity will decrease  Description  Risk for impaired skin integrity will decrease  4/10/2019 0958 by Travis Pope RN  Outcome: Met This Shift  4/10/2019 0323 by Carlton Olmedo RN  Outcome: Ongoing

## 2019-04-10 NOTE — PROGRESS NOTES
Pt discharged to home in car with daughter in stable condition with belongings. Discharge instructions given. Discharge checklist reviewed and completed with pt and family. Pt denies having any further questions at this time. Locked up home medication(s)/personal items given to patient at discharge. Patient/family state they have everything they were admitted with. Pt wanted to have reglan RX filled at personal pharmacy.

## 2019-04-10 NOTE — PROGRESS NOTES
negative for dizziness, headache    Medications: Allergies: Allergies   Allergen Reactions    Benadryl [Diphenhydramine] Hives    Ciprofloxacin      Prolonged QT and developed VT. 2018    Pcn [Penicillins] Hives and Swelling       Current Meds:   Scheduled Meds:    metoclopramide  10 mg Oral 4x Daily AC & HS    sotalol  160 mg Oral BID    sodium chloride flush  10 mL Intravenous 2 times per day    enoxaparin  30 mg Subcutaneous BID    insulin lispro  0-12 Units Subcutaneous TID WC    insulin lispro  0-6 Units Subcutaneous Nightly     Continuous Infusions:    dextrose       PRN Meds: oxyCODONE-acetaminophen **OR** oxyCODONE-acetaminophen, sodium chloride flush, potassium chloride **OR** potassium alternative oral replacement **OR** potassium chloride, magnesium sulfate, magnesium hydroxide, acetaminophen, glucose, dextrose, glucagon (rDNA), dextrose, ondansetron **OR** ondansetron, HYDROmorphone **OR** HYDROmorphone, diatrizoate meglumine-sodium, hydrALAZINE    Data:     Past Medical History:   has a past medical history of ADHD, predominantly hyperactive type, Anemia, Blood clot due to device, implant, or graft, Bowel obstruction ([facility] CAD (coronary artery disease), Cardiomyopathy ([facility] Diabetes mellitus ([facility] Diverticulitis, GERD (gastroesophageal reflux disease), History of atrial fibrillation, History of ventricular tachycardia, Kidney calculi, Obesity, and Sleep apnea. Social History:   reports that he quit smoking about 7 years ago. He has a 2.00 pack-year smoking history. He has never used smokeless tobacco. He reports that he drinks alcohol. He reports that he does not use drugs.      Family History:   Family History   Problem Relation Age of Onset    Heart Disease Mother     Heart Disease Father        Vitals:  /61   Pulse 60   Temp 97.4 °F (36.3 °C) (Oral)   Resp 16   Ht 6' 1\" (1.854 m)   Wt 266 lb 11.2 oz (121 kg)   SpO2 95%   BMI 35.19 kg/m²   Temp (24hrs), Av.9 °F (36.6 °C), Min:97.4 °F (36.3 °C), Max:98.1 °F (36.7 °C)    Recent Labs     04/09/19  1108 04/09/19  1629 04/09/19  2101 04/10/19  0646   POCGLU 121* 111* 174* 124*       I/O (24Hr): Intake/Output Summary (Last 24 hours) at 4/10/2019 1026  Last data filed at 4/10/2019 0954  Gross per 24 hour   Intake 720 ml   Output 550 ml   Net 170 ml       Labs:    Hematology:No results for input(s): WBC, RBC, HGB, HCT, MCV, MCH, MCHC, RDW, PLT, MPV, SEDRATE, CRP, INR, DDIMER, AD2TYFZS, LABABSO in the last 72 hours. Invalid input(s): PT  Chemistry:  Recent Labs     04/08/19  0546 04/09/19  0642 04/10/19  0420    140 136   K 3.9 4.4 4.3    102 100   CO2 28 27 27   GLUCOSE 132* 131* 146*   BUN 8 8 12   CREATININE 0.89 0.93 0.88   MG 1.7 2.0 1.9   ANIONGAP 8* 11 9   LABGLOM >60 >60 >60   GFRAA >60 >60 >60   CALCIUM 8.7 9.0 8.8     Recent Labs     04/08/19  0546  04/08/19 2054 04/09/19  0644 04/09/19  1108 04/09/19  1629 04/09/19  2101 04/10/19  0420 04/10/19  0646   PROT 6.9  --   --   --   --   --   --  6.7  --    LABALBU 4.0  --   --   --   --   --   --  4.0  --    AST 28  --   --   --   --   --   --  27  --    ALT 37  --   --   --   --   --   --  53*  --    ALKPHOS 66  --   --   --   --   --   --  64  --    BILITOT 2.58*  --   --   --   --   --   --  1.35*  --    BILIDIR 0.85*  --   --   --   --   --   --  0.31*  --    POCGLU  --    < > 105 119* 121* 111* 174*  --  124*    < > = values in this interval not displayed.          Lab Results   Component Value Date/Time    SPECIAL NOT REPORTED 02/01/2018 06:00 PM     Lab Results   Component Value Date/Time    CULTURE NO SIGNIFICANT GROWTH 02/01/2018 06:00 PM    CULTURE  02/01/2018 06:00 PM     Performed at [facility] [address], [address] [place] [address] [phone]       No results found for: POCPH, PHART, PH, POCPCO2, THP6EBK, PCO2, POCPO2, PO2ART, PO2, POCHCO3, DVQ8OYC, HCO3, NBEA, PBEA, BEART, BE, THGBART, THB, TNJ5PSC, RZXD0FMK, X1QLXYQF, O2SAT,

## 2019-04-10 NOTE — PLAN OF CARE
Problem: Pain:  Goal: Pain level will decrease  Description  Pain level will decrease  Outcome: Ongoing  Note:   Pain level assessment complete. Patient educated on pain scale and control interventions  PRN pain medication given per patient request  Patient instructed to call out with new onset of pain or unrelieved pain  Goal: Control of acute pain  Description  Control of acute pain  Outcome: Ongoing     Problem: Falls - Risk of:  Goal: Will remain free from falls  Description  Will remain free from falls  Outcome: Ongoing  Note:   Siderails up x 2  Hourly rounding  Call light in reach  Remains free from falls and accidental injury at this time   Floor free from obstacles  Bed is locked and in lowest position  Adequate lighting provided  Patient independent. Gait steady. Goal: Absence of physical injury  Description  Absence of physical injury  Outcome: Ongoing     Problem: SAFETY  Goal: LTG - patient will adhere to hip precautions during ADL's and transfers  Outcome: Ongoing  Goal: LTG - Patient will demonstrate safety requirements appropriate to situation/environment  Outcome: Ongoing  Goal: LTG - patient will utilize safety techniques  Outcome: Ongoing  Goal: STG - patient locks brakes on wheelchair  Outcome: Ongoing  Goal: STG - Patient uses call light consistently to request assistance with transfers  Outcome: Ongoing  Goal: STG - patient uses gait belt during all transfers  Outcome: Ongoing     Problem: Nausea/Vomiting:  Goal: Absence of nausea/vomiting  Description  Absence of nausea/vomiting  Outcome: Ongoing  Note:   Patient denies any nausea or vomiting. Goal: Able to drink  Description  Able to drink  Outcome: Ongoing  Goal: Able to eat  Description  Able to eat  Outcome: Ongoing  Goal: Ability to achieve adequate nutritional intake will improve  Description  Ability to achieve adequate nutritional intake will improve  Outcome: Ongoing     Problem:  Bowel/Gastric:  Goal: Control of bowel function will improve  Description  Control of bowel function will improve  Outcome: Ongoing  Goal: Ability to achieve a regular elimination pattern will improve  Description  Ability to achieve a regular elimination pattern will improve  Outcome: Ongoing     Problem: Nutritional:  Goal: Ability to follow a diet with enough fiber (20 to 30 grams) for normal bowel function will improve  Description  Ability to follow a diet with enough fiber (20 to 30 grams) for normal bowel function will improve  Outcome: Ongoing     Problem: Skin Integrity:  Goal: Risk for impaired skin integrity will decrease  Description  Risk for impaired skin integrity will decrease  Outcome: Ongoing

## 2019-04-21 ENCOUNTER — HOSPITAL ENCOUNTER (EMERGENCY)
Age: 54
Discharge: HOME | End: 2019-04-21
Attending: EMERGENCY MEDICINE
Payer: COMMERCIAL

## 2019-04-21 ENCOUNTER — APPOINTMENT (OUTPATIENT)
Dept: GENERAL RADIOLOGY | Age: 54
End: 2019-04-21
Payer: COMMERCIAL

## 2019-04-21 ENCOUNTER — APPOINTMENT (OUTPATIENT)
Dept: CT IMAGING | Age: 54
End: 2019-04-21
Payer: COMMERCIAL

## 2019-04-21 VITALS
WEIGHT: 276 LBS | BODY MASS INDEX: 36.58 KG/M2 | SYSTOLIC BLOOD PRESSURE: 133 MMHG | HEIGHT: 73 IN | DIASTOLIC BLOOD PRESSURE: 73 MMHG | TEMPERATURE: 97.9 F | RESPIRATION RATE: 16 BRPM | OXYGEN SATURATION: 99 % | HEART RATE: 81 BPM

## 2019-04-21 DIAGNOSIS — R11.0 NAUSEA: ICD-10-CM

## 2019-04-21 DIAGNOSIS — R10.30 LOWER ABDOMINAL PAIN: Primary | ICD-10-CM

## 2019-04-21 LAB
ABSOLUTE EOS #: 0.1 K/UL (ref 0–0.4)
ABSOLUTE IMMATURE GRANULOCYTE: ABNORMAL K/UL (ref 0–0.3)
ABSOLUTE LYMPH #: 0.7 K/UL (ref 1–4.8)
ABSOLUTE MONO #: 0.6 K/UL (ref 0.2–0.8)
ALBUMIN SERPL-MCNC: 4.6 G/DL (ref 3.5–5.2)
ALBUMIN/GLOBULIN RATIO: ABNORMAL (ref 1–2.5)
ALP BLD-CCNC: 74 U/L (ref 40–129)
ALT SERPL-CCNC: 33 U/L (ref 5–41)
ANION GAP SERPL CALCULATED.3IONS-SCNC: 17 MMOL/L (ref 9–17)
AST SERPL-CCNC: 20 U/L
BASOPHILS # BLD: 1 % (ref 0–2)
BASOPHILS ABSOLUTE: 0.1 K/UL (ref 0–0.2)
BILIRUB SERPL-MCNC: 1.09 MG/DL (ref 0.3–1.2)
BILIRUBIN URINE: NEGATIVE
BUN BLDV-MCNC: 13 MG/DL (ref 6–20)
BUN/CREAT BLD: 13 (ref 9–20)
CALCIUM SERPL-MCNC: 9.1 MG/DL (ref 8.6–10.4)
CHLORIDE BLD-SCNC: 99 MMOL/L (ref 98–107)
CO2: 22 MMOL/L (ref 20–31)
COLOR: YELLOW
COMMENT UA: ABNORMAL
CREAT SERPL-MCNC: 1.04 MG/DL (ref 0.7–1.2)
DIFFERENTIAL TYPE: ABNORMAL
EOSINOPHILS RELATIVE PERCENT: 1 % (ref 1–4)
GFR AFRICAN AMERICAN: >60 ML/MIN
GFR NON-AFRICAN AMERICAN: >60 ML/MIN
GFR SERPL CREATININE-BSD FRML MDRD: ABNORMAL ML/MIN/{1.73_M2}
GFR SERPL CREATININE-BSD FRML MDRD: ABNORMAL ML/MIN/{1.73_M2}
GLUCOSE BLD-MCNC: 204 MG/DL (ref 70–99)
GLUCOSE URINE: ABNORMAL
HCT VFR BLD CALC: 43.9 % (ref 41–53)
HEMOGLOBIN: 15 G/DL (ref 13.5–17.5)
IMMATURE GRANULOCYTES: ABNORMAL %
INR BLD: 1.1
KETONES, URINE: NEGATIVE
LACTIC ACID: 1.2 MMOL/L (ref 0.5–2.2)
LEUKOCYTE ESTERASE, URINE: NEGATIVE
LIPASE: 44 U/L (ref 13–60)
LYMPHOCYTES # BLD: 6 % (ref 24–44)
MCH RBC QN AUTO: 28.7 PG (ref 26–34)
MCHC RBC AUTO-ENTMCNC: 34.3 G/DL (ref 31–37)
MCV RBC AUTO: 83.7 FL (ref 80–100)
MONOCYTES # BLD: 5 % (ref 1–7)
NITRITE, URINE: NEGATIVE
NRBC AUTOMATED: ABNORMAL PER 100 WBC
PARTIAL THROMBOPLASTIN TIME: 31.4 SEC (ref 23–31)
PDW BLD-RTO: 18.5 % (ref 11.5–14.5)
PH UA: 5.5 (ref 5–8)
PLATELET # BLD: 172 K/UL (ref 130–400)
PLATELET ESTIMATE: ABNORMAL
PMV BLD AUTO: 8.1 FL (ref 6–12)
POTASSIUM SERPL-SCNC: 4.1 MMOL/L (ref 3.7–5.3)
PROTEIN UA: NEGATIVE
PROTHROMBIN TIME: 11 SEC (ref 9.7–11.6)
RBC # BLD: 5.24 M/UL (ref 4.5–5.9)
RBC # BLD: ABNORMAL 10*6/UL
SEG NEUTROPHILS: 87 % (ref 36–66)
SEGMENTED NEUTROPHILS ABSOLUTE COUNT: 10.4 K/UL (ref 1.8–7.7)
SODIUM BLD-SCNC: 138 MMOL/L (ref 135–144)
SPECIFIC GRAVITY UA: 1.01 (ref 1–1.03)
TOTAL PROTEIN: 7.9 G/DL (ref 6.4–8.3)
TURBIDITY: CLEAR
URINE HGB: NEGATIVE
UROBILINOGEN, URINE: NORMAL
WBC # BLD: 12 K/UL (ref 3.5–11)
WBC # BLD: ABNORMAL 10*3/UL

## 2019-04-21 PROCEDURE — 99284 EMERGENCY DEPT VISIT MOD MDM: CPT

## 2019-04-21 PROCEDURE — 83690 ASSAY OF LIPASE: CPT

## 2019-04-21 PROCEDURE — 6360000004 HC RX CONTRAST MEDICATION: Performed by: EMERGENCY MEDICINE

## 2019-04-21 PROCEDURE — 85025 COMPLETE CBC W/AUTO DIFF WBC: CPT

## 2019-04-21 PROCEDURE — 6360000002 HC RX W HCPCS: Performed by: EMERGENCY MEDICINE

## 2019-04-21 PROCEDURE — 74177 CT ABD & PELVIS W/CONTRAST: CPT

## 2019-04-21 PROCEDURE — 81003 URINALYSIS AUTO W/O SCOPE: CPT

## 2019-04-21 PROCEDURE — 85730 THROMBOPLASTIN TIME PARTIAL: CPT

## 2019-04-21 PROCEDURE — 85610 PROTHROMBIN TIME: CPT

## 2019-04-21 PROCEDURE — 83605 ASSAY OF LACTIC ACID: CPT

## 2019-04-21 PROCEDURE — 96374 THER/PROPH/DIAG INJ IV PUSH: CPT

## 2019-04-21 PROCEDURE — 74022 RADEX COMPL AQT ABD SERIES: CPT

## 2019-04-21 PROCEDURE — 96376 TX/PRO/DX INJ SAME DRUG ADON: CPT

## 2019-04-21 PROCEDURE — 80053 COMPREHEN METABOLIC PANEL: CPT

## 2019-04-21 PROCEDURE — 96375 TX/PRO/DX INJ NEW DRUG ADDON: CPT

## 2019-04-21 PROCEDURE — 2580000003 HC RX 258: Performed by: EMERGENCY MEDICINE

## 2019-04-21 RX ORDER — 0.9 % SODIUM CHLORIDE 0.9 %
80 INTRAVENOUS SOLUTION INTRAVENOUS ONCE
Status: COMPLETED | OUTPATIENT
Start: 2019-04-21 | End: 2019-04-21

## 2019-04-21 RX ORDER — ONDANSETRON 4 MG/1
4 TABLET, ORALLY DISINTEGRATING ORAL 3 TIMES DAILY PRN
Qty: 21 TABLET | Refills: 0 | Status: SHIPPED | OUTPATIENT
Start: 2019-04-21 | End: 2019-05-05

## 2019-04-21 RX ORDER — SODIUM CHLORIDE 0.9 % (FLUSH) 0.9 %
10 SYRINGE (ML) INJECTION PRN
Status: DISCONTINUED | OUTPATIENT
Start: 2019-04-21 | End: 2019-04-21 | Stop reason: HOSPADM

## 2019-04-21 RX ORDER — 0.9 % SODIUM CHLORIDE 0.9 %
1000 INTRAVENOUS SOLUTION INTRAVENOUS ONCE
Status: COMPLETED | OUTPATIENT
Start: 2019-04-21 | End: 2019-04-21

## 2019-04-21 RX ORDER — DICYCLOMINE HYDROCHLORIDE 10 MG/1
10 CAPSULE ORAL 4 TIMES DAILY
Qty: 20 CAPSULE | Refills: 1 | Status: SHIPPED | OUTPATIENT
Start: 2019-04-21 | End: 2019-05-05

## 2019-04-21 RX ORDER — HYDROMORPHONE HYDROCHLORIDE 1 MG/ML
1 INJECTION, SOLUTION INTRAMUSCULAR; INTRAVENOUS; SUBCUTANEOUS ONCE
Status: COMPLETED | OUTPATIENT
Start: 2019-04-21 | End: 2019-04-21

## 2019-04-21 RX ORDER — HYDROCODONE BITARTRATE AND ACETAMINOPHEN 5; 325 MG/1; MG/1
1 TABLET ORAL EVERY 4 HOURS PRN
Qty: 18 TABLET | Refills: 0 | Status: SHIPPED | OUTPATIENT
Start: 2019-04-21 | End: 2019-04-24

## 2019-04-21 RX ORDER — ONDANSETRON 2 MG/ML
4 INJECTION INTRAMUSCULAR; INTRAVENOUS ONCE
Status: COMPLETED | OUTPATIENT
Start: 2019-04-21 | End: 2019-04-21

## 2019-04-21 RX ADMIN — SODIUM CHLORIDE 1000 ML: 9 INJECTION, SOLUTION INTRAVENOUS at 04:25

## 2019-04-21 RX ADMIN — ONDANSETRON 4 MG: 2 INJECTION INTRAMUSCULAR; INTRAVENOUS at 04:25

## 2019-04-21 RX ADMIN — HYDROMORPHONE HYDROCHLORIDE 1 MG: 1 INJECTION, SOLUTION INTRAMUSCULAR; INTRAVENOUS; SUBCUTANEOUS at 04:25

## 2019-04-21 RX ADMIN — SODIUM CHLORIDE 80 ML: 9 INJECTION, SOLUTION INTRAVENOUS at 06:26

## 2019-04-21 RX ADMIN — IOPAMIDOL 75 ML: 755 INJECTION, SOLUTION INTRAVENOUS at 06:26

## 2019-04-21 RX ADMIN — HYDROMORPHONE HYDROCHLORIDE 1 MG: 1 INJECTION, SOLUTION INTRAMUSCULAR; INTRAVENOUS; SUBCUTANEOUS at 05:56

## 2019-04-21 RX ADMIN — Medication 10 ML: at 06:27

## 2019-04-21 ASSESSMENT — PAIN SCALES - GENERAL
PAINLEVEL_OUTOF10: 7
PAINLEVEL_OUTOF10: 10
PAINLEVEL_OUTOF10: 10

## 2019-04-21 ASSESSMENT — ENCOUNTER SYMPTOMS
CHEST TIGHTNESS: 0
EYE PAIN: 0
SHORTNESS OF BREATH: 0
EYE DISCHARGE: 0
NAUSEA: 1
BACK PAIN: 0
ABDOMINAL PAIN: 1
FACIAL SWELLING: 0
ABDOMINAL DISTENTION: 0

## 2019-04-21 NOTE — ED PROVIDER NOTES
EMERGENCY DEPARTMENT ENCOUNTER    Pt Name: Dylan Ignacio  MRN: [id number]  [place] 1965  Date of evaluation: 4/21/19  CHIEF COMPLAINT       Chief Complaint   Patient presents with    Abdominal Pain    Nausea     HISTORY OF PRESENT ILLNESS   HPI   The patient a 79-year-old male with a history of recurrent small bowel obstructions or presented to the emergency department from home secondary to abdominal pain. Patient noted abdominal pain localized to the lower quadrant as well as his abdomen became distended, initially a foul-smelling flatulence and decreased to no flatulence. Patient said after that he became nauseous vomiting nonbilious nonbloody, not able to tolerate any oral.  Of note, patient has had recurrent small bowel obstructions more recently on April 5, 2019 that was medically managed. Since December he 4 small bowel obstructions that have the medically managed. He's had previous abdominal surgeries. Patient's surgeon is Dr. Dayo Murray. He denied chest pain, shortness of breath, fever or chills. REVIEW OF SYSTEMS     Review of Systems   Constitutional: Negative for chills, diaphoresis and fever. HENT: Negative for congestion, ear pain and facial swelling. Eyes: Negative for pain, discharge and visual disturbance. Respiratory: Negative for chest tightness and shortness of breath. Cardiovascular: Negative for chest pain and palpitations. Gastrointestinal: Positive for abdominal pain and nausea. Negative for abdominal distention. Genitourinary: Negative for difficulty urinating and flank pain. Musculoskeletal: Negative for back pain. Skin: Negative for wound. Neurological: Negative for dizziness, light-headedness and headaches.      PASTMEDICAL HISTORY     Past Medical History:   Diagnosis Date    ADHD, predominantly hyperactive type     Anemia     Blood clot due to device, implant, or graft     IV site L forearm    Bowel obstruction (HCC)     CAD (coronary artery disease)  Cardiomyopathy ([facility] [address]     Diabetes mellitus ([facility]     Diverticulitis 1996    GERD (gastroesophageal reflux disease)     History of atrial fibrillation     History of ventricular tachycardia     Kidney calculi     Obesity     Sleep apnea     uses CPAP nightly     SURGICAL HISTORY       Past Surgical History:   Procedure Laterality Date    ABDOMEN SURGERY  3576 & 4786    umbillical hernia with mesh and lysis of adhesions    ABDOMINAL HERNIA REPAIR  7/7/2014    RECURRENT HERNIA REPAIR WITH MESH    APPENDECTOMY      CARDIAC DEFIBRILLATOR PLACEMENT      CHOLECYSTECTOMY      COLECTOMY  1996    d/t ruptured diverticuli    CYSTOSCOPY N/A     stent in place taped to penis head    CYSTOSCOPY  3/4/16    with bilateral pyelogram    HERNIA REPAIR      umbilical    LITHOTRIPSY  3/4/16    laser lithotripsy with ureteral dilation    PACEMAKER PLACEMENT      pacemaker/defib     CURRENT MEDICATIONS       Previous Medications    ALLOPURINOL (ZYLOPRIM) 300 MG TABLET    Take 300 mg by mouth daily    APIXABAN (ELIQUIS) 5 MG TABS TABLET    Take 5 mg by mouth 2 times daily     ASPIRIN EC 81 MG EC TABLET    Take 81 mg by mouth daily    COLCHICINE (COLCRYS) 0.6 MG TABLET    Take 0.6 mg by mouth 2 times daily as needed (gout flare)    DAPAGLIFLOZIN (FARXIGA) 10 MG TABLET    Take 10 mg by mouth every morning    DULAGLUTIDE (TRULICITY) 7.09 OX/0.1BM SOPN    Inject 0.75 mg into the skin once a week Indications: Mondays Mondays    LISINOPRIL (PRINIVIL;ZESTRIL) 10 MG TABLET    Take 10 mg by mouth daily. METFORMIN (GLUCOPHAGE) 1000 MG TABLET    Take 1,000 mg by mouth daily    METOCLOPRAMIDE (REGLAN) 10 MG TABLET    Take 1 tablet by mouth 4 times daily (before meals and nightly)    METOPROLOL (LOPRESSOR) 25 MG TABLET    Take 25 mg by mouth 2 times daily. OMEPRAZOLE (PRILOSEC) 20 MG CAPSULE    Take 20 mg by mouth daily.     PRAVASTATIN (PRAVACHOL) 10 MG TABLET    Take 10 mg by mouth daily     SITAGLIPTAN (JANUVIA) 100 MG TABLET    Take 100 mg by mouth daily. SOTALOL (BETAPACE) 160 MG TABLET    Take 160 mg by mouth 2 times daily     TAMSULOSIN (FLOMAX) 0.4 MG CAPSULE    Take 1 capsule by mouth daily     ALLERGIES     is allergic to benadryl [diphenhydramine]; ciprofloxacin; and pcn [penicillins]. FAMILY HISTORY     indicated that his mother is alive. He indicated that his father is alive. SOCIAL HISTORY       Social History     Tobacco Use    Smoking status: Former Smoker     Packs/day: 1.00     Years: 2.00     Pack years: 2.00     Last attempt to quit: 2/15/2012     Years since quittin.1    Smokeless tobacco: Never Used   Substance Use Topics    Alcohol use: Yes     Alcohol/week: 0.0 oz     Comment: rarely    Drug use: No     PHYSICAL EXAM     INITIAL VITALS: /73   Pulse 81   Temp 97.9 °F (36.6 °C) (Oral)   Resp 16   Ht 6' 1\" (1.854 m)   Wt 276 lb (125.2 kg)   SpO2 99%   BMI 36.41 kg/m²    Physical Exam   Constitutional: He is oriented to person, place, and time. He appears well-developed and well-nourished. HENT:   Head: Normocephalic and atraumatic. Eyes: Pupils are equal, round, and reactive to light. EOM are normal.   Neck: Normal range of motion. Neck supple. Cardiovascular: Normal rate and regular rhythm. Pulmonary/Chest: Effort normal and breath sounds normal.   Abdominal: He exhibits distension. There is tenderness. Hypoactive bowel sounds all 4 quadrants. Musculoskeletal: Normal range of motion. Neurological: He is alert and oriented to person, place, and time. Skin: Skin is warm. Nursing note and vitals reviewed. MEDICAL DECISION MAKING:   The patient was seen and examined. The patient is a 31-year-old male who presented to the ED secondary to, pain, nausea and vomiting. Differential diagnoses included ileus versus small bowel obstruction. Given patient's history and acute abdominal series was obtained, labs.   Patient received Dilaudid and Zofran pending imaging and laboratory analysis. Patient will be reevaluated. Laboratory analysis no acute findings. Acute abdominal series no evidence of obstruction however given patient's continued pain and history a CT abdomen and pelvis with contrast was obtained. He received an additional dose of Dilaudid 1 mg IV. Ct abdomen and  pelvis no acute findings. Results discussed with patient. He is able to tolerate oral.  Discharged home with prescription for Zofran and Norco and given instructions follows primary care doctor and parameters for return to the emergency department. CRITICAL CARE:              NIH STROKE SCALE:            PROCEDURES:    Procedures    DIAGNOSTIC RESULTS   EKG:All EKG's are interpreted by the Emergency Department Physician who either signs or Co-signs this chart in the absence of a cardiologist.        RADIOLOGY:All plain film, CT, MRI, and formal ultrasound images (except ED bedside ultrasound) are read by the radiologist, see reports below, unless otherwisenoted in MDM or here. CT ABDOMEN PELVIS W IV CONTRAST Additional Contrast? None   Preliminary Result   No acute intra-process. XR Acute Abd Series Chest 1 VW   Final Result   No acute process in the chest.      No bowel obstruction or free air. LABS: All lab results were reviewed by myself, and all abnormals are listed below.   Labs Reviewed   CBC WITH AUTO DIFFERENTIAL - Abnormal; Notable for the following components:       Result Value    WBC 12.0 (*)     RDW 18.5 (*)     Seg Neutrophils 87 (*)     Lymphocytes 6 (*)     Segs Absolute 10.40 (*)     Absolute Lymph # 0.70 (*)     All other components within normal limits   COMPREHENSIVE METABOLIC PANEL W/ REFLEX TO MG FOR LOW K - Abnormal; Notable for the following components:    Glucose 204 (*)     All other components within normal limits   APTT - Abnormal; Notable for the following components:    PTT 31.4 (*)     All other components within normal limits   LACTIC ACID LIPASE   PROTIME-INR   URINALYSIS       EMERGENCY DEPARTMENTCOURSE:         Vitals:    Vitals:    04/21/19 0353   BP: 133/73   Pulse: 81   Resp: 16   Temp: 97.9 °F (36.6 °C)   TempSrc: Oral   SpO2: 99%   Weight: 276 lb (125.2 kg)   Height: 6' 1\" (1.854 m)       The patient was given the following medications while in the emergency department:  Orders Placed This Encounter   Medications    0.9 % sodium chloride bolus    HYDROmorphone HCl PF (DILAUDID) injection 1 mg    ondansetron (ZOFRAN) injection 4 mg    HYDROmorphone HCl PF (DILAUDID) injection 1 mg    0.9 % sodium chloride bolus    sodium chloride flush 0.9 % injection 10 mL    iopamidol (ISOVUE-370) 76 % injection 75 mL     CONSULTS:  None    FINAL IMPRESSION      1. Lower abdominal pain    2. Nausea          DISPOSITION/PLAN   DISPOSITION        PATIENT REFERRED TO:  No follow-up provider specified.   DISCHARGE MEDICATIONS:  New Prescriptions    No medications on file     Monica Tolbert MD  Attending Emergency Physician                    Monica Tolbert MD  29/59/06 Day Avina MD  13/46/84 66 Judith Stock MD  58/09/36 [id number]

## 2019-05-05 ENCOUNTER — APPOINTMENT (OUTPATIENT)
Dept: CT IMAGING | Age: 54
End: 2019-05-05
Payer: COMMERCIAL

## 2019-05-05 ENCOUNTER — HOSPITAL ENCOUNTER (EMERGENCY)
Age: 54
Discharge: HOME | End: 2019-05-05
Attending: EMERGENCY MEDICINE
Payer: COMMERCIAL

## 2019-05-05 ENCOUNTER — APPOINTMENT (OUTPATIENT)
Dept: GENERAL RADIOLOGY | Age: 54
End: 2019-05-05
Payer: COMMERCIAL

## 2019-05-05 VITALS
OXYGEN SATURATION: 100 % | TEMPERATURE: 98.5 F | HEART RATE: 72 BPM | HEIGHT: 73 IN | SYSTOLIC BLOOD PRESSURE: 143 MMHG | WEIGHT: 274.9 LBS | RESPIRATION RATE: 16 BRPM | BODY MASS INDEX: 36.43 KG/M2 | DIASTOLIC BLOOD PRESSURE: 70 MMHG

## 2019-05-05 DIAGNOSIS — R14.0 ABDOMINAL BLOATING: ICD-10-CM

## 2019-05-05 DIAGNOSIS — R10.84 GENERALIZED ABDOMINAL PAIN: Primary | ICD-10-CM

## 2019-05-05 LAB
ABSOLUTE EOS #: 0.1 K/UL (ref 0–0.4)
ABSOLUTE IMMATURE GRANULOCYTE: ABNORMAL K/UL (ref 0–0.3)
ABSOLUTE LYMPH #: 0.8 K/UL (ref 1–4.8)
ABSOLUTE MONO #: 0.5 K/UL (ref 0.2–0.8)
ALBUMIN SERPL-MCNC: 4.4 G/DL (ref 3.5–5.2)
ALBUMIN/GLOBULIN RATIO: NORMAL (ref 1–2.5)
ALP BLD-CCNC: 74 U/L (ref 40–129)
ALT SERPL-CCNC: 27 U/L (ref 5–41)
ANION GAP SERPL CALCULATED.3IONS-SCNC: 10 MMOL/L (ref 9–17)
AST SERPL-CCNC: 16 U/L
BASOPHILS # BLD: 2 % (ref 0–2)
BASOPHILS ABSOLUTE: 0.1 K/UL (ref 0–0.2)
BILIRUB SERPL-MCNC: 0.97 MG/DL (ref 0.3–1.2)
BILIRUBIN DIRECT: 0.25 MG/DL
BILIRUBIN, INDIRECT: 0.72 MG/DL (ref 0–1)
BUN BLDV-MCNC: 12 MG/DL (ref 6–20)
BUN/CREAT BLD: 13 (ref 9–20)
CALCIUM SERPL-MCNC: 8.8 MG/DL (ref 8.6–10.4)
CHLORIDE BLD-SCNC: 101 MMOL/L (ref 98–107)
CO2: 24 MMOL/L (ref 20–31)
CREAT SERPL-MCNC: 0.92 MG/DL (ref 0.7–1.2)
DIFFERENTIAL TYPE: ABNORMAL
EOSINOPHILS RELATIVE PERCENT: 2 % (ref 1–4)
GFR AFRICAN AMERICAN: >60 ML/MIN
GFR NON-AFRICAN AMERICAN: >60 ML/MIN
GFR SERPL CREATININE-BSD FRML MDRD: ABNORMAL ML/MIN/{1.73_M2}
GFR SERPL CREATININE-BSD FRML MDRD: ABNORMAL ML/MIN/{1.73_M2}
GLOBULIN: NORMAL G/DL (ref 1.5–3.8)
GLUCOSE BLD-MCNC: 215 MG/DL (ref 70–99)
HCT VFR BLD CALC: 39.2 % (ref 41–53)
HEMOGLOBIN: 13.5 G/DL (ref 13.5–17.5)
IMMATURE GRANULOCYTES: ABNORMAL %
LACTIC ACID: 2.1 MMOL/L (ref 0.5–2.2)
LIPASE: 42 U/L (ref 13–60)
LYMPHOCYTES # BLD: 13 % (ref 24–44)
MCH RBC QN AUTO: 28.7 PG (ref 26–34)
MCHC RBC AUTO-ENTMCNC: 34.4 G/DL (ref 31–37)
MCV RBC AUTO: 83.3 FL (ref 80–100)
MONOCYTES # BLD: 8 % (ref 1–7)
NRBC AUTOMATED: ABNORMAL PER 100 WBC
PDW BLD-RTO: 18.7 % (ref 11.5–14.5)
PLATELET # BLD: 146 K/UL (ref 130–400)
PLATELET ESTIMATE: ABNORMAL
PMV BLD AUTO: 8.1 FL (ref 6–12)
POTASSIUM SERPL-SCNC: 4.2 MMOL/L (ref 3.7–5.3)
RBC # BLD: 4.7 M/UL (ref 4.5–5.9)
RBC # BLD: ABNORMAL 10*6/UL
SEG NEUTROPHILS: 75 % (ref 36–66)
SEGMENTED NEUTROPHILS ABSOLUTE COUNT: 4.7 K/UL (ref 1.8–7.7)
SODIUM BLD-SCNC: 135 MMOL/L (ref 135–144)
TOTAL PROTEIN: 7.2 G/DL (ref 6.4–8.3)
WBC # BLD: 6.2 K/UL (ref 3.5–11)
WBC # BLD: ABNORMAL 10*3/UL

## 2019-05-05 PROCEDURE — 2580000003 HC RX 258: Performed by: EMERGENCY MEDICINE

## 2019-05-05 PROCEDURE — 6360000002 HC RX W HCPCS: Performed by: NURSE PRACTITIONER

## 2019-05-05 PROCEDURE — 36415 COLL VENOUS BLD VENIPUNCTURE: CPT

## 2019-05-05 PROCEDURE — 99284 EMERGENCY DEPT VISIT MOD MDM: CPT

## 2019-05-05 PROCEDURE — 85025 COMPLETE CBC W/AUTO DIFF WBC: CPT

## 2019-05-05 PROCEDURE — 80048 BASIC METABOLIC PNL TOTAL CA: CPT

## 2019-05-05 PROCEDURE — 6360000004 HC RX CONTRAST MEDICATION: Performed by: EMERGENCY MEDICINE

## 2019-05-05 PROCEDURE — 83690 ASSAY OF LIPASE: CPT

## 2019-05-05 PROCEDURE — 96374 THER/PROPH/DIAG INJ IV PUSH: CPT

## 2019-05-05 PROCEDURE — 96375 TX/PRO/DX INJ NEW DRUG ADDON: CPT

## 2019-05-05 PROCEDURE — 80076 HEPATIC FUNCTION PANEL: CPT

## 2019-05-05 PROCEDURE — 83605 ASSAY OF LACTIC ACID: CPT

## 2019-05-05 PROCEDURE — 74177 CT ABD & PELVIS W/CONTRAST: CPT

## 2019-05-05 PROCEDURE — 74022 RADEX COMPL AQT ABD SERIES: CPT

## 2019-05-05 RX ORDER — ONDANSETRON 2 MG/ML
4 INJECTION INTRAMUSCULAR; INTRAVENOUS ONCE
Status: COMPLETED | OUTPATIENT
Start: 2019-05-05 | End: 2019-05-05

## 2019-05-05 RX ORDER — SODIUM CHLORIDE 0.9 % (FLUSH) 0.9 %
10 SYRINGE (ML) INJECTION PRN
Status: DISCONTINUED | OUTPATIENT
Start: 2019-05-05 | End: 2019-05-05 | Stop reason: HOSPADM

## 2019-05-05 RX ORDER — HYDROMORPHONE HYDROCHLORIDE 1 MG/ML
1 INJECTION, SOLUTION INTRAMUSCULAR; INTRAVENOUS; SUBCUTANEOUS ONCE
Status: COMPLETED | OUTPATIENT
Start: 2019-05-05 | End: 2019-05-05

## 2019-05-05 RX ORDER — MORPHINE SULFATE 2 MG/ML
2 INJECTION, SOLUTION INTRAMUSCULAR; INTRAVENOUS ONCE
Status: DISCONTINUED | OUTPATIENT
Start: 2019-05-05 | End: 2019-05-05

## 2019-05-05 RX ORDER — 0.9 % SODIUM CHLORIDE 0.9 %
80 INTRAVENOUS SOLUTION INTRAVENOUS ONCE
Status: COMPLETED | OUTPATIENT
Start: 2019-05-05 | End: 2019-05-05

## 2019-05-05 RX ADMIN — Medication 10 ML: at 19:05

## 2019-05-05 RX ADMIN — ONDANSETRON 4 MG: 2 INJECTION INTRAMUSCULAR; INTRAVENOUS at 17:54

## 2019-05-05 RX ADMIN — HYDROMORPHONE HYDROCHLORIDE 1 MG: 1 INJECTION, SOLUTION INTRAMUSCULAR; INTRAVENOUS; SUBCUTANEOUS at 17:57

## 2019-05-05 RX ADMIN — SODIUM CHLORIDE 80 ML: 9 INJECTION, SOLUTION INTRAVENOUS at 19:05

## 2019-05-05 RX ADMIN — IOPAMIDOL 75 ML: 755 INJECTION, SOLUTION INTRAVENOUS at 19:05

## 2019-05-05 ASSESSMENT — PAIN SCALES - GENERAL
PAINLEVEL_OUTOF10: 10
PAINLEVEL_OUTOF10: 10

## 2019-05-05 ASSESSMENT — PAIN DESCRIPTION - PAIN TYPE: TYPE: ACUTE PAIN

## 2019-05-05 ASSESSMENT — PAIN DESCRIPTION - LOCATION: LOCATION: ABDOMEN

## 2019-05-05 ASSESSMENT — PAIN DESCRIPTION - DESCRIPTORS: DESCRIPTORS: SHARP;CRAMPING

## 2019-05-05 NOTE — ED NOTES
Patient c/o abdominal pain that started last night. Patient concerned d/t history of SBO in the past. Patient rates pain a 10 on a 0-10 scale at this time and states he has not taken any medication for pain. Patient c/o nausea, but denies vomiting.       Noemy Collins RN  05/05/19 2749

## 2019-05-05 NOTE — ED PROVIDER NOTES
eMERGENCY dEPARTMENT eNCOUnter   Attending Attestation     Pt Name: Heydi Cueva  MRN: [id number]  [place] 1965  Date of evaluation: 5/5/19   Heydi Cueva is a 48 y.o. male with CC: Abdominal Pain    MDM:   The patient's a 77-year-old male with a history of recurrent small bowel obstructions or presented to the emergency department secondary to abdominal pain nausea and belching and decreased bowel movements. Acute abdominal series no acute findings however given patient's symptoms and history of CT abdomen pelvis with contrast obtained. The TMs and pelvis no evidence of obstruction. Results discussed with patient. Patient has an appointment scheduled the surgeon tomorrow. Patient discharged home with instructions follow-up as previously scheduled and given parameters for return to the emergency department. CRITICAL CARE:       EKG: All EKG's are interpreted by the Emergency Department Physician who either signs or Co-signs this chart in the absence of a cardiologist.      RADIOLOGY:All plain film, CT, MRI, and formal ultrasound images (except ED bedside ultrasound) are read by the radiologist, see reports below, unless otherwise noted in MDM or here. CT ABDOMEN PELVIS W IV CONTRAST   Final Result   No acute findings. XR Acute Abd Series Chest 1 VW   Final Result   No acute pulmonary process. Mild nonspecific gaseous distention of small bowel. LABS: All lab results were reviewed by myself, and all abnormals are listed below.   Labs Reviewed   CBC WITH AUTO DIFFERENTIAL - Abnormal; Notable for the following components:       Result Value    Hematocrit 39.2 (*)     RDW 18.7 (*)     Seg Neutrophils 75 (*)     Lymphocytes 13 (*)     Monocytes 8 (*)     Absolute Lymph # 0.80 (*)     All other components within normal limits   BASIC METABOLIC PANEL - Abnormal; Notable for the following components:    Glucose 215 (*)     All other components within normal limits   HEPATIC FUNCTION PANEL   LIPASE   LACTIC ACID           I personally evaluated and examined the patient in conjunction with the APC and agree with the assessment, treatment plan, and disposition of the patient as recorded by the APC.    Lori Díaz MD  Attending Emergency Physician          Lori Díaz MD  44/59/54 [id number]

## 2019-05-06 NOTE — ED PROVIDER NOTES
[address] [place] [address] ED  EMERGENCY DEPARTMENT ENCOUNTER      Pt Name: Kit Mercer  MRN: [id number]  [place] 1965  Date of evaluation: 5/5/2019  Provider: FABIANO Velez CNP    CHIEF COMPLAINT       Chief Complaint   Patient presents with    Abdominal Pain         HISTORY OF PRESENT ILLNESS  (Location/Symptom, Timing/Onset, Context/Setting, Quality, Duration, Modifying Factors, Severity.)   Kit Mercer is a 48 y.o. male who presents to the emergency department via private auto with abdominal pain that started yesterday. He has left mid and upper quadrant abdominal pain that he rates a 10 and describes as full, bloated and gassy. Pain is constant without aggravating or alleviating factors. He has nausea without vomiting. He had loose stools yesterday which resolved today. He does have history of small bowel obstruction. He states he has had at least 3 small bowel disruptions in the past 6 months and this pain feels similar. No urinary symptoms, back pain or fevers. Nursing Notes were reviewed. ALLERGIES     Benadryl [diphenhydramine]; Ciprofloxacin; Morphine; and Pcn [penicillins]    CURRENT MEDICATIONS       Previous Medications    ALLOPURINOL (ZYLOPRIM) 300 MG TABLET    Take 300 mg by mouth daily    APIXABAN (ELIQUIS) 5 MG TABS TABLET    Take 5 mg by mouth 2 times daily     ASPIRIN EC 81 MG EC TABLET    Take 81 mg by mouth daily    COLCHICINE (COLCRYS) 0.6 MG TABLET    Take 0.6 mg by mouth 2 times daily as needed (gout flare)    DAPAGLIFLOZIN (FARXIGA) 10 MG TABLET    Take 10 mg by mouth every morning    DULAGLUTIDE (TRULICITY) 6.45 WD/4.9IN SOPN    Inject 0.75 mg into the skin once a week Indications: Mondays Mondays    LISINOPRIL (PRINIVIL;ZESTRIL) 10 MG TABLET    Take 10 mg by mouth daily. METFORMIN (GLUCOPHAGE) 1000 MG TABLET    Take 1,000 mg by mouth daily    METOPROLOL (LOPRESSOR) 25 MG TABLET    Take 25 mg by mouth 2 times daily.     OMEPRAZOLE (PRILOSEC) 20 MG CAPSULE    Take 20 systems reviewed and are negative. Except as noted above the remainder of the review of systems was reviewed and negative. PHYSICAL EXAM    (up to 7 for level 4, 8 or more for level 5)     Vitals:    05/05/19 1732   BP: (!) 143/70   Pulse: 72   Resp: 16   Temp: 98.5 °F (36.9 °C)   SpO2: 100%   Weight: 274 lb 14.4 oz (124.7 kg)   Height: 6' 1\" (1.854 m)         Physical Exam   Constitutional: He is oriented to person, place, and time. He appears well-developed and well-nourished. No distress. HENT:   Mouth/Throat: Oropharynx is clear and moist. No oropharyngeal exudate. Eyes: Conjunctivae are normal. No scleral icterus. Cardiovascular: Normal rate and regular rhythm. Pulmonary/Chest: Effort normal and breath sounds normal. No respiratory distress. Abdominal: Soft. Bowel sounds are normal. He exhibits distension. He exhibits no mass. There is tenderness. There is no guarding. Musculoskeletal: He exhibits no edema. Neurological: He is alert and oriented to person, place, and time. Skin: Skin is warm and dry. No erythema. Psychiatric: He has a normal mood and affect. His behavior is normal.           DIAGNOSTIC RESULTS     EKG: All EKG's are interpreted by the Emergency Department Physician who either signs or Co-signs this chart in the absence of a cardiologist.    RADIOLOGY:   Non-plain film images such as CT, Ultrasound and MRI are read by the radiologist. Plain radiographic images are visualized and preliminarily interpreted by the emergency physician with the below findings:    Interpretation per the Radiologist below, if available at the time of this note:    CT ABDOMEN PELVIS W IV CONTRAST   Final Result   No acute findings. XR Acute Abd Series Chest 1 VW   Final Result   No acute pulmonary process. Mild nonspecific gaseous distention of small bowel.              LABS:  Labs Reviewed   CBC WITH AUTO DIFFERENTIAL - Abnormal; Notable for the following components:       Result Value    Hematocrit 39.2 (*)     RDW 18.7 (*)     Seg Neutrophils 75 (*)     Lymphocytes 13 (*)     Monocytes 8 (*)     Absolute Lymph # 0.80 (*)     All other components within normal limits   BASIC METABOLIC PANEL - Abnormal; Notable for the following components:    Glucose 215 (*)     All other components within normal limits   HEPATIC FUNCTION PANEL   LIPASE   LACTIC ACID       All other labs were within normal range or not returned as of this dictation. EMERGENCY DEPARTMENT COURSE and DIFFERENTIAL DIAGNOSIS/MDM:   Vitals:    Vitals:    19 1732   BP: (!) 143/70   Pulse: 72   Resp: 16   Temp: 98.5 °F (36.9 °C)   SpO2: 100%   Weight: 274 lb 14.4 oz (124.7 kg)   Height: 6' 1\" (1.854 m)       Medical Decision Makin-year-old male with abdominal pain similar to his previous SBO. Blood work and abdominal series are unremarkable. CT scan was performed because of his history which did not have any acute findings. He'll be discharged home to follow-up with his primary care provider. FINAL IMPRESSION      1. Generalized abdominal pain    2. Abdominal bloating          DISPOSITION/PLAN   DISPOSITION Decision To Discharge 2019 08:04:58 PM      PATIENT REFERRED TO:   Caroline Wallyer, [address]  [phone]    Schedule an appointment as soon as possible for a visit         DISCHARGE MEDICATIONS:     New Prescriptions    No medications on file         (Please note that portions of this note were completed with a voice recognition program.  Efforts were made to edit the dictations but occasionally words are mis-transcribed. )    FABIANO SUAREZ CNP, APRN - CNP  19

## 2019-09-18 ENCOUNTER — HOSPITAL ENCOUNTER (EMERGENCY)
Age: 54
Discharge: HOME | End: 2019-09-19
Attending: EMERGENCY MEDICINE
Payer: COMMERCIAL

## 2019-09-18 ENCOUNTER — APPOINTMENT (OUTPATIENT)
Dept: CT IMAGING | Age: 54
End: 2019-09-18
Payer: COMMERCIAL

## 2019-09-18 ENCOUNTER — APPOINTMENT (OUTPATIENT)
Dept: GENERAL RADIOLOGY | Age: 54
End: 2019-09-18
Payer: COMMERCIAL

## 2019-09-18 VITALS
TEMPERATURE: 98.3 F | OXYGEN SATURATION: 98 % | RESPIRATION RATE: 16 BRPM | BODY MASS INDEX: 35.78 KG/M2 | HEART RATE: 87 BPM | SYSTOLIC BLOOD PRESSURE: 125 MMHG | DIASTOLIC BLOOD PRESSURE: 67 MMHG | WEIGHT: 270 LBS | HEIGHT: 73 IN

## 2019-09-18 DIAGNOSIS — R10.9 ABDOMINAL PAIN, UNSPECIFIED ABDOMINAL LOCATION: Primary | ICD-10-CM

## 2019-09-18 LAB
ABSOLUTE EOS #: 0.12 K/UL (ref 0–0.44)
ABSOLUTE IMMATURE GRANULOCYTE: 0.03 K/UL (ref 0–0.3)
ABSOLUTE LYMPH #: 0.87 K/UL (ref 1.1–3.7)
ABSOLUTE MONO #: 0.58 K/UL (ref 0.1–1.2)
ALBUMIN SERPL-MCNC: 4.4 G/DL (ref 3.5–5.2)
ALBUMIN/GLOBULIN RATIO: NORMAL (ref 1–2.5)
ALP BLD-CCNC: 72 U/L (ref 40–129)
ALT SERPL-CCNC: 27 U/L (ref 5–41)
ANION GAP SERPL CALCULATED.3IONS-SCNC: 12 MMOL/L (ref 9–17)
AST SERPL-CCNC: 18 U/L
BASOPHILS # BLD: 1 % (ref 0–2)
BASOPHILS ABSOLUTE: 0.05 K/UL (ref 0–0.2)
BILIRUB SERPL-MCNC: 0.98 MG/DL (ref 0.3–1.2)
BILIRUBIN DIRECT: 0.24 MG/DL
BILIRUBIN, INDIRECT: 0.74 MG/DL (ref 0–1)
BUN BLDV-MCNC: 16 MG/DL (ref 6–20)
BUN/CREAT BLD: 15 (ref 9–20)
CALCIUM SERPL-MCNC: 8.9 MG/DL (ref 8.6–10.4)
CHLORIDE BLD-SCNC: 99 MMOL/L (ref 98–107)
CO2: 25 MMOL/L (ref 20–31)
CREAT SERPL-MCNC: 1.08 MG/DL (ref 0.7–1.2)
DIFFERENTIAL TYPE: ABNORMAL
EOSINOPHILS RELATIVE PERCENT: 2 % (ref 1–4)
GFR AFRICAN AMERICAN: >60 ML/MIN
GFR NON-AFRICAN AMERICAN: >60 ML/MIN
GFR SERPL CREATININE-BSD FRML MDRD: ABNORMAL ML/MIN/{1.73_M2}
GFR SERPL CREATININE-BSD FRML MDRD: ABNORMAL ML/MIN/{1.73_M2}
GLOBULIN: NORMAL G/DL (ref 1.5–3.8)
GLUCOSE BLD-MCNC: 294 MG/DL (ref 70–99)
HCT VFR BLD CALC: 39.1 % (ref 40.7–50.3)
HEMOGLOBIN: 13.4 G/DL (ref 13–17)
IMMATURE GRANULOCYTES: 0 %
LIPASE: 40 U/L (ref 13–60)
LYMPHOCYTES # BLD: 12 % (ref 24–43)
MCH RBC QN AUTO: 28.6 PG (ref 25.2–33.5)
MCHC RBC AUTO-ENTMCNC: 34.3 G/DL (ref 28.4–34.8)
MCV RBC AUTO: 83.4 FL (ref 82.6–102.9)
MONOCYTES # BLD: 8 % (ref 3–12)
NRBC AUTOMATED: 0 PER 100 WBC
PDW BLD-RTO: 17.3 % (ref 11.8–14.4)
PLATELET # BLD: 153 K/UL (ref 138–453)
PLATELET ESTIMATE: ABNORMAL
PMV BLD AUTO: 10.4 FL (ref 8.1–13.5)
POTASSIUM SERPL-SCNC: 4.2 MMOL/L (ref 3.7–5.3)
RBC # BLD: 4.69 M/UL (ref 4.21–5.77)
RBC # BLD: ABNORMAL 10*6/UL
SEG NEUTROPHILS: 77 % (ref 36–65)
SEGMENTED NEUTROPHILS ABSOLUTE COUNT: 5.73 K/UL (ref 1.5–8.1)
SODIUM BLD-SCNC: 136 MMOL/L (ref 135–144)
TOTAL PROTEIN: 7 G/DL (ref 6.4–8.3)
WBC # BLD: 7.4 K/UL (ref 3.5–11.3)
WBC # BLD: ABNORMAL 10*3/UL

## 2019-09-18 PROCEDURE — 6360000002 HC RX W HCPCS: Performed by: NURSE PRACTITIONER

## 2019-09-18 PROCEDURE — 74177 CT ABD & PELVIS W/CONTRAST: CPT

## 2019-09-18 PROCEDURE — 80048 BASIC METABOLIC PNL TOTAL CA: CPT

## 2019-09-18 PROCEDURE — 85025 COMPLETE CBC W/AUTO DIFF WBC: CPT

## 2019-09-18 PROCEDURE — 81003 URINALYSIS AUTO W/O SCOPE: CPT

## 2019-09-18 PROCEDURE — 80076 HEPATIC FUNCTION PANEL: CPT

## 2019-09-18 PROCEDURE — 99284 EMERGENCY DEPT VISIT MOD MDM: CPT

## 2019-09-18 PROCEDURE — 74022 RADEX COMPL AQT ABD SERIES: CPT

## 2019-09-18 PROCEDURE — 96375 TX/PRO/DX INJ NEW DRUG ADDON: CPT

## 2019-09-18 PROCEDURE — 96374 THER/PROPH/DIAG INJ IV PUSH: CPT

## 2019-09-18 PROCEDURE — 83690 ASSAY OF LIPASE: CPT

## 2019-09-18 PROCEDURE — 2580000003 HC RX 258: Performed by: NURSE PRACTITIONER

## 2019-09-18 PROCEDURE — C9113 INJ PANTOPRAZOLE SODIUM, VIA: HCPCS | Performed by: NURSE PRACTITIONER

## 2019-09-18 RX ORDER — HYDROMORPHONE HYDROCHLORIDE 1 MG/ML
1 INJECTION, SOLUTION INTRAMUSCULAR; INTRAVENOUS; SUBCUTANEOUS ONCE
Status: COMPLETED | OUTPATIENT
Start: 2019-09-18 | End: 2019-09-18

## 2019-09-18 RX ORDER — FENTANYL CITRATE 50 UG/ML
25 INJECTION, SOLUTION INTRAMUSCULAR; INTRAVENOUS ONCE
Status: COMPLETED | OUTPATIENT
Start: 2019-09-18 | End: 2019-09-18

## 2019-09-18 RX ORDER — PANTOPRAZOLE SODIUM 40 MG/10ML
40 INJECTION, POWDER, LYOPHILIZED, FOR SOLUTION INTRAVENOUS ONCE
Status: COMPLETED | OUTPATIENT
Start: 2019-09-18 | End: 2019-09-18

## 2019-09-18 RX ORDER — 0.9 % SODIUM CHLORIDE 0.9 %
10 VIAL (ML) INJECTION ONCE
Status: COMPLETED | OUTPATIENT
Start: 2019-09-18 | End: 2019-09-18

## 2019-09-18 RX ORDER — ONDANSETRON 2 MG/ML
4 INJECTION INTRAMUSCULAR; INTRAVENOUS ONCE
Status: COMPLETED | OUTPATIENT
Start: 2019-09-18 | End: 2019-09-18

## 2019-09-18 RX ADMIN — PANTOPRAZOLE SODIUM 40 MG: 40 INJECTION, POWDER, FOR SOLUTION INTRAVENOUS at 22:56

## 2019-09-18 RX ADMIN — Medication 10 ML: at 22:57

## 2019-09-18 RX ADMIN — ONDANSETRON 4 MG: 2 INJECTION INTRAMUSCULAR; INTRAVENOUS at 22:56

## 2019-09-18 RX ADMIN — FENTANYL CITRATE 25 MCG: 50 INJECTION INTRAMUSCULAR; INTRAVENOUS at 22:56

## 2019-09-18 RX ADMIN — HYDROMORPHONE HYDROCHLORIDE 1 MG: 1 INJECTION, SOLUTION INTRAMUSCULAR; INTRAVENOUS; SUBCUTANEOUS at 23:40

## 2019-09-18 ASSESSMENT — ENCOUNTER SYMPTOMS
BACK PAIN: 1
NAUSEA: 0
DIARRHEA: 0
CONSTIPATION: 1
COLOR CHANGE: 0
ABDOMINAL DISTENTION: 1
ABDOMINAL PAIN: 1
VOMITING: 0
COUGH: 0
SHORTNESS OF BREATH: 0

## 2019-09-18 ASSESSMENT — PAIN SCALES - GENERAL
PAINLEVEL_OUTOF10: 8

## 2019-09-18 ASSESSMENT — PAIN DESCRIPTION - PAIN TYPE: TYPE: ACUTE PAIN

## 2019-09-18 ASSESSMENT — PAIN DESCRIPTION - LOCATION: LOCATION: FLANK;ABDOMEN

## 2019-09-19 PROCEDURE — 2580000003 HC RX 258: Performed by: EMERGENCY MEDICINE

## 2019-09-19 PROCEDURE — 6360000004 HC RX CONTRAST MEDICATION: Performed by: EMERGENCY MEDICINE

## 2019-09-19 PROCEDURE — 96376 TX/PRO/DX INJ SAME DRUG ADON: CPT

## 2019-09-19 PROCEDURE — 6360000002 HC RX W HCPCS: Performed by: EMERGENCY MEDICINE

## 2019-09-19 RX ORDER — 0.9 % SODIUM CHLORIDE 0.9 %
80 INTRAVENOUS SOLUTION INTRAVENOUS ONCE
Status: COMPLETED | OUTPATIENT
Start: 2019-09-19 | End: 2019-09-19

## 2019-09-19 RX ORDER — HYDROMORPHONE HYDROCHLORIDE 1 MG/ML
0.5 INJECTION, SOLUTION INTRAMUSCULAR; INTRAVENOUS; SUBCUTANEOUS ONCE
Status: COMPLETED | OUTPATIENT
Start: 2019-09-19 | End: 2019-09-19

## 2019-09-19 RX ORDER — SODIUM CHLORIDE 0.9 % (FLUSH) 0.9 %
10 SYRINGE (ML) INJECTION PRN
Status: DISCONTINUED | OUTPATIENT
Start: 2019-09-19 | End: 2019-09-19 | Stop reason: HOSPADM

## 2019-09-19 RX ORDER — ONDANSETRON 4 MG/1
4 TABLET, ORALLY DISINTEGRATING ORAL EVERY 8 HOURS PRN
Qty: 20 TABLET | Refills: 0 | Status: SHIPPED | OUTPATIENT
Start: 2019-09-19 | End: 2019-12-04

## 2019-09-19 RX ADMIN — SODIUM CHLORIDE 80 ML: 9 INJECTION, SOLUTION INTRAVENOUS at 00:19

## 2019-09-19 RX ADMIN — IOPAMIDOL 75 ML: 755 INJECTION, SOLUTION INTRAVENOUS at 00:19

## 2019-09-19 RX ADMIN — HYDROMORPHONE HYDROCHLORIDE 0.5 MG: 1 INJECTION, SOLUTION INTRAMUSCULAR; INTRAVENOUS; SUBCUTANEOUS at 01:02

## 2019-09-19 RX ADMIN — Medication 10 ML: at 00:19

## 2019-09-19 ASSESSMENT — PAIN SCALES - GENERAL
PAINLEVEL_OUTOF10: 6
PAINLEVEL_OUTOF10: 7

## 2019-09-19 NOTE — ED PROVIDER NOTES
Radiologist below, if available at the time of this note:    Xr Acute Abd Series Chest 1 Vw    Result Date: 9/18/2019  EXAMINATION: TWO XRAY VIEWS OF THE ABDOMEN AND SINGLE  XRAY VIEW OF THE CHEST 9/18/2019 10:56 pm COMPARISON: 05/05/2019 HISTORY: ORDERING SYSTEM PROVIDED HISTORY: abd pain, left flank pain. hx SBO, kidney stones Reason for Exam: abd pain Acuity: Acute Type of Exam: Initial FINDINGS: Lungs are clear without acute process. No pleural effusion or pneumothorax. No focal consolidation or edema. Cardiomediastinal silhouette stable. Left-sided cardiac device redemonstrated. Right-sided hypertrophic bony spurring of the mid to lower thoracic spine. No evidence of intraperitoneal free air. Nonspecific bowel gas pattern without evidence of obstruction. No abnormal calcifications. Prominent left-sided osteophytosis at L1-L2. Cholecystectomy clips. No acute process in the chest. No bowel obstruction or free air. Ct Abdomen Pelvis W Iv Contrast Additional Contrast? None    Result Date: 9/19/2019  EXAMINATION: CT OF THE ABDOMEN AND PELVIS WITH CONTRAST 9/19/2019 12:11 am TECHNIQUE: CT of the abdomen and pelvis was performed with the administration of intravenous contrast. Multiplanar reformatted images are provided for review. Dose modulation, iterative reconstruction, and/or weight based adjustment of the mA/kV was utilized to reduce the radiation dose to as low as reasonably achievable. COMPARISON: 5/5/19 HISTORY: ORDERING SYSTEM PROVIDED HISTORY: Q pain TECHNOLOGIST PROVIDED HISTORY: FINDINGS: Lower Chest:  Visualized portion of the lower chest demonstrates no acute abnormality. Organs: Mild diffuse steatosis. Cholecystectomy. Stable splenomegaly. Enhanced parenchymal attenuation pattern of the pancreas and adrenal glands is unremarkable. The kidneys appear normal in size and demonstrate symmetric enhancement. No focal renal mass. No hydronephrosis. No asymmetric perinephric stranding. GI/Bowel: No evidence of bowel obstruction or bowel wall thickening. Appendix not identified. No inflammatory changes. Anastomotic sutures in the sigmoid colon appear intact. Loop of bowel herniates within the lower portion of rectus sheath without obstruction strangulation. Similar appearance to prior study. Pelvis: The urinary bladder appears unremarkable. The pelvic organs demonstrate no acute abnormality. Peritoneum/Retroperitoneum: The abdominal aorta is normal in caliber. No lymphadenopathy. No fluid collection. No free air. Bones/Soft Tissues: No acute findings. No acute findings. Stable postsurgical changes. LABS:  Labs Reviewed   BASIC METABOLIC PANEL - Abnormal; Notable for the following components:       Result Value    Glucose 294 (*)     All other components within normal limits   CBC WITH AUTO DIFFERENTIAL - Abnormal; Notable for the following components:    Hematocrit 39.1 (*)     RDW 17.3 (*)     Seg Neutrophils 77 (*)     Lymphocytes 12 (*)     Absolute Lymph # 0.87 (*)     All other components within normal limits   HEPATIC FUNCTION PANEL   LIPASE       All other labs were within normal range or not returned as of this dictation.     EMERGENCY DEPARTMENT COURSE and DIFFERENTIAL DIAGNOSIS/MDM:   Vitals:    Vitals:    09/18/19 2148 09/18/19 2149   BP:  125/67   Pulse: 87    Resp: 16    Temp: 98.3 °F (36.8 °C)    SpO2: 98%    Weight: 270 lb (122.5 kg)    Height: 6' 1\" (1.854 m)        MEDICATIONS GIVEN IN THE ED:  Medications   sodium chloride flush 0.9 % injection 10 mL (10 mLs Intravenous Given 9/19/19 0019)   ondansetron (ZOFRAN) injection 4 mg (4 mg Intravenous Given 9/18/19 2256)   pantoprazole (PROTONIX) injection 40 mg (40 mg Intravenous Given 9/18/19 2256)     And   sodium chloride (PF) 0.9 % injection 10 mL (10 mLs Intravenous Given 9/18/19 2257)   fentaNYL (SUBLIMAZE) injection 25 mcg (25 mcg Intravenous Given 9/18/19 2256)   HYDROmorphone HCl PF (DILAUDID) injection 1 mg (1

## 2019-12-04 ENCOUNTER — APPOINTMENT (OUTPATIENT)
Dept: GENERAL RADIOLOGY | Age: 54
End: 2019-12-04
Payer: COMMERCIAL

## 2019-12-04 ENCOUNTER — HOSPITAL ENCOUNTER (EMERGENCY)
Age: 54
Discharge: HOME | End: 2019-12-04
Attending: EMERGENCY MEDICINE
Payer: COMMERCIAL

## 2019-12-04 VITALS
TEMPERATURE: 98.2 F | OXYGEN SATURATION: 99 % | WEIGHT: 270 LBS | HEART RATE: 73 BPM | DIASTOLIC BLOOD PRESSURE: 66 MMHG | BODY MASS INDEX: 35.78 KG/M2 | HEIGHT: 73 IN | SYSTOLIC BLOOD PRESSURE: 104 MMHG | RESPIRATION RATE: 23 BRPM

## 2019-12-04 DIAGNOSIS — M54.50 ACUTE LOW BACK PAIN WITHOUT SCIATICA, UNSPECIFIED BACK PAIN LATERALITY: ICD-10-CM

## 2019-12-04 DIAGNOSIS — R07.9 CHEST PAIN, UNSPECIFIED TYPE: Primary | ICD-10-CM

## 2019-12-04 LAB
ABSOLUTE EOS #: 0.11 K/UL (ref 0–0.44)
ABSOLUTE IMMATURE GRANULOCYTE: 0.04 K/UL (ref 0–0.3)
ABSOLUTE LYMPH #: 0.94 K/UL (ref 1.1–3.7)
ABSOLUTE MONO #: 0.66 K/UL (ref 0.1–1.2)
ANION GAP SERPL CALCULATED.3IONS-SCNC: 11 MMOL/L (ref 9–17)
BASOPHILS # BLD: 1 % (ref 0–2)
BASOPHILS ABSOLUTE: 0.07 K/UL (ref 0–0.2)
BUN BLDV-MCNC: 28 MG/DL (ref 6–20)
BUN/CREAT BLD: 24 (ref 9–20)
CALCIUM SERPL-MCNC: 9.6 MG/DL (ref 8.6–10.4)
CHLORIDE BLD-SCNC: 97 MMOL/L (ref 98–107)
CO2: 26 MMOL/L (ref 20–31)
CREAT SERPL-MCNC: 1.19 MG/DL (ref 0.7–1.2)
DIFFERENTIAL TYPE: ABNORMAL
EOSINOPHILS RELATIVE PERCENT: 2 % (ref 1–4)
GFR AFRICAN AMERICAN: >60 ML/MIN
GFR NON-AFRICAN AMERICAN: >60 ML/MIN
GFR SERPL CREATININE-BSD FRML MDRD: ABNORMAL ML/MIN/{1.73_M2}
GFR SERPL CREATININE-BSD FRML MDRD: ABNORMAL ML/MIN/{1.73_M2}
GLUCOSE BLD-MCNC: 176 MG/DL (ref 70–99)
HCT VFR BLD CALC: 40.7 % (ref 40.7–50.3)
HEMOGLOBIN: 13.6 G/DL (ref 13–17)
IMMATURE GRANULOCYTES: 1 %
INR BLD: 1.1
LYMPHOCYTES # BLD: 13 % (ref 24–43)
MCH RBC QN AUTO: 28 PG (ref 25.2–33.5)
MCHC RBC AUTO-ENTMCNC: 33.4 G/DL (ref 28.4–34.8)
MCV RBC AUTO: 83.9 FL (ref 82.6–102.9)
MONOCYTES # BLD: 9 % (ref 3–12)
MYOGLOBIN: 43 NG/ML (ref 28–72)
MYOGLOBIN: 51 NG/ML (ref 28–72)
NRBC AUTOMATED: 0 PER 100 WBC
PARTIAL THROMBOPLASTIN TIME: 31.6 SEC (ref 23–31)
PDW BLD-RTO: 17.2 % (ref 11.8–14.4)
PLATELET # BLD: 155 K/UL (ref 138–453)
PLATELET ESTIMATE: ABNORMAL
PMV BLD AUTO: 10.3 FL (ref 8.1–13.5)
POTASSIUM SERPL-SCNC: 4.5 MMOL/L (ref 3.7–5.3)
PROTHROMBIN TIME: 11.3 SEC (ref 9.7–11.6)
RBC # BLD: 4.85 M/UL (ref 4.21–5.77)
RBC # BLD: ABNORMAL 10*6/UL
SEG NEUTROPHILS: 74 % (ref 36–65)
SEGMENTED NEUTROPHILS ABSOLUTE COUNT: 5.55 K/UL (ref 1.5–8.1)
SODIUM BLD-SCNC: 134 MMOL/L (ref 135–144)
TROPONIN INTERP: NORMAL
TROPONIN INTERP: NORMAL
TROPONIN T: NORMAL NG/ML
TROPONIN T: NORMAL NG/ML
TROPONIN, HIGH SENSITIVITY: 10 NG/L (ref 0–22)
TROPONIN, HIGH SENSITIVITY: 12 NG/L (ref 0–22)
WBC # BLD: 7.4 K/UL (ref 3.5–11.3)
WBC # BLD: ABNORMAL 10*3/UL

## 2019-12-04 PROCEDURE — 85730 THROMBOPLASTIN TIME PARTIAL: CPT

## 2019-12-04 PROCEDURE — 84484 ASSAY OF TROPONIN QUANT: CPT

## 2019-12-04 PROCEDURE — 6360000002 HC RX W HCPCS: Performed by: EMERGENCY MEDICINE

## 2019-12-04 PROCEDURE — 36415 COLL VENOUS BLD VENIPUNCTURE: CPT

## 2019-12-04 PROCEDURE — 96374 THER/PROPH/DIAG INJ IV PUSH: CPT

## 2019-12-04 PROCEDURE — 85025 COMPLETE CBC W/AUTO DIFF WBC: CPT

## 2019-12-04 PROCEDURE — 93005 ELECTROCARDIOGRAM TRACING: CPT

## 2019-12-04 PROCEDURE — 99285 EMERGENCY DEPT VISIT HI MDM: CPT

## 2019-12-04 PROCEDURE — 85610 PROTHROMBIN TIME: CPT

## 2019-12-04 PROCEDURE — 80048 BASIC METABOLIC PNL TOTAL CA: CPT

## 2019-12-04 PROCEDURE — 81003 URINALYSIS AUTO W/O SCOPE: CPT

## 2019-12-04 PROCEDURE — 71045 X-RAY EXAM CHEST 1 VIEW: CPT

## 2019-12-04 PROCEDURE — 83874 ASSAY OF MYOGLOBIN: CPT

## 2019-12-04 PROCEDURE — 96375 TX/PRO/DX INJ NEW DRUG ADDON: CPT

## 2019-12-04 RX ORDER — HYDROCODONE BITARTRATE AND ACETAMINOPHEN 5; 325 MG/1; MG/1
1 TABLET ORAL EVERY 6 HOURS PRN
Qty: 20 TABLET | Refills: 0 | Status: SHIPPED | OUTPATIENT
Start: 2019-12-04 | End: 2019-12-09

## 2019-12-04 RX ORDER — METOPROLOL SUCCINATE 50 MG/1
50 TABLET, EXTENDED RELEASE ORAL 2 TIMES DAILY
COMMUNITY

## 2019-12-04 RX ORDER — MAGNESIUM GLUCONATE 27 MG(500)
500 TABLET ORAL DAILY
COMMUNITY

## 2019-12-04 RX ORDER — FUROSEMIDE 40 MG/1
40 TABLET ORAL DAILY
Status: ON HOLD | COMMUNITY
End: 2022-01-25

## 2019-12-04 RX ORDER — ORPHENADRINE CITRATE 30 MG/ML
60 INJECTION INTRAMUSCULAR; INTRAVENOUS ONCE
Status: COMPLETED | OUTPATIENT
Start: 2019-12-04 | End: 2019-12-04

## 2019-12-04 RX ORDER — KETOROLAC TROMETHAMINE 30 MG/ML
30 INJECTION, SOLUTION INTRAMUSCULAR; INTRAVENOUS ONCE
Status: DISCONTINUED | OUTPATIENT
Start: 2019-12-04 | End: 2019-12-04

## 2019-12-04 RX ORDER — METHOCARBAMOL 750 MG/1
750 TABLET, FILM COATED ORAL 4 TIMES DAILY
Qty: 40 TABLET | Refills: 0 | Status: SHIPPED | OUTPATIENT
Start: 2019-12-04 | End: 2019-12-14

## 2019-12-04 RX ORDER — KETOROLAC TROMETHAMINE 30 MG/ML
30 INJECTION, SOLUTION INTRAMUSCULAR; INTRAVENOUS ONCE
Status: COMPLETED | OUTPATIENT
Start: 2019-12-04 | End: 2019-12-04

## 2019-12-04 RX ORDER — POTASSIUM CHLORIDE 750 MG/1
10 TABLET, EXTENDED RELEASE ORAL DAILY
Status: ON HOLD | COMMUNITY
End: 2022-01-25

## 2019-12-04 RX ADMIN — KETOROLAC TROMETHAMINE 30 MG: 30 INJECTION, SOLUTION INTRAMUSCULAR at 08:35

## 2019-12-04 RX ADMIN — ORPHENADRINE CITRATE 60 MG: 30 INJECTION INTRAMUSCULAR; INTRAVENOUS at 09:51

## 2019-12-04 ASSESSMENT — ENCOUNTER SYMPTOMS
COUGH: 0
EYE DISCHARGE: 0
ABDOMINAL PAIN: 0
FACIAL SWELLING: 0
CONSTIPATION: 0
SHORTNESS OF BREATH: 0
DIARRHEA: 0
BACK PAIN: 1
VOMITING: 0
EYE REDNESS: 0
COLOR CHANGE: 0

## 2019-12-04 ASSESSMENT — PAIN SCALES - GENERAL
PAINLEVEL_OUTOF10: 8
PAINLEVEL_OUTOF10: 8
PAINLEVEL_OUTOF10: 7

## 2019-12-05 LAB
EKG ATRIAL RATE: 70 BPM
EKG P AXIS: 53 DEGREES
EKG P-R INTERVAL: 224 MS
EKG Q-T INTERVAL: 428 MS
EKG QRS DURATION: 88 MS
EKG QTC CALCULATION (BAZETT): 462 MS
EKG R AXIS: 57 DEGREES
EKG T AXIS: 74 DEGREES
EKG VENTRICULAR RATE: 70 BPM

## 2020-01-03 ENCOUNTER — HOSPITAL ENCOUNTER (EMERGENCY)
Age: 55
Discharge: HOME | End: 2020-01-03
Attending: EMERGENCY MEDICINE
Payer: COMMERCIAL

## 2020-01-03 VITALS
BODY MASS INDEX: 36.45 KG/M2 | SYSTOLIC BLOOD PRESSURE: 116 MMHG | OXYGEN SATURATION: 99 % | HEART RATE: 79 BPM | HEIGHT: 73 IN | DIASTOLIC BLOOD PRESSURE: 78 MMHG | TEMPERATURE: 98.3 F | RESPIRATION RATE: 19 BRPM | WEIGHT: 275 LBS

## 2020-01-03 PROCEDURE — 99282 EMERGENCY DEPT VISIT SF MDM: CPT

## 2020-01-03 RX ORDER — TIZANIDINE 2 MG/1
2 TABLET ORAL EVERY 8 HOURS PRN
Qty: 20 TABLET | Refills: 0 | Status: ON HOLD | OUTPATIENT
Start: 2020-01-03 | End: 2022-01-25

## 2020-01-03 ASSESSMENT — PAIN SCALES - GENERAL: PAINLEVEL_OUTOF10: 7

## 2020-01-03 ASSESSMENT — PAIN DESCRIPTION - DESCRIPTORS: DESCRIPTORS: SHARP

## 2020-01-03 ASSESSMENT — PAIN DESCRIPTION - LOCATION: LOCATION: NECK

## 2020-01-03 ASSESSMENT — PAIN DESCRIPTION - PAIN TYPE: TYPE: ACUTE PAIN

## 2020-01-03 ASSESSMENT — ENCOUNTER SYMPTOMS
COLOR CHANGE: 0
BACK PAIN: 0

## 2020-01-03 ASSESSMENT — PAIN DESCRIPTION - ORIENTATION: ORIENTATION: POSTERIOR

## 2020-01-03 NOTE — ED PROVIDER NOTES
[address] ED  eMERGENCY dEPARTMENT eNCOUnter      Pt Name: Jonny Arnold  MRN: [id number]  [place] 1965  Date of evaluation: 1/3/2020  Provider: FABIANO Rousseau CNP    CHIEF COMPLAINT       Chief Complaint   Patient presents with    Neck Injury     posterior         HISTORY OF PRESENT ILLNESS  (Location/Symptom, Timing/Onset, Context/Setting, Quality, Duration, Modifying Factors, Severity.)   Jonny Arnold is a 47 y.o. male who presents to the emergency department via private auto for pain to his left neck. Onset was yesterday when reaching for something on a shelf. States he almost dropped it which caused his arm to jerk back. He has tried motrin without relief. Denies fever, chills, N/T. The pain increases with movement. Rates his pain 7/10 at this time. Nursing Notes were reviewed. ALLERGIES     Benadryl [diphenhydramine];  Ciprofloxacin; Codeine; Morphine; and Pcn [penicillins]    CURRENT MEDICATIONS       Previous Medications    ALLOPURINOL (ZYLOPRIM) 300 MG TABLET    Take 300 mg by mouth daily    APIXABAN (ELIQUIS) 5 MG TABS TABLET    Take 5 mg by mouth 2 times daily     ASPIRIN EC 81 MG EC TABLET    Take 81 mg by mouth daily    COLCHICINE (COLCRYS) 0.6 MG TABLET    Take 0.6 mg by mouth 2 times daily as needed (gout flare)    DAPAGLIFLOZIN (FARXIGA) 10 MG TABLET    Take 10 mg by mouth every morning    DULAGLUTIDE (TRULICITY) 5.41 OM/1.9BV SOPN    Inject 0.75 mg into the skin once a week Indications: Mondays Mondays    FUROSEMIDE (LASIX) 40 MG TABLET    Take 40 mg by mouth daily    LISINOPRIL (PRINIVIL;ZESTRIL) 2.5 MG TABLET    Take 2.5 mg by mouth daily     MAGNESIUM GLUCONATE (MAGONATE) 500 MG TABLET    Take 500 mg by mouth daily    METFORMIN (GLUCOPHAGE) 1000 MG TABLET    Take 1,000 mg by mouth daily    METOPROLOL SUCCINATE (TOPROL XL) 50 MG EXTENDED RELEASE TABLET    Take 50 mg by mouth 2 times daily    OMEPRAZOLE (PRILOSEC) 20 MG CAPSULE    Take 20 mg by mouth daily.    POTASSIUM CHLORIDE (KLOR-CON M) 10 MEQ EXTENDED RELEASE TABLET    Take 10 mEq by mouth daily    PRAVASTATIN (PRAVACHOL) 10 MG TABLET    Take 10 mg by mouth daily     SITAGLIPTAN (JANUVIA) 100 MG TABLET    Take 100 mg by mouth daily. SOTALOL (BETAPACE) 160 MG TABLET    Take 160 mg by mouth 2 times daily     TAMSULOSIN (FLOMAX) 0.4 MG CAPSULE    Take 1 capsule by mouth daily       PAST MEDICAL HISTORY         Diagnosis Date    ADHD, predominantly hyperactive type     Anemia     Blood clot due to device, implant, or graft     IV site L forearm    Bowel obstruction (HCC)     CAD (coronary artery disease)     Cardiomyopathy ([facility]     Diabetes mellitus ([facility] [facility]     Diverticulitis 1996    GERD (gastroesophageal reflux disease)     History of atrial fibrillation     History of ventricular tachycardia     Kidney calculi     Obesity     Sleep apnea     uses CPAP nightly       SURGICAL HISTORY           Procedure Laterality Date    ABDOMEN SURGERY  0817 & 0021    umbillical hernia with mesh and lysis of adhesions    ABDOMINAL HERNIA REPAIR  7/7/2014    RECURRENT HERNIA REPAIR WITH MESH    APPENDECTOMY      CARDIAC DEFIBRILLATOR PLACEMENT      CHOLECYSTECTOMY      COLECTOMY  1996    d/t ruptured diverticuli    CYSTOSCOPY N/A     stent in place taped to penis head    CYSTOSCOPY  3/4/16    with bilateral pyelogram    HERNIA REPAIR      umbilical    LITHOTRIPSY  3/4/16    laser lithotripsy with ureteral dilation    PACEMAKER PLACEMENT      pacemaker/defib         FAMILY HISTORY           Problem Relation Age of Onset    Heart Disease Mother     Heart Disease Father      Family Status   Relation Name Status    Mother  Alive    Father  Alive        SOCIAL HISTORY      reports that he quit smoking about 7 years ago. He has a 2.00 pack-year smoking history. He has never used smokeless tobacco. He reports current alcohol use. He reports that he does not use drugs.     REVIEW OF SYSTEMS    (2-9

## 2020-01-03 NOTE — ED NOTES
Pt presents to er with c/o neck pain. Pt states he was reaching up to grab something off of a high shelf and \"jerked\" back when grabbing the item. Pt states he has had pain since. Pt a&ox3. Skin warm and dry. Respirations even and non-labored.       Charly Mckinney RN  01/03/20 [id number]

## 2020-06-11 ENCOUNTER — HOSPITAL ENCOUNTER (INPATIENT)
Age: 55
LOS: 2 days | Discharge: HOME | DRG: 390 | End: 2020-06-13
Attending: EMERGENCY MEDICINE | Admitting: INTERNAL MEDICINE
Payer: COMMERCIAL

## 2020-06-11 ENCOUNTER — APPOINTMENT (OUTPATIENT)
Dept: CT IMAGING | Age: 55
DRG: 390 | End: 2020-06-11
Payer: COMMERCIAL

## 2020-06-11 PROBLEM — R10.9 ABDOMINAL PAIN: Status: ACTIVE | Noted: 2020-06-11

## 2020-06-11 LAB
ABSOLUTE EOS #: 0.1 K/UL (ref 0–0.44)
ABSOLUTE IMMATURE GRANULOCYTE: 0.02 K/UL (ref 0–0.3)
ABSOLUTE LYMPH #: 0.81 K/UL (ref 1.1–3.7)
ABSOLUTE MONO #: 0.51 K/UL (ref 0.1–1.2)
ANION GAP SERPL CALCULATED.3IONS-SCNC: 11 MMOL/L (ref 9–17)
BASOPHILS # BLD: 1 % (ref 0–2)
BASOPHILS ABSOLUTE: 0.05 K/UL (ref 0–0.2)
BUN BLDV-MCNC: 18 MG/DL (ref 6–20)
BUN/CREAT BLD: 13 (ref 9–20)
CALCIUM SERPL-MCNC: 8.9 MG/DL (ref 8.6–10.4)
CHLORIDE BLD-SCNC: 101 MMOL/L (ref 98–107)
CO2: 23 MMOL/L (ref 20–31)
CREAT SERPL-MCNC: 1.36 MG/DL (ref 0.7–1.2)
DIFFERENTIAL TYPE: ABNORMAL
EOSINOPHILS RELATIVE PERCENT: 2 % (ref 1–4)
GFR AFRICAN AMERICAN: >60 ML/MIN
GFR NON-AFRICAN AMERICAN: 54 ML/MIN
GFR SERPL CREATININE-BSD FRML MDRD: ABNORMAL ML/MIN/{1.73_M2}
GFR SERPL CREATININE-BSD FRML MDRD: ABNORMAL ML/MIN/{1.73_M2}
GLUCOSE BLD-MCNC: 194 MG/DL (ref 70–99)
HCT VFR BLD CALC: 37.7 % (ref 40.7–50.3)
HEMOGLOBIN: 12.5 G/DL (ref 13–17)
IMMATURE GRANULOCYTES: 0 %
LYMPHOCYTES # BLD: 13 % (ref 24–43)
MCH RBC QN AUTO: 28.5 PG (ref 25.2–33.5)
MCHC RBC AUTO-ENTMCNC: 33.2 G/DL (ref 28.4–34.8)
MCV RBC AUTO: 86.1 FL (ref 82.6–102.9)
MONOCYTES # BLD: 8 % (ref 3–12)
NRBC AUTOMATED: 0 PER 100 WBC
PDW BLD-RTO: 17.5 % (ref 11.8–14.4)
PLATELET # BLD: 131 K/UL (ref 138–453)
PLATELET ESTIMATE: ABNORMAL
PMV BLD AUTO: 9.7 FL (ref 8.1–13.5)
POTASSIUM SERPL-SCNC: 4.6 MMOL/L (ref 3.7–5.3)
RBC # BLD: 4.38 M/UL (ref 4.21–5.77)
RBC # BLD: ABNORMAL 10*6/UL
SEG NEUTROPHILS: 76 % (ref 36–65)
SEGMENTED NEUTROPHILS ABSOLUTE COUNT: 4.97 K/UL (ref 1.5–8.1)
SODIUM BLD-SCNC: 135 MMOL/L (ref 135–144)
WBC # BLD: 6.5 K/UL (ref 3.5–11.3)
WBC # BLD: ABNORMAL 10*3/UL

## 2020-06-11 PROCEDURE — 74177 CT ABD & PELVIS W/CONTRAST: CPT

## 2020-06-11 PROCEDURE — 6360000004 HC RX CONTRAST MEDICATION: Performed by: EMERGENCY MEDICINE

## 2020-06-11 PROCEDURE — 96374 THER/PROPH/DIAG INJ IV PUSH: CPT

## 2020-06-11 PROCEDURE — 80048 BASIC METABOLIC PNL TOTAL CA: CPT

## 2020-06-11 PROCEDURE — 2580000003 HC RX 258: Performed by: EMERGENCY MEDICINE

## 2020-06-11 PROCEDURE — 99285 EMERGENCY DEPT VISIT HI MDM: CPT

## 2020-06-11 PROCEDURE — 96375 TX/PRO/DX INJ NEW DRUG ADDON: CPT

## 2020-06-11 PROCEDURE — 1200000000 HC SEMI PRIVATE

## 2020-06-11 PROCEDURE — 85025 COMPLETE CBC W/AUTO DIFF WBC: CPT

## 2020-06-11 PROCEDURE — 96361 HYDRATE IV INFUSION ADD-ON: CPT

## 2020-06-11 PROCEDURE — 6360000002 HC RX W HCPCS: Performed by: EMERGENCY MEDICINE

## 2020-06-11 RX ORDER — SODIUM CHLORIDE 9 MG/ML
INJECTION, SOLUTION INTRAVENOUS CONTINUOUS
Status: DISCONTINUED | OUTPATIENT
Start: 2020-06-11 | End: 2020-06-12

## 2020-06-11 RX ORDER — 0.9 % SODIUM CHLORIDE 0.9 %
80 INTRAVENOUS SOLUTION INTRAVENOUS ONCE
Status: COMPLETED | OUTPATIENT
Start: 2020-06-11 | End: 2020-06-11

## 2020-06-11 RX ORDER — ONDANSETRON 2 MG/ML
4 INJECTION INTRAMUSCULAR; INTRAVENOUS ONCE
Status: COMPLETED | OUTPATIENT
Start: 2020-06-11 | End: 2020-06-11

## 2020-06-11 RX ORDER — HYDROMORPHONE HYDROCHLORIDE 1 MG/ML
1 INJECTION, SOLUTION INTRAMUSCULAR; INTRAVENOUS; SUBCUTANEOUS ONCE
Status: COMPLETED | OUTPATIENT
Start: 2020-06-11 | End: 2020-06-11

## 2020-06-11 RX ORDER — SODIUM CHLORIDE 0.9 % (FLUSH) 0.9 %
10 SYRINGE (ML) INJECTION ONCE
Status: COMPLETED | OUTPATIENT
Start: 2020-06-11 | End: 2020-06-11

## 2020-06-11 RX ADMIN — SODIUM CHLORIDE 80 ML: 9 INJECTION, SOLUTION INTRAVENOUS at 21:56

## 2020-06-11 RX ADMIN — ONDANSETRON 4 MG: 2 INJECTION INTRAMUSCULAR; INTRAVENOUS at 21:21

## 2020-06-11 RX ADMIN — IOPAMIDOL 75 ML: 755 INJECTION, SOLUTION INTRAVENOUS at 21:57

## 2020-06-11 RX ADMIN — HYDROMORPHONE HYDROCHLORIDE 1 MG: 1 INJECTION, SOLUTION INTRAMUSCULAR; INTRAVENOUS; SUBCUTANEOUS at 22:55

## 2020-06-11 RX ADMIN — SODIUM CHLORIDE: 9 INJECTION, SOLUTION INTRAVENOUS at 21:21

## 2020-06-11 RX ADMIN — HYDROMORPHONE HYDROCHLORIDE 1 MG: 1 INJECTION, SOLUTION INTRAMUSCULAR; INTRAVENOUS; SUBCUTANEOUS at 21:21

## 2020-06-11 RX ADMIN — Medication 10 ML: at 21:56

## 2020-06-11 ASSESSMENT — ENCOUNTER SYMPTOMS
VOMITING: 1
COLOR CHANGE: 0
DIARRHEA: 0
NAUSEA: 1
EYE REDNESS: 0
FACIAL SWELLING: 0
ABDOMINAL DISTENTION: 1
EYE DISCHARGE: 0
ABDOMINAL PAIN: 1
CONSTIPATION: 0
COUGH: 0
SHORTNESS OF BREATH: 0

## 2020-06-11 ASSESSMENT — PAIN SCALES - GENERAL
PAINLEVEL_OUTOF10: 10
PAINLEVEL_OUTOF10: 10
PAINLEVEL_OUTOF10: 8

## 2020-06-12 ENCOUNTER — APPOINTMENT (OUTPATIENT)
Dept: GENERAL RADIOLOGY | Age: 55
DRG: 390 | End: 2020-06-12
Payer: COMMERCIAL

## 2020-06-12 LAB
ALBUMIN SERPL-MCNC: 3.9 G/DL (ref 3.5–5.2)
ALBUMIN/GLOBULIN RATIO: ABNORMAL (ref 1–2.5)
ALP BLD-CCNC: 58 U/L (ref 40–129)
ALT SERPL-CCNC: 32 U/L (ref 5–41)
AMYLASE: 25 U/L (ref 28–100)
ANION GAP SERPL CALCULATED.3IONS-SCNC: 9 MMOL/L (ref 9–17)
AST SERPL-CCNC: 18 U/L
BILIRUB SERPL-MCNC: 1.03 MG/DL (ref 0.3–1.2)
BUN BLDV-MCNC: 19 MG/DL (ref 6–20)
BUN/CREAT BLD: 17 (ref 9–20)
CALCIUM SERPL-MCNC: 8.4 MG/DL (ref 8.6–10.4)
CHLORIDE BLD-SCNC: 102 MMOL/L (ref 98–107)
CO2: 26 MMOL/L (ref 20–31)
CREAT SERPL-MCNC: 1.15 MG/DL (ref 0.7–1.2)
DATE, STOOL #1: NORMAL
DATE, STOOL #2: NORMAL
DATE, STOOL #3: NORMAL
ESTIMATED AVERAGE GLUCOSE: 131 MG/DL
GFR AFRICAN AMERICAN: >60 ML/MIN
GFR NON-AFRICAN AMERICAN: >60 ML/MIN
GFR SERPL CREATININE-BSD FRML MDRD: ABNORMAL ML/MIN/{1.73_M2}
GFR SERPL CREATININE-BSD FRML MDRD: ABNORMAL ML/MIN/{1.73_M2}
GLUCOSE BLD-MCNC: 119 MG/DL (ref 75–110)
GLUCOSE BLD-MCNC: 125 MG/DL (ref 70–99)
GLUCOSE BLD-MCNC: 125 MG/DL (ref 75–110)
GLUCOSE BLD-MCNC: 127 MG/DL (ref 75–110)
GLUCOSE BLD-MCNC: 135 MG/DL (ref 75–110)
GLUCOSE BLD-MCNC: 135 MG/DL (ref 75–110)
GLUCOSE BLD-MCNC: 179 MG/DL (ref 75–110)
HBA1C MFR BLD: 6.2 % (ref 4–6)
HCT VFR BLD CALC: 34.6 % (ref 40.7–50.3)
HEMOCCULT SP1 STL QL: NEGATIVE
HEMOCCULT SP2 STL QL: NORMAL
HEMOCCULT SP3 STL QL: NORMAL
HEMOGLOBIN: 11.5 G/DL (ref 13–17)
LIPASE: 26 U/L (ref 13–60)
MAGNESIUM: 1.9 MG/DL (ref 1.6–2.6)
MCH RBC QN AUTO: 28.7 PG (ref 25.2–33.5)
MCHC RBC AUTO-ENTMCNC: 33.2 G/DL (ref 28.4–34.8)
MCV RBC AUTO: 86.3 FL (ref 82.6–102.9)
NRBC AUTOMATED: 0 PER 100 WBC
PDW BLD-RTO: 17.3 % (ref 11.8–14.4)
PHOSPHORUS: 2.7 MG/DL (ref 2.5–4.5)
PLATELET # BLD: 116 K/UL (ref 138–453)
PMV BLD AUTO: 9.3 FL (ref 8.1–13.5)
POTASSIUM SERPL-SCNC: 4 MMOL/L (ref 3.7–5.3)
RBC # BLD: 4.01 M/UL (ref 4.21–5.77)
SODIUM BLD-SCNC: 137 MMOL/L (ref 135–144)
TIME, STOOL #1: NORMAL
TIME, STOOL #2: NORMAL
TIME, STOOL #3: NORMAL
TOTAL PROTEIN: 6 G/DL (ref 6.4–8.3)
WBC # BLD: 6.4 K/UL (ref 3.5–11.3)

## 2020-06-12 PROCEDURE — 1200000000 HC SEMI PRIVATE

## 2020-06-12 PROCEDURE — 6360000002 HC RX W HCPCS: Performed by: NURSE PRACTITIONER

## 2020-06-12 PROCEDURE — 82947 ASSAY GLUCOSE BLOOD QUANT: CPT

## 2020-06-12 PROCEDURE — 2580000003 HC RX 258: Performed by: NURSE PRACTITIONER

## 2020-06-12 PROCEDURE — 2T015 HOSPITALIST 2ND TOUCH: CPT | Performed by: INTERNAL MEDICINE

## 2020-06-12 PROCEDURE — 83690 ASSAY OF LIPASE: CPT

## 2020-06-12 PROCEDURE — 83036 HEMOGLOBIN GLYCOSYLATED A1C: CPT

## 2020-06-12 PROCEDURE — 87329 GIARDIA AG IA: CPT

## 2020-06-12 PROCEDURE — 2580000003 HC RX 258: Performed by: INTERNAL MEDICINE

## 2020-06-12 PROCEDURE — 6370000000 HC RX 637 (ALT 250 FOR IP): Performed by: NURSE PRACTITIONER

## 2020-06-12 PROCEDURE — 83735 ASSAY OF MAGNESIUM: CPT

## 2020-06-12 PROCEDURE — 85027 COMPLETE CBC AUTOMATED: CPT

## 2020-06-12 PROCEDURE — 84100 ASSAY OF PHOSPHORUS: CPT

## 2020-06-12 PROCEDURE — 82150 ASSAY OF AMYLASE: CPT

## 2020-06-12 PROCEDURE — 82272 OCCULT BLD FECES 1-3 TESTS: CPT

## 2020-06-12 PROCEDURE — 80053 COMPREHEN METABOLIC PANEL: CPT

## 2020-06-12 PROCEDURE — 36415 COLL VENOUS BLD VENIPUNCTURE: CPT

## 2020-06-12 PROCEDURE — 83630 LACTOFERRIN FECAL (QUAL): CPT

## 2020-06-12 PROCEDURE — 6370000000 HC RX 637 (ALT 250 FOR IP): Performed by: INTERNAL MEDICINE

## 2020-06-12 PROCEDURE — 99222 1ST HOSP IP/OBS MODERATE 55: CPT | Performed by: INTERNAL MEDICINE

## 2020-06-12 PROCEDURE — 74018 RADEX ABDOMEN 1 VIEW: CPT

## 2020-06-12 RX ORDER — SODIUM CHLORIDE 0.9 % (FLUSH) 0.9 %
10 SYRINGE (ML) INJECTION EVERY 12 HOURS SCHEDULED
Status: DISCONTINUED | OUTPATIENT
Start: 2020-06-12 | End: 2020-06-13 | Stop reason: HOSPADM

## 2020-06-12 RX ORDER — ACETAMINOPHEN 650 MG/1
650 SUPPOSITORY RECTAL EVERY 6 HOURS PRN
Status: DISCONTINUED | OUTPATIENT
Start: 2020-06-12 | End: 2020-06-13 | Stop reason: HOSPADM

## 2020-06-12 RX ORDER — ACETAMINOPHEN 325 MG/1
650 TABLET ORAL EVERY 6 HOURS PRN
Status: DISCONTINUED | OUTPATIENT
Start: 2020-06-12 | End: 2020-06-13 | Stop reason: HOSPADM

## 2020-06-12 RX ORDER — PANTOPRAZOLE SODIUM 40 MG/1
40 TABLET, DELAYED RELEASE ORAL
Status: DISCONTINUED | OUTPATIENT
Start: 2020-06-12 | End: 2020-06-13 | Stop reason: HOSPADM

## 2020-06-12 RX ORDER — POTASSIUM CHLORIDE 7.45 MG/ML
10 INJECTION INTRAVENOUS PRN
Status: DISCONTINUED | OUTPATIENT
Start: 2020-06-12 | End: 2020-06-13 | Stop reason: HOSPADM

## 2020-06-12 RX ORDER — POTASSIUM CHLORIDE 20 MEQ/1
40 TABLET, EXTENDED RELEASE ORAL PRN
Status: DISCONTINUED | OUTPATIENT
Start: 2020-06-12 | End: 2020-06-13 | Stop reason: HOSPADM

## 2020-06-12 RX ORDER — SOTALOL HYDROCHLORIDE 80 MG/1
160 TABLET ORAL 2 TIMES DAILY
Status: DISCONTINUED | OUTPATIENT
Start: 2020-06-12 | End: 2020-06-13 | Stop reason: HOSPADM

## 2020-06-12 RX ORDER — ALLOPURINOL 300 MG/1
300 TABLET ORAL DAILY
Status: DISCONTINUED | OUTPATIENT
Start: 2020-06-12 | End: 2020-06-13 | Stop reason: HOSPADM

## 2020-06-12 RX ORDER — METOPROLOL TARTRATE 5 MG/5ML
5 INJECTION INTRAVENOUS EVERY 4 HOURS PRN
Status: DISCONTINUED | OUTPATIENT
Start: 2020-06-12 | End: 2020-06-13 | Stop reason: HOSPADM

## 2020-06-12 RX ORDER — TAMSULOSIN HYDROCHLORIDE 0.4 MG/1
0.4 CAPSULE ORAL DAILY
Status: DISCONTINUED | OUTPATIENT
Start: 2020-06-12 | End: 2020-06-13 | Stop reason: HOSPADM

## 2020-06-12 RX ORDER — DOCUSATE SODIUM 100 MG/1
100 CAPSULE, LIQUID FILLED ORAL DAILY
Status: DISCONTINUED | OUTPATIENT
Start: 2020-06-12 | End: 2020-06-13 | Stop reason: HOSPADM

## 2020-06-12 RX ORDER — NICOTINE 21 MG/24HR
1 PATCH, TRANSDERMAL 24 HOURS TRANSDERMAL DAILY PRN
Status: DISCONTINUED | OUTPATIENT
Start: 2020-06-12 | End: 2020-06-13 | Stop reason: HOSPADM

## 2020-06-12 RX ORDER — SODIUM CHLORIDE 9 MG/ML
INJECTION, SOLUTION INTRAVENOUS CONTINUOUS
Status: DISCONTINUED | OUTPATIENT
Start: 2020-06-12 | End: 2020-06-13 | Stop reason: HOSPADM

## 2020-06-12 RX ORDER — POTASSIUM CHLORIDE 20 MEQ/1
10 TABLET, EXTENDED RELEASE ORAL DAILY
Status: DISCONTINUED | OUTPATIENT
Start: 2020-06-12 | End: 2020-06-13 | Stop reason: HOSPADM

## 2020-06-12 RX ORDER — LISINOPRIL 2.5 MG/1
2.5 TABLET ORAL DAILY
Status: DISCONTINUED | OUTPATIENT
Start: 2020-06-12 | End: 2020-06-13 | Stop reason: HOSPADM

## 2020-06-12 RX ORDER — PROMETHAZINE HYDROCHLORIDE 12.5 MG/1
12.5 TABLET ORAL EVERY 6 HOURS PRN
Status: DISCONTINUED | OUTPATIENT
Start: 2020-06-12 | End: 2020-06-13 | Stop reason: HOSPADM

## 2020-06-12 RX ORDER — NICOTINE POLACRILEX 4 MG
15 LOZENGE BUCCAL PRN
Status: DISCONTINUED | OUTPATIENT
Start: 2020-06-12 | End: 2020-06-13 | Stop reason: HOSPADM

## 2020-06-12 RX ORDER — ASPIRIN 81 MG/1
81 TABLET ORAL DAILY
Status: DISCONTINUED | OUTPATIENT
Start: 2020-06-12 | End: 2020-06-13 | Stop reason: HOSPADM

## 2020-06-12 RX ORDER — SODIUM CHLORIDE 0.9 % (FLUSH) 0.9 %
10 SYRINGE (ML) INJECTION PRN
Status: DISCONTINUED | OUTPATIENT
Start: 2020-06-12 | End: 2020-06-13 | Stop reason: HOSPADM

## 2020-06-12 RX ORDER — PRAVASTATIN SODIUM 20 MG
10 TABLET ORAL DAILY
Status: DISCONTINUED | OUTPATIENT
Start: 2020-06-12 | End: 2020-06-13 | Stop reason: HOSPADM

## 2020-06-12 RX ORDER — MAGNESIUM SULFATE 1 G/100ML
1 INJECTION INTRAVENOUS PRN
Status: DISCONTINUED | OUTPATIENT
Start: 2020-06-12 | End: 2020-06-13 | Stop reason: HOSPADM

## 2020-06-12 RX ORDER — POLYETHYLENE GLYCOL 3350 17 G/17G
17 POWDER, FOR SOLUTION ORAL DAILY PRN
Status: DISCONTINUED | OUTPATIENT
Start: 2020-06-12 | End: 2020-06-13 | Stop reason: HOSPADM

## 2020-06-12 RX ORDER — DEXTROSE MONOHYDRATE 50 MG/ML
100 INJECTION, SOLUTION INTRAVENOUS PRN
Status: DISCONTINUED | OUTPATIENT
Start: 2020-06-12 | End: 2020-06-13 | Stop reason: HOSPADM

## 2020-06-12 RX ORDER — FUROSEMIDE 40 MG/1
40 TABLET ORAL DAILY
Status: DISCONTINUED | OUTPATIENT
Start: 2020-06-12 | End: 2020-06-13 | Stop reason: HOSPADM

## 2020-06-12 RX ORDER — DEXTROSE MONOHYDRATE 25 G/50ML
12.5 INJECTION, SOLUTION INTRAVENOUS PRN
Status: DISCONTINUED | OUTPATIENT
Start: 2020-06-12 | End: 2020-06-13 | Stop reason: HOSPADM

## 2020-06-12 RX ORDER — ONDANSETRON 2 MG/ML
4 INJECTION INTRAMUSCULAR; INTRAVENOUS EVERY 6 HOURS PRN
Status: DISCONTINUED | OUTPATIENT
Start: 2020-06-12 | End: 2020-06-13 | Stop reason: HOSPADM

## 2020-06-12 RX ORDER — METOPROLOL SUCCINATE 50 MG/1
50 TABLET, EXTENDED RELEASE ORAL 2 TIMES DAILY
Status: DISCONTINUED | OUTPATIENT
Start: 2020-06-12 | End: 2020-06-13 | Stop reason: HOSPADM

## 2020-06-12 RX ADMIN — HYDROMORPHONE HYDROCHLORIDE 0.5 MG: 1 INJECTION, SOLUTION INTRAMUSCULAR; INTRAVENOUS; SUBCUTANEOUS at 08:10

## 2020-06-12 RX ADMIN — HYDROMORPHONE HYDROCHLORIDE 0.5 MG: 1 INJECTION, SOLUTION INTRAMUSCULAR; INTRAVENOUS; SUBCUTANEOUS at 19:44

## 2020-06-12 RX ADMIN — SODIUM CHLORIDE: 9 INJECTION, SOLUTION INTRAVENOUS at 01:56

## 2020-06-12 RX ADMIN — ONDANSETRON 4 MG: 2 INJECTION INTRAMUSCULAR; INTRAVENOUS at 05:15

## 2020-06-12 RX ADMIN — MAGNESIUM GLUCONATE 500 MG ORAL TABLET 400 MG: 500 TABLET ORAL at 14:12

## 2020-06-12 RX ADMIN — PRAVASTATIN SODIUM 10 MG: 20 TABLET ORAL at 14:13

## 2020-06-12 RX ADMIN — LISINOPRIL 2.5 MG: 2.5 TABLET ORAL at 14:12

## 2020-06-12 RX ADMIN — HYDROMORPHONE HYDROCHLORIDE 0.5 MG: 1 INJECTION, SOLUTION INTRAMUSCULAR; INTRAVENOUS; SUBCUTANEOUS at 11:45

## 2020-06-12 RX ADMIN — DOCUSATE SODIUM 100 MG: 100 CAPSULE, LIQUID FILLED ORAL at 17:23

## 2020-06-12 RX ADMIN — SODIUM CHLORIDE: 9 INJECTION, SOLUTION INTRAVENOUS at 17:23

## 2020-06-12 RX ADMIN — FUROSEMIDE 40 MG: 40 TABLET ORAL at 14:12

## 2020-06-12 RX ADMIN — APIXABAN 5 MG: 5 TABLET, FILM COATED ORAL at 19:43

## 2020-06-12 RX ADMIN — HYDROMORPHONE HYDROCHLORIDE 0.5 MG: 1 INJECTION, SOLUTION INTRAMUSCULAR; INTRAVENOUS; SUBCUTANEOUS at 01:56

## 2020-06-12 RX ADMIN — METOPROLOL SUCCINATE 50 MG: 50 TABLET, EXTENDED RELEASE ORAL at 14:13

## 2020-06-12 RX ADMIN — HYDROMORPHONE HYDROCHLORIDE 0.5 MG: 1 INJECTION, SOLUTION INTRAMUSCULAR; INTRAVENOUS; SUBCUTANEOUS at 05:14

## 2020-06-12 RX ADMIN — ASPIRIN 81 MG: 81 TABLET, COATED ORAL at 14:13

## 2020-06-12 RX ADMIN — HYDROMORPHONE HYDROCHLORIDE 0.5 MG: 1 INJECTION, SOLUTION INTRAMUSCULAR; INTRAVENOUS; SUBCUTANEOUS at 16:01

## 2020-06-12 ASSESSMENT — PAIN DESCRIPTION - PAIN TYPE
TYPE: ACUTE PAIN

## 2020-06-12 ASSESSMENT — PAIN SCALES - GENERAL
PAINLEVEL_OUTOF10: 0
PAINLEVEL_OUTOF10: 8
PAINLEVEL_OUTOF10: 0
PAINLEVEL_OUTOF10: 8
PAINLEVEL_OUTOF10: 7
PAINLEVEL_OUTOF10: 0
PAINLEVEL_OUTOF10: 7
PAINLEVEL_OUTOF10: 7
PAINLEVEL_OUTOF10: 9
PAINLEVEL_OUTOF10: 10
PAINLEVEL_OUTOF10: 0

## 2020-06-12 ASSESSMENT — PAIN DESCRIPTION - DESCRIPTORS
DESCRIPTORS: SHARP
DESCRIPTORS: ACHING

## 2020-06-12 ASSESSMENT — PAIN DESCRIPTION - FREQUENCY
FREQUENCY: INTERMITTENT
FREQUENCY: INTERMITTENT
FREQUENCY: CONTINUOUS
FREQUENCY: INTERMITTENT

## 2020-06-12 ASSESSMENT — PAIN DESCRIPTION - PROGRESSION
CLINICAL_PROGRESSION: GRADUALLY WORSENING
CLINICAL_PROGRESSION: GRADUALLY WORSENING
CLINICAL_PROGRESSION: GRADUALLY IMPROVING
CLINICAL_PROGRESSION: GRADUALLY WORSENING

## 2020-06-12 ASSESSMENT — PAIN DESCRIPTION - ORIENTATION
ORIENTATION: MID;LOWER
ORIENTATION: MID;LOWER
ORIENTATION: LEFT;LOWER;UPPER
ORIENTATION: MID;LOWER

## 2020-06-12 ASSESSMENT — PAIN DESCRIPTION - ONSET
ONSET: ON-GOING

## 2020-06-12 ASSESSMENT — PAIN DESCRIPTION - LOCATION
LOCATION: ABDOMEN

## 2020-06-12 ASSESSMENT — PAIN DESCRIPTION - DIRECTION: RADIATING_TOWARDS: RUQ

## 2020-06-12 NOTE — PROGRESS NOTES
Pt admitted to room 2002 from ER. Patient alert and oriented x4  Oriented to room and call light/tv controls. Bed in lowest position, wheels locked, 2/4 side rails up  Call light in reach, room free of clutter, adequate lighting provided.

## 2020-06-12 NOTE — PROGRESS NOTES
Transitions of Care Pharmacy Service   Medication Review    The patient's list of current home medications has been reviewed. Source(s) of information: patient, [facility], Care Every[facility]    Other Notes Cardiology note in [place] from 3/10/20 reported pt is taking Sotalol 80mg BID, but pt is getting 160mg tabs and he confirmed taking a full 160mg tab BID at home           Please feel free to call me with any questions about this encounter. Thank you. Kyrie Clark [facility]   Transitions of Care Pharmacy Service  Phone:  [phone]  Fax: [phone]      Electronically signed by Kyrie Clark [facility] on 6/12/2020 at 3:32 PM         Medications Prior to Admission:   tiZANidine (ZANAFLEX) 2 MG tablet, Take 1 tablet by mouth every 8 hours as needed (back pain) WARNING:  May cause drowsiness. May impair ability to operate vehicles or machinery.   Do not use in combination with alcohol.  metoprolol succinate (TOPROL XL) 50 MG extended release tablet, Take 50 mg by mouth 2 times daily  furosemide (LASIX) 40 MG tablet, Take 40 mg by mouth daily  magnesium gluconate (MAGONATE) 500 MG tablet, Take 500 mg by mouth daily  potassium chloride (KLOR-CON M) 10 MEQ extended release tablet, Take 10 mEq by mouth daily  aspirin EC 81 MG EC tablet, Take 81 mg by mouth daily  colchicine (COLCRYS) 0.6 MG tablet, Take 0.6 mg by mouth 2 times daily as needed (gout flare)  apixaban (ELIQUIS) 5 MG TABS tablet, Take 5 mg by mouth 2 times daily   pravastatin (PRAVACHOL) 10 MG tablet, Take 10 mg by mouth daily   allopurinol (ZYLOPRIM) 300 MG tablet, Take 300 mg by mouth daily  metFORMIN (GLUCOPHAGE) 1000 MG tablet, Take 1,000 mg by mouth daily  Dulaglutide (TRULICITY) 2.20 XO/7.0YX SOPN, Inject 0.75 mg into the skin once a week Indications: Mondays  tamsulosin (FLOMAX) 0.4 MG capsule, Take 1 capsule by mouth daily  dapagliflozin (FARXIGA) 10 MG tablet, Take 10 mg by mouth every morning  sitaGLIPtan (JANUVIA) 100 MG tablet, Take

## 2020-06-12 NOTE — H&P
[facility]    HISTORY AND PHYSICAL EXAMINATION            Date:   6/12/2020  Patient name:  Emiliano Hairston  Date of admission:  6/11/2020  9:00 PM  MRN:   [id number]  Account:  [de-identified]  YOB: 1965  PCP:    Sherie Jones MD  Room:   2024/2024-01  Code Status:    Full Code    Chief Complaint:     Chief Complaint   Patient presents with    Abdominal Pain     History of many bowel obstructions, feels like one, pain is in the LLQ    Emesis     Foul smell       History Obtained From:     patient    History of Present Illness:     Emiliano Hairston is a 54 y.o. Non-/non  male who presents with Abdominal Pain (History of many bowel obstructions, feels like one, pain is in the LLQ) and Emesis (Foul smell)   and is admitted to the hospital for the management of SBO (small bowel obstruction) ([facility] [address] This is a 24-year-old white male with history of prior small bowel obstructions that presents with abdominal pain and distention and high-pitched abdominal gurgling similar to prior bowel obstructions. Symptoms were sudden onset yesterday with associated nausea and vomiting and no BM or flatus. While in the emergency room he did pass some flatus but did not markedly improve. He underwent CT scan imaging which was negative for bowel obstruction but given his clinical presentation he was admitted for further evaluation. He is currently seen in his room he continues to have abdominal pain now more right-sided without radiation. He has no further nausea or vomiting but has not passed gas or had a BM since admission. At this point he is not nauseated or vomiting and there is no indication for an NG tube.     Past Medical History:     Past Medical History:   Diagnosis Date    ADHD, predominantly hyperactive type     Anemia     Blood clot due to device, implant, or graft     IV site L forearm    Bowel obstruction (HCC)     CAD (coronary artery disease)     Cardiomyopathy ([facility] [address]     Diabetes mellitus ([facility] [address]     Diverticulitis 1996    GERD (gastroesophageal reflux disease)     History of atrial fibrillation     History of ventricular tachycardia     Kidney calculi     Obesity     Sleep apnea     uses CPAP nightly        Past Surgical History:     Past Surgical History:   Procedure Laterality Date    ABDOMEN SURGERY  1097 & 9997    umbillical hernia with mesh and lysis of adhesions    ABDOMINAL HERNIA REPAIR  7/7/2014    RECURRENT HERNIA REPAIR WITH MESH    APPENDECTOMY      CARDIAC DEFIBRILLATOR PLACEMENT      CHOLECYSTECTOMY      COLECTOMY  1996    d/t ruptured diverticuli    CYSTOSCOPY N/A     stent in place taped to penis head    CYSTOSCOPY  3/4/16    with bilateral pyelogram    HERNIA REPAIR      umbilical    LITHOTRIPSY  3/4/16    laser lithotripsy with ureteral dilation    PACEMAKER PLACEMENT      pacemaker/defib        Medications Prior to Admission:     Prior to Admission medications    Medication Sig Start Date End Date Taking? Authorizing Provider   tiZANidine (ZANAFLEX) 2 MG tablet Take 1 tablet by mouth every 8 hours as needed (back pain) WARNING:  May cause drowsiness.  May impair ability to operate vehicles or machinery.  Do not use in combination with alcohol.  1/3/20  Yes FABIANO Ross - CNP   metoprolol succinate (TOPROL XL) 50 MG extended release tablet Take 50 mg by mouth 2 times daily   Yes Historical Provider, MD   furosemide (LASIX) 40 MG tablet Take 40 mg by mouth daily   Yes Historical Provider, MD   magnesium gluconate (MAGONATE) 500 MG tablet Take 500 mg by mouth daily   Yes Historical Provider, MD   potassium chloride (KLOR-CON M) 10 MEQ extended release tablet Take 10 mEq by mouth daily   Yes Historical Provider, MD   aspirin EC 81 MG EC tablet Take 81 mg by mouth daily   Yes Historical Provider, MD   colchicine (COLCRYS) 0.6 MG tablet Take 0.6 mg by mouth 2 times daily as needed (gout flare)   Yes Historical Provider, MD   apixaban (ELIQUIS) 5 MG TABS tablet Take 5 mg by mouth 2 times daily  6/21/18  Yes Historical Provider, MD   pravastatin (PRAVACHOL) 10 MG tablet Take 10 mg by mouth daily  9/6/18  Yes Historical Provider, MD   allopurinol (ZYLOPRIM) 300 MG tablet Take 300 mg by mouth daily 1/12/18  Yes Historical Provider, MD   metFORMIN (GLUCOPHAGE) 1000 MG tablet Take 1,000 mg by mouth daily   Yes Historical Provider, MD   Dulaglutide (TRULICITY) 0.79 HT/2.3OJ SOPN Inject 0.75 mg into the skin once a week Indications: Mondays Mondays   Yes Historical Provider, MD   tamsulosin (FLOMAX) 0.4 MG capsule Take 1 capsule by mouth daily 3/4/16  Yes Jamal Garcia MD   dapagliflozin (FARXIGA) 10 MG tablet Take 10 mg by mouth every morning   Yes Historical Provider, MD   sitaGLIPtan (JANUVIA) 100 MG tablet Take 100 mg by mouth daily. Yes Historical Provider, MD   omeprazole (PRILOSEC) 20 MG capsule Take 20 mg by mouth daily. Yes Historical Provider, MD   sotalol (BETAPACE) 160 MG tablet Take 160 mg by mouth 2 times daily    Yes Historical Provider, MD   lisinopril (PRINIVIL;ZESTRIL) 2.5 MG tablet Take 2.5 mg by mouth daily    Yes Historical Provider, MD        Allergies:     Benadryl [diphenhydramine]; Ciprofloxacin; Codeine; Morphine; and Pcn [penicillins]    Social History:     Tobacco:    reports that he quit smoking about 8 years ago. He has a 2.00 pack-year smoking history. He has never used smokeless tobacco.  Alcohol:      reports current alcohol use. Drug Use:  reports no history of drug use. Family History:     Family History   Problem Relation Age of Onset    Heart Disease Mother     Heart Disease Father        Review of Systems:     Positive and Negative as described in HPI.     CONSTITUTIONAL:  negative for fevers, chills, sweats, fatigue, weight loss  HEENT:  negative for vision, hearing changes, runny nose, throat pain  RESPIRATORY:  negative for shortness of breath, right-sided tenderness, mildly distended, high-pitched bowel sounds right upper quadrant, no hepatomegaly or splenomegaly  Neurologic: There are no new focal motor or sensory deficits, normal muscle tone and bulk, no abnormal sensation, normal speech, cranial nerves II through XII grossly intact  Skin: No gross lesions, rashes, bruising or bleeding on exposed skin area  Extremities:  peripheral pulses palpable, no pedal edema or calf pain with palpation  Psych: normal affect     Investigations:      Laboratory Testing:  Recent Results (from the past 24 hour(s))   CBC Auto Differential    Collection Time: 06/11/20  9:25 PM   Result Value Ref Range    WBC 6.5 3.5 - 11.3 k/uL    RBC 4.38 4.21 - 5.77 m/uL    Hemoglobin 12.5 (L) 13.0 - 17.0 g/dL    Hematocrit 37.7 (L) 40.7 - 50.3 %    MCV 86.1 82.6 - 102.9 fL    MCH 28.5 25.2 - 33.5 pg    MCHC 33.2 28.4 - 34.8 g/dL    RDW 17.5 (H) 11.8 - 14.4 %    Platelets 464 (L) 924 - 453 k/uL    MPV 9.7 8.1 - 13.5 fL    NRBC Automated 0.0 0.0 per 100 WBC    Differential Type NOT REPORTED     Seg Neutrophils 76 (H) 36 - 65 %    Lymphocytes 13 (L) 24 - 43 %    Monocytes 8 3 - 12 %    Eosinophils % 2 1 - 4 %    Basophils 1 0 - 2 %    Immature Granulocytes 0 0 %    Segs Absolute 4.97 1.50 - 8.10 k/uL    Absolute Lymph # 0.81 (L) 1.10 - 3.70 k/uL    Absolute Mono # 0.51 0.10 - 1.20 k/uL    Absolute Eos # 0.10 0.00 - 0.44 k/uL    Basophils Absolute 0.05 0.00 - 0.20 k/uL    Absolute Immature Granulocyte 0.02 0.00 - 0.30 k/uL    WBC Morphology NOT REPORTED     RBC Morphology ANISOCYTOSIS PRESENT     Platelet Estimate NOT REPORTED    Basic Metabolic Panel    Collection Time: 06/11/20  9:25 PM   Result Value Ref Range    Glucose 194 (H) 70 - 99 mg/dL    BUN 18 6 - 20 mg/dL    CREATININE 1.36 (H) 0.70 - 1.20 mg/dL    Bun/Cre Ratio 13 9 - 20    Calcium 8.9 8.6 - 10.4 mg/dL    Sodium 135 135 - 144 mmol/L    Potassium 4.6 3.7 - 5.3 mmol/L    Chloride 101 98 - 107 mmol/L    CO2 23 20 - 31 mmol/L Anion Gap 11 9 - 17 mmol/L    GFR Non-African American 54 (L) >60 mL/min    GFR African American >60 >60 mL/min    GFR Comment          GFR Staging NOT REPORTED    POC Glucose Fingerstick    Collection Time: 06/12/20  1:31 AM   Result Value Ref Range    POC Glucose 135 (H) 75 - 110 mg/dL   POC Glucose Fingerstick    Collection Time: 06/12/20  4:05 AM   Result Value Ref Range    POC Glucose 127 (H) 75 - 110 mg/dL   Comprehensive Metabolic Panel w/ Reflex to MG    Collection Time: 06/12/20  5:55 AM   Result Value Ref Range    Glucose 125 (H) 70 - 99 mg/dL    BUN 19 6 - 20 mg/dL    CREATININE 1.15 0.70 - 1.20 mg/dL    Bun/Cre Ratio 17 9 - 20    Calcium 8.4 (L) 8.6 - 10.4 mg/dL    Sodium 137 135 - 144 mmol/L    Potassium 4.0 3.7 - 5.3 mmol/L    Chloride 102 98 - 107 mmol/L    CO2 26 20 - 31 mmol/L    Anion Gap 9 9 - 17 mmol/L    Alkaline Phosphatase 58 40 - 129 U/L    ALT 32 5 - 41 U/L    AST 18 <40 U/L    Total Bilirubin 1.03 0.3 - 1.2 mg/dL    Total Protein 6.0 (L) 6.4 - 8.3 g/dL    Alb 3.9 3.5 - 5.2 g/dL    Albumin/Globulin Ratio NOT REPORTED 1.0 - 2.5    GFR Non-African American >60 >60 mL/min    GFR African American >60 >60 mL/min    GFR Comment          GFR Staging NOT REPORTED    Magnesium    Collection Time: 06/12/20  5:55 AM   Result Value Ref Range    Magnesium 1.9 1.6 - 2.6 mg/dL   Phosphorus    Collection Time: 06/12/20  5:55 AM   Result Value Ref Range    Phosphorus 2.7 2.5 - 4.5 mg/dL   Amylase    Collection Time: 06/12/20  5:55 AM   Result Value Ref Range    Amylase 25 (L) 28 - 100 U/L   Lipase    Collection Time: 06/12/20  5:55 AM   Result Value Ref Range    Lipase 26 13 - 60 U/L   CBC    Collection Time: 06/12/20  5:55 AM   Result Value Ref Range    WBC 6.4 3.5 - 11.3 k/uL    RBC 4.01 (L) 4.21 - 5.77 m/uL    Hemoglobin 11.5 (L) 13.0 - 17.0 g/dL    Hematocrit 34.6 (L) 40.7 - 50.3 %    MCV 86.3 82.6 - 102.9 fL    MCH 28.7 25.2 - 33.5 pg    MCHC 33.2 28.4 - 34.8 g/dL    RDW 17.3 (H) 11.8 - 14.4 % Platelets 580 (L) 890 - 453 k/uL    MPV 9.3 8.1 - 13.5 fL    NRBC Automated 0.0 0.0 per 100 WBC   Hemoglobin A1C    Collection Time: 06/12/20  5:55 AM   Result Value Ref Range    Hemoglobin A1C 6.2 (H) 4.0 - 6.0 %    Estimated Avg Glucose 131 mg/dL   POC Glucose Fingerstick    Collection Time: 06/12/20  8:06 AM   Result Value Ref Range    POC Glucose 119 (H) 75 - 110 mg/dL   POC Glucose Fingerstick    Collection Time: 06/12/20 11:27 AM   Result Value Ref Range    POC Glucose 125 (H) 75 - 110 mg/dL       Imaging/Diagnostics:  Ct Abdomen Pelvis W Iv Contrast    Result Date: 6/11/2020  1. No acute findings within the abdomen or pelvis 2. Hepatic steatosis 3. Splenomegaly 4. Prior cholecystectomy       Assessment :      Hospital Problems           Last Modified POA    * (Principal) SBO (small bowel obstruction) ([facility] [address] 6/12/2020 Yes    AICD (automatic cardioverter/defibrillator) present 6/12/2020 Yes    Type 2 diabetes mellitus, with long-term current use of insulin ([facility] [address] 6/12/2020 Yes    Essential hypertension 6/12/2020 Yes    History of atrial fibrillation 6/12/2020 Yes    Abdominal pain 6/12/2020 Yes          Plan:     Patient status inpatient in the Med/Surge    1. Admit inpatient  2. IV hydration  3. CT scan reviewed, will repeat KUB today  4. Correct electrolytes as needed  5. Home cardiac medications with sips  6. Insulin scale  7. If KUB negative start clear liquid diet  8. GI and DVT prophylaxis  9. Activity as tolerated, encourage ambulation  10. Further recommendations and plans pending clinical course    Consultations:   IP CONSULT TO HOSPITALIST     Patient is admitted as inpatient status because of co-morbidities listed above, severity of signs and symptoms as outlined, requirement for current medical therapies and most importantly because of direct risk to patient if care not provided in a hospital setting.     Kyler Pina DO  6/12/2020  4:53 PM    Copy sent to Dr. Brayan Gary MD

## 2020-06-12 NOTE — ED PROVIDER NOTES
chloride bolus    sodium chloride flush 0.9 % injection 10 mL    iopamidol (ISOVUE-370) 76 % injection 75 mL    HYDROmorphone HCl PF (DILAUDID) injection 1 mg       Medical Decision Making: CT is negative but his symptoms are consistent with bowel obstruction. We will admit him overnight for observation and have his surgeon see him in the morning. Treatment diagnosis and disposition were discussed with the patient. CONSULTS:  IP CONSULT TO HOSPITALIST    PROCEDURES:  None    FINAL IMPRESSION      1.  Left lower quadrant abdominal pain          DISPOSITION/PLAN   DISPOSITION Decision To Admit 06/11/2020 10:55:03 PM      PATIENT REFERRED TO:   Charly Carvalho, [address]  [phone]            DISCHARGE MEDICATIONS:     New Prescriptions    No medications on file         (Please note that portions of this note were completed with a voice recognition program.  Efforts were made to edit the dictations but occasionally words are mis-transcribed.)    Dion Bone MD  Attending Emergency Physician           Dion Bone MD  06/11/20 [id number]

## 2020-06-13 VITALS
RESPIRATION RATE: 18 BRPM | BODY MASS INDEX: 35.12 KG/M2 | SYSTOLIC BLOOD PRESSURE: 101 MMHG | HEIGHT: 73 IN | DIASTOLIC BLOOD PRESSURE: 55 MMHG | TEMPERATURE: 98.1 F | OXYGEN SATURATION: 96 % | HEART RATE: 66 BPM | WEIGHT: 265 LBS

## 2020-06-13 LAB
ANION GAP SERPL CALCULATED.3IONS-SCNC: 7 MMOL/L (ref 9–17)
BUN BLDV-MCNC: 14 MG/DL (ref 6–20)
BUN/CREAT BLD: 16 (ref 9–20)
CALCIUM SERPL-MCNC: 8.3 MG/DL (ref 8.6–10.4)
CHLORIDE BLD-SCNC: 104 MMOL/L (ref 98–107)
CO2: 28 MMOL/L (ref 20–31)
CREAT SERPL-MCNC: 0.9 MG/DL (ref 0.7–1.2)
GFR AFRICAN AMERICAN: >60 ML/MIN
GFR NON-AFRICAN AMERICAN: >60 ML/MIN
GFR SERPL CREATININE-BSD FRML MDRD: ABNORMAL ML/MIN/{1.73_M2}
GFR SERPL CREATININE-BSD FRML MDRD: ABNORMAL ML/MIN/{1.73_M2}
GLUCOSE BLD-MCNC: 116 MG/DL (ref 70–99)
GLUCOSE BLD-MCNC: 116 MG/DL (ref 75–110)
GLUCOSE BLD-MCNC: 142 MG/DL (ref 75–110)
POTASSIUM SERPL-SCNC: 3.7 MMOL/L (ref 3.7–5.3)
SODIUM BLD-SCNC: 139 MMOL/L (ref 135–144)

## 2020-06-13 PROCEDURE — 2580000003 HC RX 258: Performed by: INTERNAL MEDICINE

## 2020-06-13 PROCEDURE — 2580000003 HC RX 258: Performed by: NURSE PRACTITIONER

## 2020-06-13 PROCEDURE — 80048 BASIC METABOLIC PNL TOTAL CA: CPT

## 2020-06-13 PROCEDURE — 6370000000 HC RX 637 (ALT 250 FOR IP): Performed by: NURSE PRACTITIONER

## 2020-06-13 PROCEDURE — 99232 SBSQ HOSP IP/OBS MODERATE 35: CPT | Performed by: INTERNAL MEDICINE

## 2020-06-13 PROCEDURE — 36415 COLL VENOUS BLD VENIPUNCTURE: CPT

## 2020-06-13 PROCEDURE — 6360000002 HC RX W HCPCS: Performed by: NURSE PRACTITIONER

## 2020-06-13 PROCEDURE — 6370000000 HC RX 637 (ALT 250 FOR IP): Performed by: INTERNAL MEDICINE

## 2020-06-13 PROCEDURE — 82947 ASSAY GLUCOSE BLOOD QUANT: CPT

## 2020-06-13 RX ORDER — PSEUDOEPHEDRINE HCL 30 MG
100 TABLET ORAL 2 TIMES DAILY PRN
Status: ON HOLD | COMMUNITY
Start: 2020-06-13 | End: 2022-01-25

## 2020-06-13 RX ADMIN — HYDROMORPHONE HYDROCHLORIDE 0.5 MG: 1 INJECTION, SOLUTION INTRAMUSCULAR; INTRAVENOUS; SUBCUTANEOUS at 13:17

## 2020-06-13 RX ADMIN — PRAVASTATIN SODIUM 10 MG: 20 TABLET ORAL at 10:34

## 2020-06-13 RX ADMIN — INSULIN LISPRO 1 UNITS: 100 INJECTION, SOLUTION INTRAVENOUS; SUBCUTANEOUS at 13:16

## 2020-06-13 RX ADMIN — HYDROMORPHONE HYDROCHLORIDE 0.5 MG: 1 INJECTION, SOLUTION INTRAMUSCULAR; INTRAVENOUS; SUBCUTANEOUS at 00:19

## 2020-06-13 RX ADMIN — ALLOPURINOL 300 MG: 300 TABLET ORAL at 10:35

## 2020-06-13 RX ADMIN — HYDROMORPHONE HYDROCHLORIDE 0.5 MG: 1 INJECTION, SOLUTION INTRAMUSCULAR; INTRAVENOUS; SUBCUTANEOUS at 06:09

## 2020-06-13 RX ADMIN — METOPROLOL SUCCINATE 50 MG: 50 TABLET, EXTENDED RELEASE ORAL at 10:35

## 2020-06-13 RX ADMIN — DOCUSATE SODIUM 100 MG: 100 CAPSULE, LIQUID FILLED ORAL at 10:36

## 2020-06-13 RX ADMIN — Medication 10 ML: at 13:18

## 2020-06-13 RX ADMIN — SODIUM CHLORIDE: 9 INJECTION, SOLUTION INTRAVENOUS at 06:01

## 2020-06-13 RX ADMIN — APIXABAN 5 MG: 5 TABLET, FILM COATED ORAL at 10:35

## 2020-06-13 RX ADMIN — HYDROMORPHONE HYDROCHLORIDE 0.5 MG: 1 INJECTION, SOLUTION INTRAMUSCULAR; INTRAVENOUS; SUBCUTANEOUS at 09:53

## 2020-06-13 RX ADMIN — SOTALOL HYDROCHLORIDE 160 MG: 80 TABLET ORAL at 10:34

## 2020-06-13 RX ADMIN — ASPIRIN 81 MG: 81 TABLET, COATED ORAL at 10:33

## 2020-06-13 RX ADMIN — PANTOPRAZOLE SODIUM 40 MG: 40 TABLET, DELAYED RELEASE ORAL at 06:09

## 2020-06-13 RX ADMIN — POTASSIUM CHLORIDE 10 MEQ: 20 TABLET, EXTENDED RELEASE ORAL at 10:47

## 2020-06-13 RX ADMIN — TAMSULOSIN HYDROCHLORIDE 0.4 MG: 0.4 CAPSULE ORAL at 10:35

## 2020-06-13 RX ADMIN — LISINOPRIL 2.5 MG: 2.5 TABLET ORAL at 10:34

## 2020-06-13 RX ADMIN — FUROSEMIDE 40 MG: 40 TABLET ORAL at 10:35

## 2020-06-13 RX ADMIN — MAGNESIUM GLUCONATE 500 MG ORAL TABLET 400 MG: 500 TABLET ORAL at 10:33

## 2020-06-13 ASSESSMENT — PAIN SCALES - GENERAL
PAINLEVEL_OUTOF10: 10
PAINLEVEL_OUTOF10: 8
PAINLEVEL_OUTOF10: 7
PAINLEVEL_OUTOF10: 8

## 2020-06-13 NOTE — PROGRESS NOTES
[facility]    Progress Note    6/13/2020    7:26 AM    Name:   Joanne Malone  MRN:     [id number]     [place]:      [de-identified]   Room:   2024/2024-01  IP Day:  2  Admit Date:  6/11/2020  9:00 PM    PCP:   Uzma Escamilla MD  Code Status:  Full Code    Subjective:     C/C:   Chief Complaint   Patient presents with    Abdominal Pain     History of many bowel obstructions, feels like one, pain is in the LLQ    Emesis     Foul smell     Interval History Status: improved. Patient has decreased abdominal pain but still mildly present on the right side. Denies any nausea or vomiting, chest pain, shortness of breath, fevers or chills. Brief History: This is a 80-year-old white male with history of perforated diverticulitis and prior bowel obstructions that presents with abdominal pain, nausea and vomiting with distention concerning for recurrent bowel obstruction. CT scan imaging the emergency room without evidence of obstruction with his ongoing symptoms admitted for further evaluation. The KUB was repeated hospital day 1 without evidence of obstruction due to ongoing symptoms. At that point he was started on a clear liquid diet as his nausea and vomiting had improved which has been tolerated. Review of Systems:     Constitutional:  negative for chills, fevers, sweats  Respiratory:  negative for cough, dyspnea on exertion, shortness of breath, wheezing  Cardiovascular:  negative for chest pain, chest pressure/discomfort, lower extremity edema, palpitations  Gastrointestinal:  negative for abdominal pain, constipation, diarrhea, nausea, vomiting  Neurological:  negative for dizziness, headache    Medications: Allergies:     Allergies   Allergen Reactions    Ciprofloxacin      Prolonged QT and developed VT. 2/2018  Other reaction(s): Unknown    Codeine      Other reaction(s): Abnormal Behavior  Other reaction(s): Unknown    Diphenhydramine Hives     Other reaction(s): Unknown    Morphine Other (See Comments)     Confusion       Penicillins Hives and Swelling     Other reaction(s): Unknown       Current Meds:   Scheduled Meds:    allopurinol  300 mg Oral Daily    apixaban  5 mg Oral BID    aspirin EC  81 mg Oral Daily    furosemide  40 mg Oral Daily    lisinopril  2.5 mg Oral Daily    magnesium oxide  400 mg Oral Daily    metoprolol succinate  50 mg Oral BID    pantoprazole  40 mg Oral QAM AC    potassium chloride  10 mEq Oral Daily    pravastatin  10 mg Oral Daily    sotalol  160 mg Oral BID    tamsulosin  0.4 mg Oral Daily    sodium chloride flush  10 mL Intravenous 2 times per day    docusate sodium  100 mg Oral Daily    insulin lispro  0-6 Units Subcutaneous 4x Daily AC & HS     Continuous Infusions:    dextrose      sodium chloride 75 mL/hr at 06/13/20 0601     PRN Meds: sodium chloride flush, potassium chloride **OR** potassium alternative oral replacement **OR** potassium chloride, magnesium sulfate, acetaminophen **OR** acetaminophen, polyethylene glycol, promethazine **OR** ondansetron, nicotine, glucose, dextrose, glucagon (rDNA), dextrose, HYDROmorphone **OR** HYDROmorphone, metoprolol    Data:     Past Medical History:   has a past medical history of ADHD, predominantly hyperactive type, Anemia, Blood clot due to device, implant, or graft, Bowel obstruction ([facility] [address] CAD (coronary artery disease), Cardiomyopathy ([facility] [address] Diabetes mellitus ([facility] [address] Diverticulitis, GERD (gastroesophageal reflux disease), History of atrial fibrillation, History of ventricular tachycardia, Kidney calculi, Obesity, and Sleep apnea. Social History:   reports that he quit smoking about 8 years ago. He has a 2.00 pack-year smoking history. He has never used smokeless tobacco. He reports current alcohol use. He reports that he does not use drugs.      Family History:   Family History   Problem Relation Age of Onset    Heart Disease

## 2020-06-13 NOTE — DISCHARGE SUMMARY
[address]    Discharge Summary     Patient ID: Era Pham  :  1965   MRN: [id number]     ACCOUNT:  [de-identified]   Patient's PCP: Rebecca Mendez MD  Admit Date: 2020   Discharge Date: 2020     Length of Stay: 2  Code Status:  Full Code  Admitting Physician: Teetee York DO  Discharge Physician: Teetee York DO     Active Discharge Diagnoses:     Hospital Problem Lists:  Principal Problem:    SBO (small bowel obstruction) ([address]  Active Problems:    AICD (automatic cardioverter/defibrillator) present    Type 2 diabetes mellitus, with long-term current use of insulin ([address]    Essential hypertension    History of atrial fibrillation    Abdominal pain  Resolved Problems:    * No resolved hospital problems. *      Admission Condition:  fair     Discharged Condition: stable    Hospital Stay:     Hospital Course:  Era Pham is a 54 y.o. male who was admitted for the management of  SBO (small bowel obstruction) ([address] , presented to ER with Abdominal Pain (History of many bowel obstructions, feels like one, pain is in the LLQ) and Emesis (Foul smell)    This is a 60-year-old white male with history of perforated diverticulitis and prior bowel obstructions related to adhesions. He presents at this time with abdominal pain, distention and nausea and vomiting similar to prior episodes of bowel obstruction. He had not had a bowel movement approximately 2 days which is unusual for him was not passing flatus. He underwent CT scan in the emergency room without evidence of bowel obstruction but clinically had findings consistent with the obstruction with tympanic abdomen with distention. He was admitted and placed on bowel rest with resolution of his nausea and vomiting.   Repeat KUB did not show any evidence of obstruction and as his symptoms are clinically improving his diet was initiated with clear liquids and advance as tolerated the next 24 hours. He tolerated dietary advancement will be discharged this evening if there is no recurrence of symptoms after dinner. Significant therapeutic interventions: Bowel rest, IV hydration    Significant Diagnostic Studies:   Labs / Micro:  CBC:   Lab Results   Component Value Date    WBC 6.4 06/12/2020    RBC 4.01 06/12/2020    HGB 11.5 06/12/2020    HCT 34.6 06/12/2020    MCV 86.3 06/12/2020    MCH 28.7 06/12/2020    MCHC 33.2 06/12/2020    RDW 17.3 06/12/2020     06/12/2020     BMP:    Lab Results   Component Value Date    GLUCOSE 116 06/13/2020     06/13/2020    K 3.7 06/13/2020     06/13/2020    CO2 28 06/13/2020    ANIONGAP 7 06/13/2020    BUN 14 06/13/2020    CREATININE 0.90 06/13/2020    BUNCRER 16 06/13/2020    CALCIUM 8.3 06/13/2020    LABGLOM >60 06/13/2020    GFRAA >60 06/13/2020    GFR      06/13/2020    GFR NOT REPORTED 06/13/2020        Radiology:  Xr Abdomen (kub) (single Ap View)    Result Date: 6/12/2020  Nonspecific bowel gas pattern. Ct Abdomen Pelvis W Iv Contrast    Result Date: 6/11/2020  1. No acute findings within the abdomen or pelvis 2. Hepatic steatosis 3. Splenomegaly 4. Prior cholecystectomy       Consultations:    Consults:     Final Specialist Recommendations/Findings:   IP CONSULT TO HOSPITALIST      The patient was seen and examined on day of discharge and this discharge summary is in conjunction with any daily progress note from day of discharge.     Discharge plan:     Disposition: Home    Physician Follow Up:   Pan Quigley MD  [address]  [phone]             Requiring Further Evaluation/Follow Up POST HOSPITALIZATION/Incidental Findings: None    Diet: diabetic diet    Activity: As tolerated    Instructions to Patient: Take medication as prescribed    Discharge Medications:      Medication List      START taking these medications    docusate 100 MG Caps  Commonly known as:  COLACE, Brayan Gary MD for the opportunity to be involved in this patient's care.

## 2020-06-15 LAB
DIRECT EXAM: NORMAL
DIRECT EXAM: NORMAL
Lab: NORMAL
SPECIMEN DESCRIPTION: NORMAL

## 2020-07-09 ENCOUNTER — APPOINTMENT (OUTPATIENT)
Dept: CT IMAGING | Age: 55
End: 2020-07-09
Payer: COMMERCIAL

## 2020-07-09 ENCOUNTER — HOSPITAL ENCOUNTER (EMERGENCY)
Age: 55
Discharge: HOME | End: 2020-07-09
Attending: EMERGENCY MEDICINE
Payer: COMMERCIAL

## 2020-07-09 VITALS
HEART RATE: 88 BPM | BODY MASS INDEX: 35.3 KG/M2 | WEIGHT: 266.38 LBS | HEIGHT: 73 IN | OXYGEN SATURATION: 98 % | RESPIRATION RATE: 18 BRPM | SYSTOLIC BLOOD PRESSURE: 114 MMHG | TEMPERATURE: 99 F | DIASTOLIC BLOOD PRESSURE: 62 MMHG

## 2020-07-09 LAB
-: ABNORMAL
ABSOLUTE EOS #: 0.17 K/UL (ref 0–0.44)
ABSOLUTE IMMATURE GRANULOCYTE: 0.03 K/UL (ref 0–0.3)
ABSOLUTE LYMPH #: 1.4 K/UL (ref 1.1–3.7)
ABSOLUTE MONO #: 0.68 K/UL (ref 0.1–1.2)
AMORPHOUS: ABNORMAL
ANION GAP SERPL CALCULATED.3IONS-SCNC: 12 MMOL/L (ref 9–17)
BACTERIA: ABNORMAL
BASOPHILS # BLD: 1 % (ref 0–2)
BASOPHILS ABSOLUTE: 0.08 K/UL (ref 0–0.2)
BILIRUBIN URINE: NEGATIVE
BUN BLDV-MCNC: 18 MG/DL (ref 6–20)
BUN/CREAT BLD: 14 (ref 9–20)
CALCIUM SERPL-MCNC: 9.4 MG/DL (ref 8.6–10.4)
CASTS UA: ABNORMAL /LPF
CHLORIDE BLD-SCNC: 97 MMOL/L (ref 98–107)
CO2: 27 MMOL/L (ref 20–31)
COLOR: YELLOW
COMMENT UA: ABNORMAL
CREAT SERPL-MCNC: 1.3 MG/DL (ref 0.7–1.2)
CRYSTALS, UA: ABNORMAL /HPF
DIFFERENTIAL TYPE: ABNORMAL
EOSINOPHILS RELATIVE PERCENT: 2 % (ref 1–4)
EPITHELIAL CELLS UA: ABNORMAL /HPF (ref 0–5)
GFR AFRICAN AMERICAN: >60 ML/MIN
GFR NON-AFRICAN AMERICAN: 57 ML/MIN
GFR SERPL CREATININE-BSD FRML MDRD: ABNORMAL ML/MIN/{1.73_M2}
GFR SERPL CREATININE-BSD FRML MDRD: ABNORMAL ML/MIN/{1.73_M2}
GLUCOSE BLD-MCNC: 217 MG/DL (ref 70–99)
GLUCOSE URINE: ABNORMAL
HCT VFR BLD CALC: 40.5 % (ref 40.7–50.3)
HEMOGLOBIN: 14 G/DL (ref 13–17)
IMMATURE GRANULOCYTES: 0 %
INR BLD: 1.2
KETONES, URINE: NEGATIVE
LEUKOCYTE ESTERASE, URINE: ABNORMAL
LYMPHOCYTES # BLD: 15 % (ref 24–43)
MCH RBC QN AUTO: 29.5 PG (ref 25.2–33.5)
MCHC RBC AUTO-ENTMCNC: 34.6 G/DL (ref 28.4–34.8)
MCV RBC AUTO: 85.3 FL (ref 82.6–102.9)
MONOCYTES # BLD: 7 % (ref 3–12)
MUCUS: ABNORMAL
NITRITE, URINE: NEGATIVE
NRBC AUTOMATED: 0 PER 100 WBC
OTHER OBSERVATIONS UA: ABNORMAL
PARTIAL THROMBOPLASTIN TIME: 41.4 SEC (ref 23.9–33.8)
PDW BLD-RTO: 17.9 % (ref 11.8–14.4)
PH UA: 5.5 (ref 5–8)
PLATELET # BLD: 167 K/UL (ref 138–453)
PLATELET ESTIMATE: ABNORMAL
PMV BLD AUTO: 10 FL (ref 8.1–13.5)
POTASSIUM SERPL-SCNC: 4.2 MMOL/L (ref 3.7–5.3)
PROTEIN UA: NEGATIVE
PROTHROMBIN TIME: 14.6 SEC (ref 11.5–14.2)
RBC # BLD: 4.75 M/UL (ref 4.21–5.77)
RBC # BLD: ABNORMAL 10*6/UL
RBC UA: ABNORMAL /HPF (ref 0–2)
RENAL EPITHELIAL, UA: ABNORMAL /HPF
SEG NEUTROPHILS: 75 % (ref 36–65)
SEGMENTED NEUTROPHILS ABSOLUTE COUNT: 7.17 K/UL (ref 1.5–8.1)
SODIUM BLD-SCNC: 136 MMOL/L (ref 135–144)
SPECIFIC GRAVITY UA: 1.02 (ref 1–1.03)
TRICHOMONAS: ABNORMAL
TURBIDITY: ABNORMAL
URINE HGB: NEGATIVE
UROBILINOGEN, URINE: NORMAL
WBC # BLD: 9.5 K/UL (ref 3.5–11.3)
WBC # BLD: ABNORMAL 10*3/UL
WBC UA: ABNORMAL /HPF (ref 0–5)
YEAST: ABNORMAL

## 2020-07-09 PROCEDURE — 81001 URINALYSIS AUTO W/SCOPE: CPT

## 2020-07-09 PROCEDURE — 96375 TX/PRO/DX INJ NEW DRUG ADDON: CPT

## 2020-07-09 PROCEDURE — 99284 EMERGENCY DEPT VISIT MOD MDM: CPT

## 2020-07-09 PROCEDURE — 74176 CT ABD & PELVIS W/O CONTRAST: CPT

## 2020-07-09 PROCEDURE — 85730 THROMBOPLASTIN TIME PARTIAL: CPT

## 2020-07-09 PROCEDURE — 85025 COMPLETE CBC W/AUTO DIFF WBC: CPT

## 2020-07-09 PROCEDURE — 6360000002 HC RX W HCPCS: Performed by: EMERGENCY MEDICINE

## 2020-07-09 PROCEDURE — 96374 THER/PROPH/DIAG INJ IV PUSH: CPT

## 2020-07-09 PROCEDURE — 85610 PROTHROMBIN TIME: CPT

## 2020-07-09 PROCEDURE — 80048 BASIC METABOLIC PNL TOTAL CA: CPT

## 2020-07-09 RX ORDER — ONDANSETRON 4 MG/1
4 TABLET, ORALLY DISINTEGRATING ORAL 3 TIMES DAILY PRN
Qty: 21 TABLET | Refills: 0 | Status: SHIPPED | OUTPATIENT
Start: 2020-07-09

## 2020-07-09 RX ORDER — ONDANSETRON 2 MG/ML
4 INJECTION INTRAMUSCULAR; INTRAVENOUS ONCE
Status: COMPLETED | OUTPATIENT
Start: 2020-07-09 | End: 2020-07-09

## 2020-07-09 RX ORDER — HYDROMORPHONE HYDROCHLORIDE 1 MG/ML
1 INJECTION, SOLUTION INTRAMUSCULAR; INTRAVENOUS; SUBCUTANEOUS ONCE
Status: COMPLETED | OUTPATIENT
Start: 2020-07-09 | End: 2020-07-09

## 2020-07-09 RX ORDER — OXYCODONE HYDROCHLORIDE AND ACETAMINOPHEN 5; 325 MG/1; MG/1
1 TABLET ORAL EVERY 6 HOURS PRN
Qty: 12 TABLET | Refills: 0 | Status: SHIPPED | OUTPATIENT
Start: 2020-07-09 | End: 2020-07-12

## 2020-07-09 RX ADMIN — HYDROMORPHONE HYDROCHLORIDE 1 MG: 1 INJECTION, SOLUTION INTRAMUSCULAR; INTRAVENOUS; SUBCUTANEOUS at 18:41

## 2020-07-09 RX ADMIN — ONDANSETRON 4 MG: 2 INJECTION INTRAMUSCULAR; INTRAVENOUS at 18:41

## 2020-07-09 ASSESSMENT — ENCOUNTER SYMPTOMS
NAUSEA: 1
BACK PAIN: 0
EYE DISCHARGE: 0
FACIAL SWELLING: 0
ABDOMINAL DISTENTION: 0
CHEST TIGHTNESS: 0
ABDOMINAL PAIN: 0
SHORTNESS OF BREATH: 0
EYE PAIN: 0

## 2020-07-09 ASSESSMENT — PAIN DESCRIPTION - DESCRIPTORS: DESCRIPTORS: DISCOMFORT

## 2020-07-09 ASSESSMENT — PAIN SCALES - GENERAL
PAINLEVEL_OUTOF10: 8
PAINLEVEL_OUTOF10: 8

## 2020-07-09 ASSESSMENT — PAIN DESCRIPTION - LOCATION: LOCATION: BACK

## 2020-07-09 ASSESSMENT — PAIN DESCRIPTION - PAIN TYPE: TYPE: ACUTE PAIN

## 2020-07-09 ASSESSMENT — PAIN DESCRIPTION - ORIENTATION: ORIENTATION: RIGHT

## 2020-07-09 NOTE — ED PROVIDER NOTES
EMERGENCY DEPARTMENT ENCOUNTER    Pt Name: Jaycee Loving  MRN: [id number]  [place] 1965  Date of evaluation: 7/9/20  CHIEF COMPLAINT       Chief Complaint   Patient presents with    Flank Pain     Mid R back    Nausea     HISTORY OF PRESENT ILLNESS   HPI   Patient is a 77-year-old male who presented to the emergency department secondary to flank pain. Patient complains of a 3-day history localized to his right flank nonradiating 8 out of 10 on the pain scale is gotten progressively worse. Has a previous history of kidney stones states the pain is similar nature. Approximately 1 year ago he had a kidney stone requiring stent placement. His urologist is Dr. Betito Ely. Patient is also had nausea no vomiting. He denies hematuria dysuria he is on Eliquis secondary to have an AICD pacemaker. Denied hemoptysis or rectal bleeding. Patient denies shortness of breath, fevers or chills. REVIEW OF SYSTEMS     Review of Systems   Constitutional: Negative for chills, diaphoresis and fever. HENT: Negative for congestion, ear pain and facial swelling. Eyes: Negative for pain, discharge and visual disturbance. Respiratory: Negative for chest tightness and shortness of breath. Cardiovascular: Negative for chest pain and palpitations. Gastrointestinal: Positive for nausea. Negative for abdominal distention and abdominal pain. Genitourinary: Positive for flank pain. Negative for difficulty urinating. Musculoskeletal: Negative for back pain. Skin: Negative for wound. Neurological: Negative for dizziness, light-headedness and headaches.      PASTMEDICAL HISTORY     Past Medical History:   Diagnosis Date    ADHD, predominantly hyperactive type     Anemia     Blood clot due to device, implant, or graft     IV site L forearm    Bowel obstruction (HCC)     CAD (coronary artery disease)     [facility]     Diabetes mellitus ([facility]     Diverticulitis 1996    GERD (gastroesophageal reflux disease)     History of atrial fibrillation     History of ventricular tachycardia     Kidney calculi     Obesity     Sleep apnea     uses CPAP nightly     SURGICAL HISTORY       Past Surgical History:   Procedure Laterality Date    ABDOMEN SURGERY  [address]    umbillical hernia with mesh and lysis of adhesions    ABDOMINAL HERNIA REPAIR  7/7/2014    RECURRENT HERNIA REPAIR WITH MESH    APPENDECTOMY      CARDIAC DEFIBRILLATOR PLACEMENT      CHOLECYSTECTOMY      COLECTOMY  1996    d/t ruptured diverticuli    CYSTOSCOPY N/A     stent in place taped to penis head    CYSTOSCOPY  3/4/16    with bilateral pyelogram    HERNIA REPAIR      umbilical    LITHOTRIPSY  3/4/16    laser lithotripsy with ureteral dilation    PACEMAKER PLACEMENT      pacemaker/defib     CURRENT MEDICATIONS       Previous Medications    ALLOPURINOL (ZYLOPRIM) 300 MG TABLET    Take 300 mg by mouth daily    APIXABAN (ELIQUIS) 5 MG TABS TABLET    Take 5 mg by mouth 2 times daily     ASPIRIN EC 81 MG EC TABLET    Take 81 mg by mouth daily    COLCHICINE (COLCRYS) 0.6 MG TABLET    Take 0.6 mg by mouth 2 times daily as needed (gout flare)    DAPAGLIFLOZIN (FARXIGA) 10 MG TABLET    Take 10 mg by mouth every morning    DOCUSATE SODIUM (COLACE, DULCOLAX) 100 MG CAPS    Take 100 mg by mouth 2 times daily as needed for Constipation    DULAGLUTIDE (TRULICITY) 4.75 ZD/4.5TR SOPN    Inject 0.75 mg into the skin once a week Indications: Mondays Mondays    FUROSEMIDE (LASIX) 40 MG TABLET    Take 40 mg by mouth daily    LISINOPRIL (PRINIVIL;ZESTRIL) 2.5 MG TABLET    Take 2.5 mg by mouth daily     MAGNESIUM GLUCONATE (MAGONATE) 500 MG TABLET    Take 500 mg by mouth daily    METFORMIN (GLUCOPHAGE) 1000 MG TABLET    Take 1,000 mg by mouth daily    METOPROLOL SUCCINATE (TOPROL XL) 50 MG EXTENDED RELEASE TABLET    Take 50 mg by mouth 2 times daily    OMEPRAZOLE (PRILOSEC) 20 MG CAPSULE    Take 20 mg by mouth daily.     POTASSIUM CHLORIDE (KLOR-CON M) 10 MEQ EXTENDED RELEASE TABLET    Take 10 mEq by mouth daily    PRAVASTATIN (PRAVACHOL) 10 MG TABLET    Take 10 mg by mouth daily     SITAGLIPTAN (JANUVIA) 100 MG TABLET    Take 100 mg by mouth daily. SOTALOL (BETAPACE) 160 MG TABLET    Take 160 mg by mouth 2 times daily     TAMSULOSIN (FLOMAX) 0.4 MG CAPSULE    Take 1 capsule by mouth daily    TIZANIDINE (ZANAFLEX) 2 MG TABLET    Take 1 tablet by mouth every 8 hours as needed (back pain) WARNING:  May cause drowsiness.  May impair ability to operate vehicles or machinery.  Do not use in combination with alcohol. ALLERGIES     is allergic to ciprofloxacin; codeine; diphenhydramine; morphine; and penicillins. FAMILY HISTORY     He indicated that his mother is alive. He indicated that his father is alive. SOCIAL HISTORY       Social History     Tobacco Use    Smoking status: Former Smoker     Packs/day: 1.00     Years: 2.00     Pack years: 2.00     Last attempt to quit: 2/15/2012     Years since quittin.4    Smokeless tobacco: Never Used   Substance Use Topics    Alcohol use: Yes     Alcohol/week: 0.0 standard drinks     Comment: rarely    Drug use: No     PHYSICAL EXAM     INITIAL VITALS: /62   Pulse 88   Temp 99 °F (37.2 °C)   Resp 18   Ht 6' 1\" (1.854 m)   Wt 266 lb 6 oz (120.8 kg)   SpO2 98%   BMI 35.14 kg/m²    Physical Exam  Vitals signs and nursing note reviewed. Constitutional:       General: He is not in acute distress. Appearance: He is well-developed. He is not diaphoretic. HENT:      Head: Normocephalic and atraumatic. Eyes:      Pupils: Pupils are equal, round, and reactive to light. Neck:      Musculoskeletal: Normal range of motion and neck supple. Cardiovascular:      Rate and Rhythm: Normal rate and regular rhythm. Pulmonary:      Effort: Pulmonary effort is normal.      Breath sounds: Normal breath sounds. Abdominal:      General: Bowel sounds are normal.      Palpations: Abdomen is soft. (*)     RDW 17.9 (*)     Seg Neutrophils 75 (*)     Lymphocytes 15 (*)     All other components within normal limits   BASIC METABOLIC PANEL W/ REFLEX TO MG FOR LOW K - Abnormal; Notable for the following components:    Glucose 217 (*)     CREATININE 1.30 (*)     Chloride 97 (*)     GFR Non- 57 (*)     All other components within normal limits   URINALYSIS WITH MICROSCOPIC - Abnormal; Notable for the following components:    Turbidity UA SLIGHTLY CLOUDY (*)     Glucose, Ur 3+ (*)     Leukocyte Esterase, Urine TRACE (*)     All other components within normal limits   PROTIME-INR - Abnormal; Notable for the following components:    Protime 14.6 (*)     All other components within normal limits   APTT - Abnormal; Notable for the following components:    PTT 41.4 (*)     All other components within normal limits       EMERGENCY DEPARTMENTCOURSE:         Vitals:    Vitals:    07/09/20 1748 07/09/20 1750   BP: 114/62    Pulse: 88    Resp: 18    Temp: 99 °F (37.2 °C)    SpO2: 98%    Weight:  266 lb 6 oz (120.8 kg)   Height:  6' 1\" (1.854 m)       The patient was given the following medications while in the emergency department:  Orders Placed This Encounter   Medications    ondansetron (ZOFRAN) injection 4 mg    HYDROmorphone HCl PF (DILAUDID) injection 1 mg    ondansetron (ZOFRAN-ODT) 4 MG disintegrating tablet     Sig: Take 1 tablet by mouth 3 times daily as needed for Nausea or Vomiting     Dispense:  21 tablet     Refill:  0    oxyCODONE-acetaminophen (PERCOCET) 5-325 MG per tablet     Sig: Take 1 tablet by mouth every 6 hours as needed for Pain for up to 3 days. Intended supply: 3 days. Take lowest dose possible to manage pain     Dispense:  12 tablet     Refill:  0     CONSULTS:  None    FINAL IMPRESSION      1.  Flank pain          DISPOSITION/PLAN   DISPOSITION Decision To Discharge 07/09/2020 07:35:23 PM      PATIENT REFERRED TO:  Abby Arauz, [address] [address]  [phone]    Schedule an appointment as soon as possible for a visit       Renato Beavers MD  [facility], [address] [facility]  [phone]    Schedule an appointment as soon as possible for a visit       DISCHARGE MEDICATIONS:  New Prescriptions    ONDANSETRON (ZOFRAN-ODT) 4 MG DISINTEGRATING TABLET    Take 1 tablet by mouth 3 times daily as needed for Nausea or Vomiting    OXYCODONE-ACETAMINOPHEN (PERCOCET) 5-325 MG PER TABLET    Take 1 tablet by mouth every 6 hours as needed for Pain for up to 3 days. Intended supply: 3 days. Take lowest dose possible to manage pain     Johan Butler MD  Attending Emergency Physician    This note was created with the assistance of a speech-recognition program. While intending to generate a document that actually reflects the content of the visit, no guarantees can be provided that every mistake has been identified and corrected by editing.                     Johan Butler MD  [phone]

## 2020-07-09 NOTE — ED NOTES
Pt presents to ed c/o low back pain for a few days. He does have h/o kidney stones. Vital signs stable.       Damari Cruz RN  07/09/20 5637

## 2020-07-11 ENCOUNTER — HOSPITAL ENCOUNTER (EMERGENCY)
Age: 55
Discharge: HOME | End: 2020-07-11
Attending: EMERGENCY MEDICINE
Payer: COMMERCIAL

## 2020-07-11 VITALS
TEMPERATURE: 98.5 F | HEART RATE: 76 BPM | SYSTOLIC BLOOD PRESSURE: 113 MMHG | WEIGHT: 266 LBS | OXYGEN SATURATION: 98 % | BODY MASS INDEX: 35.25 KG/M2 | DIASTOLIC BLOOD PRESSURE: 53 MMHG | RESPIRATION RATE: 16 BRPM | HEIGHT: 73 IN

## 2020-07-11 LAB — GLUCOSE BLD-MCNC: 297 MG/DL (ref 75–110)

## 2020-07-11 PROCEDURE — 99282 EMERGENCY DEPT VISIT SF MDM: CPT

## 2020-07-11 PROCEDURE — 10061 I&D ABSCESS COMP/MULTIPLE: CPT

## 2020-07-11 PROCEDURE — 82947 ASSAY GLUCOSE BLOOD QUANT: CPT

## 2020-07-11 RX ORDER — LIDOCAINE HYDROCHLORIDE 10 MG/ML
10 INJECTION, SOLUTION INFILTRATION; PERINEURAL ONCE
Status: DISCONTINUED | OUTPATIENT
Start: 2020-07-11 | End: 2020-07-11 | Stop reason: HOSPADM

## 2020-07-11 RX ORDER — SULFAMETHOXAZOLE AND TRIMETHOPRIM 800; 160 MG/1; MG/1
1 TABLET ORAL 2 TIMES DAILY
Qty: 20 TABLET | Refills: 0 | Status: SHIPPED | OUTPATIENT
Start: 2020-07-11 | End: 2020-07-21

## 2020-07-11 ASSESSMENT — PAIN DESCRIPTION - ORIENTATION: ORIENTATION: LEFT

## 2020-07-11 ASSESSMENT — PAIN DESCRIPTION - LOCATION: LOCATION: GROIN

## 2020-07-11 ASSESSMENT — ENCOUNTER SYMPTOMS: COLOR CHANGE: 1

## 2020-07-11 ASSESSMENT — PAIN SCALES - GENERAL: PAINLEVEL_OUTOF10: 8

## 2020-07-11 NOTE — ED NOTES
Pt states that he had some irritation from wet bathing suit on left side of groin area. It is red, warm, and painful. Area is swelling and he was unsure if he got at bite. Pt denies any drainage. Pt is diabetic.       Brittney Elmore RN  07/11/20 [id number]

## 2020-07-11 NOTE — ED PROVIDER NOTES
[facility] ED  EMERGENCY DEPARTMENT ENCOUNTER      Pt Name: Dania Stokes  MRN: [id number]  [place] 1965  Date of evaluation: 7/11/2020  Provider: FABIANO Casarez CNP    CHIEF COMPLAINT       Chief Complaint   Patient presents with    Abscess     left groin         HISTORY OFPRESENT ILLNESS  (Location/Symptom, Timing/Onset, Context/Setting, Quality, Duration, Modifying Factors, Severity.)   Dania Stokes is a 54 y.o. male who presents to the emergency department for evaluation of left groin abscess that started 2 days ago. Patient states he was on vacation and wearing swim trunks for most of vacation and thought maybe he had some redness due to the rubbing of the bathing suit against his left groin area. States his symptoms worsened past 24 hours. Rates the pain a 5 out of 10. Denies testicular pain. He does have history of diabetes. Nursing Notes were reviewed.     PASTMEDICAL HISTORY     Past Medical History:   Diagnosis Date    ADHD, predominantly hyperactive type     Anemia     Blood clot due to device, implant, or graft     IV site L forearm    Bowel obstruction (HCC)     CAD (coronary artery disease)     Cardiomyopathy ([address]     Diabetes mellitus ([address]     Diverticulitis 1996    GERD (gastroesophageal reflux disease)     History of atrial fibrillation     History of ventricular tachycardia     Kidney calculi     Obesity     Sleep apnea     uses CPAP nightly         SURGICAL HISTORY       Past Surgical History:   Procedure Laterality Date    ABDOMEN SURGERY  [address]    umbillical hernia with mesh and lysis of adhesions    ABDOMINAL HERNIA REPAIR  7/7/2014    RECURRENT HERNIA REPAIR WITH MESH    APPENDECTOMY      CARDIAC DEFIBRILLATOR PLACEMENT      CHOLECYSTECTOMY      COLECTOMY  1996    d/t ruptured diverticuli    CYSTOSCOPY N/A     stent in place taped to penis head    CYSTOSCOPY  3/4/16    with bilateral pyelogram    HERNIA REPAIR      umbilical    LITHOTRIPSY  3/4/16    laser lithotripsy with ureteral dilation    PACEMAKER PLACEMENT      pacemaker/defib         CURRENT MEDICATIONS     Discharge Medication List as of 7/11/2020  1:46 PM      CONTINUE these medications which have NOT CHANGED    Details   ondansetron (ZOFRAN-ODT) 4 MG disintegrating tablet Take 1 tablet by mouth 3 times daily as needed for Nausea or Vomiting, Disp-21 tablet, R-0Print      oxyCODONE-acetaminophen (PERCOCET) 5-325 MG per tablet Take 1 tablet by mouth every 6 hours as needed for Pain for up to 3 days. Intended supply: 3 days.  Take lowest dose possible to manage pain, Disp-12 tablet, R-0Print      docusate sodium (COLACE, DULCOLAX) 100 MG CAPS Take 100 mg by mouth 2 times daily as needed for ConstipationOTC      tiZANidine (ZANAFLEX) 2 MG tablet Take 1 tablet by mouth every 8 hours as needed (back pain) WARNING:  May cause drowsiness.  May impair ability to operate vehicles or machinery.  Do not use in combination with alcohol., Disp-20 tablet, R-0Print      metoprolol succinate (TOPROL XL) 50 MG extended release tablet Take 50 mg by mouth 2 times dailyHistorical Med      furosemide (LASIX) 40 MG tablet Take 40 mg by mouth dailyHistorical Med      magnesium gluconate (MAGONATE) 500 MG tablet Take 500 mg by mouth dailyHistorical Med      potassium chloride (KLOR-CON M) 10 MEQ extended release tablet Take 10 mEq by mouth dailyHistorical Med      aspirin EC 81 MG EC tablet Take 81 mg by mouth dailyHistorical Med      colchicine (COLCRYS) 0.6 MG tablet Take 0.6 mg by mouth 2 times daily as needed (gout flare)Historical Med      apixaban (ELIQUIS) 5 MG TABS tablet Take 5 mg by mouth 2 times daily Historical Med      pravastatin (PRAVACHOL) 10 MG tablet Take 10 mg by mouth daily Historical Med      allopurinol (ZYLOPRIM) 300 MG tablet Take 300 mg by mouth daily, R-2Historical Med      metFORMIN (GLUCOPHAGE) 1000 MG tablet Take 1,000 mg by mouth dailyHistorical Med      Dulaglutide (TRULICITY) 9.86 /2.5VR SOPN Inject 0.75 mg into the skin once a week Indications: Historical Med      tamsulosin (FLOMAX) 0.4 MG capsule Take 1 capsule by mouth daily, Disp-30 capsule, R-3      dapagliflozin (FARXIGA) 10 MG tablet Take 10 mg by mouth every morning      sitaGLIPtan (JANUVIA) 100 MG tablet Take 100 mg by mouth daily. omeprazole (PRILOSEC) 20 MG capsule Take 20 mg by mouth daily. sotalol (BETAPACE) 160 MG tablet Take 160 mg by mouth 2 times daily Historical Med      lisinopril (PRINIVIL;ZESTRIL) 2.5 MG tablet Take 2.5 mg by mouth daily Historical Med             ALLERGIES     Ciprofloxacin; Codeine; Diphenhydramine; Morphine; and Penicillins    FAMILY HISTORY       Family History   Problem Relation Age of Onset    Heart Disease Mother     Heart Disease Father           SOCIAL HISTORY       Social History     Socioeconomic History    Marital status:      Spouse name: None    Number of children: None    Years of education: None    Highest education level: None   Occupational History    None   Social Needs    Financial resource strain: None    Food insecurity     Worry: None     Inability: None    Transportation needs     Medical: None     Non-medical: None   Tobacco Use    Smoking status: Former Smoker     Packs/day: 1.00     Years: 2.00     Pack years: 2.00     Last attempt to quit: 2/15/2012     Years since quittin.4    Smokeless tobacco: Never Used   Substance and Sexual Activity    Alcohol use:  Yes     Alcohol/week: 0.0 standard drinks     Comment: rarely    Drug use: No    Sexual activity: None   Lifestyle    Physical activity     Days per week: None     Minutes per session: None    Stress: None   Relationships    Social connections     Talks on phone: None     Gets together: None     Attends Restoration service: None     Active member of club or organization: None     Attends meetings of clubs or organizations: None     Relationship status: None    Intimate partner violence     Fear of current or ex partner: None     Emotionally abused: None     Physically abused: None     Forced sexual activity: None   Other Topics Concern    None   Social History Narrative    None         REVIEW OF SYSTEMS    (2-9 systems for level 4, 10 or more for level 5)     Review of Systems   Genitourinary: Negative for scrotal swelling and testicular pain. Skin: Positive for color change. All other systems reviewed and are negative. Except as noted above the remainder of the review of systems was reviewed and negative. PHYSICAL EXAM    (up to 7 for level 4, 8 or more for level 5)     ED Triage Vitals [07/11/20 1241]   BP Temp Temp src Pulse Resp SpO2 Height Weight   (!) 113/53 98.5 °F (36.9 °C) -- 76 16 98 % 6' 1\" (1.854 m) 266 lb (120.7 kg)       Physical Exam  Constitutional:       Appearance: Normal appearance. He is well-developed, well-groomed and overweight. HENT:      Head: Normocephalic. Right Ear: External ear normal.      Left Ear: External ear normal.      Nose: Nose normal.   Eyes:      Extraocular Movements: Extraocular movements intact. Conjunctiva/sclera: Conjunctivae normal.      Pupils: Pupils are equal, round, and reactive to light. Neck:      Musculoskeletal: Normal range of motion. Pulmonary:      Effort: Pulmonary effort is normal. No respiratory distress. Genitourinary:     Penis: Normal.       Scrotum/Testes: Normal. Cremasteric reflex is present. Left: Mass, tenderness or swelling not present. Musculoskeletal: Normal range of motion. Skin:     General: Skin is warm and dry. Capillary Refill: Capillary refill takes less than 2 seconds. Findings: Erythema present. Neurological:      Mental Status: He is alert and oriented to person, place, and time. Psychiatric:         Mood and Affect: Mood normal.         Behavior: Behavior normal.         Thought Content:  Thought content normal. Judgment: Judgment normal.           DIAGNOSTIC RESULTS     EKG:All EKG's are interpreted by the Emergency Department Physician who either signs or Co-signs this chart in the absence of a cardiologist.        RADIOLOGY:   Non-plain film images such as CT, Ultrasound and MRI are read by theradiologist. Plain radiographic images are visualized and preliminarily interpreted by the emergency physician with the below findings:        Interpretation per the Radiologist below, if available at the time of this note:    No orders to display         EDBEDSIDE ULTRASOUND:   Performed by Bj Coreas - lety    LABS:  Labs Reviewed   POC GLUCOSE FINGERSTICK - Abnormal; Notable for the following components:       Result Value    POC Glucose 297 (*)     All other components within normal limits   POCT GLUCOSE       All other labs were within normal range or not returned as of this dictation. EMERGENCY DEPARTMENT COURSE andDIFFERENTIAL DIAGNOSIS/MDM:   Abscess I&D. See procedure note. Glucose 297. Patient has a penicillin allergy which causes hives and swelling. He will be discharged on Bactrim. Patient was instructed to either remove the packing from his wound tomorrow he may return to emergency department we will remove the packing for him. He is to follow-up with his doctor as scheduled on Tuesday. Return precautions provided. Vitals:    Vitals:    07/11/20 1241   BP: (!) 113/53   Pulse: 76   Resp: 16   Temp: 98.5 °F (36.9 °C)   SpO2: 98%   Weight: 266 lb (120.7 kg)   Height: 6' 1\" (1.854 m)         CONSULTS:  None    RES:  Incision/Drainage    Date/Time: 7/11/2020 1:43 PM  Performed by: FABIANO Montenegro CNP  Authorized by: Ayah Mills MD     Consent:     Consent obtained:  Verbal    Consent given by:  Patient    Risks discussed:  Bleeding, incomplete drainage and pain  Location:     Type:  Abscess    Location: Left groin.   Pre-procedure details:     Skin preparation:  Betadine  Anesthesia (see MAR for exact dosages): Anesthesia method:  Local infiltration    Local anesthetic:  Lidocaine 1% w/o epi  Procedure details:     Incision types:  Single straight    Incision depth:  Subcutaneous    Scalpel blade:  11    Wound management:  Probed and deloculated    Drainage:  Purulent    Drainage amount:  Copious    Packing materials:  1/2 in iodoform gauze  Post-procedure details:     Patient tolerance of procedure: Tolerated well, no immediate complications        FINAL IMPRESSION      1.  Abscess of groin, left          DISPOSITION/PLAN   DISPOSITION Decision To Discharge 07/11/2020 01:44:43 PM      PATIENT REFERRED TO:   Maia Encinas, [address]  [phone]    In 2 days      [facility] ED  [address] [facility]  [phone]    If symptoms worsen      DISCHARGE MEDICATIONS:     Discharge Medication List as of 7/11/2020  1:46 PM      START taking these medications    Details   sulfamethoxazole-trimethoprim (BACTRIM DS) 800-160 MG per tablet Take 1 tablet by mouth 2 times daily for 10 days, Disp-20 tablet,R-0Print           Electronically signed by Ofelia Curling, APRN - CNPon 7/11/2020 at 3:43 PM            Ofelia Curling, APRN - CNP  07/11/20 2049

## 2020-07-15 ENCOUNTER — APPOINTMENT (OUTPATIENT)
Dept: CT IMAGING | Age: 55
End: 2020-07-15
Payer: COMMERCIAL

## 2020-07-15 ENCOUNTER — HOSPITAL ENCOUNTER (EMERGENCY)
Age: 55
Discharge: HOME | End: 2020-07-15
Attending: EMERGENCY MEDICINE
Payer: COMMERCIAL

## 2020-07-15 VITALS
RESPIRATION RATE: 16 BRPM | TEMPERATURE: 98.3 F | BODY MASS INDEX: 33.8 KG/M2 | HEART RATE: 77 BPM | OXYGEN SATURATION: 98 % | WEIGHT: 255 LBS | SYSTOLIC BLOOD PRESSURE: 106 MMHG | HEIGHT: 73 IN | DIASTOLIC BLOOD PRESSURE: 63 MMHG

## 2020-07-15 LAB
ABSOLUTE EOS #: 0.16 K/UL (ref 0–0.44)
ABSOLUTE IMMATURE GRANULOCYTE: 0.04 K/UL (ref 0–0.3)
ABSOLUTE LYMPH #: 1.08 K/UL (ref 1.1–3.7)
ABSOLUTE MONO #: 0.64 K/UL (ref 0.1–1.2)
ALBUMIN SERPL-MCNC: 4.8 G/DL (ref 3.5–5.2)
ALBUMIN/GLOBULIN RATIO: ABNORMAL (ref 1–2.5)
ALP BLD-CCNC: 70 U/L (ref 40–129)
ALT SERPL-CCNC: 55 U/L (ref 5–41)
ANION GAP SERPL CALCULATED.3IONS-SCNC: 14 MMOL/L (ref 9–17)
AST SERPL-CCNC: 37 U/L
BASOPHILS # BLD: 1 % (ref 0–2)
BASOPHILS ABSOLUTE: 0.08 K/UL (ref 0–0.2)
BILIRUB SERPL-MCNC: 1.88 MG/DL (ref 0.3–1.2)
BILIRUBIN DIRECT: 0.55 MG/DL
BILIRUBIN, INDIRECT: 1.33 MG/DL (ref 0–1)
BUN BLDV-MCNC: 22 MG/DL (ref 6–20)
BUN/CREAT BLD: 15 (ref 9–20)
CALCIUM SERPL-MCNC: 9.6 MG/DL (ref 8.6–10.4)
CHLORIDE BLD-SCNC: 94 MMOL/L (ref 98–107)
CO2: 27 MMOL/L (ref 20–31)
CREAT SERPL-MCNC: 1.5 MG/DL (ref 0.7–1.2)
DIFFERENTIAL TYPE: ABNORMAL
EOSINOPHILS RELATIVE PERCENT: 2 % (ref 1–4)
GFR AFRICAN AMERICAN: 59 ML/MIN
GFR NON-AFRICAN AMERICAN: 49 ML/MIN
GFR SERPL CREATININE-BSD FRML MDRD: ABNORMAL ML/MIN/{1.73_M2}
GFR SERPL CREATININE-BSD FRML MDRD: ABNORMAL ML/MIN/{1.73_M2}
GLOBULIN: ABNORMAL G/DL (ref 1.5–3.8)
GLUCOSE BLD-MCNC: 199 MG/DL (ref 70–99)
HCT VFR BLD CALC: 38.3 % (ref 40.7–50.3)
HEMOGLOBIN: 13.1 G/DL (ref 13–17)
IMMATURE GRANULOCYTES: 0 %
LIPASE: 24 U/L (ref 13–60)
LYMPHOCYTES # BLD: 11 % (ref 24–43)
MCH RBC QN AUTO: 29 PG (ref 25.2–33.5)
MCHC RBC AUTO-ENTMCNC: 34.2 G/DL (ref 28.4–34.8)
MCV RBC AUTO: 84.9 FL (ref 82.6–102.9)
MONOCYTES # BLD: 7 % (ref 3–12)
MYOGLOBIN: 45 NG/ML (ref 28–72)
NRBC AUTOMATED: 0 PER 100 WBC
PDW BLD-RTO: 18.2 % (ref 11.8–14.4)
PLATELET # BLD: 166 K/UL (ref 138–453)
PLATELET ESTIMATE: ABNORMAL
PMV BLD AUTO: 10 FL (ref 8.1–13.5)
POTASSIUM SERPL-SCNC: 4.7 MMOL/L (ref 3.7–5.3)
RBC # BLD: 4.51 M/UL (ref 4.21–5.77)
RBC # BLD: ABNORMAL 10*6/UL
SEG NEUTROPHILS: 79 % (ref 36–65)
SEGMENTED NEUTROPHILS ABSOLUTE COUNT: 7.5 K/UL (ref 1.5–8.1)
SODIUM BLD-SCNC: 135 MMOL/L (ref 135–144)
TOTAL PROTEIN: 7.6 G/DL (ref 6.4–8.3)
TROPONIN INTERP: NORMAL
TROPONIN T: NORMAL NG/ML
TROPONIN, HIGH SENSITIVITY: 13 NG/L (ref 0–22)
WBC # BLD: 9.5 K/UL (ref 3.5–11.3)
WBC # BLD: ABNORMAL 10*3/UL

## 2020-07-15 PROCEDURE — 96375 TX/PRO/DX INJ NEW DRUG ADDON: CPT

## 2020-07-15 PROCEDURE — 96374 THER/PROPH/DIAG INJ IV PUSH: CPT

## 2020-07-15 PROCEDURE — 83874 ASSAY OF MYOGLOBIN: CPT

## 2020-07-15 PROCEDURE — 6360000002 HC RX W HCPCS: Performed by: NURSE PRACTITIONER

## 2020-07-15 PROCEDURE — C9113 INJ PANTOPRAZOLE SODIUM, VIA: HCPCS | Performed by: NURSE PRACTITIONER

## 2020-07-15 PROCEDURE — 6370000000 HC RX 637 (ALT 250 FOR IP): Performed by: NURSE PRACTITIONER

## 2020-07-15 PROCEDURE — 2580000003 HC RX 258: Performed by: NURSE PRACTITIONER

## 2020-07-15 PROCEDURE — 84484 ASSAY OF TROPONIN QUANT: CPT

## 2020-07-15 PROCEDURE — 85025 COMPLETE CBC W/AUTO DIFF WBC: CPT

## 2020-07-15 PROCEDURE — 74177 CT ABD & PELVIS W/CONTRAST: CPT

## 2020-07-15 PROCEDURE — 80076 HEPATIC FUNCTION PANEL: CPT

## 2020-07-15 PROCEDURE — 83690 ASSAY OF LIPASE: CPT

## 2020-07-15 PROCEDURE — 80048 BASIC METABOLIC PNL TOTAL CA: CPT

## 2020-07-15 PROCEDURE — 99284 EMERGENCY DEPT VISIT MOD MDM: CPT

## 2020-07-15 PROCEDURE — 6360000004 HC RX CONTRAST MEDICATION: Performed by: NURSE PRACTITIONER

## 2020-07-15 RX ORDER — SODIUM CHLORIDE 9 MG/ML
10 INJECTION INTRAVENOUS ONCE
Status: COMPLETED | OUTPATIENT
Start: 2020-07-15 | End: 2020-07-15

## 2020-07-15 RX ORDER — 0.9 % SODIUM CHLORIDE 0.9 %
80 INTRAVENOUS SOLUTION INTRAVENOUS ONCE
Status: COMPLETED | OUTPATIENT
Start: 2020-07-15 | End: 2020-07-15

## 2020-07-15 RX ORDER — 0.9 % SODIUM CHLORIDE 0.9 %
500 INTRAVENOUS SOLUTION INTRAVENOUS ONCE
Status: COMPLETED | OUTPATIENT
Start: 2020-07-15 | End: 2020-07-15

## 2020-07-15 RX ORDER — FENTANYL CITRATE 50 UG/ML
25 INJECTION, SOLUTION INTRAMUSCULAR; INTRAVENOUS ONCE
Status: COMPLETED | OUTPATIENT
Start: 2020-07-15 | End: 2020-07-15

## 2020-07-15 RX ORDER — PANTOPRAZOLE SODIUM 40 MG/10ML
40 INJECTION, POWDER, LYOPHILIZED, FOR SOLUTION INTRAVENOUS ONCE
Status: COMPLETED | OUTPATIENT
Start: 2020-07-15 | End: 2020-07-15

## 2020-07-15 RX ORDER — SODIUM CHLORIDE 0.9 % (FLUSH) 0.9 %
10 SYRINGE (ML) INJECTION
Status: COMPLETED | OUTPATIENT
Start: 2020-07-15 | End: 2020-07-15

## 2020-07-15 RX ORDER — ONDANSETRON 2 MG/ML
4 INJECTION INTRAMUSCULAR; INTRAVENOUS ONCE
Status: COMPLETED | OUTPATIENT
Start: 2020-07-15 | End: 2020-07-15

## 2020-07-15 RX ORDER — SUCRALFATE 1 G/1
1 TABLET ORAL 4 TIMES DAILY
Qty: 60 TABLET | Refills: 0 | Status: ON HOLD | OUTPATIENT
Start: 2020-07-15 | End: 2022-01-25

## 2020-07-15 RX ADMIN — ONDANSETRON 4 MG: 2 INJECTION INTRAMUSCULAR; INTRAVENOUS at 17:38

## 2020-07-15 RX ADMIN — SODIUM CHLORIDE 80 ML: 0.9 INJECTION, SOLUTION INTRAVENOUS at 19:06

## 2020-07-15 RX ADMIN — FENTANYL CITRATE 25 MCG: 50 INJECTION, SOLUTION INTRAMUSCULAR; INTRAVENOUS at 18:37

## 2020-07-15 RX ADMIN — IOPAMIDOL 75 ML: 755 INJECTION, SOLUTION INTRAVENOUS at 19:06

## 2020-07-15 RX ADMIN — LIDOCAINE HYDROCHLORIDE: 20 SOLUTION ORAL; TOPICAL at 17:53

## 2020-07-15 RX ADMIN — SODIUM CHLORIDE 10 ML: 9 INJECTION, SOLUTION INTRAMUSCULAR; INTRAVENOUS; SUBCUTANEOUS at 17:39

## 2020-07-15 RX ADMIN — SODIUM CHLORIDE, PRESERVATIVE FREE 10 ML: 5 INJECTION INTRAVENOUS at 19:07

## 2020-07-15 RX ADMIN — PANTOPRAZOLE SODIUM 40 MG: 40 INJECTION, POWDER, FOR SOLUTION INTRAVENOUS at 17:38

## 2020-07-15 RX ADMIN — SODIUM CHLORIDE 500 ML: 9 INJECTION, SOLUTION INTRAVENOUS at 18:38

## 2020-07-15 ASSESSMENT — PAIN SCALES - GENERAL
PAINLEVEL_OUTOF10: 8
PAINLEVEL_OUTOF10: 9

## 2020-07-15 ASSESSMENT — ENCOUNTER SYMPTOMS
VOICE CHANGE: 0
COUGH: 0
SHORTNESS OF BREATH: 0
VOMITING: 0
SORE THROAT: 0
BACK PAIN: 0
NAUSEA: 0
ABDOMINAL PAIN: 1
DIARRHEA: 0
COLOR CHANGE: 0
TROUBLE SWALLOWING: 0

## 2020-07-15 ASSESSMENT — PAIN DESCRIPTION - PROGRESSION: CLINICAL_PROGRESSION: NOT CHANGED

## 2020-07-15 ASSESSMENT — PAIN DESCRIPTION - ONSET: ONSET: ON-GOING

## 2020-07-15 ASSESSMENT — PAIN DESCRIPTION - FREQUENCY: FREQUENCY: CONTINUOUS

## 2020-07-15 ASSESSMENT — PAIN DESCRIPTION - ORIENTATION: ORIENTATION: UPPER

## 2020-07-15 ASSESSMENT — PAIN DESCRIPTION - LOCATION: LOCATION: ABDOMEN

## 2020-07-15 ASSESSMENT — PAIN DESCRIPTION - PAIN TYPE: TYPE: ACUTE PAIN

## 2020-07-15 NOTE — ED PROVIDER NOTES
[address] ED  eMERGENCY dEPARTMENT eNCOUnter      Pt Name: Jaspal Huang  MRN: [id number]  [place] 1965  Date of evaluation: 7/15/2020  Provider: FABIANO Villegas 0320       Chief Complaint   Patient presents with    Abdominal Pain     starts in epigastric area radiating into LUQ & right rib cage. Eval at TT this am         HISTORY OF PRESENT ILLNESS  (Location/Symptom, Timing/Onset, Context/Setting, Quality, Duration, Modifying Factors, Severity.)   Jaspal Huang is a 54 y.o. male who presents to the emergency department via private auto for epigastric pain, burning. Onset was last night. He was evaluated at [facility] this morning for a cardiac concern; he had an EKG, troponin, and chest x-ray. Reports nausea. Denies fever, chills, diarrhea, SOB, cough. Rates his pain 10/10 at this time. Nursing Notes were reviewed.     ALLERGIES     Ciprofloxacin; Codeine; Diphenhydramine; Morphine; and Penicillins    CURRENT MEDICATIONS       Discharge Medication List as of 7/15/2020  8:04 PM      CONTINUE these medications which have NOT CHANGED    Details   sulfamethoxazole-trimethoprim (BACTRIM DS) 800-160 MG per tablet Take 1 tablet by mouth 2 times daily for 10 days, Disp-20 tablet,R-0Print      ondansetron (ZOFRAN-ODT) 4 MG disintegrating tablet Take 1 tablet by mouth 3 times daily as needed for Nausea or Vomiting, Disp-21 tablet, R-0Print      docusate sodium (COLACE, DULCOLAX) 100 MG CAPS Take 100 mg by mouth 2 times daily as needed for ConstipationOTC      tiZANidine (ZANAFLEX) 2 MG tablet Take 1 tablet by mouth every 8 hours as needed (back pain) WARNING:  May cause drowsiness.  May impair ability to operate vehicles or machinery.  Do not use in combination with alcohol., Disp-20 tablet, R-0Print      metoprolol succinate (TOPROL XL) 50 MG extended release tablet Take 50 mg by mouth 2 times dailyHistorical Med      furosemide (LASIX) 40 MG tablet Take 40 mg by ABDOMEN SURGERY  1944 & 8858    umbillical hernia with mesh and lysis of adhesions    ABDOMINAL HERNIA REPAIR  7/7/2014    RECURRENT HERNIA REPAIR WITH MESH    APPENDECTOMY      CARDIAC DEFIBRILLATOR PLACEMENT      CHOLECYSTECTOMY      COLECTOMY  1996    d/t ruptured diverticuli    CYSTOSCOPY N/A     stent in place taped to penis head    CYSTOSCOPY  3/4/16    with bilateral pyelogram    HERNIA REPAIR      umbilical    LITHOTRIPSY  3/4/16    laser lithotripsy with ureteral dilation    PACEMAKER PLACEMENT      pacemaker/defib         FAMILY HISTORY           Problem Relation Age of Onset    Heart Disease Mother     Heart Disease Father      Family Status   Relation Name Status    Mother  Alive    Father  Alive        SOCIAL HISTORY      reports that he quit smoking about 8 years ago. He has a 2.00 pack-year smoking history. He has never used smokeless tobacco. He reports current alcohol use. He reports that he does not use drugs. REVIEW OF SYSTEMS    (2-9 systems for level 4, 10 or more for level 5)     Review of Systems   Constitutional: Negative for chills, diaphoresis, fatigue and fever. HENT: Negative for congestion, sore throat, trouble swallowing and voice change. Respiratory: Negative for cough and shortness of breath. Cardiovascular: Negative for chest pain. Gastrointestinal: Positive for abdominal pain. Negative for diarrhea, nausea and vomiting. Genitourinary: Negative for dysuria. Musculoskeletal: Negative for back pain. Skin: Negative for color change, rash and wound. Neurological: Negative for dizziness, weakness, light-headedness and headaches. Except as noted above the remainder of the review of systems was reviewed and negative.      PHYSICAL EXAM    (up to 7 for level 4, 8 or more for level 5)     ED Triage Vitals [07/15/20 1653]   BP Temp Temp Source Pulse Resp SpO2 Height Weight   106/63 98.3 °F (36.8 °C) Oral 77 16 98 % 6' 1\" (1.854 m) 255 lb (115.7 kg) Physical Exam  Vitals signs reviewed. Constitutional:       General: He is not in acute distress. Appearance: He is well-developed. He is not diaphoretic. Eyes:      General: No scleral icterus. Conjunctiva/sclera: Conjunctivae normal.   Cardiovascular:      Rate and Rhythm: Normal rate and regular rhythm. Pulmonary:      Effort: Pulmonary effort is normal. No respiratory distress. Abdominal:      General: Bowel sounds are normal. There is no distension. Palpations: Abdomen is soft. Tenderness: There is abdominal tenderness in the epigastric area. There is no guarding. Musculoskeletal:      Right lower leg: No edema. Left lower leg: No edema. Comments: Moves extremities   Skin:     General: Skin is warm and dry. Findings: No rash. Neurological:      Mental Status: He is alert and oriented to person, place, and time. Psychiatric:         Behavior: Behavior normal.         DIAGNOSTIC RESULTS       RADIOLOGY:   Non-plain film images such as CT, Ultrasound and MRI are read by the radiologist. Plain radiographic images are visualized and preliminarily interpreted by the emergency physician with the below findings:    Interpretation per the Radiologist below, if available at the time of this note:    Ct Abdomen Pelvis W Iv Contrast Additional Contrast? None    Result Date: 7/15/2020  EXAMINATION: CT OF THE ABDOMEN AND PELVIS WITH CONTRAST 7/15/2020 7:01 pm TECHNIQUE: CT of the abdomen and pelvis was performed with the administration of intravenous contrast. Multiplanar reformatted images are provided for review. Dose modulation, iterative reconstruction, and/or weight based adjustment of the mA/kV was utilized to reduce the radiation dose to as low as reasonably achievable.  COMPARISON: 07/09/2020 HISTORY: ORDERING SYSTEM PROVIDED HISTORY: upper abd pain TECHNOLOGIST PROVIDED HISTORY: upper abd pain Reason for Exam: Abdomen pain Acuity: Unknown Type of Exam: Unknown FINDINGS: Lower Chest: The lung bases are clear. No pleural or pericardial effusions. Organs: Diffuse hepatic steatosis. The spleen is enlarged, measuring 21 cm in craniocaudal dimension. The pancreas, adrenal glands, and kidneys are normal, aside from right renal cyst measuring 4.2 x 3.7 cm. The gallbladder is surgically absent. GI/Bowel: The unopacified stomach and small bowel loops are unremarkable. No small bowel obstruction. The colon is intact. Descending and sigmoid colon diverticulosis without associated acute inflammatory changes. Suture line in the sigmoid colon. Pelvis: The bladder is decompressed. Peritoneum/Retroperitoneum: Abdominal aorta has normal caliber. No free fluid or free air. No enlarged lymph node. Bones/Soft Tissues: Soft tissues are unremarkable. No destructive osseous lesions. 1. Diffuse hepatic steatosis. 2. Stable significant splenomegaly. 3. No acute abdominal or pelvic abnormality. 4. Colonic diverticulosis without acute inflammatory changes. LABS:  Labs Reviewed   BASIC METABOLIC PANEL - Abnormal; Notable for the following components:       Result Value    Glucose 199 (*)     BUN 22 (*)     CREATININE 1.50 (*)     Chloride 94 (*)     GFR Non- 49 (*)     GFR  59 (*)     All other components within normal limits   CBC WITH AUTO DIFFERENTIAL - Abnormal; Notable for the following components:    Hematocrit 38.3 (*)     RDW 18.2 (*)     Seg Neutrophils 79 (*)     Lymphocytes 11 (*)     Absolute Lymph # 1.08 (*)     All other components within normal limits   HEPATIC FUNCTION PANEL - Abnormal; Notable for the following components:    ALT 55 (*)     Total Bilirubin 1.88 (*)     Bilirubin, Direct 0.55 (*)     Bilirubin, Indirect 1.33 (*)     All other components within normal limits   LIPASE   TROP/MYOGLOBIN       All other labs were within normal range or not returned as of this dictation.     EMERGENCY DEPARTMENT COURSE and DIFFERENTIAL with long-term current use of insulin (HCC) E11.9, Z79.4    Gross hematuria R31.0    Bladder stone N21.0    Hematospermia R36.1    Nausea and vomiting R11.2    Adynamic ileus (HCC) K56.0    Small bowel obstruction due to adhesions K56.50    Nonischemic cardiomyopathy (HCC) I42.8    Essential hypertension I10    Obesity (BMI 30-39. 9) E66.9    Abnormal LFTs R94.5    SBO (small bowel obstruction) ([address] K56.609    History of small bowel obstruction Z87.19    Blood clot due to device, implant, or graft T85.818A    History of atrial fibrillation Z86.79    Abdominal pain R10.9         DISPOSITION/PLAN   DISPOSITION Decision To Discharge 07/15/2020 08:02:34 PM      PATIENT REFERRED TO:   Kumar Luna MD  [facility] [address] [facility]  [phone]    Schedule an appointment as soon as possible for a visit       MD Demetrius Montano  [address] [facility]  [phone]    Schedule an appointment as soon as possible for a visit         DISCHARGE MEDICATIONS:     Discharge Medication List as of 7/15/2020  8:04 PM      START taking these medications    Details   sucralfate (CARAFATE) 1 GM tablet Take 1 tablet by mouth 4 times daily, Disp-60 tablet,R-0Print                 (Please note that portions of this note were completed with a voice recognition program.  Efforts were made to edit the dictations but occasionally words are mis-transcribed.)    FABIANO Salas CNP, APRN - CNP  07/15/20 1693

## 2020-07-15 NOTE — ED NOTES
Patient presents to the er with c/o abdominal discomfort. Patient states was started on new antibiotics in addition to Metformin increased. Patient states burning that radiates up his esophagus. Patient verbalizing was seen at [facility] and checked out for cardiac and cleared, but did nothing to evaluate and stomach issues. Patient had recent kidney stone as well as groin abscess.       Aleja Valente RN  07/15/20 9486

## 2020-12-04 ENCOUNTER — APPOINTMENT (OUTPATIENT)
Dept: CT IMAGING | Age: 55
End: 2020-12-04
Payer: COMMERCIAL

## 2020-12-04 ENCOUNTER — APPOINTMENT (OUTPATIENT)
Dept: GENERAL RADIOLOGY | Age: 55
End: 2020-12-04
Payer: COMMERCIAL

## 2020-12-04 ENCOUNTER — HOSPITAL ENCOUNTER (EMERGENCY)
Age: 55
Discharge: HOME | End: 2020-12-04
Attending: EMERGENCY MEDICINE
Payer: COMMERCIAL

## 2020-12-04 VITALS
SYSTOLIC BLOOD PRESSURE: 98 MMHG | DIASTOLIC BLOOD PRESSURE: 58 MMHG | RESPIRATION RATE: 13 BRPM | TEMPERATURE: 97.2 F | HEIGHT: 73 IN | HEART RATE: 68 BPM | BODY MASS INDEX: 35.52 KG/M2 | WEIGHT: 268 LBS | OXYGEN SATURATION: 96 %

## 2020-12-04 LAB
ABSOLUTE EOS #: 0.12 K/UL (ref 0–0.44)
ABSOLUTE IMMATURE GRANULOCYTE: <0.03 K/UL (ref 0–0.3)
ABSOLUTE LYMPH #: 0.85 K/UL (ref 1.1–3.7)
ABSOLUTE MONO #: 0.51 K/UL (ref 0.1–1.2)
ALBUMIN SERPL-MCNC: 4.2 G/DL (ref 3.5–5.2)
ALBUMIN/GLOBULIN RATIO: 1.6 (ref 1–2.5)
ALP BLD-CCNC: 67 U/L (ref 40–129)
ALT SERPL-CCNC: 34 U/L (ref 5–41)
ANION GAP SERPL CALCULATED.3IONS-SCNC: 11 MMOL/L (ref 9–17)
AST SERPL-CCNC: 24 U/L
BASOPHILS # BLD: 1 % (ref 0–2)
BASOPHILS ABSOLUTE: 0.04 K/UL (ref 0–0.2)
BILIRUB SERPL-MCNC: 0.96 MG/DL (ref 0.3–1.2)
BUN BLDV-MCNC: 17 MG/DL (ref 6–20)
BUN/CREAT BLD: ABNORMAL (ref 9–20)
C-REACTIVE PROTEIN: 3.7 MG/L (ref 0–5)
CALCIUM SERPL-MCNC: 8.9 MG/DL (ref 8.6–10.4)
CHLORIDE BLD-SCNC: 100 MMOL/L (ref 98–107)
CO2: 26 MMOL/L (ref 20–31)
CREAT SERPL-MCNC: 1.24 MG/DL (ref 0.7–1.2)
D-DIMER QUANTITATIVE: 0.31 MG/L FEU
DIFFERENTIAL TYPE: ABNORMAL
EOSINOPHILS RELATIVE PERCENT: 2 % (ref 1–4)
FERRITIN: 472 UG/L (ref 30–400)
GFR AFRICAN AMERICAN: >60 ML/MIN
GFR NON-AFRICAN AMERICAN: >60 ML/MIN
GFR SERPL CREATININE-BSD FRML MDRD: ABNORMAL ML/MIN/{1.73_M2}
GFR SERPL CREATININE-BSD FRML MDRD: ABNORMAL ML/MIN/{1.73_M2}
GLUCOSE BLD-MCNC: 257 MG/DL (ref 70–99)
HCT VFR BLD CALC: 40.9 % (ref 40.7–50.3)
HEMOGLOBIN: 13.8 G/DL (ref 13–17)
IMMATURE GRANULOCYTES: 0 %
LACTATE DEHYDROGENASE: 216 U/L (ref 135–225)
LACTIC ACID, WHOLE BLOOD: 2.3 MMOL/L (ref 0.7–2.1)
LACTIC ACID: ABNORMAL MMOL/L
LIPASE: 37 U/L (ref 13–60)
LYMPHOCYTES # BLD: 12 % (ref 24–43)
MCH RBC QN AUTO: 28.8 PG (ref 25.2–33.5)
MCHC RBC AUTO-ENTMCNC: 33.7 G/DL (ref 28.4–34.8)
MCV RBC AUTO: 85.2 FL (ref 82.6–102.9)
MONOCYTES # BLD: 7 % (ref 3–12)
NRBC AUTOMATED: 0 PER 100 WBC
PDW BLD-RTO: 17.3 % (ref 11.8–14.4)
PLATELET # BLD: ABNORMAL K/UL (ref 138–453)
PLATELET ESTIMATE: ABNORMAL
PLATELET, FLUORESCENCE: 132 K/UL (ref 138–453)
PLATELET, IMMATURE FRACTION: 2.9 % (ref 1.1–10.3)
PMV BLD AUTO: ABNORMAL FL (ref 8.1–13.5)
POTASSIUM SERPL-SCNC: 4.3 MMOL/L (ref 3.7–5.3)
RBC # BLD: 4.8 M/UL (ref 4.21–5.77)
RBC # BLD: ABNORMAL 10*6/UL
SARS-COV-2, RAPID: NOT DETECTED
SARS-COV-2: NORMAL
SARS-COV-2: NORMAL
SEG NEUTROPHILS: 78 % (ref 36–65)
SEGMENTED NEUTROPHILS ABSOLUTE COUNT: 5.38 K/UL (ref 1.5–8.1)
SODIUM BLD-SCNC: 137 MMOL/L (ref 135–144)
SOURCE: NORMAL
TOTAL PROTEIN: 6.9 G/DL (ref 6.4–8.3)
TROPONIN INTERP: NORMAL
TROPONIN INTERP: NORMAL
TROPONIN T: NORMAL NG/ML
TROPONIN T: NORMAL NG/ML
TROPONIN, HIGH SENSITIVITY: 11 NG/L (ref 0–22)
TROPONIN, HIGH SENSITIVITY: 12 NG/L (ref 0–22)
WBC # BLD: 6.9 K/UL (ref 3.5–11.3)
WBC # BLD: ABNORMAL 10*3/UL

## 2020-12-04 PROCEDURE — 6360000004 HC RX CONTRAST MEDICATION: Performed by: STUDENT IN AN ORGANIZED HEALTH CARE EDUCATION/TRAINING PROGRAM

## 2020-12-04 PROCEDURE — 99283 EMERGENCY DEPT VISIT LOW MDM: CPT

## 2020-12-04 PROCEDURE — 96374 THER/PROPH/DIAG INJ IV PUSH: CPT

## 2020-12-04 PROCEDURE — 83690 ASSAY OF LIPASE: CPT

## 2020-12-04 PROCEDURE — 85055 RETICULATED PLATELET ASSAY: CPT

## 2020-12-04 PROCEDURE — 80053 COMPREHEN METABOLIC PANEL: CPT

## 2020-12-04 PROCEDURE — 82728 ASSAY OF FERRITIN: CPT

## 2020-12-04 PROCEDURE — 86140 C-REACTIVE PROTEIN: CPT

## 2020-12-04 PROCEDURE — 74177 CT ABD & PELVIS W/CONTRAST: CPT

## 2020-12-04 PROCEDURE — 85379 FIBRIN DEGRADATION QUANT: CPT

## 2020-12-04 PROCEDURE — 71045 X-RAY EXAM CHEST 1 VIEW: CPT

## 2020-12-04 PROCEDURE — 84484 ASSAY OF TROPONIN QUANT: CPT

## 2020-12-04 PROCEDURE — U0002 COVID-19 LAB TEST NON-CDC: HCPCS

## 2020-12-04 PROCEDURE — 85025 COMPLETE CBC W/AUTO DIFF WBC: CPT

## 2020-12-04 PROCEDURE — 6360000002 HC RX W HCPCS: Performed by: STUDENT IN AN ORGANIZED HEALTH CARE EDUCATION/TRAINING PROGRAM

## 2020-12-04 PROCEDURE — 93005 ELECTROCARDIOGRAM TRACING: CPT | Performed by: STUDENT IN AN ORGANIZED HEALTH CARE EDUCATION/TRAINING PROGRAM

## 2020-12-04 PROCEDURE — 83615 LACTATE (LD) (LDH) ENZYME: CPT

## 2020-12-04 PROCEDURE — 96375 TX/PRO/DX INJ NEW DRUG ADDON: CPT

## 2020-12-04 PROCEDURE — 83605 ASSAY OF LACTIC ACID: CPT

## 2020-12-04 RX ORDER — FENTANYL CITRATE 50 UG/ML
50 INJECTION, SOLUTION INTRAMUSCULAR; INTRAVENOUS ONCE
Status: COMPLETED | OUTPATIENT
Start: 2020-12-04 | End: 2020-12-04

## 2020-12-04 RX ORDER — ONDANSETRON 2 MG/ML
4 INJECTION INTRAMUSCULAR; INTRAVENOUS ONCE
Status: COMPLETED | OUTPATIENT
Start: 2020-12-04 | End: 2020-12-04

## 2020-12-04 RX ADMIN — ONDANSETRON 4 MG: 2 INJECTION INTRAMUSCULAR; INTRAVENOUS at 19:36

## 2020-12-04 RX ADMIN — IOPAMIDOL 75 ML: 755 INJECTION, SOLUTION INTRAVENOUS at 20:58

## 2020-12-04 RX ADMIN — FENTANYL CITRATE 50 MCG: 50 INJECTION, SOLUTION INTRAMUSCULAR; INTRAVENOUS at 19:36

## 2020-12-04 RX ADMIN — HYDROMORPHONE HYDROCHLORIDE 0.5 MG: 1 INJECTION, SOLUTION INTRAMUSCULAR; INTRAVENOUS; SUBCUTANEOUS at 21:36

## 2020-12-04 ASSESSMENT — PAIN DESCRIPTION - LOCATION: LOCATION: ABDOMEN

## 2020-12-04 ASSESSMENT — PAIN SCALES - GENERAL
PAINLEVEL_OUTOF10: 8
PAINLEVEL_OUTOF10: 7
PAINLEVEL_OUTOF10: 8

## 2020-12-04 NOTE — ED NOTES
Bed: 18  Expected date:   Expected time:   Means of arrival:   Comments:     Zander Rivas RN  12/04/20 2701

## 2020-12-05 NOTE — ED PROVIDER NOTES
[address]  ED  Emergency Department Encounter  EmergencyMedicine Resident     Pt Juni Martinez  MRN: [id number]  [place] 1965  Date of evaluation: 12/4/20  PCP:  Yared Garber MD    [address] [place] [address]       Chief Complaint   Patient presents with    Shortness of Breath    Abdominal Pain       HISTORY OF PRESENT ILLNESS  (Location/Symptom, Timing/Onset, Context/Setting, Quality, Duration, Modifying Factors, Severity.)      Pau Chawla is a 54 y.o. male who presents with abdominal pain. Patient is presenting with 3 days of left lower quadrant abdominal pain, sharp, nonradiating, does not know what makes it better or worse, associated with nausea, diarrhea, shortness of breath. Patient reports proximately 10 days of generalized weakness, generalized fatigue, generalized muscle aches, cough, congestion, rhinorrhea, sore throat. Patient denies chest pains, palpitations, visual changes, hearing changes, lightheadedness, dizziness, lower extremity pain or swelling. Patient has history of CAD, last cardiac catheterization was in 2012. Patient's brother was admitted to the hospital for Covid recently. Patient has history of prior abdominal surgeries, ruptured diverticulitis, small bowel obstructions. PAST MEDICAL / SURGICAL / SOCIAL / FAMILY HISTORY      has a past medical history of ADHD, predominantly hyperactive type, Anemia, Blood clot due to device, implant, or graft, Bowel obstruction ([address] CAD (coronary artery disease), Cardiomyopathy ([address] Diabetes mellitus ([address] Diverticulitis, GERD (gastroesophageal reflux disease), History of atrial fibrillation, History of ventricular tachycardia, Kidney calculi, Obesity, and Sleep apnea. has a past surgical history that includes Abdomen surgery (2006 & 2008); Appendectomy; Cholecystectomy; Cardiac defibrillator placement; colectomy (1996); Cystocopy (N/A);  Abdominal hernia repair (7/7/2014); hernia repair; pacemaker placement; Cystoscopy (3/4/16); and Lithotripsy (3/4/16). Social History     Socioeconomic History    Marital status:      Spouse name: Not on file    Number of children: Not on file    Years of education: Not on file    Highest education level: Not on file   Occupational History    Not on file   Social Needs    Financial resource strain: Not on file    Food insecurity     Worry: Not on file     Inability: Not on file    Transportation needs     Medical: Not on file     Non-medical: Not on file   Tobacco Use    Smoking status: Former Smoker     Packs/day: 1.00     Years: 2.00     Pack years: 2.00     Last attempt to quit: 2/15/2012     Years since quittin.8    Smokeless tobacco: Never Used   Substance and Sexual Activity    Alcohol use: Yes     Alcohol/week: 0.0 standard drinks     Comment: rarely    Drug use: No    Sexual activity: Not on file   Lifestyle    Physical activity     Days per week: Not on file     Minutes per session: Not on file    Stress: Not on file   Relationships    Social connections     Talks on phone: Not on file     Gets together: Not on file     Attends Buddhism service: Not on file     Active member of club or organization: Not on file     Attends meetings of clubs or organizations: Not on file     Relationship status: Not on file    Intimate partner violence     Fear of current or ex partner: Not on file     Emotionally abused: Not on file     Physically abused: Not on file     Forced sexual activity: Not on file   Other Topics Concern    Not on file   Social History Narrative    Not on file       Family History   Problem Relation Age of Onset    Heart Disease Mother     Heart Disease Father        Allergies:  Ciprofloxacin; Codeine; Diphenhydramine; Morphine; and Penicillins    Home Medications:  Prior to Admission medications    Medication Sig Start Date End Date Taking?  Authorizing Provider   sucralfate (CARAFATE) 1 GM tablet Take 1 tablet by mouth 4 times daily 7/15/20   FABIANO Rosado CNP   ondansetron (ZOFRAN-ODT) 4 MG disintegrating tablet Take 1 tablet by mouth 3 times daily as needed for Nausea or Vomiting [birth year]/0[birth year]   Adam Mcghee MD   docusate sodium (COLACE, DULCOLAX) 100 MG CAPS Take 100 mg by mouth 2 times daily as needed for Constipation 6/13/20   Benito Nancy [place],    tiZANidine (ZANAFLEX) 2 MG tablet Take 1 tablet by mouth every 8 hours as needed (back pain) WARNING:  May cause drowsiness.  May impair ability to operate vehicles or machinery.  Do not use in combination with alcohol.  1/3/20   FABIANO Rosado CNP   metoprolol succinate (TOPROL XL) 50 MG extended release tablet Take 50 mg by mouth 2 times daily    Historical Provider, MD   furosemide (LASIX) 40 MG tablet Take 40 mg by mouth daily    Historical Provider, MD   magnesium gluconate (MAGONATE) 500 MG tablet Take 500 mg by mouth daily    Historical Provider, MD   potassium chloride (KLOR-CON M) 10 MEQ extended release tablet Take 10 mEq by mouth daily    Historical Provider, MD   aspirin EC 81 MG EC tablet Take 81 mg by mouth daily    Historical Provider, MD   colchicine (COLCRYS) 0.6 MG tablet Take 0.6 mg by mouth 2 times daily as needed (gout flare)    Historical Provider, MD   apixaban (ELIQUIS) 5 MG TABS tablet Take 5 mg by mouth 2 times daily  6/21/18   Historical Provider, MD   pravastatin (PRAVACHOL) 10 MG tablet Take 10 mg by mouth daily  9/6/18   Historical Provider, MD   allopurinol (ZYLOPRIM) 300 MG tablet Take 300 mg by mouth daily 1/12/18   Historical Provider, MD   metFORMIN (GLUCOPHAGE) 1000 MG tablet Take 1,000 mg by mouth daily    Historical Provider, MD   Dulaglutide (TRULICITY) 8.87 PV/8.7NB SOPN Inject 0.75 mg into the skin once a week Indications: Mondays Mondays    Historical Provider, MD   tamsulosin (FLOMAX) 0.4 MG capsule Take 1 capsule by mouth daily 3/4/16   Deangelo Arenas MD   dapagliflozin (FARXIGA) 10 MG tablet Take 10 mg by mouth every morning Historical Provider, MD   sitaGLIPtan (JANUVIA) 100 MG tablet Take 100 mg by mouth daily. Historical Provider, MD   omeprazole (PRILOSEC) 20 MG capsule Take 20 mg by mouth daily. Historical Provider, MD   sotalol (BETAPACE) 160 MG tablet Take 160 mg by mouth 2 times daily     Historical Provider, MD   lisinopril (PRINIVIL;ZESTRIL) 2.5 MG tablet Take 2.5 mg by mouth daily     Historical Provider, MD       REVIEW OF SYSTEMS    (2-9 systems for level 4, 10 or more for level 5)      Review of Systems   Positive for abdominal pain, nausea, diarrhea, shortness of breath, generalized weakness, generalized fatigue, myalgias, cough, congestion, rhinorrhea, sore throat. Patient denies chest pains, palpitations, visual changes, hearing changes, lightheadedness, dizziness, lower extremity pain or swelling. PHYSICAL EXAM   (up to 7 for level 4, 8 or more for level 5)      INITIAL VITALS:   BP (!) 98/58   Pulse 68   Temp 97.2 °F (36.2 °C) (Skin)   Resp 13   Ht 6' 1\" (1.854 m)   Wt 268 lb (121.6 kg)   SpO2 96%   BMI 35.36 kg/m²     Physical Exam   Patient is alert and oriented, openly communicative, no acute distress, head is atraumatic, PERRLA, EOMI, cranial nerves intact, sensation light touch intact in all extremities, strength 5/5 in all extremities, CTAB, regular rate and rhythm, no extra heart sounds, no abdominal tenderness, distal pulses intact and equal bilaterally.     DIFFERENTIAL  DIAGNOSIS     PLAN (LABS / IMAGING / EKG):  Orders Placed This Encounter   Procedures    XR CHEST PORTABLE    CT ABDOMEN PELVIS W IV CONTRAST Additional Contrast? None    CBC Auto Differential    Comprehensive Metabolic Panel w/ Reflex to MG    Lipase    Troponin    COVID-19    D-Dimer, Quantitative    C-Reactive Protein    FERRITIN    LACTATE DEHYDROGENASE    Lactic Acid, Plasma    Immature Platelet Fraction    EKG 12 Lead       MEDICATIONS ORDERED:  Orders Placed This Encounter   Medications    ondansetron 3.5 - 5.2 g/dL    Albumin/Globulin Ratio 1.6 1.0 - 2.5    GFR Non-African American >60 >60 mL/min    GFR African American >60 >60 mL/min    GFR Comment          GFR Staging NOT REPORTED    Lipase   Result Value Ref Range    Lipase 37 13 - 60 U/L   Troponin   Result Value Ref Range    Troponin, High Sensitivity 12 0 - 22 ng/L    Troponin T NOT REPORTED <0.03 ng/mL    Troponin Interp NOT REPORTED    Troponin   Result Value Ref Range    Troponin, High Sensitivity 11 0 - 22 ng/L    Troponin T NOT REPORTED <0.03 ng/mL    Troponin Interp NOT REPORTED    COVID-19    Specimen: Other   Result Value Ref Range    SARS-CoV-2          SARS-CoV-2, Rapid Not Detected Not Detected    Source . NASOPHARYNGEAL SWAB     SARS-CoV-2         D-Dimer, Quantitative   Result Value Ref Range    D-Dimer, Quant 0.31 mg/L FEU   C-Reactive Protein   Result Value Ref Range    CRP 3.7 0.0 - 5.0 mg/L   FERRITIN   Result Value Ref Range    Ferritin 472 (H) 30 - 400 ug/L   LACTATE DEHYDROGENASE   Result Value Ref Range     135 - 225 U/L   Lactic Acid, Plasma   Result Value Ref Range    Lactic Acid NOT REPORTED mmol/L    Lactic Acid, Whole Blood 2.3 (H) 0.7 - 2.1 mmol/L   Immature Platelet Fraction   Result Value Ref Range    Platelet, Immature Fraction 2.9 1.1 - 10.3 %    Platelet, Fluorescence 132 (L) 138 - 453 k/uL   EKG 12 Lead   Result Value Ref Range    Ventricular Rate 78 BPM    Atrial Rate 78 BPM    P-R Interval 198 ms    QRS Duration 90 ms    Q-T Interval 398 ms    QTc Calculation (Bazett) 453 ms    P Axis 46 degrees    R Axis 56 degrees    T Axis 68 degrees       IMPRESSION: 51-year-old male with Covid-like symptoms, abdominal pain, will obtain Covid labs, abdominal labs, cardiac work-up, CT abdomen pelvis.     RADIOLOGY:  Ct Abdomen Pelvis W Iv Contrast Additional Contrast? None    Result Date: 12/4/2020  EXAMINATION: CT OF THE ABDOMEN AND PELVIS WITH CONTRAST 12/4/2020 5:50 pm TECHNIQUE: CT of the abdomen and pelvis was performed with the administration of intravenous contrast. Multiplanar reformatted images are provided for review. Dose modulation, iterative reconstruction, and/or weight based adjustment of the mA/kV was utilized to reduce the radiation dose to as low as reasonably achievable. COMPARISON: 07/15/2020 FINDINGS: Lower Chest: Lung bases are clear. Organs: Liver is normal in size with decreased density. No focal masses identified. Spleen is enlarged in size measuring 24 cm. The gallbladder is surgically absent. Both adrenal glands are normal.  Pancreas is normal in appearance. Stable exophytic cyst in the upper right kidney. The kidneys are otherwise normal in size and attenuation without evidence of hydronephrosis or renal calculi. GI/Bowel: The visualized bowel and mesentery show no mass lesions. Mild colonic diverticulosis without evidence of diverticulitis. Pelvis: No intrapelvic mass is identified. Bladder and rectum are intact. Punctate prostatic calculi Peritoneum/Retroperitoneum: No free fluid. No lymphadenopathy. No evidence of pneumoperitoneum. Bones/Soft Tissues: The abdominal and pelvic walls are unremarkable. Degenerative changes seen in the visualized spine. No acute bony abnormalities. Diffuse fatty infiltration of the liver. Stable massive splenomegaly No acute intra-abdominal or intrapelvic abnormalities are noted HISTORY: ORDERING SYSTEM PROVIDED HISTORY: abd pain, hx of diverticulitis and SBO TECHNOLOGIST PROVIDED HISTORY: abd pain, hx of diverticulitis and SBO Reason for Exam: Abd pain, hx of diverticulitis and SBO. Acuity: Acute Type of Exam: Initial Additional signs and symptoms: Per Patient Abdominal pain with Nausea. No bowel movement today.  Negative COVID test. FINDINGS: Lower Chest: Organs: GI/Bowel: Pelvis: Peritoneum/Retroperitoneum: Bones/Soft Tissues:     Xr Chest Portable    Result Date: 12/4/2020  EXAMINATION: ONE XRAY VIEW OF THE CHEST 12/4/2020 5:06 pm COMPARISON: 12/04/2019, 02/15/2016 [id number]  [phone]    Schedule an appointment as soon as possible for a visit in 2 days  For reassessment    [facility]  [address] [facility] [address]  [phone]  Go to   As needed, If symptoms worsen      DISCHARGE MEDICATIONS:  Discharge Medication List as of 12/4/2020  9:47 PM          Denzel Jiménez MD  Emergency Medicine Resident    (Please note that portions of thisnote were completed with a voice recognition program.  Efforts were made to edit the dictations but occasionally words are mis-transcribed      Denzel Jiménez MD  Resident  12/05/20 3857

## 2020-12-07 LAB
EKG ATRIAL RATE: 78 BPM
EKG P AXIS: 46 DEGREES
EKG P-R INTERVAL: 198 MS
EKG Q-T INTERVAL: 398 MS
EKG QRS DURATION: 90 MS
EKG QTC CALCULATION (BAZETT): 453 MS
EKG R AXIS: 56 DEGREES
EKG T AXIS: 68 DEGREES
EKG VENTRICULAR RATE: 78 BPM

## 2022-01-22 ENCOUNTER — APPOINTMENT (OUTPATIENT)
Dept: CT IMAGING | Age: 57
DRG: 389 | End: 2022-01-22
Payer: COMMERCIAL

## 2022-01-22 ENCOUNTER — APPOINTMENT (OUTPATIENT)
Dept: GENERAL RADIOLOGY | Age: 57
DRG: 389 | End: 2022-01-22
Payer: COMMERCIAL

## 2022-01-22 ENCOUNTER — HOSPITAL ENCOUNTER (INPATIENT)
Age: 57
LOS: 3 days | Discharge: HOME | DRG: 389 | End: 2022-01-25
Attending: EMERGENCY MEDICINE | Admitting: INTERNAL MEDICINE
Payer: COMMERCIAL

## 2022-01-22 DIAGNOSIS — K56.609 SBO (SMALL BOWEL OBSTRUCTION) (HCC): Primary | ICD-10-CM

## 2022-01-22 PROBLEM — I50.32 CHRONIC DIASTOLIC CHF (CONGESTIVE HEART FAILURE) (HCC): Status: ACTIVE | Noted: 2022-01-22

## 2022-01-22 PROBLEM — K21.9 GERD (GASTROESOPHAGEAL REFLUX DISEASE): Status: ACTIVE | Noted: 2022-01-22

## 2022-01-22 PROBLEM — F90.1 ADHD, PREDOMINANTLY HYPERACTIVE TYPE: Status: ACTIVE | Noted: 2022-01-22

## 2022-01-22 PROBLEM — I25.10 CAD (CORONARY ARTERY DISEASE): Status: ACTIVE | Noted: 2022-01-22

## 2022-01-22 PROBLEM — G47.30 SLEEP APNEA: Status: ACTIVE | Noted: 2022-01-22

## 2022-01-22 LAB
ABSOLUTE EOS #: 0.1 K/UL (ref 0–0.44)
ABSOLUTE IMMATURE GRANULOCYTE: 0.03 K/UL (ref 0–0.3)
ABSOLUTE LYMPH #: 0.96 K/UL (ref 1.1–3.7)
ABSOLUTE MONO #: 0.82 K/UL (ref 0.1–1.2)
ALBUMIN SERPL-MCNC: 5 G/DL (ref 3.5–5.2)
ALBUMIN/GLOBULIN RATIO: ABNORMAL (ref 1–2.5)
ALP BLD-CCNC: 78 U/L (ref 40–129)
ALT SERPL-CCNC: 50 U/L (ref 5–41)
ANION GAP SERPL CALCULATED.3IONS-SCNC: 12 MMOL/L (ref 9–17)
AST SERPL-CCNC: 31 U/L
BASOPHILS # BLD: 1 % (ref 0–2)
BASOPHILS ABSOLUTE: 0.07 K/UL (ref 0–0.2)
BILIRUB SERPL-MCNC: 2.97 MG/DL (ref 0.3–1.2)
BILIRUBIN DIRECT: 0.68 MG/DL
BILIRUBIN URINE: NEGATIVE
BILIRUBIN, INDIRECT: 2.29 MG/DL (ref 0–1)
BUN BLDV-MCNC: 19 MG/DL (ref 6–20)
BUN/CREAT BLD: 21 (ref 9–20)
CALCIUM SERPL-MCNC: 9.1 MG/DL (ref 8.6–10.4)
CHLORIDE BLD-SCNC: 102 MMOL/L (ref 98–107)
CO2: 22 MMOL/L (ref 20–31)
COLOR: YELLOW
COMMENT UA: ABNORMAL
CREAT SERPL-MCNC: 0.91 MG/DL (ref 0.7–1.2)
DIFFERENTIAL TYPE: ABNORMAL
EOSINOPHILS RELATIVE PERCENT: 1 % (ref 1–4)
GFR AFRICAN AMERICAN: >60 ML/MIN
GFR NON-AFRICAN AMERICAN: >60 ML/MIN
GFR SERPL CREATININE-BSD FRML MDRD: ABNORMAL ML/MIN/{1.73_M2}
GFR SERPL CREATININE-BSD FRML MDRD: ABNORMAL ML/MIN/{1.73_M2}
GLOBULIN: ABNORMAL G/DL (ref 1.5–3.8)
GLUCOSE BLD-MCNC: 159 MG/DL (ref 75–110)
GLUCOSE BLD-MCNC: 178 MG/DL (ref 75–110)
GLUCOSE BLD-MCNC: 250 MG/DL (ref 70–99)
GLUCOSE URINE: ABNORMAL
HCT VFR BLD CALC: 41.2 % (ref 40.7–50.3)
HEMOGLOBIN: 14 G/DL (ref 13–17)
IMMATURE GRANULOCYTES: 0 %
KETONES, URINE: ABNORMAL
LACTIC ACID: 0.9 MMOL/L (ref 0.5–2.2)
LEUKOCYTE ESTERASE, URINE: NEGATIVE
LIPASE: 19 U/L (ref 13–60)
LYMPHOCYTES # BLD: 9 % (ref 24–43)
MCH RBC QN AUTO: 28.2 PG (ref 25.2–33.5)
MCHC RBC AUTO-ENTMCNC: 34 G/DL (ref 28.4–34.8)
MCV RBC AUTO: 83.1 FL (ref 82.6–102.9)
MONOCYTES # BLD: 8 % (ref 3–12)
NITRITE, URINE: NEGATIVE
NRBC AUTOMATED: 0 PER 100 WBC
PDW BLD-RTO: 18.2 % (ref 11.8–14.4)
PH UA: 5.5 (ref 5–8)
PLATELET # BLD: 159 K/UL (ref 138–453)
PLATELET ESTIMATE: ABNORMAL
PMV BLD AUTO: 10 FL (ref 8.1–13.5)
POTASSIUM SERPL-SCNC: 4.2 MMOL/L (ref 3.7–5.3)
PROTEIN UA: NEGATIVE
RBC # BLD: 4.96 M/UL (ref 4.21–5.77)
RBC # BLD: ABNORMAL 10*6/UL
SEG NEUTROPHILS: 81 % (ref 36–65)
SEGMENTED NEUTROPHILS ABSOLUTE COUNT: 8.6 K/UL (ref 1.5–8.1)
SODIUM BLD-SCNC: 136 MMOL/L (ref 135–144)
SPECIFIC GRAVITY UA: 1.02 (ref 1–1.03)
TOTAL PROTEIN: 7.9 G/DL (ref 6.4–8.3)
TURBIDITY: CLEAR
URINE HGB: NEGATIVE
UROBILINOGEN, URINE: NORMAL
WBC # BLD: 10.6 K/UL (ref 3.5–11.3)
WBC # BLD: ABNORMAL 10*3/UL

## 2022-01-22 PROCEDURE — 83605 ASSAY OF LACTIC ACID: CPT

## 2022-01-22 PROCEDURE — 6360000002 HC RX W HCPCS: Performed by: NURSE PRACTITIONER

## 2022-01-22 PROCEDURE — 82947 ASSAY GLUCOSE BLOOD QUANT: CPT

## 2022-01-22 PROCEDURE — 1200000000 HC SEMI PRIVATE

## 2022-01-22 PROCEDURE — 96372 THER/PROPH/DIAG INJ SC/IM: CPT

## 2022-01-22 PROCEDURE — 6360000004 HC RX CONTRAST MEDICATION: Performed by: EMERGENCY MEDICINE

## 2022-01-22 PROCEDURE — 96376 TX/PRO/DX INJ SAME DRUG ADON: CPT

## 2022-01-22 PROCEDURE — 6360000002 HC RX W HCPCS: Performed by: INTERNAL MEDICINE

## 2022-01-22 PROCEDURE — 2580000003 HC RX 258: Performed by: EMERGENCY MEDICINE

## 2022-01-22 PROCEDURE — 6360000002 HC RX W HCPCS: Performed by: EMERGENCY MEDICINE

## 2022-01-22 PROCEDURE — 96361 HYDRATE IV INFUSION ADD-ON: CPT

## 2022-01-22 PROCEDURE — 83690 ASSAY OF LIPASE: CPT

## 2022-01-22 PROCEDURE — 96374 THER/PROPH/DIAG INJ IV PUSH: CPT

## 2022-01-22 PROCEDURE — 2580000003 HC RX 258: Performed by: NURSE PRACTITIONER

## 2022-01-22 PROCEDURE — 99223 1ST HOSP IP/OBS HIGH 75: CPT | Performed by: NURSE PRACTITIONER

## 2022-01-22 PROCEDURE — 85025 COMPLETE CBC W/AUTO DIFF WBC: CPT

## 2022-01-22 PROCEDURE — 81003 URINALYSIS AUTO W/O SCOPE: CPT

## 2022-01-22 PROCEDURE — 74177 CT ABD & PELVIS W/CONTRAST: CPT

## 2022-01-22 PROCEDURE — 74018 RADEX ABDOMEN 1 VIEW: CPT

## 2022-01-22 PROCEDURE — 96375 TX/PRO/DX INJ NEW DRUG ADDON: CPT

## 2022-01-22 PROCEDURE — 80048 BASIC METABOLIC PNL TOTAL CA: CPT

## 2022-01-22 PROCEDURE — 80076 HEPATIC FUNCTION PANEL: CPT

## 2022-01-22 PROCEDURE — 99285 EMERGENCY DEPT VISIT HI MDM: CPT

## 2022-01-22 PROCEDURE — 2500000003 HC RX 250 WO HCPCS: Performed by: NURSE PRACTITIONER

## 2022-01-22 RX ORDER — METOPROLOL TARTRATE 5 MG/5ML
5 INJECTION INTRAVENOUS EVERY 12 HOURS
Status: DISCONTINUED | OUTPATIENT
Start: 2022-01-22 | End: 2022-01-25

## 2022-01-22 RX ORDER — SODIUM CHLORIDE 9 MG/ML
25 INJECTION, SOLUTION INTRAVENOUS PRN
Status: DISCONTINUED | OUTPATIENT
Start: 2022-01-22 | End: 2022-01-25 | Stop reason: HOSPADM

## 2022-01-22 RX ORDER — ONDANSETRON 2 MG/ML
4 INJECTION INTRAMUSCULAR; INTRAVENOUS ONCE
Status: COMPLETED | OUTPATIENT
Start: 2022-01-22 | End: 2022-01-22

## 2022-01-22 RX ORDER — PROMETHAZINE HYDROCHLORIDE 25 MG/ML
25 INJECTION, SOLUTION INTRAMUSCULAR; INTRAVENOUS ONCE
Status: COMPLETED | OUTPATIENT
Start: 2022-01-22 | End: 2022-01-22

## 2022-01-22 RX ORDER — PROMETHAZINE HYDROCHLORIDE 25 MG/ML
25 INJECTION, SOLUTION INTRAMUSCULAR; INTRAVENOUS EVERY 6 HOURS PRN
Status: DISCONTINUED | OUTPATIENT
Start: 2022-01-22 | End: 2022-01-25 | Stop reason: HOSPADM

## 2022-01-22 RX ORDER — HYDROMORPHONE HYDROCHLORIDE 1 MG/ML
1 INJECTION, SOLUTION INTRAMUSCULAR; INTRAVENOUS; SUBCUTANEOUS
Status: DISCONTINUED | OUTPATIENT
Start: 2022-01-22 | End: 2022-01-25 | Stop reason: HOSPADM

## 2022-01-22 RX ORDER — ONDANSETRON 2 MG/ML
4 INJECTION INTRAMUSCULAR; INTRAVENOUS EVERY 6 HOURS PRN
Status: DISCONTINUED | OUTPATIENT
Start: 2022-01-22 | End: 2022-01-25 | Stop reason: HOSPADM

## 2022-01-22 RX ORDER — HYDROMORPHONE HYDROCHLORIDE 1 MG/ML
1 INJECTION, SOLUTION INTRAMUSCULAR; INTRAVENOUS; SUBCUTANEOUS ONCE
Status: COMPLETED | OUTPATIENT
Start: 2022-01-22 | End: 2022-01-22

## 2022-01-22 RX ORDER — SODIUM CHLORIDE 9 MG/ML
INJECTION, SOLUTION INTRAVENOUS CONTINUOUS
Status: DISCONTINUED | OUTPATIENT
Start: 2022-01-22 | End: 2022-01-23

## 2022-01-22 RX ORDER — 0.9 % SODIUM CHLORIDE 0.9 %
1000 INTRAVENOUS SOLUTION INTRAVENOUS ONCE
Status: COMPLETED | OUTPATIENT
Start: 2022-01-22 | End: 2022-01-22

## 2022-01-22 RX ORDER — HYDROMORPHONE HYDROCHLORIDE 1 MG/ML
1 INJECTION, SOLUTION INTRAMUSCULAR; INTRAVENOUS; SUBCUTANEOUS EVERY 4 HOURS PRN
Status: DISCONTINUED | OUTPATIENT
Start: 2022-01-22 | End: 2022-01-22

## 2022-01-22 RX ORDER — 0.9 % SODIUM CHLORIDE 0.9 %
80 INTRAVENOUS SOLUTION INTRAVENOUS ONCE
Status: DISCONTINUED | OUTPATIENT
Start: 2022-01-22 | End: 2022-01-25 | Stop reason: HOSPADM

## 2022-01-22 RX ORDER — SODIUM CHLORIDE 0.9 % (FLUSH) 0.9 %
5-40 SYRINGE (ML) INJECTION PRN
Status: DISCONTINUED | OUTPATIENT
Start: 2022-01-22 | End: 2022-01-25 | Stop reason: HOSPADM

## 2022-01-22 RX ORDER — POTASSIUM CHLORIDE 7.45 MG/ML
10 INJECTION INTRAVENOUS PRN
Status: DISCONTINUED | OUTPATIENT
Start: 2022-01-22 | End: 2022-01-25 | Stop reason: HOSPADM

## 2022-01-22 RX ORDER — SODIUM CHLORIDE 0.9 % (FLUSH) 0.9 %
10 SYRINGE (ML) INJECTION PRN
Status: DISCONTINUED | OUTPATIENT
Start: 2022-01-22 | End: 2022-01-25 | Stop reason: HOSPADM

## 2022-01-22 RX ORDER — SODIUM CHLORIDE 0.9 % (FLUSH) 0.9 %
5-40 SYRINGE (ML) INJECTION EVERY 12 HOURS SCHEDULED
Status: DISCONTINUED | OUTPATIENT
Start: 2022-01-22 | End: 2022-01-25 | Stop reason: HOSPADM

## 2022-01-22 RX ORDER — MAGNESIUM SULFATE 1 G/100ML
1000 INJECTION INTRAVENOUS PRN
Status: DISCONTINUED | OUTPATIENT
Start: 2022-01-22 | End: 2022-01-25 | Stop reason: HOSPADM

## 2022-01-22 RX ADMIN — Medication 80 ML: at 08:58

## 2022-01-22 RX ADMIN — ONDANSETRON 4 MG: 2 INJECTION INTRAMUSCULAR; INTRAVENOUS at 07:30

## 2022-01-22 RX ADMIN — SODIUM CHLORIDE 75 ML/HR: 9 INJECTION, SOLUTION INTRAVENOUS at 13:44

## 2022-01-22 RX ADMIN — HYDROMORPHONE HYDROCHLORIDE 1 MG: 1 INJECTION, SOLUTION INTRAMUSCULAR; INTRAVENOUS; SUBCUTANEOUS at 17:11

## 2022-01-22 RX ADMIN — HYDROMORPHONE HYDROCHLORIDE 1 MG: 1 INJECTION, SOLUTION INTRAMUSCULAR; INTRAVENOUS; SUBCUTANEOUS at 13:53

## 2022-01-22 RX ADMIN — METOPROLOL TARTRATE 5 MG: 5 INJECTION INTRAVENOUS at 20:07

## 2022-01-22 RX ADMIN — HYDROMORPHONE HYDROCHLORIDE 1 MG: 1 INJECTION, SOLUTION INTRAMUSCULAR; INTRAVENOUS; SUBCUTANEOUS at 23:46

## 2022-01-22 RX ADMIN — IOHEXOL 30 ML: 300 INJECTION, SOLUTION INTRAVENOUS at 08:58

## 2022-01-22 RX ADMIN — SODIUM CHLORIDE, PRESERVATIVE FREE 10 ML: 5 INJECTION INTRAVENOUS at 20:07

## 2022-01-22 RX ADMIN — SODIUM CHLORIDE 1000 ML: 9 INJECTION, SOLUTION INTRAVENOUS at 07:30

## 2022-01-22 RX ADMIN — ONDANSETRON 4 MG: 2 INJECTION INTRAMUSCULAR; INTRAVENOUS at 11:40

## 2022-01-22 RX ADMIN — SODIUM CHLORIDE, PRESERVATIVE FREE 10 ML: 5 INJECTION INTRAVENOUS at 08:58

## 2022-01-22 RX ADMIN — PROMETHAZINE HYDROCHLORIDE 25 MG: 25 INJECTION INTRAMUSCULAR; INTRAVENOUS at 09:05

## 2022-01-22 RX ADMIN — HYDROMORPHONE HYDROCHLORIDE 0.5 MG: 1 INJECTION, SOLUTION INTRAMUSCULAR; INTRAVENOUS; SUBCUTANEOUS at 07:31

## 2022-01-22 RX ADMIN — IOPAMIDOL 75 ML: 755 INJECTION, SOLUTION INTRAVENOUS at 08:58

## 2022-01-22 RX ADMIN — HYDROMORPHONE HYDROCHLORIDE 1 MG: 1 INJECTION, SOLUTION INTRAMUSCULAR; INTRAVENOUS; SUBCUTANEOUS at 20:06

## 2022-01-22 RX ADMIN — ENOXAPARIN SODIUM 30 MG: 30 INJECTION SUBCUTANEOUS at 20:07

## 2022-01-22 RX ADMIN — HYDROMORPHONE HYDROCHLORIDE 1 MG: 1 INJECTION, SOLUTION INTRAMUSCULAR; INTRAVENOUS; SUBCUTANEOUS at 09:51

## 2022-01-22 ASSESSMENT — PAIN SCALES - GENERAL
PAINLEVEL_OUTOF10: 10
PAINLEVEL_OUTOF10: 9
PAINLEVEL_OUTOF10: 10
PAINLEVEL_OUTOF10: 9
PAINLEVEL_OUTOF10: 7
PAINLEVEL_OUTOF10: 10
PAINLEVEL_OUTOF10: 10
PAINLEVEL_OUTOF10: 9
PAINLEVEL_OUTOF10: 9

## 2022-01-22 ASSESSMENT — ENCOUNTER SYMPTOMS
SHORTNESS OF BREATH: 0
RHINORRHEA: 0
DIARRHEA: 0
VOMITING: 1
EYE DISCHARGE: 0
SORE THROAT: 0
NAUSEA: 1
COUGH: 0
COLOR CHANGE: 0
EYE REDNESS: 0
ABDOMINAL PAIN: 1

## 2022-01-22 ASSESSMENT — PAIN DESCRIPTION - DESCRIPTORS: DESCRIPTORS: CRAMPING;HEAVINESS

## 2022-01-22 ASSESSMENT — PAIN DESCRIPTION - PAIN TYPE: TYPE: ACUTE PAIN

## 2022-01-22 ASSESSMENT — PAIN DESCRIPTION - LOCATION: LOCATION: ABDOMEN

## 2022-01-22 NOTE — ED PROVIDER NOTES
EMERGENCY DEPARTMENT ENCOUNTER    Pt Name: Jennifer Gambino  MRN: [id number]  [place] 1965  Date of evaluation: 1/22/22  CHIEF COMPLAINT       Chief Complaint   Patient presents with    Abdominal Pain     HISTORY OF PRESENT ILLNESS   This is a 68-year-old male that presents with complaints of abdominal pain and abdominal distention associated with nausea. Patient has a history of a small bowel obstruction, he has had multiple laparotomies with lysis of adhesions. Patient states over the past few days has been having increasing nausea and began to not passing flatus yesterday, he states that he knew that he had an obstruction. Patient complains of nausea vomiting abdominal pain and generally not feeling well. REVIEW OF SYSTEMS     Review of Systems   Constitutional: Negative for chills and fever. HENT: Negative for rhinorrhea and sore throat. Eyes: Negative for discharge, redness and visual disturbance. Respiratory: Negative for cough and shortness of breath. Cardiovascular: Negative for chest pain, palpitations and leg swelling. Gastrointestinal: Positive for abdominal pain, nausea and vomiting. Negative for diarrhea. Genitourinary: Negative for dysuria and hematuria. Musculoskeletal: Negative for arthralgias, myalgias and neck pain. Skin: Negative for color change and rash. Neurological: Negative for seizures, weakness and headaches. Psychiatric/Behavioral: Negative for hallucinations, self-injury and suicidal ideas.      PASTMEDICAL HISTORY     Past Medical History:   Diagnosis Date    ADHD, predominantly hyperactive type     Anemia     Blood clot due to device, implant, or graft     IV site L forearm    Bowel obstruction (HCC)     CAD (coronary artery disease)     Cardiomyopathy ([address]     [facility] [address]     Diverticulitis 1996    GERD (gastroesophageal reflux disease)     History of atrial fibrillation     History of ventricular tachycardia     by mouth 2 times daily     ASPIRIN EC 81 MG EC TABLET    Take 81 mg by mouth daily    COLCHICINE (COLCRYS) 0.6 MG TABLET    Take 0.6 mg by mouth 2 times daily as needed (gout flare)    DAPAGLIFLOZIN (FARXIGA) 10 MG TABLET    Take 10 mg by mouth every morning    DOCUSATE SODIUM (COLACE, DULCOLAX) 100 MG CAPS    Take 100 mg by mouth 2 times daily as needed for Constipation    DULAGLUTIDE (TRULICITY) 7.25 FD/9.5ZB SOPN    Inject 0.75 mg into the skin once a week Indications: Mondays Mondays    FUROSEMIDE (LASIX) 40 MG TABLET    Take 40 mg by mouth daily    LISINOPRIL (PRINIVIL;ZESTRIL) 2.5 MG TABLET    Take 2.5 mg by mouth daily     MAGNESIUM GLUCONATE (MAGONATE) 500 MG TABLET    Take 500 mg by mouth daily    METFORMIN (GLUCOPHAGE) 1000 MG TABLET    Take 1,000 mg by mouth daily    METOPROLOL SUCCINATE (TOPROL XL) 50 MG EXTENDED RELEASE TABLET    Take 50 mg by mouth 2 times daily    OMEPRAZOLE (PRILOSEC) 20 MG CAPSULE    Take 20 mg by mouth daily. ONDANSETRON (ZOFRAN-ODT) 4 MG DISINTEGRATING TABLET    Take 1 tablet by mouth 3 times daily as needed for Nausea or Vomiting    POTASSIUM CHLORIDE (KLOR-CON M) 10 MEQ EXTENDED RELEASE TABLET    Take 10 mEq by mouth daily    PRAVASTATIN (PRAVACHOL) 10 MG TABLET    Take 10 mg by mouth daily     SITAGLIPTAN (JANUVIA) 100 MG TABLET    Take 100 mg by mouth daily. SOTALOL (BETAPACE) 160 MG TABLET    Take 160 mg by mouth 2 times daily     SUCRALFATE (CARAFATE) 1 GM TABLET    Take 1 tablet by mouth 4 times daily    TAMSULOSIN (FLOMAX) 0.4 MG CAPSULE    Take 1 capsule by mouth daily    TIZANIDINE (ZANAFLEX) 2 MG TABLET    Take 1 tablet by mouth every 8 hours as needed (back pain) WARNING:  May cause drowsiness.  May impair ability to operate vehicles or machinery.  Do not use in combination with alcohol. ALLERGIES     is allergic to ciprofloxacin, codeine, diphenhydramine, morphine, and penicillins. FAMILY HISTORY     He indicated that his mother is alive.  He indicated that his father is alive. SOCIAL HISTORY       Social History     Tobacco Use    Smoking status: Former Smoker     Packs/day: 1.00     Years: 2.00     Pack years: 2.00     Quit date: 2/15/2012     Years since quittin.9    Smokeless tobacco: Never Used   Vaping Use    Vaping Use: Never used   Substance Use Topics    Alcohol use: Yes     Alcohol/week: 0.0 standard drinks     Comment: rarely    Drug use: No     PHYSICAL EXAM     INITIAL VITALS: /75   Pulse 72   Temp 98.2 °F (36.8 °C) (Oral)   Resp 16   Ht 6' 1\" (1.854 m)   Wt 264 lb (119.7 kg)   SpO2 96%   BMI 34.83 kg/m²    Physical Exam  Constitutional:       Appearance: Normal appearance. He is well-developed. He is not ill-appearing or toxic-appearing. HENT:      Head: Normocephalic and atraumatic. Eyes:      Conjunctiva/sclera: Conjunctivae normal.      Pupils: Pupils are equal, round, and reactive to light. Neck:      Trachea: Trachea normal.   Cardiovascular:      Rate and Rhythm: Normal rate and regular rhythm. Heart sounds: S1 normal and S2 normal. No murmur heard. Pulmonary:      Effort: Pulmonary effort is normal. No accessory muscle usage or respiratory distress. Breath sounds: Normal breath sounds. Chest:      Chest wall: No deformity or tenderness. Abdominal:      General: Bowel sounds are normal. There is distension. There is no abdominal bruit. Palpations: Abdomen is not rigid. Tenderness: There is generalized abdominal tenderness. There is no guarding or rebound. Negative signs include Mello's sign and McBurney's sign. Musculoskeletal:      Cervical back: Normal range of motion and neck supple. Skin:     General: Skin is warm. Findings: No rash. Neurological:      Mental Status: He is alert and oriented to person, place, and time. GCS: GCS eye subscore is 4. GCS verbal subscore is 5. GCS motor subscore is 6.    Psychiatric:         Speech: Speech normal.         MEDICAL DECISION MAKIN-year-old male, generalized abdominal pain with abdominal distention concerning for small bowel obstruction. Plan is basic labs CT with oral contrast.    10:27 AM EST  Patient CT scan shows evidence of small bowel obstruction with transition point identified, the patient will be admitted to the hospitalist for further evaluation, Dr. Gilmer Gallagher from general surgery was consulted, he recommended NG tube placement. CRITICAL CARE:       PROCEDURES:    Procedures    DIAGNOSTIC RESULTS   EKG:All EKG's are interpreted by the Emergency Department Physician who either signs or Co-signs this chart in the absence of a cardiologist.        RADIOLOGY:All plain film, CT, MRI, and formal ultrasound images (except ED bedside ultrasound) are read by the radiologist, see reports below, unless otherwisenoted in MDM or here. CT ABDOMEN PELVIS W IV CONTRAST Additional Contrast? Oral   Final Result   1. Findings above concerning for small bowel obstruction with sudden   transition point/caliber change in the central abdomen in the mid small bowel. 2. Nonvisualization of the appendix. 3. Stable left iliac chain lymph node measuring up to 1.3 cm.   4. Fatty liver. Prior cholecystectomy. 5. Stable midpole right renal cyst measuring 4.2 cm. No obstructing calculus   or hydronephrosis. LABS: All lab results were reviewed by myself, and all abnormals are listed below.   Labs Reviewed   BASIC METABOLIC PANEL - Abnormal; Notable for the following components:       Result Value    Glucose 250 (*)     Bun/Cre Ratio 21 (*)     All other components within normal limits   CBC WITH AUTO DIFFERENTIAL - Abnormal; Notable for the following components:    RDW 18.2 (*)     Seg Neutrophils 81 (*)     Lymphocytes 9 (*)     Segs Absolute 8.60 (*)     Absolute Lymph # 0.96 (*)     All other components within normal limits   HEPATIC FUNCTION PANEL - Abnormal; Notable for the following components:    ALT 50 (*)     Total

## 2022-01-22 NOTE — H&P
[facility]  Office: [address], DO, Julia Haider, DO, Benny Cameron, DO, Jared Medina Blood, DO, Adán Ochoa MD, Karmen Sprague MD, Juno Pinzon MD, Malena Wilson MD, Yoan Jerez MD, Wilver Barragan MD, Gabby Lara MD, Lucius Snyder, DO, Rodney Shetty, DO, Roxanne Putnam MD,  Johann Galeazzi, DO, Braulio Penn MD, Tricia Laurent MD, Evelyn Santoyo MD, Jorge Murphy MD, Myrna Pablo MD, Steve Wong MD, Charisse Fung MD, Alec Sosa [facility], [facility], CNP, En Ruano, CNP, Lien Cannon, CNS, Skye Gaspar, CNP, Teagan Ornelas, CNP, Nikia Perez, CNP, Mariya Angeles, CNP, Sabina Espinoza, CNP, Nicolette Bautista PA-C, Angela Ornelas, DNP, [facility], Samira Jacques, CNP, Dede Dhaliwal, CNP, Areli Clark, CNP, Juliana Marie, CNP, Adrian Guardado, CNP, Mao Buckley, CNP         [facility] [address]    HISTORY AND PHYSICAL EXAMINATION            Date:   1/22/2022  Patient name:  Adan Hutchinson  Date of admission:  1/22/2022  6:44 AM  MRN:   [id number]  Account:  [de-identified]  YOB: 1965  PCP:    Skylar Leal MD  Room:   [place] [address]  Code Status:    Prior    Chief Complaint:     Chief Complaint   Patient presents with    Abdominal Pain       History Obtained From:     patient    History of Present Illness:     Adan Hutchinson is a 64 y.o. Non- / non  male with a history of recurrent SBO due to intra-abdominal adhesions, NICMP with AICD, chronic CHF, type 2DM, paroxysmal afib, DVT on eliquis and ventricular tachycardia and ROHIT who presents with a 3 day history of progressively worsening abdominal pain, distention and nausea and is admitted to the hospital for the management of Small bowel obstruction. He reports that he stopped passing gas yesterday and was concerned for another bowel obstruction. He has multiple laparotomies with lysis of adhesions.  He has seen Dr. Wilver Barragan as his surgeon in the past who has since retired. He began vomiting in the ER and an NG tube was placed to suction with over 300ml of gastric contents out already. During my assessment he states he is feeling a little better but is still having quite a bit of nausea and abdominal pain. Patient has a known history of Fatty liver with chronically elevated LFTs      Past Medical History:     Past Medical History:   Diagnosis Date    ADHD, predominantly hyperactive type     Anemia     Bowel obstruction (HCC)     CAD (coronary artery disease)     Cardi[facility] [address]     Chronic diastolic CHF (congestive heart failure) ([facility] [address]     Diabetes mellitus ([facility] [address]     Diverticulitis 1996    GERD (gastroesophageal reflux disease)     History of atrial fibrillation     history of Blood clot due to device, implant, or graft     IV site L forearm    History of ventricular tachycardia     Kidney calculi     Obesity     Sleep apnea     uses CPAP nightly        Past Surgical History:     Past Surgical History:   Procedure Laterality Date    ABDOMEN SURGERY  [address]    umbillical hernia with mesh and lysis of adhesions    ABDOMINAL HERNIA REPAIR  7/7/2014    RECURRENT HERNIA REPAIR WITH MESH    APPENDECTOMY      CARDIAC DEFIBRILLATOR PLACEMENT      CHOLECYSTECTOMY      COLECTOMY  1996    d/t ruptured diverticuli    CYSTOSCOPY N/A     stent in place taped to penis head    CYSTOSCOPY  3/4/16    with bilateral pyelogram    HERNIA REPAIR      umbilical    LITHOTRIPSY  3/4/16    laser lithotripsy with ureteral dilation    PACEMAKER PLACEMENT      pacemaker/defib        Medications Prior to Admission:     Prior to Admission medications    Medication Sig Start Date End Date Taking?  Authorizing Provider   sucralfate (CARAFATE) 1 GM tablet Take 1 tablet by mouth 4 times daily 7/15/20   FABIANO Camarena - CNP   ondansetron (ZOFRAN-ODT) 4 MG disintegrating tablet Take 1 tablet by mouth 3 times daily as needed for Nausea or Vomiting 7/9/20 Camilla Degroot MD   docusate sodium (COLACE, DULCOLAX) 100 MG CAPS Take 100 mg by mouth 2 times daily as needed for Constipation 6/13/20   Drake Gonzalez DO   tiZANidine (ZANAFLEX) 2 MG tablet Take 1 tablet by mouth every 8 hours as needed (back pain) WARNING:  May cause drowsiness.  May impair ability to operate vehicles or machinery.  Do not use in combination with alcohol. 1/3/20   Monica Griffith, APRN - CNP   metoprolol succinate (TOPROL XL) 50 MG extended release tablet Take 50 mg by mouth 2 times daily    Historical Provider, MD   furosemide (LASIX) 40 MG tablet Take 40 mg by mouth daily    Historical Provider, MD   magnesium gluconate (MAGONATE) 500 MG tablet Take 500 mg by mouth daily    Historical Provider, MD   potassium chloride (KLOR-CON M) 10 MEQ extended release tablet Take 10 mEq by mouth daily    Historical Provider, MD   aspirin EC 81 MG EC tablet Take 81 mg by mouth daily    Historical Provider, MD   colchicine (COLCRYS) 0.6 MG tablet Take 0.6 mg by mouth 2 times daily as needed (gout flare)    Historical Provider, MD   apixaban (ELIQUIS) 5 MG TABS tablet Take 5 mg by mouth 2 times daily  6/21/18   Historical Provider, MD   pravastatin (PRAVACHOL) 10 MG tablet Take 10 mg by mouth daily  9/6/18   Historical Provider, MD   allopurinol (ZYLOPRIM) 300 MG tablet Take 300 mg by mouth daily 1/12/18   Historical Provider, MD   metFORMIN (GLUCOPHAGE) 1000 MG tablet Take 1,000 mg by mouth daily    Historical Provider, MD   Dulaglutide (TRULICITY) 0.72 QP/7.8KI SOPN Inject 0.75 mg into the skin once a week Indications: Mondays Mondays    Historical Provider, MD   tamsulosin [facility]) 0.4 MG capsule Take 1 capsule by mouth daily 3/4/16   Leisa Dunaway MD   dapagliflozin (FARXIGA) 10 MG tablet Take 10 mg by mouth every morning    Historical Provider, MD   sitaGLIPtan (JANUVIA) 100 MG tablet Take 100 mg by mouth daily.     Historical Provider, MD   omeprazole (PRILOSEC) 20 MG capsule Take 20 mg by mouth daily.    Historical Provider, MD   sotalol (BETAPACE) 160 MG tablet Take 160 mg by mouth 2 times daily     Historical Provider, MD   lisinopril (PRINIVIL;ZESTRIL) 2.5 MG tablet Take 2.5 mg by mouth daily     Historical Provider, MD        Allergies:     Ciprofloxacin, Codeine, Diphenhydramine, Morphine, and Penicillins    Social History:     Tobacco:    reports that he quit smoking about 9 years ago. He has a 2.00 pack-year smoking history. He has never used smokeless tobacco.  Alcohol:      reports current alcohol use. Drug Use:  reports no history of drug use. Family History:     Family History   Problem Relation Age of Onset    Heart Disease Mother     Heart Disease Father        Review of Systems:     Positive and Negative as described in HPI. CONSTITUTIONAL:  negative for fevers, chills, sweats, fatigue, weight loss  HEENT:  negative for vision, hearing changes, runny nose, throat pain  RESPIRATORY:  negative for shortness of breath, cough, congestion, wheezing  CARDIOVASCULAR:  negative for chest pain, palpitations  GASTROINTESTINAL:  positive for nausea, vomiting, constipation, abdominal pain and distention.  Negative for diarrhea  GENITOURINARY:  negative for difficulty of urination, burning with urination, frequency   INTEGUMENT:  negative for rash, skin lesions, easy bruising   HEMATOLOGIC/LYMPHATIC:  negative for swelling/edema   ALLERGIC/IMMUNOLOGIC:  negative for urticaria , itching  ENDOCRINE:  negative increase in drinking, increase in urination, hot or cold intolerance  MUSCULOSKELETAL:  negative joint pains, muscle aches, swelling of joints  NEUROLOGICAL:  negative for headaches, dizziness, lightheadedness, numbness, pain, tingling extremities  BEHAVIOR/PSYCH:  negative for depression, anxiety    Physical Exam:   /75   Pulse 72   Temp 98.2 °F (36.8 °C) (Oral)   Resp 16   Ht 6' 1\" (1.854 m)   Wt 264 lb (119.7 kg)   SpO2 96%   BMI 34.83 kg/m²   Temp (24hrs), Av.2 °F (36.8 °C), Min:98.2 °F (36.8 °C), Max:98.2 °F (36.8 °C)    No results for input(s): POCGLU in the last 72 hours. No intake or output data in the 24 hours ending 01/22/22 1345    General Appearance:  alert, appears uncomfortable but in no acute distress  Mental status: oriented to person, place, and time  Head:  normocephalic, atraumatic  Eye: no icterus, redness, pupils equal and reactive, extraocular eye movements intact, conjunctiva clear  Ear: normal external ear, no discharge, hearing intact  Nose:  no drainage noted  Mouth: mucous membranes moist  Neck: supple, no carotid bruits, thyroid not palpable  Lungs: Bilateral equal air entry, clear to auscultation, no wheezing, rales or rhonchi, normal effort  Cardiovascular: normal rate, regular rhythm, no murmur, gallop, rub.   Abdomen: Soft, tender to palpation-worse in the left upper quadrant, distended,hypoactive bowel sounds, no hepatomegaly or splenomegaly  Neurologic: There are no new focal motor or sensory deficits, normal muscle tone and bulk, no abnormal sensation, normal speech, cranial nerves II through XII grossly intact  Skin: No gross lesions, rashes, bruising or bleeding on exposed skin area  Extremities:  peripheral pulses palpable, no pedal edema or calf pain with palpation  Psych: normal affect     Investigations:      Laboratory Testing:  Recent Results (from the past 24 hour(s))   Basic Metabolic Panel    Collection Time: 01/22/22  7:25 AM   Result Value Ref Range    Glucose 250 (H) 70 - 99 mg/dL    BUN 19 6 - 20 mg/dL    CREATININE 0.91 0.70 - 1.20 mg/dL    Bun/Cre Ratio 21 (H) 9 - 20    Calcium 9.1 8.6 - 10.4 mg/dL    Sodium 136 135 - 144 mmol/L    Potassium 4.2 3.7 - 5.3 mmol/L    Chloride 102 98 - 107 mmol/L    CO2 22 20 - 31 mmol/L    Anion Gap 12 9 - 17 mmol/L    GFR Non-African American >60 >60 mL/min    GFR African American >60 >60 mL/min    GFR Comment          GFR Staging NOT REPORTED    CBC Auto Differential    Collection Time: 01/22/22  7:25 AM   Result Value Ref Range    WBC 10.6 3.5 - 11.3 k/uL    RBC 4.96 4.21 - 5.77 m/uL    Hemoglobin 14.0 13.0 - 17.0 g/dL    Hematocrit 41.2 40.7 - 50.3 %    MCV 83.1 82.6 - 102.9 fL    MCH 28.2 25.2 - 33.5 pg    MCHC 34.0 28.4 - 34.8 g/dL    RDW 18.2 (H) 11.8 - 14.4 %    Platelets 878 754 - 484 k/uL    MPV 10.0 8.1 - 13.5 fL    NRBC Automated 0.0 0.0 per 100 WBC    Differential Type NOT REPORTED     Seg Neutrophils 81 (H) 36 - 65 %    Lymphocytes 9 (L) 24 - 43 %    Monocytes 8 3 - 12 %    Eosinophils % 1 1 - 4 %    Basophils 1 0 - 2 %    Immature Granulocytes 0 0 %    Segs Absolute 8.60 (H) 1.50 - 8.10 k/uL    Absolute Lymph # 0.96 (L) 1.10 - 3.70 k/uL    Absolute Mono # 0.82 0.10 - 1.20 k/uL    Absolute Eos # 0.10 0.00 - 0.44 k/uL    Basophils Absolute 0.07 0.00 - 0.20 k/uL    Absolute Immature Granulocyte 0.03 0.00 - 0.30 k/uL    WBC Morphology NOT REPORTED     RBC Morphology ANISOCYTOSIS PRESENT     Platelet Estimate NOT REPORTED    Hepatic Function Panel    Collection Time: 01/22/22  7:25 AM   Result Value Ref Range    Albumin 5.0 3.5 - 5.2 g/dL    Alkaline Phosphatase 78 40 - 129 U/L    ALT 50 (H) 5 - 41 U/L    AST 31 <40 U/L    Total Bilirubin 2.97 (H) 0.3 - 1.2 mg/dL    Bilirubin, Direct 0.68 (H) <0.31 mg/dL    Bilirubin, Indirect 2.29 (H) 0.00 - 1.00 mg/dL    Total Protein 7.9 6.4 - 8.3 g/dL    Globulin NOT REPORTED 1.5 - 3.8 g/dL    Albumin/Globulin Ratio NOT REPORTED 1.0 - 2.5   Lipase    Collection Time: 01/22/22  7:25 AM   Result Value Ref Range    Lipase 19 13 - 60 U/L   Lactic Acid    Collection Time: 01/22/22  7:25 AM   Result Value Ref Range    Lactic Acid 0.9 0.5 - 2.2 mmol/L       Imaging/Diagnostics:  CT ABDOMEN PELVIS W IV CONTRAST Additional Contrast? Oral    Result Date: 1/22/2022  1. Findings above concerning for small bowel obstruction with sudden transition point/caliber change in the central abdomen in the mid small bowel. 2. Nonvisualization of the appendix.  3. Stable left iliac chain lymph node measuring up to 1.3 cm. 4. Fatty liver. Prior cholecystectomy. 5. Stable midpole right renal cyst measuring 4.2 cm. No obstructing calculus or hydronephrosis.        Assessment :      Hospital Problems           Last Modified POA    * (Principal) SBO (small bowel obstruction) ([facility] [address] 1/22/2022 Yes    Ventricular ectopy 1/22/2022 Yes    Type 2 diabetes mellitus, with long-term current use of insulin ([address] 1/22/2022 Yes    Nausea and vomiting 1/22/2022 Yes    Nonischemic cardiomyopathy ([address] 1/22/2022 Yes    Essential hypertension 1/22/2022 Yes    Obesity (BMI 30-39.9) 1/22/2022 Yes    Abnormal LFTs 1/22/2022 Yes    History of small bowel obstruction (Chronic) 1/22/2022 Yes    Overview Signed 4/5/2019  5:42 AM by FABIANO Tom - CNP     Due to adhesions           History of atrial fibrillation 1/22/2022 Yes    Sleep apnea 1/22/2022 Yes    Overview Signed 1/22/2022  1:43 PM by FABIANO Church - NP     uses CPAP nightly         GERD (gastroesophageal reflux disease) 1/22/2022 Yes    Chronic diastolic CHF (congestive heart failure) ([facility] [address] 1/22/2022 Yes          Plan:     Patient status inpatient in the Med/Surge    Keep NPO, ice chips ok   Cautious IV fluids due to history of CHF   Continue NG to [facility]  General surgery consult   Antiemetics as needed   DVT prophylaxis-will resume eliquis when no longer NPO   Monitor labs, replace electrolytes as needed  Give IV lopressor q12, patient has a history of palpitations/VTACH and reports he will have reoccurrence without lopressor  Telemetry monitoring   Pain control-use IV narcotics with caution   Repeat KUB in the morning   Monitor intake and output   Plan discussed with patient and staff     Consultations:   [address] TO HOSPITALIST     Patient is admitted as inpatient status because of co-morbidities listed above, severity of signs and symptoms as outlined, requirement for current medical therapies and most importantly because of direct risk to patient if care not provided in a hospital setting. Expected length of stay > 48 hours.     FABIANO Michaud - NP  1/22/2022  1:45 PM    Copy sent to Dr. Dalton Horne MD

## 2022-01-22 NOTE — PROGRESS NOTES
Patient weight is between 101-149kg. For prophylaxis with Enoxaparin, Pharmacy adjusted the dose to account for the patient's increased body weight in accordance with hospital approved protocol. The dose has been changed to 30mg BID. Please contact pharmacy with any concerns @ [phone]. Thank you.    [address] [place] [address]  1/22/2022 12:39 PM

## 2022-01-22 NOTE — PROGRESS NOTES
Pt admitted to room 2001 per wheelchair in stable condition from ED  Oriented to room and surroundings  Bed in lowest position, wheels locked, 2/4 side rails up  Call light in reach, room free of clutter, adequate lighting provided  Denies any further questions at this time  Instructed to call out with any questions/concerns/new onset of pain and/or n/v   White board updated  Continue to monitor with hourly rounding  Non-skid socks on/at bedside

## 2022-01-22 NOTE — CONSULTS
General Surgery:  Consult Note        PATIENT NAME: Kae Ballard OF BIRTH: 1965    ADMISSION DATE: 1/22/2022  6:44 AM     Admitting Provider: Hospitalist    Consulted Physician: Dr Savi Balderas: 1/22/2022    Chief Complaint:  Abdominal distention    Consult Regarding:  SBO    HISTORY OF PRESENT ILLNESS:  The patient is a 64 y.o. male with history of diabetes, CAD, cardiomyopathy, a fib on beta blocker, extensive surgical hx consisting of appendectomy, cholecystectomy, multiple ventral hernia repairs with mesh most recently in 2014 who presents to the emergency department at [facility] where he is seen and evaluated with 2 day history of abdominal distention, nausea, decreased flatus and stool output. Per patient, he has had numerous small bowel obstructions since his last surgical procedure all of which have been managed nonoperatively. He reports that he has been having intermittent nausea without emesis for the past 2 days. Reports minimal flatus and passage of only small amounts of loose stool. No emesis. Reports leg cramps. No fever, no chills, no chest pain.       Past Medical History:        Diagnosis Date    ADHD, predominantly hyperactive type     Anemia     Blood clot due to device, implant, or graft     IV site L forearm    Bowel obstruction (HCC)     CAD (coronary artery disease)     Cardiomyopathy ([address]     Diabetes mellitus ([address]     Diverticulitis 1996    GERD (gastroesophageal reflux disease)     History of atrial fibrillation     History of ventricular tachycardia     Kidney calculi     Obesity     Sleep apnea     uses CPAP nightly       Past Surgical History:        Procedure Laterality Date    ABDOMEN SURGERY  6952 & 7580    umbillical hernia with mesh and lysis of adhesions    ABDOMINAL HERNIA REPAIR  7/7/2014    RECURRENT HERNIA REPAIR WITH MESH    APPENDECTOMY      CARDIAC DEFIBRILLATOR PLACEMENT     [facility] Member of Clubs or Organizations: Not on file    Attends Club or Organization Meetings: Not on file    Marital Status: Not on file   Intimate Partner Violence:     Fear of Current or Ex-Partner: Not on file    Emotionally Abused: Not on file    Physically Abused: Not on file    Sexually Abused: Not on file   Housing Stability:     Unable to Pay for Housing in the Last Year: Not on file    Number of [place] in the Last Year: Not on file    Unstable Housing in the Last Year: Not on file       Family History:       Problem Relation Age of Onset    Heart Disease Mother     Heart Disease Father        REVIEW OF SYSTEMS:    CONSTITUTIONAL: Denies recent weight loss, fatigue, fevers, chills. HEENT: Denies rhinorrhea, dysphagia, odynphagia. CARDIOVASCULAR: Hx a fib and hypertension  RESPIRATORY: Denies recent history of shortness of breath or history of PE. GASTROINTESTINAL: + abdominal distention and nausea as per HPI  GENITOURINARY: Hx BPH  HEMATOLOGIC/LYMPHATIC: Hx DVT  ENDOCRINE: Hx diabetes which is treated  NEURO: Denies history of CVA, TIA. Review of systems negative unless listed above. PHYSICAL EXAM:    VITALS:  /75   Pulse 72   Temp 98.2 °F (36.8 °C) (Oral)   Resp 16   Ht 6' 1\" (1.854 m)   Wt 264 lb (119.7 kg)   SpO2 96%   BMI 34.83 kg/m²   INTAKE/OUTPUT:   No intake or output data in the 24 hours ending 01/22/22 1047    CONSTITUTIONAL:  awake, alert, not distressed and morbidly obese  HEENT: Normocephalic/atraumatic, without obvious abnormality, NG in place  NECK:  Supple, symmetrical, trachea midline   CARDIOVASCULAR: Regular rate and rhythm   LUNGS: Unlabored, on room air  ABDOMEN: Obese, distended and tympanitic, TTP in LLQ, nonperitoneal, well healed surgical incisions   MUSCULOSKELETAL: Muscle strength intact in all extremities bilaterally  NEUROLOGIC: CN II- XII intact.  Gross motor intact without focal weakness  SKIN: No cyanosis, rashes, or edema noted    CBC with Differential:    Lab Results   Component Value Date    WBC 10.6 01/22/2022    RBC 4.96 01/22/2022    HGB 14.0 01/22/2022    HCT 41.2 01/22/2022     01/22/2022    MCV 83.1 01/22/2022    MCH 28.2 01/22/2022    MCHC 34.0 01/22/2022    RDW 18.2 01/22/2022    LYMPHOPCT 9 01/22/2022    MONOPCT 8 01/22/2022    BASOPCT 1 01/22/2022    MONOSABS 0.82 01/22/2022    LYMPHSABS 0.96 01/22/2022    EOSABS 0.10 01/22/2022    BASOSABS 0.07 01/22/2022    DIFFTYPE NOT REPORTED 01/22/2022     BMP:    Lab Results   Component Value Date     01/22/2022    K 4.2 01/22/2022     01/22/2022    CO2 22 01/22/2022    BUN 19 01/22/2022    LABALBU 5.0 01/22/2022    CREATININE 0.91 01/22/2022    CALCIUM 9.1 01/22/2022    GFRAA >60 01/22/2022    LABGLOM >60 01/22/2022    GLUCOSE 250 01/22/2022       Pertinent Radiology:   CT ABDOMEN PELVIS W IV CONTRAST Additional Contrast? Oral    Result Date: 1/22/2022  EXAMINATION: CT OF THE ABDOMEN AND PELVIS WITH CONTRAST 1/22/2022 8:47 am TECHNIQUE: CT of the abdomen and pelvis was performed with the administration of intravenous contrast. Multiplanar reformatted images are provided for review. Dose modulation, iterative reconstruction, and/or weight based adjustment of the mA/kV was utilized to reduce the radiation dose to as low as reasonably achievable. COMPARISON: 12/04/2020 HISTORY: ORDERING SYSTEM PROVIDED HISTORY: Small bowel obstruction TECHNOLOGIST PROVIDED HISTORY: Small bowel obstruction Decision Support Exception - unselect if not a suspected or confirmed emergency medical condition->Emergency Medical Condition (MA) Reason for Exam: Small bowel obstruction, left side abd pain, nausea, vomiting, hx SBO 49-year-old male with suspected small bowel obstruction; left-sided abdominal pain, nausea, and vomiting. FINDINGS: Lower Chest: No free intra-abdominal air. Cardiac pacemaker device in place. Organs: Fatty infiltration of the liver. Prior cholecystectomy.   Adrenal glands, spleen, pancreas and left kidney grossly unremarkable in appearance. Stable midpole right renal cyst measures 4.2 cm on image 86, series 2. Mild bilateral perinephric fluid. No obstructing calculus, hydronephrosis, or hydroureter. GI/Bowel: Multiple dilated fluid-filled small bowel loops with a sudden transition point/caliber change in the central abdomen on image 129, series 2. Dilated fluid-filled small bowel loops measure up to 4.8 cm in greatest caliber on image 82, series 2. Mild stool burden. Nonvisualization of the appendix. The stomach is filled with contrast.  Relative dilution of oral contrast throughout the proximal and mid small bowel loops. Relative decompression of the distal small bowel loops. Pelvis: Moderate distention of the urinary bladder. No free fluid in the pelvis. Calcifications in the prostate. No inguinal or pelvic sidewall lymphadenopathy. Peritoneum/Retroperitoneum: Abdominal aorta normal in appearance and caliber. No retroperitoneal lymphadenopathy. Psoas muscles normal in size and symmetric in appearance. Stable left iliac chain lymph node measuring up to 1.3 cm on image 114, series 2. Bones/Soft Tissues: Moderate degenerative changes throughout the spine. Multilevel Schmorl's node deformities. 1. Findings above concerning for small bowel obstruction with sudden transition point/caliber change in the central abdomen in the mid small bowel. 2. Nonvisualization of the appendix. 3. Stable left iliac chain lymph node measuring up to 1.3 cm. 4. Fatty liver. Prior cholecystectomy. 5. Stable midpole right renal cyst measuring 4.2 cm. No obstructing calculus or hydronephrosis. ASSESSMENT:  Active Hospital Problems    Diagnosis Date Noted    [facility]) [facility] [K56.609]      64year old male with extensive surgical history, clinical and diagnostic evidence of small bowel obstruction    Plan:  1. Medical management per primary  2. NPO  3.  NGT to LIS  4. IVF  5. Replete electrolytes PRN  6. Repeat AM labs  7. Repeat KUB in AM  8. Serial abdominal exams    Electronically signed by Guevara Mercado MD  on 1/22/2022 at 10:47 AM   I attest that I was present in the emergency department at [facility] with the resident during the patient's evaluation and agree with the description of findings and plan as dictated above.   Ramsey Washington MD

## 2022-01-22 NOTE — PROGRESS NOTES
KUB states NG tube is coiled. NG length pulled to 66 from 70 in hopes to straighten the coiled tubing. Repeat KUB ordered.

## 2022-01-23 ENCOUNTER — APPOINTMENT (OUTPATIENT)
Dept: GENERAL RADIOLOGY | Age: 57
DRG: 389 | End: 2022-01-23
Payer: COMMERCIAL

## 2022-01-23 LAB
ALBUMIN SERPL-MCNC: 3.9 G/DL (ref 3.5–5.2)
ALBUMIN/GLOBULIN RATIO: ABNORMAL (ref 1–2.5)
ALP BLD-CCNC: 63 U/L (ref 40–129)
ALT SERPL-CCNC: 34 U/L (ref 5–41)
AMYLASE: 16 U/L (ref 28–100)
ANION GAP SERPL CALCULATED.3IONS-SCNC: 12 MMOL/L (ref 9–17)
ANION GAP SERPL CALCULATED.3IONS-SCNC: 12 MMOL/L (ref 9–17)
AST SERPL-CCNC: 18 U/L
BILIRUB SERPL-MCNC: 2.54 MG/DL (ref 0.3–1.2)
BUN BLDV-MCNC: 18 MG/DL (ref 6–20)
BUN BLDV-MCNC: 20 MG/DL (ref 6–20)
BUN/CREAT BLD: 23 (ref 9–20)
BUN/CREAT BLD: 25 (ref 9–20)
CALCIUM SERPL-MCNC: 8.4 MG/DL (ref 8.6–10.4)
CALCIUM SERPL-MCNC: 8.4 MG/DL (ref 8.6–10.4)
CHLORIDE BLD-SCNC: 102 MMOL/L (ref 98–107)
CHLORIDE BLD-SCNC: 103 MMOL/L (ref 98–107)
CO2: 21 MMOL/L (ref 20–31)
CO2: 23 MMOL/L (ref 20–31)
CREAT SERPL-MCNC: 0.78 MG/DL (ref 0.7–1.2)
CREAT SERPL-MCNC: 0.8 MG/DL (ref 0.7–1.2)
GFR AFRICAN AMERICAN: >60 ML/MIN
GFR AFRICAN AMERICAN: >60 ML/MIN
GFR NON-AFRICAN AMERICAN: >60 ML/MIN
GFR NON-AFRICAN AMERICAN: >60 ML/MIN
GFR SERPL CREATININE-BSD FRML MDRD: ABNORMAL ML/MIN/{1.73_M2}
GLUCOSE BLD-MCNC: 115 MG/DL (ref 75–110)
GLUCOSE BLD-MCNC: 129 MG/DL (ref 70–99)
GLUCOSE BLD-MCNC: 153 MG/DL (ref 75–110)
GLUCOSE BLD-MCNC: 156 MG/DL (ref 75–110)
GLUCOSE BLD-MCNC: 169 MG/DL (ref 70–99)
GLUCOSE BLD-MCNC: 173 MG/DL (ref 75–110)
HCT VFR BLD CALC: 38.1 % (ref 40.7–50.3)
HEMOGLOBIN: 12.8 G/DL (ref 13–17)
INR BLD: 1.4
MAGNESIUM: 1.7 MG/DL (ref 1.6–2.6)
MCH RBC QN AUTO: 28.7 PG (ref 25.2–33.5)
MCHC RBC AUTO-ENTMCNC: 33.6 G/DL (ref 28.4–34.8)
MCV RBC AUTO: 85.4 FL (ref 82.6–102.9)
NRBC AUTOMATED: 0 PER 100 WBC
PDW BLD-RTO: 18.2 % (ref 11.8–14.4)
PLATELET # BLD: 142 K/UL (ref 138–453)
PMV BLD AUTO: 9.9 FL (ref 8.1–13.5)
POTASSIUM SERPL-SCNC: 3.4 MMOL/L (ref 3.7–5.3)
POTASSIUM SERPL-SCNC: 3.6 MMOL/L (ref 3.7–5.3)
POTASSIUM SERPL-SCNC: 3.8 MMOL/L (ref 3.7–5.3)
PROTHROMBIN TIME: 16.9 SEC (ref 11.5–14.2)
RBC # BLD: 4.46 M/UL (ref 4.21–5.77)
SODIUM BLD-SCNC: 135 MMOL/L (ref 135–144)
SODIUM BLD-SCNC: 138 MMOL/L (ref 135–144)
TOTAL PROTEIN: 6.8 G/DL (ref 6.4–8.3)
WBC # BLD: 7.8 K/UL (ref 3.5–11.3)

## 2022-01-23 PROCEDURE — 2580000003 HC RX 258: Performed by: NURSE PRACTITIONER

## 2022-01-23 PROCEDURE — 85610 PROTHROMBIN TIME: CPT

## 2022-01-23 PROCEDURE — 82947 ASSAY GLUCOSE BLOOD QUANT: CPT

## 2022-01-23 PROCEDURE — 85027 COMPLETE CBC AUTOMATED: CPT

## 2022-01-23 PROCEDURE — 36415 COLL VENOUS BLD VENIPUNCTURE: CPT

## 2022-01-23 PROCEDURE — 83735 ASSAY OF MAGNESIUM: CPT

## 2022-01-23 PROCEDURE — 6360000002 HC RX W HCPCS: Performed by: NURSE PRACTITIONER

## 2022-01-23 PROCEDURE — 82150 ASSAY OF AMYLASE: CPT

## 2022-01-23 PROCEDURE — 74018 RADEX ABDOMEN 1 VIEW: CPT

## 2022-01-23 PROCEDURE — 2580000003 HC RX 258: Performed by: SURGERY

## 2022-01-23 PROCEDURE — 80048 BASIC METABOLIC PNL TOTAL CA: CPT

## 2022-01-23 PROCEDURE — 6360000002 HC RX W HCPCS: Performed by: INTERNAL MEDICINE

## 2022-01-23 PROCEDURE — 84132 ASSAY OF SERUM POTASSIUM: CPT

## 2022-01-23 PROCEDURE — 99232 SBSQ HOSP IP/OBS MODERATE 35: CPT | Performed by: INTERNAL MEDICINE

## 2022-01-23 PROCEDURE — 1200000000 HC SEMI PRIVATE

## 2022-01-23 PROCEDURE — 2500000003 HC RX 250 WO HCPCS: Performed by: NURSE PRACTITIONER

## 2022-01-23 PROCEDURE — 80053 COMPREHEN METABOLIC PANEL: CPT

## 2022-01-23 RX ORDER — DEXTROSE, SODIUM CHLORIDE, SODIUM LACTATE, POTASSIUM CHLORIDE, AND CALCIUM CHLORIDE 5; .6; .31; .03; .02 G/100ML; G/100ML; G/100ML; G/100ML; G/100ML
INJECTION, SOLUTION INTRAVENOUS CONTINUOUS
Status: DISCONTINUED | OUTPATIENT
Start: 2022-01-23 | End: 2022-01-25 | Stop reason: HOSPADM

## 2022-01-23 RX ADMIN — HYDROMORPHONE HYDROCHLORIDE 1 MG: 1 INJECTION, SOLUTION INTRAMUSCULAR; INTRAVENOUS; SUBCUTANEOUS at 13:08

## 2022-01-23 RX ADMIN — HYDROMORPHONE HYDROCHLORIDE 1 MG: 1 INJECTION, SOLUTION INTRAMUSCULAR; INTRAVENOUS; SUBCUTANEOUS at 04:20

## 2022-01-23 RX ADMIN — ENOXAPARIN SODIUM 30 MG: 30 INJECTION SUBCUTANEOUS at 21:47

## 2022-01-23 RX ADMIN — HYDROMORPHONE HYDROCHLORIDE 1 MG: 1 INJECTION, SOLUTION INTRAMUSCULAR; INTRAVENOUS; SUBCUTANEOUS at 21:47

## 2022-01-23 RX ADMIN — HYDROMORPHONE HYDROCHLORIDE 1 MG: 1 INJECTION, SOLUTION INTRAMUSCULAR; INTRAVENOUS; SUBCUTANEOUS at 18:04

## 2022-01-23 RX ADMIN — SODIUM CHLORIDE, SODIUM LACTATE, POTASSIUM CHLORIDE, CALCIUM CHLORIDE AND DEXTROSE MONOHYDRATE: 5; 600; 310; 30; 20 INJECTION, SOLUTION INTRAVENOUS at 09:02

## 2022-01-23 RX ADMIN — POTASSIUM CHLORIDE 10 MEQ: 10 INJECTION, SOLUTION INTRAVENOUS at 09:57

## 2022-01-23 RX ADMIN — METOPROLOL TARTRATE 5 MG: 5 INJECTION INTRAVENOUS at 08:41

## 2022-01-23 RX ADMIN — METOPROLOL TARTRATE 5 MG: 5 INJECTION INTRAVENOUS at 21:47

## 2022-01-23 RX ADMIN — SODIUM CHLORIDE, SODIUM LACTATE, POTASSIUM CHLORIDE, CALCIUM CHLORIDE AND DEXTROSE MONOHYDRATE: 5; 600; 310; 30; 20 INJECTION, SOLUTION INTRAVENOUS at 18:10

## 2022-01-23 RX ADMIN — POTASSIUM CHLORIDE 10 MEQ: 10 INJECTION, SOLUTION INTRAVENOUS at 13:59

## 2022-01-23 RX ADMIN — SODIUM CHLORIDE: 9 INJECTION, SOLUTION INTRAVENOUS at 02:01

## 2022-01-23 RX ADMIN — SODIUM CHLORIDE, PRESERVATIVE FREE 10 ML: 5 INJECTION INTRAVENOUS at 21:50

## 2022-01-23 RX ADMIN — POTASSIUM CHLORIDE 10 MEQ: 10 INJECTION, SOLUTION INTRAVENOUS at 07:15

## 2022-01-23 RX ADMIN — ENOXAPARIN SODIUM 30 MG: 30 INJECTION SUBCUTANEOUS at 08:41

## 2022-01-23 RX ADMIN — HYDROMORPHONE HYDROCHLORIDE 1 MG: 1 INJECTION, SOLUTION INTRAMUSCULAR; INTRAVENOUS; SUBCUTANEOUS at 08:41

## 2022-01-23 RX ADMIN — ONDANSETRON 4 MG: 2 INJECTION INTRAMUSCULAR; INTRAVENOUS at 13:15

## 2022-01-23 RX ADMIN — POTASSIUM CHLORIDE 10 MEQ: 10 INJECTION, SOLUTION INTRAVENOUS at 12:01

## 2022-01-23 ASSESSMENT — PAIN DESCRIPTION - FREQUENCY
FREQUENCY: INTERMITTENT

## 2022-01-23 ASSESSMENT — PAIN SCALES - GENERAL
PAINLEVEL_OUTOF10: 8
PAINLEVEL_OUTOF10: 6
PAINLEVEL_OUTOF10: 9
PAINLEVEL_OUTOF10: 8
PAINLEVEL_OUTOF10: 9
PAINLEVEL_OUTOF10: 4
PAINLEVEL_OUTOF10: 6

## 2022-01-23 ASSESSMENT — PAIN DESCRIPTION - LOCATION
LOCATION: ABDOMEN

## 2022-01-23 ASSESSMENT — PAIN DESCRIPTION - PAIN TYPE
TYPE: ACUTE PAIN

## 2022-01-23 ASSESSMENT — PAIN DESCRIPTION - DESCRIPTORS
DESCRIPTORS: CRAMPING

## 2022-01-23 ASSESSMENT — PAIN DESCRIPTION - PROGRESSION
CLINICAL_PROGRESSION: GRADUALLY WORSENING
CLINICAL_PROGRESSION: GRADUALLY WORSENING
CLINICAL_PROGRESSION: GRADUALLY IMPROVING
CLINICAL_PROGRESSION: GRADUALLY IMPROVING
CLINICAL_PROGRESSION: GRADUALLY WORSENING

## 2022-01-23 ASSESSMENT — PAIN DESCRIPTION - ONSET
ONSET: ON-GOING

## 2022-01-23 ASSESSMENT — PAIN DESCRIPTION - ORIENTATION
ORIENTATION: LOWER

## 2022-01-23 NOTE — PROGRESS NOTES
Occupational Therapy    DATE: 2022    NAME: Onofre Hdez  MRN: [id number]   : 1965    Patient not seen this date for Occupational Therapy due to:      [] Cancel by RN or physician due to:    [] Hemodialysis    [] Critical Lab Value Level     [] Blood transfusion in progress    [] Acute or unstable cardiovascular status   _MAP < 55 or more than >115  _HR < 40 or > 130    [] Acute or unstable pulmonary status   -FiO2 > 60%   _RR < 5 or >40    _O2 sats < 85%    [] Strict Bedrest    [] Off Unit for surgery or procedure    [] Off Unit for testing       [] Pending imaging to R/O fracture    [] Refusal by Patient      [] Other      [x] OT being discontinued at this time. Patient independent. No further needs.      Yeni Nieto OTR/L

## 2022-01-23 NOTE — PROGRESS NOTES
Physical Therapy  DATE: 2022    NAME: Lizz Dillard  MRN: [id number]   : 1965    Patient not seen this date for Physical Therapy due to:      [] Cancel by RN or physician due to:    [] Hemodialysis    [] Critical Lab Value Level     [] Blood transfusion in progress    [] Acute or unstable cardiovascular status   _MAP < 55 or more than >115  _HR < 40 or > 130    [] Acute or unstable pulmonary status   -FiO2 > 60%   _RR < 5 or >40    _O2 sats < 85%    [] Strict Bedrest    [] Off Unit for surgery or procedure    [] Off Unit for testing       [] Pending imaging to R/O fracture    [] Refusal by Patient      [x] Other  (Pt states he is fully independent and does not require PT at this time)      [] PT being discontinued at this time. Patient independent. No further needs. [] PT being discontinued at this time as the patient has been transferred to hospice care. No further needs.       Karla Glass, PT  08:37

## 2022-01-23 NOTE — CARE COORDINATION
Case Management Initial Discharge Plan  Ana Thakkar,             Met with:patient to discuss discharge plans. Information verified: address, contacts, phone number, , insurance Yes  Insurance Provider: [employer]    Emergency Contact/Next of Kin name & number: spouse/Telma    [phone]  Who are involved in patient's support system? spouse    PCP: Kelly Humphrey MD  Date of last visit: 1 week ago      Discharge Planning    Living Arrangements:        Home has 2 stories  3 stairs to climb to get into front door, 1 flightstairs to climb to reach second floor  Location of bedroom/bathroom in home 2nd floor    Patient able to perform ADL's:Independent    Current Services (outpatient & in home) none  DME equipment: CPAP  DME provider:     Is patient receiving oral anticoagulation therapy? No    If indicated:   Physician managing anticoagulation treatment:   Where does patient obtain lab work for ATC treatment? Potential Assistance Needed:       Patient agreeable to home care: No  [place] of choice provided:  n/a    Prior SNF/Rehab Placement and Facility: n/a  Agreeable to SNF/Rehab: No  [place] of choice provided: n/a     Evaluation: no    Expected Discharge date:       Patient expects to be discharged to: If home: is the family and/or caregiver wiling & able to provide support at home? yes  Who will be providing this support? Spouse and self    Follow Up Appointment: Best Day/ Time:      Transportation provider: spouse  Transportation arrangements needed for discharge: No    Readmission Risk              Risk of Unplanned Readmission:  13             Does patient have a readmission risk score greater than 14?: No  If yes, follow-up appointment must be made within 7 days of discharge.      Goals of Care:       Educated patient on transitional options, provided freedom of choice and are agreeable with plan      Discharge Plan: SBO  Patient lives in a 2 story home with 3 steps to enter.  Lives with spouse. Uses a CPAP  Declines any skilled needs. Pharmacy is [facility] on [address]    PT/OT to eval.  Surgery consulted  Patient has NG to BHARGAV  NPO. Continue to follow.           Electronically signed by Cristal Tubbs RN on 1/23/22 at 4:01 PM EST

## 2022-01-23 NOTE — FLOWSHEET NOTE
Patient + spouse was present. Patient appeared passive, expressed no major needs or prayers.  leaves prayer card for possible follow up.     01/23/22 1325   Encounter Summary   Services provided to: Patient and family together   Referral/Consult From: [facility]   Support System Spouse   Continue Visiting   (1-23-22 )   Complexity of Encounter Moderate   Length of Encounter 15 minutes   Spiritual Assessment Completed Yes   Routine   Type Initial   Assessment Passive   Intervention Explored feelings, thoughts, concerns; Discussed illness/injury and it's impact; Discussed belief system/Taoist practices/ramone   Outcome Expressed gratitude

## 2022-01-23 NOTE — PROGRESS NOTES
Patient called out stating his abdomen was getting significantly more distended and he was complaining of increased pain. Upon evaluation it was observed the NG tube was not suctioning properly. The measurement was at 66 cm. Writer pulled the NG out 3 cm and the suction started pulling the fluid again. The NG was clamped at 63 cm. Will continue to monitor.

## 2022-01-23 NOTE — PLAN OF CARE
Problem: Pain:  Goal: Pain level will decrease  Description: Pain level will decrease  1/23/2022 1236 by Jeannie Desai RN  Outcome: Ongoing  1/23/2022 0025 by Jaime Lees RN  Outcome: Ongoing  Goal: Control of acute pain  Description: Control of acute pain  Outcome: Ongoing  Goal: Control of chronic pain  Description: Control of chronic pain  Outcome: Ongoing  Goal: Patient's pain/discomfort is manageable  Description: Patient's pain/discomfort is manageable  Outcome: Ongoing     Problem: Infection:  Goal: Will remain free from infection  Description: Will remain free from infection  1/23/2022 1236 by Jeannie Desai RN  Outcome: Ongoing  1/23/2022 0025 by Jaime Lees RN  Outcome: Ongoing     Problem: Safety:  Goal: Free from accidental physical injury  Description: Free from accidental physical injury  1/23/2022 1236 by Jeannie Desai RN  Outcome: Ongoing  1/23/2022 0025 by Jaime Lees RN  Outcome: Ongoing  Goal: Free from intentional harm  Description: Free from intentional harm  Outcome: Ongoing     Problem: Daily Care:  Goal: Daily care needs are met  Description: Daily care needs are met  1/23/2022 1236 by Jeannie Desai RN  Outcome: Ongoing  1/23/2022 0025 by Jaime Lees RN  Outcome: Ongoing     Problem: Skin Integrity:  Goal: Skin integrity will stabilize  Description: Skin integrity will stabilize  1/23/2022 1236 by Jeannie Desai RN  Outcome: Ongoing  1/23/2022 0025 by Jaime Lees RN  Outcome: Ongoing     Problem: Discharge Planning:  Goal: Patients continuum of care needs are met  Description: Patients continuum of care needs are met  1/23/2022 1236 by Jeannie Desai RN  Outcome: Ongoing  1/23/2022 0025 by Jaime Lees RN  Outcome: Ongoing

## 2022-01-23 NOTE — PROGRESS NOTES
General Surgery:  Daily Progress Note         PATIENT NAME: Adan Hutchinson     TODAY'S DATE: 1/23/2022, 6:57 AM  SUBJECTIVE:     Pt seen and examined at bedside. No acute events overnight. Patient reports passing flatus x1 yesterday. No BM. NG with 250cc output overnight. UOP adequate. OBJECTIVE:   VITALS:  BP (!) 132/42   Pulse (!) 48 Comment: RN notfied  Temp 97.8 °F (36.6 °C) (Oral)   Resp 18   Ht 6' 1\" (1.854 m)   Wt 269 lb (122 kg)   SpO2 93%   BMI 35.49 kg/m²      INTAKE/OUTPUT:      Intake/Output Summary (Last 24 hours) at 1/23/2022 0613  Last data filed at 1/23/2022 0654  Gross per 24 hour   Intake 1287.39 ml   Output 1250 ml   Net 37.39 ml       PHYSICAL EXAM:  General Appearance: awake, alert, oriented, in no acute distress  HEENT:  Normocephalic, atraumatic, mucus membranes moist, NG in place  Heart: Bradycardic, regular rhythm, asymptomatic  Lungs: Unlabored respirations  Abdomen: Obese, soft, minimally TTP, improved distention   Extremities: No cyanosis, pitting edema, rashes noted.       Data:  CBC with Differential:    Lab Results   Component Value Date    WBC 7.8 01/23/2022    RBC 4.46 01/23/2022    HGB 12.8 01/23/2022    HCT 38.1 01/23/2022     01/23/2022    MCV 85.4 01/23/2022    MCH 28.7 01/23/2022    MCHC 33.6 01/23/2022    RDW 18.2 01/23/2022    LYMPHOPCT 9 01/22/2022    MONOPCT 8 01/22/2022    BASOPCT 1 01/22/2022    MONOSABS 0.82 01/22/2022    LYMPHSABS 0.96 01/22/2022    EOSABS 0.10 01/22/2022    BASOSABS 0.07 01/22/2022    DIFFTYPE NOT REPORTED 01/22/2022     BMP:    Lab Results   Component Value Date     01/23/2022    K 3.4 01/23/2022     01/23/2022    CO2 23 01/23/2022    BUN 20 01/23/2022    LABALBU 3.9 01/23/2022    CREATININE 0.80 01/23/2022    CALCIUM 8.4 01/23/2022    GFRAA >60 01/23/2022    LABGLOM >60 01/23/2022    GLUCOSE 129 01/23/2022       Radiology Review:  Pending AM KUB    ASSESSMENT:  Active Hospital Problems    Diagnosis Date Noted    GERD (gastroesophageal reflux disease) [K21.9] 01/22/2022    Chronic diastolic CHF (congestive heart failure) ([facility] [I50.32] 01/22/2022    Sleep apnea [G47.30] 01/22/2022    History of small bowel obstruction [Z87.19] 04/05/2019    History of atrial fibrillation [Z86.79]     [facility] bowel obstruction) ([facility] [K56.609]     Abnormal LFTs [R94.5] 10/02/2018    Essential hypertension [I10] 09/26/2018    Obesity (BMI 30-39. 9) [E66.9] 09/26/2018    [facility] [I42.8] 02/01/2018    Nausea and vomiting [R11.2] 04/12/2016    Type 2 diabetes mellitus, with long-term current use of insulin ([facility] [E11.9, Z79.4] 07/20/2014    Ventricular ectopy [I49.3] 07/10/2014       1. 64 y.o. male with extensive surgical history and SBO    Plan:  1. Continue medical management per primary  2. Continue NPO  3. NGT to LIS  4. IVF rehydration  5. Replete lytes PRN  6. Follow up KUB this AM  7. Serial exams  8.  OK for OOB, ambulation    Electronically signed by Deric Zhou MD  on 1/23/2022 at 6:57 AM

## 2022-01-23 NOTE — PROGRESS NOTES
Writer rounded on patient and the distention has improved along with pain. A significant amount of fluid is in the canister. Will continue to monitor.

## 2022-01-23 NOTE — PROGRESS NOTES
[facility]  Office: [address], DO, Maria Del Carmen March, DO, Rubio Fragoso, DO, Augusto Wood Butler, DO, Heath Melendez MD, Milton Lemos MD, Shaniqua Fairchild MD, Anastacio Troy MD, Nova Chao MD, Jimbo Bautista MD, Bhavin Soto MD, Talia Sim, DO, Latasha Ramos, DO, Lacho Obrien MD,  Ara Encinas, DO, Jane Ramos MD, Lisa Zavala MD, Deb Mahmood MD, Mariama Hidalgo MD, Maribell Martinez MD, Michelle Sagastume MD, Sabina Donohue MD, Benjy Steen, [facility], [facility], CNP, Derik Yi, CNP, [facility] Cheng, CNS, Radha Benitez, CNP, Sunny Hamlin, CNP, Tyra Rodrigez, CNP, Lien Sandoval, CNP, Steven Herron, CNP, Alexei Feliz PA-C, Catracho Daily [facility], Claudette Anis, [facility], Justin Ramsay, CNP, Anand hWyte, CNP, [facility], CNP, Carolina Barnett CNP, Mike Lehman, [facility], [facility]    Progress Note    1/23/2022    1:12 PM    Name:   Josephine Rhoades  MRN:     [id number]     Jeannie Young:      [de-identified]   Room:   2001/2001-02   Day:  1  Admit Date:  1/22/2022  6:44 AM    PCP:   Delmy Coronel MD  Code Status:  Full Code    Subjective:     C/C:   Chief Complaint   Patient presents with    Abdominal Pain     Interval History Status:   Passed flatus x1 only so far  No bowel movement  Pain is moderate  NG tube in place      Brief History:   Josephine Rhoades is a 64 y.o. Non- / non  male with a history of recurrent SBO due to intra-abdominal adhesions, NICMP with AICD, chronic CHF, type 2DM, paroxysmal afib, DVT on eliquis and ventricular tachycardia and ROHIT who presents with a 3 day history of progressively worsening abdominal pain, distention and nausea and is admitted to the hospital for the management of Small bowel obstruction. He reports that he stopped passing gas yesterday and was concerned for another bowel obstruction. He has multiple laparotomies with lysis of adhesions.  He has seen  mellitus ([address] Diverticulitis, GERD (gastroesophageal reflux disease), History of atrial fibrillation, history of Blood clot due to device, implant, or graft, History of ventricular tachycardia, Kidney calculi, Obesity, and Sleep apnea. Social History:   reports that he quit smoking about 9 years ago. He has a 2.00 pack-year smoking history. He has never used smokeless tobacco. He reports current alcohol use. He reports that he does not use drugs. Family History:   Family History   Problem Relation Age of Onset    Heart Disease Mother     Heart Disease Father        Vitals:  BP (!) 137/53   Pulse 94   Temp 98.4 °F (36.9 °C) (Oral)   Resp 18   Ht 6' 1\" (1.854 m)   Wt 269 lb (122 kg)   SpO2 94%   BMI 35.49 kg/m²   Temp (24hrs), Av °F (36.7 °C), Min:97.4 °F (36.3 °C), Max:98.4 °F (36.9 °C)    Recent Labs     22  1601 22  0600 22  1123   POCGLU 178* 159* 115* 153*       I/O (24Hr):     Intake/Output Summary (Last 24 hours) at 2022 1312  Last data filed at 2022 1235  Gross per 24 hour   Intake 1287.39 ml   Output 1900 ml   Net -612.61 ml       Labs:  Hematology:  Recent Labs     22  0725 22  0519   WBC 10.6 7.8   RBC 4.96 4.46   HGB 14.0 12.8*   HCT 41.2 38.1*   MCV 83.1 85.4   MCH 28.2 28.7   MCHC 34.0 33.6   RDW 18.2* 18.2*    142   MPV 10.0 9.9   INR  --  1.4     Chemistry:  Recent Labs     22  0725 22  0519    138   K 4.2 3.4*    103   CO2 22 23   GLUCOSE 250* 129*   BUN 19 20   CREATININE 0.91 0.80   MG  --  1.7   ANIONGAP 12 12   LABGLOM >60 >60   GFRAA >60 >60   CALCIUM 9.1 8.4*     Recent Labs     22  0725 22  1601 22  2027 22  0519 22  0600 22  1123   PROT 7.9  --   --  6.8  --   --    LABALBU 5.0  --   --  3.9  --   --    AST 31  --   --  18  --   --    ALT 50*  --   --  34  --   --    ALKPHOS 78  --   --  63  --   --    BILITOT 2.97*  --   --  2.54*  --   --    BILIDIR 0.68*  --   --   --   --   --    AMYLASE  --   --   --  16*  --   --    LIPASE 19  --   --   --   --   --    POCGLU  --  178* 159*  --  115* 153*     ABG:No results found for: POCPH, PHART, PH, POCPCO2, AHY1NWU, PCO2, POCPO2, PO2ART, PO2, POCHCO3, QGV6QLX, HCO3, NBEA, PBEA, BEART, BE, THGBART, THB, YFT5TQU, RQKL6WER, R9BXZMPQ, O2SAT, FIO2  Lab Results   Component Value Date/Time    SPECIAL NOT REPORTED 06/12/2020 09:05 PM     Lab Results   Component Value Date/Time    CULTURE NO SIGNIFICANT GROWTH 02/01/2018 06:00 PM    CULTURE  02/01/2018 06:00 PM     Performed at [facility] [id number] [place] [address], [address] [place] [address] [phone]       Radiology:  XR ABDOMEN (KUB) (SINGLE AP VIEW)    Result Date: 1/23/2022  1. Dilated small bowel loops measuring up to 4.9 cm within the left mid and central abdomen. Differential considerations include ileus or partial small bowel obstruction. 2. Residual contrast and air throughout the transverse, descending and rectosigmoid colon. 3. Prior cholecystectomy. 4. NG tube partially visualized projecting over the left upper quadrant likely within the body of the stomach     CT ABDOMEN PELVIS W IV CONTRAST Additional Contrast? Oral    Result Date: 1/22/2022  1. Findings above concerning for small bowel obstruction with sudden transition point/caliber change in the central abdomen in the mid small bowel. 2. Nonvisualization of the appendix. 3. Stable left iliac chain lymph node measuring up to 1.3 cm. 4. Fatty liver. Prior cholecystectomy. 5. Stable midpole right renal cyst measuring 4.2 cm. No obstructing calculus or hydronephrosis. XR ABDOMEN FOR NG/OG/NE TUBE PLACEMENT    Result Date: 1/22/2022  1. NG tube in place, coiled within the gastric fundus. Tip is directed medially, towards the GE junction     XR ABDOMEN FOR NG/OG/NE TUBE PLACEMENT    Result Date: 1/22/2022  1. Nasogastric tube tip and side port are coiled within the fundus.  2. Please refer to dedicated CT dated January 22, 2020 for details of the small bowel. Physical Examination:        General appearance:  alert, cooperative and no distress, NG tube in place  Mental Status:  oriented to person, place and time and normal affect  Lungs:  clear to auscultation bilaterally, normal effort  Heart:  regular rate and rhythm, no murmur  Abdomen: Mildly distended,+ mild discomfort on palpation, sluggish  bowel sounds, no masses, hepatomegaly, splenomegaly  Extremities:  no edema, redness, tenderness in the calves  Skin:  no gross lesions, rashes, induration    Assessment:        Hospital Problems           Last Modified POA    * (Principal) SBO (small bowel obstruction) ([facility] 1/22/2022 Yes    Ventricular ectopy 1/22/2022 Yes    Type 2 diabetes mellitus, with long-term current use of insulin (HCC) 1/22/2022 Yes    Nausea and vomiting 1/22/2022 Yes    Nonischemic cardiomyopathy ([facility] 1/22/2022 Yes    Essential hypertension 1/22/2022 Yes    Obesity (BMI 30-39.9) 1/22/2022 Yes    Abnormal LFTs 1/22/2022 Yes    History of small bowel obstruction (Chronic) 1/22/2022 Yes    Overview Signed 4/5/2019  5:42 AM by FABIANO Ramirez CNP     Due to adhesions           History of atrial fibrillation 1/22/2022 Yes    Sleep apnea 1/22/2022 Yes    Overview Signed 1/22/2022  1:43 PM by Francisca Epley, APRN - NP     uses CPAP nightly         GERD (gastroesophageal reflux disease) 1/22/2022 Yes    Chronic diastolic CHF (congestive heart failure) ([facility] 1/22/2022 Yes          Plan:        Keep n.p.o.   Continue conservative management as per surgery plan  OOB to chair and ambulation  Monitor and replace electrolytes as needed  Surgery following the patient    Brianna Luevano MD  1/23/2022  1:12 PM

## 2022-01-24 ENCOUNTER — APPOINTMENT (OUTPATIENT)
Dept: GENERAL RADIOLOGY | Age: 57
DRG: 389 | End: 2022-01-24
Payer: COMMERCIAL

## 2022-01-24 LAB
-: ABNORMAL
ALBUMIN SERPL-MCNC: 3.9 G/DL (ref 3.5–5.2)
ALBUMIN/GLOBULIN RATIO: ABNORMAL (ref 1–2.5)
ALP BLD-CCNC: 64 U/L (ref 40–129)
ALT SERPL-CCNC: 26 U/L (ref 5–41)
AMORPHOUS: ABNORMAL
ANION GAP SERPL CALCULATED.3IONS-SCNC: 13 MMOL/L (ref 9–17)
AST SERPL-CCNC: 17 U/L
BACTERIA: ABNORMAL
BILIRUB SERPL-MCNC: 2.18 MG/DL (ref 0.3–1.2)
BILIRUBIN URINE: ABNORMAL
BUN BLDV-MCNC: 11 MG/DL (ref 6–20)
BUN/CREAT BLD: 16 (ref 9–20)
CALCIUM SERPL-MCNC: 8.5 MG/DL (ref 8.6–10.4)
CASTS UA: ABNORMAL /LPF
CHLORIDE BLD-SCNC: 101 MMOL/L (ref 98–107)
CO2: 23 MMOL/L (ref 20–31)
COLOR: YELLOW
COMMENT UA: ABNORMAL
CREAT SERPL-MCNC: 0.7 MG/DL (ref 0.7–1.2)
CRYSTALS, UA: ABNORMAL /HPF
EPITHELIAL CELLS UA: ABNORMAL /HPF (ref 0–5)
GFR AFRICAN AMERICAN: >60 ML/MIN
GFR NON-AFRICAN AMERICAN: >60 ML/MIN
GFR SERPL CREATININE-BSD FRML MDRD: ABNORMAL ML/MIN/{1.73_M2}
GFR SERPL CREATININE-BSD FRML MDRD: ABNORMAL ML/MIN/{1.73_M2}
GLUCOSE BLD-MCNC: 183 MG/DL (ref 75–110)
GLUCOSE BLD-MCNC: 185 MG/DL (ref 75–110)
GLUCOSE BLD-MCNC: 189 MG/DL (ref 70–99)
GLUCOSE BLD-MCNC: 190 MG/DL (ref 75–110)
GLUCOSE BLD-MCNC: 193 MG/DL (ref 75–110)
GLUCOSE URINE: ABNORMAL
HCT VFR BLD CALC: 38.1 % (ref 40.7–50.3)
HEMOGLOBIN: 12.8 G/DL (ref 13–17)
KETONES, URINE: ABNORMAL
LEUKOCYTE ESTERASE, URINE: NEGATIVE
MAGNESIUM: 1.6 MG/DL (ref 1.6–2.6)
MCH RBC QN AUTO: 28.5 PG (ref 25.2–33.5)
MCHC RBC AUTO-ENTMCNC: 33.6 G/DL (ref 28.4–34.8)
MCV RBC AUTO: 84.9 FL (ref 82.6–102.9)
MUCUS: ABNORMAL
NITRITE, URINE: NEGATIVE
NRBC AUTOMATED: 0 PER 100 WBC
OTHER OBSERVATIONS UA: ABNORMAL
PDW BLD-RTO: 17.6 % (ref 11.8–14.4)
PH UA: 6 (ref 5–8)
PLATELET # BLD: 138 K/UL (ref 138–453)
PMV BLD AUTO: 9.5 FL (ref 8.1–13.5)
POTASSIUM SERPL-SCNC: 3.5 MMOL/L (ref 3.7–5.3)
POTASSIUM SERPL-SCNC: 3.6 MMOL/L (ref 3.7–5.3)
PROTEIN UA: ABNORMAL
RBC # BLD: 4.49 M/UL (ref 4.21–5.77)
RBC UA: ABNORMAL /HPF (ref 0–2)
RENAL EPITHELIAL, UA: ABNORMAL /HPF
SODIUM BLD-SCNC: 137 MMOL/L (ref 135–144)
SPECIFIC GRAVITY UA: 1.06 (ref 1–1.03)
TOTAL PROTEIN: 6.6 G/DL (ref 6.4–8.3)
TRICHOMONAS: ABNORMAL
TURBIDITY: ABNORMAL
URINE HGB: NEGATIVE
UROBILINOGEN, URINE: ABNORMAL
WBC # BLD: 7.5 K/UL (ref 3.5–11.3)
WBC UA: ABNORMAL /HPF (ref 0–5)
YEAST: ABNORMAL

## 2022-01-24 PROCEDURE — 84132 ASSAY OF SERUM POTASSIUM: CPT

## 2022-01-24 PROCEDURE — 71045 X-RAY EXAM CHEST 1 VIEW: CPT

## 2022-01-24 PROCEDURE — 85027 COMPLETE CBC AUTOMATED: CPT

## 2022-01-24 PROCEDURE — 81001 URINALYSIS AUTO W/SCOPE: CPT

## 2022-01-24 PROCEDURE — 6370000000 HC RX 637 (ALT 250 FOR IP): Performed by: INTERNAL MEDICINE

## 2022-01-24 PROCEDURE — 1200000000 HC SEMI PRIVATE

## 2022-01-24 PROCEDURE — 86403 PARTICLE AGGLUT ANTBDY SCRN: CPT

## 2022-01-24 PROCEDURE — 2580000003 HC RX 258: Performed by: SURGERY

## 2022-01-24 PROCEDURE — 87086 URINE CULTURE/COLONY COUNT: CPT

## 2022-01-24 PROCEDURE — 36415 COLL VENOUS BLD VENIPUNCTURE: CPT

## 2022-01-24 PROCEDURE — 2500000003 HC RX 250 WO HCPCS: Performed by: NURSE PRACTITIONER

## 2022-01-24 PROCEDURE — 80053 COMPREHEN METABOLIC PANEL: CPT

## 2022-01-24 PROCEDURE — 6360000002 HC RX W HCPCS: Performed by: INTERNAL MEDICINE

## 2022-01-24 PROCEDURE — 99232 SBSQ HOSP IP/OBS MODERATE 35: CPT | Performed by: INTERNAL MEDICINE

## 2022-01-24 PROCEDURE — 82947 ASSAY GLUCOSE BLOOD QUANT: CPT

## 2022-01-24 PROCEDURE — 6360000002 HC RX W HCPCS: Performed by: NURSE PRACTITIONER

## 2022-01-24 PROCEDURE — 74018 RADEX ABDOMEN 1 VIEW: CPT

## 2022-01-24 PROCEDURE — 83735 ASSAY OF MAGNESIUM: CPT

## 2022-01-24 PROCEDURE — 2580000003 HC RX 258: Performed by: NURSE PRACTITIONER

## 2022-01-24 RX ADMIN — HYDROMORPHONE HYDROCHLORIDE 1 MG: 1 INJECTION, SOLUTION INTRAMUSCULAR; INTRAVENOUS; SUBCUTANEOUS at 12:14

## 2022-01-24 RX ADMIN — HYDROMORPHONE HYDROCHLORIDE 1 MG: 1 INJECTION, SOLUTION INTRAMUSCULAR; INTRAVENOUS; SUBCUTANEOUS at 16:43

## 2022-01-24 RX ADMIN — SODIUM CHLORIDE, SODIUM LACTATE, POTASSIUM CHLORIDE, CALCIUM CHLORIDE AND DEXTROSE MONOHYDRATE: 5; 600; 310; 30; 20 INJECTION, SOLUTION INTRAVENOUS at 16:42

## 2022-01-24 RX ADMIN — MAGNESIUM SULFATE HEPTAHYDRATE 1000 MG: 1 INJECTION, SOLUTION INTRAVENOUS at 09:01

## 2022-01-24 RX ADMIN — INSULIN LISPRO 1 UNITS: 100 INJECTION, SOLUTION INTRAVENOUS; SUBCUTANEOUS at 09:19

## 2022-01-24 RX ADMIN — HYDROMORPHONE HYDROCHLORIDE 1 MG: 1 INJECTION, SOLUTION INTRAMUSCULAR; INTRAVENOUS; SUBCUTANEOUS at 05:35

## 2022-01-24 RX ADMIN — POTASSIUM CHLORIDE 10 MEQ: 10 INJECTION, SOLUTION INTRAVENOUS at 07:39

## 2022-01-24 RX ADMIN — INSULIN LISPRO 1 UNITS: 100 INJECTION, SOLUTION INTRAVENOUS; SUBCUTANEOUS at 17:15

## 2022-01-24 RX ADMIN — INSULIN LISPRO 1 UNITS: 100 INJECTION, SOLUTION INTRAVENOUS; SUBCUTANEOUS at 12:14

## 2022-01-24 RX ADMIN — SODIUM CHLORIDE, SODIUM LACTATE, POTASSIUM CHLORIDE, CALCIUM CHLORIDE AND DEXTROSE MONOHYDRATE: 5; 600; 310; 30; 20 INJECTION, SOLUTION INTRAVENOUS at 04:02

## 2022-01-24 RX ADMIN — POTASSIUM CHLORIDE 10 MEQ: 10 INJECTION, SOLUTION INTRAVENOUS at 08:56

## 2022-01-24 RX ADMIN — POTASSIUM CHLORIDE 10 MEQ: 10 INJECTION, SOLUTION INTRAVENOUS at 11:52

## 2022-01-24 RX ADMIN — MAGNESIUM SULFATE HEPTAHYDRATE 1000 MG: 1 INJECTION, SOLUTION INTRAVENOUS at 07:48

## 2022-01-24 RX ADMIN — METOPROLOL TARTRATE 5 MG: 5 INJECTION INTRAVENOUS at 08:03

## 2022-01-24 RX ADMIN — ENOXAPARIN SODIUM 30 MG: 30 INJECTION SUBCUTANEOUS at 20:29

## 2022-01-24 RX ADMIN — HYDROMORPHONE HYDROCHLORIDE 1 MG: 1 INJECTION, SOLUTION INTRAMUSCULAR; INTRAVENOUS; SUBCUTANEOUS at 01:35

## 2022-01-24 RX ADMIN — INSULIN LISPRO 1 UNITS: 100 INJECTION, SOLUTION INTRAVENOUS; SUBCUTANEOUS at 20:30

## 2022-01-24 RX ADMIN — HYDROMORPHONE HYDROCHLORIDE 1 MG: 1 INJECTION, SOLUTION INTRAMUSCULAR; INTRAVENOUS; SUBCUTANEOUS at 08:58

## 2022-01-24 RX ADMIN — POTASSIUM CHLORIDE 10 MEQ: 10 INJECTION, SOLUTION INTRAVENOUS at 10:26

## 2022-01-24 RX ADMIN — ENOXAPARIN SODIUM 30 MG: 30 INJECTION SUBCUTANEOUS at 08:03

## 2022-01-24 RX ADMIN — SODIUM CHLORIDE, PRESERVATIVE FREE 10 ML: 5 INJECTION INTRAVENOUS at 20:41

## 2022-01-24 RX ADMIN — METOPROLOL TARTRATE 5 MG: 5 INJECTION INTRAVENOUS at 20:29

## 2022-01-24 RX ADMIN — ONDANSETRON 4 MG: 2 INJECTION INTRAMUSCULAR; INTRAVENOUS at 16:48

## 2022-01-24 ASSESSMENT — PAIN SCALES - GENERAL
PAINLEVEL_OUTOF10: 6
PAINLEVEL_OUTOF10: 8
PAINLEVEL_OUTOF10: 7
PAINLEVEL_OUTOF10: 8
PAINLEVEL_OUTOF10: 7
PAINLEVEL_OUTOF10: 8

## 2022-01-24 ASSESSMENT — PAIN DESCRIPTION - LOCATION
LOCATION: ABDOMEN

## 2022-01-24 ASSESSMENT — PAIN DESCRIPTION - FREQUENCY
FREQUENCY: CONTINUOUS
FREQUENCY: CONTINUOUS

## 2022-01-24 ASSESSMENT — PAIN - FUNCTIONAL ASSESSMENT
PAIN_FUNCTIONAL_ASSESSMENT: ACTIVITIES ARE NOT PREVENTED
PAIN_FUNCTIONAL_ASSESSMENT: PREVENTS OR INTERFERES SOME ACTIVE ACTIVITIES AND ADLS

## 2022-01-24 ASSESSMENT — PAIN DESCRIPTION - PAIN TYPE
TYPE: ACUTE PAIN

## 2022-01-24 ASSESSMENT — PAIN DESCRIPTION - ORIENTATION
ORIENTATION: LEFT
ORIENTATION: LOWER
ORIENTATION: LOWER
ORIENTATION: MID;LEFT

## 2022-01-24 ASSESSMENT — PAIN DESCRIPTION - ONSET
ONSET: ON-GOING

## 2022-01-24 ASSESSMENT — PAIN DESCRIPTION - PROGRESSION
CLINICAL_PROGRESSION: NOT CHANGED
CLINICAL_PROGRESSION: NOT CHANGED

## 2022-01-24 ASSESSMENT — PAIN DESCRIPTION - DESCRIPTORS
DESCRIPTORS: SHARP;CONSTANT
DESCRIPTORS: SHARP

## 2022-01-24 NOTE — FLOWSHEET NOTE
Patient receives Sacrament of the Sick (anointing) from [facility].    [facility] will follow as needed. (writer charting for [employer]     56/68/02 1357   Encounter Summary   Services provided to: Patient   Referral/Consult From: Rounding   [facility], [place]   Continue Visiting   (1/24/22 anointed)   Complexity of Encounter Low   Length of Encounter 15 minutes   Routine   Type Follow up   Sacraments   Sacrament of Sick-Anointing Anointed  (1/24/22 Fr. Jamar Toscano)

## 2022-01-24 NOTE — PLAN OF CARE
Problem: Pain:  Goal: Pain level will decrease  Description: Pain level will decrease  1/24/2022 0405 by Malen Aschoff, RN  Outcome: Ongoing  1/24/2022 0405 by Malen Aschoff, RN  Outcome: Ongoing  Goal: Control of acute pain  Description: Control of acute pain  1/24/2022 0405 by Malen Aschoff, RN  Outcome: Ongoing  1/24/2022 0405 by Malen Aschoff, RN  Outcome: Ongoing  Goal: Control of chronic pain  Description: Control of chronic pain  1/24/2022 0405 by Malen Aschoff, RN  Outcome: Ongoing  1/24/2022 0405 by Malen Aschoff, RN  Outcome: Ongoing  Goal: Patient's pain/discomfort is manageable  Description: Patient's pain/discomfort is manageable  1/24/2022 0405 by Malen Aschoff, RN  Outcome: Ongoing  1/24/2022 0405 by Malen Aschoff, RN  Outcome: Ongoing     Problem: Infection:  Goal: Will remain free from infection  Description: Will remain free from infection  1/24/2022 0405 by Malen Aschoff, RN  Outcome: Ongoing  1/24/2022 0405 by Malen Aschoff, RN  Outcome: Ongoing     Problem: Safety:  Goal: Free from accidental physical injury  Description: Free from accidental physical injury  1/24/2022 0405 by Malen Aschoff, RN  Outcome: Ongoing  1/24/2022 0405 by Malen Aschoff, RN  Outcome: Ongoing  Goal: Free from intentional harm  Description: Free from intentional harm  1/24/2022 0405 by Malen Aschoff, RN  Outcome: Ongoing  1/24/2022 0405 by Malen Aschoff, RN  Outcome: Ongoing     Problem: Daily Care:  Goal: Daily care needs are met  Description: Daily care needs are met  1/24/2022 0405 by Malen Aschoff, RN  Outcome: Ongoing  1/24/2022 0405 by Malen Aschoff, RN  Outcome: Ongoing     Problem: Skin Integrity:  Goal: Skin integrity will stabilize  Description: Skin integrity will stabilize  1/24/2022 0405 by Malen Aschoff, RN  Outcome: Ongoing  1/24/2022 0405 by Malen Aschoff, RN  Outcome: Ongoing     Problem: Discharge Planning:  Goal: Patients continuum of care needs are met  Description: Patients continuum of care needs are met  1/24/2022 0405 by Edson Slade, RN  Outcome: Ongoing  1/24/2022 0405 by Edson Slade, RN  Outcome: Ongoing

## 2022-01-24 NOTE — PLAN OF CARE
Problem: Pain:  Goal: Pain level will decrease  Description: Pain level will decrease  1/24/2022 1433 by Rocky Claros RN  Outcome: Ongoing  Note: Pain level assessment complete.    Patient educated on pain scale and control interventions  PRN pain medication given per patient request-Dilaudid  Patient instructed to call out with new onset of pain or unrelieved pain  Goal: Control of acute pain  Description: Control of acute pain  1/24/2022 1433 by Rocky Claros RN  Outcome: Ongoing  Goal: Control of chronic pain  Description: Control of chronic pain  1/24/2022 1433 by Rocky Claros RN  Outcome: Ongoing  Goal: Patient's pain/discomfort is manageable  Description: Patient's pain/discomfort is manageable  1/24/2022 1433 by Rocky Claros RN  Outcome: Ongoing     Problem: Infection:  Goal: Will remain free from infection  Description: Will remain free from infection  1/24/2022 1433 by Rocky Clarso RN  Outcome: Ongoing     Problem: Safety:  Goal: Free from accidental physical injury  Description: Free from accidental physical injury  1/24/2022 1433 by Rocky Claros RN  Outcome: Ongoing  Goal: Free from intentional harm  Description: Free from intentional harm  1/24/2022 1433 by Rocky Claros RN  Outcome: Ongoing     Problem: Daily Care:  Goal: Daily care needs are met  Description: Daily care needs are met  1/24/2022 1433 by Rocky Claros RN  Outcome: Ongoing     Problem: Skin Integrity:  Goal: Skin integrity will stabilize  Description: Skin integrity will stabilize  1/24/2022 1433 by Rocky Claros RN  Outcome: Ongoing     Problem: Discharge Planning:  Goal: Patients continuum of care needs are met  Description: Patients continuum of care needs are met  1/24/2022 1433 by Rocky Claros RN  Outcome: Ongoing     Problem: Falls - Risk of:  Goal: Will remain free from falls  Description: Will remain free from falls  Outcome: Ongoing  Note: Siderails up x 2  Hourly rounding  Call light in reach  Remains free from falls and accidental injury at this time   Floor free from obstacles  Bed is locked and in lowest position  Adequate lighting provided  Patients independent. Gait steady.   Goal: Absence of physical injury  Description: Absence of physical injury  Outcome: Ongoing

## 2022-01-24 NOTE — PROGRESS NOTES
[facility]  Office: [address], DO, Sunita Kulkarni, DO, Alejandro Cooper, DO, Marito Wrightchristine Butler, DO, Natalie Colon MD, Elise York MD, Shu Cuevas MD, Amy Vogt MD, Rebecca Saeed MD, Paula Dumont MD, Frank Horne MD, Arabella Carranza DO, Natalie Mcginnis DO, Anand Solorzano MD,  Jeannie Norris DO, Leah Bowling MD, Lili Shelton MD, Antoinette Babb MD, Negin Smith MD, Sherrie Valdes MD, Gertrudis Marcelino MD, Sy Varghese MD, Rahul Roberts [facility], [facility], CNP, Sandip Dela Cruz, CNP, Cassandra Sahu, CNS, Mahendra Restrepo, CNP, Roby Carlson, CNP, Rosa Silver, CNP, Kunal Canales, CNP, Charles Garvin, CNP, Rob Mustafa PA-C, Porfirio Lion, [facility], Rafael Novoa [facility], Manuelito Pandya, CNP, Shama Sears, CNP, Pako Lopez, CNP, Sanjuana Estrada, CNP, Burton Young CNP, [facility]    Progress Note    1/24/2022    1:40 PM    Name:   Ana Thakkar  MRN:     [id number]     [place]:      [de-identified]   Room:   2001/2001-02  IP Day:  2  Admit Date:  1/22/2022  6:44 AM    PCP:   Kelly Humphrey MD  Code Status:  Full Code    Subjective:     C/C:   Chief Complaint   Patient presents with    Abdominal Pain     Interval History Status:   Passed flatus x1 again today but no bowel movement  Pain is moderate  NG tube in place, was blocked, 900 mL output after repositioning  T-max 100.4, denies cough denies dysuria    Brief History:   Ana Thakkar is a 64 y.o. Non- / non  male with a history of recurrent SBO due to intra-abdominal adhesions, NICMP with AICD, chronic CHF, type 2DM, paroxysmal afib, DVT on eliquis and ventricular tachycardia and ROHIT who presents with a 3 day history of progressively worsening abdominal pain, distention and nausea and is admitted to the hospital for the management of Small bowel obstruction.  He reports that he stopped passing gas yesterday and was concerned for another bowel obstruction. He has multiple laparotomies with lysis of adhesions. He has seen Dr. Carolynn Snellen as his surgeon in the past who has since retired. He began vomiting in the ER and an NG tube was placed to suction with over 300ml of gastric contents out already. During my assessment he states he is feeling a little better but is still having quite a bit of nausea and abdominal pain. Patient has a known history of Fatty liver with chronically elevated LFTs        Review of Systems:     Constitutional:  negative for chills,+ fevers, no sweats  Respiratory:  negative for cough, dyspnea on exertion, shortness of breath, wheezing  Cardiovascular:  negative for chest pain, chest pressure/discomfort, lower extremity edema, palpitations  Gastrointestinal:  + for abdominal pain, + flatus x1, no bowel movement yet,  Neurological:  negative for dizziness, headache    Medications: Allergies:     Allergies   Allergen Reactions    Ciprofloxacin      Prolonged QT and developed VT. 2/2018  Other reaction(s): Unknown    Codeine      Other reaction(s): Abnormal Behavior  Other reaction(s): Unknown    Diphenhydramine Hives     Other reaction(s): Unknown    Morphine Other (See Comments)     Confusion       Penicillins Hives and Swelling     Other reaction(s): Unknown       Current Meds:   Scheduled Meds:    insulin lispro  0-6 Units SubCUTAneous TID WC    insulin lispro  0-3 Units SubCUTAneous Nightly    sodium chloride  80 mL IntraVENous Once    sodium chloride flush  5-40 mL IntraVENous 2 times per day    enoxaparin  30 mg SubCUTAneous BID    metoprolol  5 mg IntraVENous Q12H     Continuous Infusions:    dextrose 5% in lactated ringers 100 mL/hr at 01/24/22 1248    sodium chloride       PRN Meds: sodium chloride flush, sodium chloride flush, sodium chloride, potassium chloride, magnesium sulfate, promethazine, ondansetron, HYDROmorphone **OR** HYDROmorphone    Data:     Past Medical History:   has a past medical history of ADHD, predominantly hyperactive type, Anemia, Bowel obstruction ([facility] CAD (coronary artery disease), Cardiomyopathy ([facility] Chronic diastolic CHF (congestive heart failure) ([facility] [address] Diabetes mellitus ([facility] [address] Diverticulitis, GERD (gastroesophageal reflux disease), History of atrial fibrillation, history of Blood clot due to device, implant, or graft, History of ventricular tachycardia, Kidney calculi, Obesity, and Sleep apnea. Social History:   reports that he quit smoking about 9 years ago. He has a 2.00 pack-year smoking history. He has never used smokeless tobacco. He reports current alcohol use. He reports that he does not use drugs. Family History:   Family History   Problem Relation Age of Onset    Heart Disease Mother     Heart Disease Father        Vitals:  BP (!) 140/64   Pulse 100   Temp 98.2 °F (36.8 °C) (Oral)   Resp 18   Ht 6' 1\" (1.854 m)   Wt 265 lb 4.8 oz (120.3 kg)   SpO2 94%   BMI 35.00 kg/m²   Temp (24hrs), Av.7 °F (37.1 °C), Min:98.2 °F (36.8 °C), Max:100.3 °F (37.9 °C)    Recent Labs     22  1646 22  2008 22  0558 22  1131   POCGLU 156* 173* 183* 190*       I/O (24Hr):     Intake/Output Summary (Last 24 hours) at 2022 1340  Last data filed at 2022 1247  Gross per 24 hour   Intake 2011.72 ml   Output 3650 ml   Net -1638.28 ml       Labs:  Hematology:  Recent Labs     22  0725 22  0519 22  0611   WBC 10.6 7.8 7.5   RBC 4.96 4.46 4.49   HGB 14.0 12.8* 12.8*   HCT 41.2 38.1* 38.1*   MCV 83.1 85.4 84.9   MCH 28.2 28.7 28.5   MCHC 34.0 33.6 33.6   RDW 18.2* 18.2* 17.6*    142 138   MPV 10.0 9.9 9.5   INR  --  1.4  --      Chemistry:  Recent Labs     22  0519 22  0519 22  1505 22  1807 22  0611     --  135  --  137   K 3.4*   < > 3.6* 3.8 3.5*     --  102  --  101   CO2 23  --  21  --  23   GLUCOSE 129*  --  169*  --  189*   BUN 20  --  18  --  11   CREATININE 0.80  --  0.78  --  0.70 MG 1.7  --   --   --  1.6   ANIONGAP 12  --  12  --  13   LABGLOM >60  --  >60  --  >60   GFRAA >60  --  >60  --  >60   CALCIUM 8.4*  --  8.4*  --  8.5*    < > = values in this interval not displayed. Recent Labs     01/22/22  0725 01/22/22  1601 01/22/22 2027 01/23/22  0519 01/23/22  0600 01/23/22  1123 01/23/22  1646 01/23/22 2008 01/24/22  0558 01/24/22  0611 01/24/22  1131   PROT 7.9  --   --  6.8  --   --   --   --   --  6.6  --    LABALBU 5.0  --   --  3.9  --   --   --   --   --  3.9  --    AST 31  --   --  18  --   --   --   --   --  17  --    ALT 50*  --   --  34  --   --   --   --   --  26  --    ALKPHOS 78  --   --  63  --   --   --   --   --  64  --    BILITOT 2.97*  --   --  2.54*  --   --   --   --   --  2.18*  --    BILIDIR 0.68*  --   --   --   --   --   --   --   --   --   --    AMYLASE  --   --   --  16*  --   --   --   --   --   --   --    LIPASE 19  --   --   --   --   --   --   --   --   --   --    POCGLU  --    < >   < >  --  115* 153* 156* 173* 183*  --  190*    < > = values in this interval not displayed. ABG:No results found for: POCPH, PHART, PH, POCPCO2, PGM4OAV, PCO2, POCPO2, PO2ART, PO2, POCHCO3, QLL6EFS, HCO3, NBEA, PBEA, BEART, BE, THGBART, THB, AAP4LSA, VBSG3YZX, L9AFLMWO, O2SAT, FIO2  Lab Results   Component Value Date/Time    SPECIAL NOT REPORTED 06/12/2020 09:05 PM     Lab Results   Component Value Date/Time    CULTURE NO SIGNIFICANT GROWTH 02/01/2018 06:00 PM    CULTURE  02/01/2018 06:00 PM     Performed at [facility] [id number] [place] [address], [address] [place] [address] [phone]       Radiology:  XR ABDOMEN (KUB) (SINGLE AP VIEW)    Result Date: 1/23/2022  1. Dilated small bowel loops measuring up to 4.9 cm within the left mid and central abdomen. Differential considerations include ileus or partial small bowel obstruction. 2. Residual contrast and air throughout the transverse, descending and rectosigmoid colon. 3. Prior cholecystectomy.  4. NG tube partially visualized projecting over the left upper quadrant likely within the body of the stomach     CT ABDOMEN PELVIS W IV CONTRAST Additional Contrast? Oral    Result Date: 1/22/2022  1. Findings above concerning for small bowel obstruction with sudden transition point/caliber change in the central abdomen in the mid small bowel. 2. Nonvisualization of the appendix. 3. Stable left iliac chain lymph node measuring up to 1.3 cm. 4. Fatty liver. Prior cholecystectomy. 5. Stable midpole right renal cyst measuring 4.2 cm. No obstructing calculus or hydronephrosis. XR ABDOMEN FOR NG/OG/NE TUBE PLACEMENT    Result Date: 1/22/2022  1. NG tube in place, coiled within the gastric fundus. Tip is directed medially, towards the GE junction     XR ABDOMEN FOR NG/OG/NE TUBE PLACEMENT    Result Date: 1/22/2022  1. Nasogastric tube tip and side port are coiled within the fundus. 2. Please refer to dedicated CT dated January 22, 2020 for details of the small bowel.        Physical Examination:        General appearance:  alert, cooperative and no distress, NG tube in place  Mental Status:  oriented to person, place and time and normal affect  Lungs:  clear to auscultation bilaterally, normal effort  Heart:  regular rate and rhythm, no murmur  Abdomen: Mildly distended,+ mild discomfort on palpation, sluggish  bowel sounds, no masses, hepatomegaly, splenomegaly  Extremities:  no edema, redness, tenderness in the calves  Skin:  no gross lesions, rashes, induration    Assessment:        Hospital Problems           Last Modified POA    * (Principal) SBO (small bowel obstruction) ([address] 1/22/2022 Yes    Ventricular ectopy 1/22/2022 Yes    Type 2 diabetes mellitus, with long-term current use of insulin ([address] 1/22/2022 Yes    Nausea and vomiting 1/22/2022 Yes    Nonischemic cardiomyopathy ([address] 1/22/2022 Yes    Essential hypertension 1/22/2022 Yes    Obesity (BMI 30-39.9) 1/22/2022 Yes    Abnormal LFTs 1/22/2022 Yes    History of small bowel obstruction (Chronic) 1/22/2022 Yes    Overview Signed 4/5/2019  5:42 AM by FABIANO Bai CNP     Due to adhesions           History of atrial fibrillation 1/22/2022 Yes    Sleep apnea 1/22/2022 Yes    Overview Signed 1/22/2022  1:43 PM by FABIANO Zimmer - NP     uses CPAP nightly         GERD (gastroesophageal reflux disease) 1/22/2022 Yes    Chronic diastolic CHF (congestive heart failure) ([facility] 1/22/2022 Yes          Plan:        Keep n.p.o.   Continue conservative management as per surgery plan  OOB to chair and ambulation  Monitor and replace electrolytes as needed  Surgery following the patient  Check CXR  Urine analysis    Prem Frederick MD  1/24/2022  1:40 PM

## 2022-01-24 NOTE — PROGRESS NOTES
General Surgery:  Daily Progress Note         PATIENT NAME: Sparkle Fernando     TODAY'S DATE: 1/24/2022, 6:02 AM  SUBJECTIVE:     Pt seen and examined at bedside. No acute events overnight. Patient reports passing flatus. No BM. NG with 1050cc output in 24hrs. UOP adequate. Pt did have some nausea yesterday afternoon, the NGT was manipulated with about 900cc output and he hasnt had nausea since. Had some ice chips. OBJECTIVE:   VITALS:  BP (!) 165/45 Comment: RN gave patient medication one hour prior to vitals taken  Pulse 62   Temp 98.2 °F (36.8 °C) (Oral)   Resp 16   Ht 6' 1\" (1.854 m)   Wt 265 lb 4.8 oz (120.3 kg)   SpO2 95%   BMI 35.00 kg/m²      INTAKE/OUTPUT:      Intake/Output Summary (Last 24 hours) at 1/24/2022 0602  Last data filed at 1/24/2022 0539  Gross per 24 hour   Intake 3299.11 ml   Output 3000 ml   Net 299.11 ml       PHYSICAL EXAM:  General Appearance: awake, alert, oriented, in no acute distress  HEENT:  Normocephalic, atraumatic, mucus membranes moist, NG in place  Heart: Bradycardic, regular rhythm, asymptomatic  Lungs: Unlabored respirations  Abdomen: Obese, soft, nonTTP, improved distention   Extremities: No cyanosis, pitting edema, rashes noted.       Data:  CBC with Differential:    Lab Results   Component Value Date    WBC 7.8 01/23/2022    RBC 4.46 01/23/2022    HGB 12.8 01/23/2022    HCT 38.1 01/23/2022     01/23/2022    MCV 85.4 01/23/2022    MCH 28.7 01/23/2022    MCHC 33.6 01/23/2022    RDW 18.2 01/23/2022    LYMPHOPCT 9 01/22/2022    MONOPCT 8 01/22/2022    BASOPCT 1 01/22/2022    MONOSABS 0.82 01/22/2022    LYMPHSABS 0.96 01/22/2022    EOSABS 0.10 01/22/2022    BASOSABS 0.07 01/22/2022    DIFFTYPE NOT REPORTED 01/22/2022     BMP:    Lab Results   Component Value Date     01/23/2022    K 3.8 01/23/2022     01/23/2022    CO2 21 01/23/2022    BUN 18 01/23/2022    LABALBU 3.9 01/23/2022    CREATININE 0.78 01/23/2022    CALCIUM 8.4 01/23/2022    GFRAA >60 01/23/2022    LABGLOM >60 01/23/2022    GLUCOSE 169 01/23/2022       Radiology Review:  Pending AM KUB    ASSESSMENT:  Active Hospital Problems    Diagnosis Date Noted    GERD (gastroesophageal reflux disease) [K21.9] 01/22/2022    Chronic diastolic CHF (congestive [facility] failure) [facility] [I50.32] 01/22/2022    Sleep apnea [G47.30] 01/22/2022    History of small bowel obstruction [Z87.19] 04/05/2019    History of atrial fibrillation [Z86.79]     SBO (small bowel obstruction) ([facility] [K56.609]     Abnormal LFTs [R94.5] 10/02/2018    Essential hypertension [I10] 09/26/2018    Obesity (BMI 30-39. 9) [E66.9] 09/26/2018    Nonischemic cardiomyopathy ([facility] [I42.8] 02/01/2018    Nausea and vomiting [R11.2] 04/12/2016    Type 2 diabetes mellitus, with long-term current use of insulin ([facility] [E11.9, Z79.4] 07/20/2014    Ventricular ectopy [I49.3] 07/10/2014       1. 64 y.o. male with extensive surgical history and SBO    Plan:  1. Continue medical management per primary  2. Continue NPO  3. NGT to LIS, will check KUB this morning and potentially clamp NGT  4. IVF rehydration  5. Replete lytes PRN  6. Serial exams  7.  OK for OOB, ambulation    Electronically signed by Haider Daily DO  on 1/24/2022 at 6:02 AM

## 2022-01-25 VITALS
BODY MASS INDEX: 40.56 KG/M2 | HEIGHT: 73 IN | TEMPERATURE: 97.8 F | OXYGEN SATURATION: 97 % | DIASTOLIC BLOOD PRESSURE: 48 MMHG | WEIGHT: 306 LBS | HEART RATE: 63 BPM | SYSTOLIC BLOOD PRESSURE: 124 MMHG | RESPIRATION RATE: 18 BRPM

## 2022-01-25 PROBLEM — E66.813 OBESITY, CLASS III, BMI 40-49.9 (MORBID OBESITY) (HCC): Status: ACTIVE | Noted: 2022-01-25

## 2022-01-25 PROBLEM — E66.01 OBESITY, CLASS III, BMI 40-49.9 (MORBID OBESITY) (HCC): Status: ACTIVE | Noted: 2022-01-25

## 2022-01-25 PROBLEM — E11.65 UNCONTROLLED TYPE 2 DIABETES MELLITUS WITH HYPERGLYCEMIA (HCC): Status: ACTIVE | Noted: 2022-01-25

## 2022-01-25 PROBLEM — E87.6 HYPOKALEMIA: Status: ACTIVE | Noted: 2022-01-25

## 2022-01-25 PROBLEM — K76.0 FATTY LIVER: Status: ACTIVE | Noted: 2022-01-25

## 2022-01-25 LAB
ALBUMIN SERPL-MCNC: 4.1 G/DL (ref 3.5–5.2)
ALBUMIN/GLOBULIN RATIO: ABNORMAL (ref 1–2.5)
ALP BLD-CCNC: 66 U/L (ref 40–129)
ALT SERPL-CCNC: 24 U/L (ref 5–41)
ANION GAP SERPL CALCULATED.3IONS-SCNC: 11 MMOL/L (ref 9–17)
AST SERPL-CCNC: 17 U/L
BILIRUB SERPL-MCNC: 2.17 MG/DL (ref 0.3–1.2)
BUN BLDV-MCNC: 7 MG/DL (ref 6–20)
BUN/CREAT BLD: 10 (ref 9–20)
CALCIUM SERPL-MCNC: 8.7 MG/DL (ref 8.6–10.4)
CHLORIDE BLD-SCNC: 99 MMOL/L (ref 98–107)
CO2: 26 MMOL/L (ref 20–31)
CREAT SERPL-MCNC: 0.68 MG/DL (ref 0.7–1.2)
CULTURE: ABNORMAL
GFR AFRICAN AMERICAN: >60 ML/MIN
GFR NON-AFRICAN AMERICAN: >60 ML/MIN
GFR SERPL CREATININE-BSD FRML MDRD: ABNORMAL ML/MIN/{1.73_M2}
GFR SERPL CREATININE-BSD FRML MDRD: ABNORMAL ML/MIN/{1.73_M2}
GLUCOSE BLD-MCNC: 211 MG/DL (ref 75–110)
GLUCOSE BLD-MCNC: 218 MG/DL (ref 70–99)
GLUCOSE BLD-MCNC: 227 MG/DL (ref 75–110)
GLUCOSE BLD-MCNC: 248 MG/DL (ref 75–110)
HCT VFR BLD CALC: 38.8 % (ref 40.7–50.3)
HEMOGLOBIN: 13 G/DL (ref 13–17)
Lab: ABNORMAL
MAGNESIUM: 1.8 MG/DL (ref 1.6–2.6)
MCH RBC QN AUTO: 28.5 PG (ref 25.2–33.5)
MCHC RBC AUTO-ENTMCNC: 33.5 G/DL (ref 28.4–34.8)
MCV RBC AUTO: 85.1 FL (ref 82.6–102.9)
NRBC AUTOMATED: 0 PER 100 WBC
PDW BLD-RTO: 17.6 % (ref 11.8–14.4)
PLATELET # BLD: 147 K/UL (ref 138–453)
PMV BLD AUTO: 9.6 FL (ref 8.1–13.5)
POTASSIUM SERPL-SCNC: 3.3 MMOL/L (ref 3.7–5.3)
RBC # BLD: 4.56 M/UL (ref 4.21–5.77)
SODIUM BLD-SCNC: 136 MMOL/L (ref 135–144)
SPECIMEN DESCRIPTION: ABNORMAL
TOTAL PROTEIN: 6.9 G/DL (ref 6.4–8.3)
WBC # BLD: 6.6 K/UL (ref 3.5–11.3)

## 2022-01-25 PROCEDURE — 80053 COMPREHEN METABOLIC PANEL: CPT

## 2022-01-25 PROCEDURE — 36415 COLL VENOUS BLD VENIPUNCTURE: CPT

## 2022-01-25 PROCEDURE — 99232 SBSQ HOSP IP/OBS MODERATE 35: CPT | Performed by: INTERNAL MEDICINE

## 2022-01-25 PROCEDURE — 82947 ASSAY GLUCOSE BLOOD QUANT: CPT

## 2022-01-25 PROCEDURE — 2500000003 HC RX 250 WO HCPCS: Performed by: NURSE PRACTITIONER

## 2022-01-25 PROCEDURE — 6370000000 HC RX 637 (ALT 250 FOR IP): Performed by: NURSE PRACTITIONER

## 2022-01-25 PROCEDURE — 85027 COMPLETE CBC AUTOMATED: CPT

## 2022-01-25 PROCEDURE — 83735 ASSAY OF MAGNESIUM: CPT

## 2022-01-25 PROCEDURE — 6370000000 HC RX 637 (ALT 250 FOR IP): Performed by: INTERNAL MEDICINE

## 2022-01-25 PROCEDURE — 6360000002 HC RX W HCPCS: Performed by: NURSE PRACTITIONER

## 2022-01-25 RX ORDER — PRAVASTATIN SODIUM 10 MG
10 TABLET ORAL DAILY
Status: DISCONTINUED | OUTPATIENT
Start: 2022-01-25 | End: 2022-01-25 | Stop reason: HOSPADM

## 2022-01-25 RX ORDER — POTASSIUM CHLORIDE 750 MG/1
10 CAPSULE, EXTENDED RELEASE ORAL DAILY
Status: DISCONTINUED | OUTPATIENT
Start: 2022-01-25 | End: 2022-01-25 | Stop reason: HOSPADM

## 2022-01-25 RX ORDER — PANTOPRAZOLE SODIUM 40 MG/1
40 TABLET, DELAYED RELEASE ORAL
Status: DISCONTINUED | OUTPATIENT
Start: 2022-01-26 | End: 2022-01-25 | Stop reason: HOSPADM

## 2022-01-25 RX ORDER — SOTALOL HYDROCHLORIDE 80 MG/1
160 TABLET ORAL 2 TIMES DAILY
Status: DISCONTINUED | OUTPATIENT
Start: 2022-01-25 | End: 2022-01-25 | Stop reason: HOSPADM

## 2022-01-25 RX ORDER — COLCHICINE 0.6 MG/1
0.6 TABLET ORAL 2 TIMES DAILY PRN
Status: DISCONTINUED | OUTPATIENT
Start: 2022-01-25 | End: 2022-01-25 | Stop reason: HOSPADM

## 2022-01-25 RX ORDER — TAMSULOSIN HYDROCHLORIDE 0.4 MG/1
0.4 CAPSULE ORAL DAILY
Status: DISCONTINUED | OUTPATIENT
Start: 2022-01-25 | End: 2022-01-25 | Stop reason: HOSPADM

## 2022-01-25 RX ORDER — METOPROLOL SUCCINATE 50 MG/1
50 TABLET, EXTENDED RELEASE ORAL 2 TIMES DAILY
Status: CANCELLED | OUTPATIENT
Start: 2022-01-25

## 2022-01-25 RX ORDER — METOPROLOL SUCCINATE 50 MG/1
50 TABLET, EXTENDED RELEASE ORAL 2 TIMES DAILY
Status: DISCONTINUED | OUTPATIENT
Start: 2022-01-25 | End: 2022-01-25 | Stop reason: HOSPADM

## 2022-01-25 RX ORDER — NICOTINE POLACRILEX 4 MG
15 LOZENGE BUCCAL PRN
Status: DISCONTINUED | OUTPATIENT
Start: 2022-01-25 | End: 2022-01-25 | Stop reason: HOSPADM

## 2022-01-25 RX ORDER — POTASSIUM CHLORIDE 7.45 MG/ML
10 INJECTION INTRAVENOUS PRN
Status: DISCONTINUED | OUTPATIENT
Start: 2022-01-25 | End: 2022-01-25 | Stop reason: HOSPADM

## 2022-01-25 RX ORDER — POTASSIUM CHLORIDE 20 MEQ/1
40 TABLET, EXTENDED RELEASE ORAL PRN
Status: DISCONTINUED | OUTPATIENT
Start: 2022-01-25 | End: 2022-01-25 | Stop reason: HOSPADM

## 2022-01-25 RX ORDER — LISINOPRIL 2.5 MG/1
2.5 TABLET ORAL DAILY
Status: DISCONTINUED | OUTPATIENT
Start: 2022-01-25 | End: 2022-01-25 | Stop reason: HOSPADM

## 2022-01-25 RX ORDER — TIZANIDINE 2 MG/1
2 TABLET ORAL EVERY 8 HOURS PRN
Status: DISCONTINUED | OUTPATIENT
Start: 2022-01-25 | End: 2022-01-25

## 2022-01-25 RX ORDER — ALOGLIPTIN 6.25 MG/1
6.25 TABLET, FILM COATED ORAL DAILY
Status: DISCONTINUED | OUTPATIENT
Start: 2022-01-25 | End: 2022-01-25 | Stop reason: HOSPADM

## 2022-01-25 RX ORDER — DEXTROSE MONOHYDRATE 50 MG/ML
100 INJECTION, SOLUTION INTRAVENOUS PRN
Status: DISCONTINUED | OUTPATIENT
Start: 2022-01-25 | End: 2022-01-25 | Stop reason: HOSPADM

## 2022-01-25 RX ORDER — ONDANSETRON 4 MG/1
4 TABLET, ORALLY DISINTEGRATING ORAL 3 TIMES DAILY PRN
Status: DISCONTINUED | OUTPATIENT
Start: 2022-01-25 | End: 2022-01-25 | Stop reason: HOSPADM

## 2022-01-25 RX ORDER — POTASSIUM CHLORIDE 750 MG/1
10 TABLET, EXTENDED RELEASE ORAL DAILY
Qty: 60 TABLET | Refills: 3 | Status: SHIPPED | OUTPATIENT
Start: 2022-01-25

## 2022-01-25 RX ORDER — SUCRALFATE 1 G/1
1 TABLET ORAL 4 TIMES DAILY
Status: DISCONTINUED | OUTPATIENT
Start: 2022-01-25 | End: 2022-01-25

## 2022-01-25 RX ADMIN — INSULIN LISPRO 2 UNITS: 100 INJECTION, SOLUTION INTRAVENOUS; SUBCUTANEOUS at 09:39

## 2022-01-25 RX ADMIN — POTASSIUM CHLORIDE 40 MEQ: 20 TABLET, EXTENDED RELEASE ORAL at 13:56

## 2022-01-25 RX ADMIN — ALOGLIPTIN 6.25 MG: 6.25 TABLET, FILM COATED ORAL at 17:49

## 2022-01-25 RX ADMIN — ENOXAPARIN SODIUM 30 MG: 30 INJECTION SUBCUTANEOUS at 09:38

## 2022-01-25 RX ADMIN — SOTALOL HYDROCHLORIDE 160 MG: 80 TABLET ORAL at 13:37

## 2022-01-25 RX ADMIN — INSULIN LISPRO 2 UNITS: 100 INJECTION, SOLUTION INTRAVENOUS; SUBCUTANEOUS at 13:37

## 2022-01-25 RX ADMIN — POTASSIUM CHLORIDE 10 MEQ: 10 CAPSULE, COATED, EXTENDED RELEASE ORAL at 17:49

## 2022-01-25 RX ADMIN — MAGNESIUM OXIDE TAB 400 MG (241.3 MG ELEMENTAL MG) 400 MG: 400 (241.3 MG) TAB at 17:49

## 2022-01-25 RX ADMIN — METOPROLOL TARTRATE 5 MG: 5 INJECTION INTRAVENOUS at 09:38

## 2022-01-25 RX ADMIN — INSULIN LISPRO 2 UNITS: 100 INJECTION, SOLUTION INTRAVENOUS; SUBCUTANEOUS at 17:49

## 2022-01-25 RX ADMIN — LISINOPRIL 2.5 MG: 2.5 TABLET ORAL at 13:37

## 2022-01-25 ASSESSMENT — ENCOUNTER SYMPTOMS
ABDOMINAL DISTENTION: 0
VOMITING: 0
NAUSEA: 0
SHORTNESS OF BREATH: 0
COUGH: 0
ABDOMINAL PAIN: 0

## 2022-01-25 ASSESSMENT — PAIN SCALES - GENERAL: PAINLEVEL_OUTOF10: 3

## 2022-01-25 ASSESSMENT — PAIN DESCRIPTION - LOCATION: LOCATION: ABDOMEN

## 2022-01-25 ASSESSMENT — PAIN DESCRIPTION - FREQUENCY: FREQUENCY: CONTINUOUS

## 2022-01-25 ASSESSMENT — PAIN DESCRIPTION - DESCRIPTORS: DESCRIPTORS: CONSTANT

## 2022-01-25 ASSESSMENT — PAIN DESCRIPTION - ONSET: ONSET: ON-GOING

## 2022-01-25 ASSESSMENT — PAIN - FUNCTIONAL ASSESSMENT: PAIN_FUNCTIONAL_ASSESSMENT: ACTIVITIES ARE NOT PREVENTED

## 2022-01-25 ASSESSMENT — PAIN DESCRIPTION - PROGRESSION: CLINICAL_PROGRESSION: NOT CHANGED

## 2022-01-25 ASSESSMENT — PAIN DESCRIPTION - ORIENTATION: ORIENTATION: LEFT

## 2022-01-25 ASSESSMENT — PAIN DESCRIPTION - PAIN TYPE: TYPE: ACUTE PAIN

## 2022-01-25 NOTE — PROGRESS NOTES
Per patient who stated that he had 2 small diarrhea type of stool around 12am and 3am. Patient also states that he tolerated his clears w/o N/V

## 2022-01-25 NOTE — DISCHARGE INSTR - ACTIVITY
Activity as tolerated    Call Doctor:   If you have a fever  You are vomiting  You have new or increased abdominal pain  You are unable to pass stool or gas

## 2022-01-25 NOTE — PROGRESS NOTES
[facility]  Office: [address], DO, Talat Lewis, DO, Vicki Chavez, DO, Praneeth Ontiveros Blood, DO, Antelmo Morales MD, Zeina Inman MD, Alice Cummings MD, Alem Grigsby MD, Ronda Mejia MD, Chris Esteban MD, Monica Horner MD, Ildefonso Saeed, DO, Darío Salazar, DO, Nohemi Slade MD,  Fabiola Nevarez DO, Eliezer Washington MD, Arabella Crow MD, Jamari Lane MD, Delaney Robison MD, Parvez Vaughn MD, Sara Baldwin MD, Betty Whitmore MD, Terrance [facility], [facility], CNP, Joe Gusman, CNP, Catracho Langford, CNS, Kristie Delaney, CNP, Neeta Wagner, CNP, Lori Napier, CNP, Nadia Swenson, CNP, Dalton Pulido, CNP, Vanessa Ramey PA-C, Resee Diaz, [facility], Vidya Bartlett, [facility], Nargis Berumen, CNP, Dante Sullivan, CNP, Yvette Quinones, CNP, Patricia Rojas, CNP, Claudette David, CNP, Nancy [address]    Progress Note    1/25/2022    12:18 PM    Name:   Concha Miranda  MRN:     [id number]     [place]:      [de-identified]   Room:   2001/2001-02  IP Day:  3  Admit Date:  1/22/2022  6:44 AM    PCP:   Shira Garcia MD  Code Status:  Full Code    Subjective:     C/C:   Chief Complaint   Patient presents with    Abdominal Pain     Interval History Status:   Improved  No pain  No cramping  Doing well with full liquid diet  Requesting liberalization of his diet  Reports two stools / last 24 hrs   Hoping to go home today    Data Base Updates:  WBC6.6k/uL RBC4.56m/uL Bialkwjsyf05.0     Weqkfjh409 High mg/dL     BUN7mg/dL   CREATININE0.68 Low      Potassium3. 3 Low        Brief History:     As documented in the medical record:  \"Jim Dennis is a 64 y.o.  Non- / non  male with a history of recurrent SBO due to intra-abdominal adhesions, NICMP with AICD, chronic CHF, type 2DM, paroxysmal afib, DVT on eliquis and ventricular tachycardia and ROHIT who presents with a 3 day history of progressively worsening abdominal pain, distention and nausea and is admitted to the hospital for the management of Small bowel obstruction. He reports that he stopped passing gas yesterday and was concerned for another bowel obstruction. He has multiple laparotomies with lysis of adhesions. He has seen Dr. Prosper Eduardo as his surgeon in the past who has since retired. He began vomiting in the ER and an NG tube was placed to suction with over 300ml of gastric contents out already. During my assessment he states he is feeling a little better but is still having quite a bit of nausea and abdominal pain.  Patient has a known history of Fatty liver with chronically elevated LFTs \"    The intitial assessment and plan included:  \"    * (Principal) SBO (small bowel obstruction) ([address] 1/22/2022 Yes     Ventricular ectopy 1/22/2022 Yes     Type 2 diabetes mellitus, with long-term current use of insulin ([address] 1/22/2022 Yes     Nausea and vomiting 1/22/2022 Yes     Nonischemic cardiomyopathy ([address] 1/22/2022 Yes     Essential hypertension 1/22/2022 Yes     Obesity (BMI 30-39.9) 1/22/2022 Yes     Abnormal LFTs 1/22/2022 Yes     History of small bowel obstruction (Chronic) 1/22/2022 Yes     Overview Signed 4/5/2019  5:42 AM by FABIANO Barrett CNP       Due to adhesions              History of atrial fibrillation 1/22/2022 Yes     Sleep apnea 1/22/2022 Yes     Overview Signed 1/22/2022  1:43 PM by FABIANO Lemus NP       uses CPAP nightly           GERD (gastroesophageal reflux disease) 1/22/2022 Yes     Chronic diastolic CHF (congestive heart failure) ([address] 1/22/2022 Yes          Plan   Continue conservative management as per surgery plan  OOB to chair and ambulation  Monitor and replace electrolytes as needed  Surgery following the patient  Check CXR  Urine analysis  Diet as tolerated\"       Past Medical History:   has a past medical history of ADHD, predominantly hyperactive type, Anemia, Bowel obstruction ([address] CAD (coronary artery disease), Cardiomyopathy ([address] Chronic diastolic CHF (congestive heart failure) ([facility] Diabetes mellitus ([facility] Diverticulitis, GERD (gastroesophageal reflux disease), History of atrial fibrillation, history of Blood clot due to device, implant, or graft, History of ventricular tachycardia, Kidney calculi, Obesity, and Sleep apnea. Social History:   reports that he quit smoking about 9 years ago. He has a 2.00 pack-year smoking history. He has never used smokeless tobacco. He reports current alcohol use. He reports that he does not use drugs. Family History:   Family History   Problem Relation Age of Onset    Heart Disease Mother     Heart Disease Father        Review of Systems:     Review of Systems   Constitutional: Positive for appetite change (Improving). Respiratory: Negative for cough and shortness of breath. Cardiovascular: Negative for chest pain and palpitations. Gastrointestinal: Negative for abdominal distention, abdominal pain (Resolved), nausea and vomiting. The patient reports 2 small stools in the last 24 hours   Genitourinary: Negative for flank pain and hematuria. Physical Examination:        Vitals  /76   Pulse 56   Temp 98.2 °F (36.8 °C) (Oral)   Resp 16   Ht 6' 1\" (1.854 m)   Wt (!) 306 lb (138.8 kg)   SpO2 97%   BMI 40.37 kg/m²   Temp (24hrs), Av.3 °F (36.8 °C), Min:97.7 °F (36.5 °C), Max:99.3 °F (37.4 °C)    Recent Labs     22  1604 22  1941 22  0642 22  1108   POCGLU 185* 193* 211* 248*       Physical Exam  Vitals reviewed. Constitutional:       General: He is not in acute distress. Appearance: He is not diaphoretic. HENT:      Head: Normocephalic. Nose: Nose normal.   Eyes:      General: No scleral icterus. Conjunctiva/sclera: Conjunctivae normal.   Neck:      Trachea: No tracheal deviation. Cardiovascular:      Rate and Rhythm: Normal rate and regular rhythm. Pulmonary:      Effort: Pulmonary effort is normal. No respiratory distress.       Breath sounds: Normal breath sounds. No wheezing or rales. Chest:      Chest wall: No tenderness. Abdominal:      General: Bowel sounds are normal. There is no distension. Palpations: Abdomen is soft. Tenderness: There is no abdominal tenderness. There is no guarding. Musculoskeletal:         General: No tenderness. Cervical back: Neck supple. Skin:     General: Skin is warm and dry. Neurological:      Mental Status: He is alert and oriented to person, place, and time. Medications: Allergies:     Allergies   Allergen Reactions    Ciprofloxacin      Prolonged QT and developed VT. 2/2018  Other reaction(s): Unknown    Codeine      Other reaction(s): Abnormal Behavior  Other reaction(s): Unknown    Diphenhydramine Hives     Other reaction(s): Unknown    Morphine Other (See Comments)     Confusion       Penicillins Hives and Swelling     Other reaction(s): Unknown       Current Meds:   Scheduled Meds:    lisinopril  2.5 mg Oral Daily    sotalol  160 mg Oral BID    metoprolol succinate  50 mg Oral BID    alogliptin  6.25 mg Oral Daily    [START ON 1/26/2022] pantoprazole  40 mg Oral QAM AC    dapagliflozin  10 mg Oral QAM    tamsulosin  0.4 mg Oral Daily    Dulaglutide  0.75 mg SubCUTAneous Weekly    pravastatin  10 mg Oral Daily    magnesium gluconate  500 mg Oral Daily    potassium chloride  10 mEq Oral Daily    sucralfate  1 g Oral 4x Daily    insulin lispro  0-6 Units SubCUTAneous TID WC    insulin lispro  0-3 Units SubCUTAneous Nightly    sodium chloride  80 mL IntraVENous Once    sodium chloride flush  5-40 mL IntraVENous 2 times per day    enoxaparin  30 mg SubCUTAneous BID     Continuous Infusions:    dextrose      dextrose 5% in lactated ringers 50 mL/hr at 01/25/22 0950    sodium chloride       PRN Meds: colchicine, tiZANidine, ondansetron, potassium chloride **OR** potassium alternative oral replacement **OR** potassium chloride, glucose, glucagon (rDNA), dextrose, dextrose bolus (hypoglycemia) **OR** dextrose bolus (hypoglycemia), sodium chloride flush, sodium chloride flush, sodium chloride, potassium chloride, magnesium sulfate, promethazine, ondansetron, HYDROmorphone **OR** HYDROmorphone    Data:     I/O (24Hr): Intake/Output Summary (Last 24 hours) at 1/25/2022 1218  Last data filed at 1/24/2022 1715  Gross per 24 hour   Intake 1107.38 ml   Output 1000 ml   Net 107.38 ml       Labs:  Hematology:  Recent Labs     01/23/22  0519 01/24/22  0611 01/25/22  0654   WBC 7.8 7.5 6.6   RBC 4.46 4.49 4.56   HGB 12.8* 12.8* 13.0   HCT 38.1* 38.1* 38.8*   MCV 85.4 84.9 85.1   MCH 28.7 28.5 28.5   MCHC 33.6 33.6 33.5   RDW 18.2* 17.6* 17.6*    138 147   MPV 9.9 9.5 9.6   INR 1.4  --   --      Chemistry:  Recent Labs     01/23/22  0519 01/23/22  0519 01/23/22  1505 01/23/22  1807 01/24/22  0611 01/24/22  1343 01/25/22  0654      < > 135  --  137  --  136   K 3.4*   < > 3.6*   < > 3.5* 3.6* 3.3*      < > 102  --  101  --  99   CO2 23   < > 21  --  23  --  26   GLUCOSE 129*   < > 169*  --  189*  --  218*   BUN 20   < > 18  --  11  --  7   CREATININE 0.80   < > 0.78  --  0.70  --  0.68*   MG 1.7  --   --   --  1.6  --  1.8   ANIONGAP 12   < > 12  --  13  --  11   LABGLOM >60   < > >60  --  >60  --  >60   GFRAA >60   < > >60  --  >60  --  >60   CALCIUM 8.4*   < > 8.4*  --  8.5*  --  8.7    < > = values in this interval not displayed.      Recent Labs     01/23/22  0519 01/23/22  0600 01/24/22  0558 01/24/22  0611 01/24/22  1131 01/24/22  1604 01/24/22  1941 01/25/22  0642 01/25/22  0654 01/25/22  1108   PROT 6.8  --   --  6.6  --   --   --   --  6.9  --    LABALBU 3.9  --   --  3.9  --   --   --   --  4.1  --    AST 18  --   --  17  --   --   --   --  17  --    ALT 34  --   --  26  --   --   --   --  24  --    ALKPHOS 63  --   --  64  --   --   --   --  66  --    BILITOT 2.54*  --   --  2.18*  --   --   --   --  2.17*  --    AMYLASE 16*  --   --   --   -- --   --   --   --   --    POCGLU  --    < > 183*  --  190* 185* 193* 211*  --  248*    < > = values in this interval not displayed. ABG:No results found for: POCPH, PHART, PH, POCPCO2, CBO0TEQ, PCO2, POCPO2, PO2ART, PO2, POCHCO3, IPW4BZO, HCO3, NBEA, PBEA, BEART, BE, THGBART, THB, FGA1FJU, PPYQ0GJV, B5RJJSVF, O2SAT, FIO2  Lab Results   Component Value Date/Time    SPECIAL NOT REPORTED 06/12/2020 09:05 PM     Lab Results   Component Value Date/Time    CULTURE NO SIGNIFICANT GROWTH 02/01/2018 06:00 PM    CULTURE  02/01/2018 06:00 PM     Performed at [address] [phone]       Radiology:  XR ABDOMEN (KUB) (SINGLE AP VIEW)    Result Date: 1/24/2022  No aerated dilated loops of small bowel are visualized today, either due to improvement versus interval fluid filling of the small bowel loops. Recommend attention on follow-up. Normal caliber colon containing residual enteric contrast.     XR ABDOMEN (KUB) (SINGLE AP VIEW)    Result Date: 1/23/2022  1. Dilated small bowel loops measuring up to 4.9 cm within the left mid and central abdomen. Differential considerations include ileus or partial small bowel obstruction. 2. Residual contrast and air throughout the transverse, descending and rectosigmoid colon. 3. Prior cholecystectomy. 4. NG tube partially visualized projecting over the left upper quadrant likely within the body of the stomach     CT ABDOMEN PELVIS W IV CONTRAST Additional Contrast? Oral    Result Date: 1/22/2022  1. Findings above concerning for small bowel obstruction with sudden transition point/caliber change in the central abdomen in the mid small bowel. 2. Nonvisualization of the appendix. 3. Stable left iliac chain lymph node measuring up to 1.3 cm. 4. Fatty liver. Prior cholecystectomy. 5. Stable midpole right renal cyst measuring 4.2 cm. No obstructing calculus or hydronephrosis.      XR CHEST PORTABLE    Result Date: 1/24/2022  No acute intrathoracic process. XR ABDOMEN FOR NG/OG/NE TUBE PLACEMENT    Result Date: 1/24/2022  1. Nasogastric tube tip and side port are coiled within the fundus. 2. Please refer to dedicated CT dated January 22, 2020 for details of the small bowel. XR ABDOMEN FOR NG/OG/NE TUBE PLACEMENT    Result Date: 1/22/2022  1. NG tube in place, coiled within the gastric fundus. Tip is directed medially, towards the GE junction       Assessment:        Primary Problem  SBO (small bowel obstruction) ([facility]    Active Hospital Problems    Diagnosis Date Noted    Uncontrolled type 2 diabetes mellitus with hyperglycemia [facility] [E11.65] 01/25/2022    Fatty liver [K76.0] 01/25/2022    Obesity, Class III, BMI 40-49.9 (morbid obesity) ([facility] [E66.01] 01/25/2022    Hypokalemia [E87.6] 01/25/2022    GERD (gastroesophageal reflux disease) [K21.9] 01/22/2022    Chronic diastolic CHF (congestive heart failure) ([facility] [I50.32] 01/22/2022    Sleep apnea [G47.30] 01/22/2022    History of small bowel obstruction [Z87.19] 04/05/2019    History of atrial fibrillation [Z86.79]     SBO (small bowel obstruction) ([facility] [K56.609]     Abnormal LFTs [R94.5] 10/02/2018    Essential hypertension [I10] 09/26/2018    Obesity (BMI 30-39. 9) [E66.9] 09/26/2018    Nonischemic cardiomyopathy ([facility] [I42.8] 02/01/2018    Nausea and vomiting [R11.2] 04/12/2016    Type 2 diabetes mellitus, with long-term current use of insulin ([facility] [E11.9, Z79.4] 07/20/2014    Ventricular ectopy [I49.3] 07/10/2014         Plan:        The patient is responding well to conservative treatment  Diet as tolerated  Aspirin on hold  Eliquis on hold  Activity as tolerated  Correct electrolyte abnormalities  Blood Pressure - Monitor and control  Risk factor management / weight loss    Reflux precautions   DVT prophylaxis  Discharge planning  Will discharge when arrangements complete and ok with other services.   Follow-up with PCP in one week, Mita Quigley MD  Notify PCP of discharge   Surgical eval & f/u as scheduled Dr Regina Faulkner,   1/25/2022  12:18 PM

## 2022-01-25 NOTE — PLAN OF CARE
Problem: Bowel/Gastric:  Goal: Control of bowel function will improve  Description: Control of bowel function will improve  Outcome: Ongoing     Problem:  Bowel/Gastric:  Goal: Ability to achieve a regular elimination pattern will improve  Description: Ability to achieve a regular elimination pattern will improve  Outcome: Ongoing     Problem: Nutritional:  Goal: Ability to follow a diet with enough fiber (20 to 30 grams) for normal bowel function will improve  Description: Ability to follow a diet with enough fiber (20 to 30 grams) for normal bowel function will improve  Outcome: Ongoing     Problem: Pain:  Goal: Pain level will decrease  Description: Pain level will decrease  1/24/2022 2319 by Yessy Frias RN  Outcome: Ongoing     Problem: Pain:  Goal: Control of acute pain  Description: Control of acute pain  1/24/2022 2319 by Yessy Frias RN  Outcome: Ongoing

## 2022-01-25 NOTE — PLAN OF CARE
Problem: Pain:  Goal: Control of acute pain  Description: Control of acute pain  1/25/2022 1140 by Venessa Kimble RN  Outcome: Ongoing  Note: Pt medicated with pain medication prn. Assessed all pain characteristics including level, type, location, frequency, and onset. Non-pharmacologic interventions offered to pt as well. Pt states pain is tolerable at this time. Will continue to monitor      Problem: Daily Care:  Goal: Daily care needs are met  Description: Daily care needs are met  1/25/2022 1140 by Venessa Kimble RN  Outcome: Ongoing     Problem: Safety:  Goal: Free from accidental physical injury  Description: Free from accidental physical injury  1/25/2022 1140 by Venessa Kimble RN  Outcome: Ongoing     Problem: Discharge Planning:  Goal: Patients continuum of care needs are met  Description: Patients continuum of care needs are met  1/25/2022 1140 by Venessa Kimble RN  Outcome: Ongoing     Problem: Falls - Risk of:  Goal: Will remain free from falls  Description: Will remain free from falls  1/25/2022 1140 by Venessa Kimble RN  Outcome: Ongoing     Problem: Bowel/Gastric:  Goal: Ability to achieve a regular elimination pattern will improve  Description: Ability to achieve a regular elimination pattern will improve  1/25/2022 1140 by Venessa Kimble RN  Outcome: Ongoing  Note: Bowel sounds remain hypoactive at this time. Patient has passed very small amount of stool yesterday. Currently patient is tolerating full liquid diet. Patient ambulates frequently in halls.       Problem: Nutritional:  Goal: Ability to follow a diet with enough fiber (20 to 30 grams) for normal bowel function will improve  Description: Ability to follow a diet with enough fiber (20 to 30 grams) for normal bowel function will improve  1/25/2022 1140 by Venessa Kimble RN  Outcome: Ongoing

## 2022-01-25 NOTE — PROGRESS NOTES
General Surgery:  Daily Progress Note         PATIENT NAME: Ginny Macias     TODAY'S DATE: 1/25/2022, 6:32 AM  SUBJECTIVE:     Pt seen and examined at bedside. No acute events overnight. Saying he feels \"tingly\" all over. No dizziness or lightheadedness. Patient reports passing flatus, having multiple small BMs. OBJECTIVE:   VITALS:  BP (!) 142/47   Pulse (!) 32   Temp 97.7 °F (36.5 °C) (Oral)   Resp 22   Ht 6' 1\" (1.854 m)   Wt (!) 306 lb (138.8 kg)   SpO2 97%   BMI 40.37 kg/m²      INTAKE/OUTPUT:      Intake/Output Summary (Last 24 hours) at 1/25/2022 3516  Last data filed at 1/24/2022 1715  Gross per 24 hour   Intake 1107.38 ml   Output 1450 ml   Net -342.62 ml       PHYSICAL EXAM:  General Appearance: awake, alert, oriented, in no acute distress  HEENT:  Normocephalic, atraumatic, mucus membranes moist  Heart: bradycardic, regular rhythm  Lungs: Unlabored respirations  Abdomen: Obese, soft, nonTTP, improved distention   Extremities: No cyanosis, pitting edema, rashes noted.       Data:  CBC with Differential:    Lab Results   Component Value Date    WBC 7.5 01/24/2022    RBC 4.49 01/24/2022    HGB 12.8 01/24/2022    HCT 38.1 01/24/2022     01/24/2022    MCV 84.9 01/24/2022    MCH 28.5 01/24/2022    MCHC 33.6 01/24/2022    RDW 17.6 01/24/2022    LYMPHOPCT 9 01/22/2022    MONOPCT 8 01/22/2022    BASOPCT 1 01/22/2022    MONOSABS 0.82 01/22/2022    LYMPHSABS 0.96 01/22/2022    EOSABS 0.10 01/22/2022    BASOSABS 0.07 01/22/2022    DIFFTYPE NOT REPORTED 01/22/2022     BMP:    Lab Results   Component Value Date     01/24/2022    K 3.6 01/24/2022     01/24/2022    CO2 23 01/24/2022    BUN 11 01/24/2022    LABALBU 3.9 01/24/2022    CREATININE 0.70 01/24/2022    CALCIUM 8.5 01/24/2022    GFRAA >60 01/24/2022    LABGLOM >60 01/24/2022    GLUCOSE 189 01/24/2022       ASSESSMENT:  Active Hospital Problems    Diagnosis Date Noted    GERD (gastroesophageal reflux disease) [K21.9] 01/22/2022

## 2022-01-25 NOTE — PROGRESS NOTES
Transitions of Care Pharmacy Service   Medication Review    The patient's list of current home medications has been reviewed and updated. Source(s) of information: Patient/[facility]    Please feel free to call with any questions about this encounter. Thank you. Vane Wiggins, [facility]  Transitions of Care Pharmacy Service  Phone:  [phone]  Fax: [phone]      Prior to Admission medications    Medication Sig Start Date End Date Taking? Authorizing Provider   dapagliflozin (FARXIGA) 10 MG tablet Take 10 mg by mouth every morning   Yes Historical Provider, MD   ondansetron (ZOFRAN-ODT) 4 MG disintegrating tablet Take 1 tablet by mouth 3 times daily as needed for Nausea or Vomiting 1/5/29  Yes Myrna Wadsworth MD   metoprolol succinate (TOPROL XL) 50 MG extended release tablet Take 50 mg by mouth 2 times daily   Yes Historical Provider, MD   magnesium gluconate (MAGONATE) 500 MG tablet Take 500 mg by mouth daily   Yes Historical Provider, MD   aspirin EC 81 MG EC tablet Take 81 mg by mouth daily   Yes Historical Provider, MD   apixaban (ELIQUIS) 5 MG TABS tablet Take 5 mg by mouth 2 times daily  6/21/18  Yes Historical Provider, MD   pravastatin (PRAVACHOL) 10 MG tablet Take 10 mg by mouth daily  9/6/18  Yes Historical Provider, MD   allopurinol (ZYLOPRIM) 300 MG tablet Take 300 mg by mouth daily 1/12/18  Yes Historical Provider, MD   metFORMIN (GLUCOPHAGE) 1000 MG tablet Take 1,000 mg by mouth daily   Yes Historical Provider, MD   Dulaglutide (TRULICITY) 4.06 NQ/5.8BU SOPN Inject 0.75 mg into the skin once a week Indications: Mondays Mondays   Yes Historical Provider, MD   tamsulosin (FLOMAX) 0.4 MG capsule Take 1 capsule by mouth daily 3/4/16  Yes Spring Pruitt MD   sitaGLIPtan (JANUVIA) 100 MG tablet Take 100 mg by mouth daily. Yes Historical Provider, MD   omeprazole (PRILOSEC) 20 MG capsule Take 20 mg by mouth daily.    Yes Historical Provider, MD   sotalol (BETAPACE) 160 MG tablet Take 160 mg by mouth 2 times daily    Yes Historical Provider, MD   lisinopril (PRINIVIL;ZESTRIL) 2.5 MG tablet Take 2.5 mg by mouth daily    Yes Historical Provider, MD

## 2022-01-26 NOTE — DISCHARGE SUMMARY
[facility]  Office: [address], DO, Maria Del Carmen March, DO, Rubio Fouzia, DO, Augusto Wood Butler, DO, Heath Melendez MD, Milton Lemos MD, Shaniqua Fairchild MD, Anastacio Troy MD, Nova Chao MD, Jhony Means MD, Bhavin Soto MD, Talia Sim, DO, Latasha Ramos, DO, Lacho Obrien MD,  Ara Encinas, DO, Jane Ramos MD, Lisa Zavala MD, Deb Mahmood MD, Mariama Hidalgo MD, Maribell Martinez MD, Michelle Sagastume MD, Sabina Donohue MD, Benjy Steen, [facility], [facility], CNP, Derik Yi, CNP, Cruz Anand, CNS, Radha Benitez, CNP, Sunny Hamlin, CNP, Tyra Rodrigez, CNP, Lien Sandoval, CNP, Steven Herron, CNP, Alexei Feliz PA-C, Catracho Daily, DNP, Claudette Anis, [facility], Justin Ramsay, CNP, Anand Whyte, CNP, [facility], CNP, Carolina Barnett CNP, Mike Lehman, [facility], Carmen Lafleur, [address] [place] [address]    Discharge Summary    Patient ID: Josephine Rhoades  :  1965   MRN: [id number]     ACCOUNT:  [de-identified]   Patient's PCP: Delmy Coronel MD  Admit Date: 2022   Discharge Date: 2022    Discharge Physician: Jurgen Esparza DO     The patient was seen and examined on day of discharge and this discharge summary is in conjunction with any daily progress note from day of discharge.     Active Discharge Diagnoses:     Primary Problem  SBO (small bowel obstruction) [facility])      Hospital Problems  Active Hospital Problems    Diagnosis Date Noted    Uncontrolled type 2 diabetes mellitus with hyperglycemia ([facility] [E11.65] 2022    Fatty liver [K76.0] 2022    Obesity, Class III, BMI 40-49.9 (morbid obesity) ([facility] [E66.01] 2022    Hypokalemia [E87.6] 2022    GERD (gastroesophageal reflux disease) [K21.9] 2022  Chronic diastolic CHF (congestive heart failure) ([address] [I50.32] 01/22/2022    Sleep apnea [G47.30] 01/22/2022    History of small bowel obstruction [Z87.19] 04/05/2019    History of atrial fibrillation [Z86.79]     SBO (small bowel obstruction) ([address] [K56.609]     Abnormal LFTs [R94.5] 10/02/2018    Essential hypertension [I10] 09/26/2018    Obesity (BMI 30-39. 9) [E66.9] 09/26/2018    Nonischemic cardiomyopathy ([address] [I42.8] 02/01/2018    Nausea and vomiting [R11.2] 04/12/2016    Type 2 diabetes mellitus, with long-term current use of insulin ([address] [E11.9, Z79.4] 07/20/2014    Ventricular ectopy [I49.3] 07/10/2014         Hospital Course:     Brief History  As documented in the medical record:  \"Jim Ham is a 64 y.o. Non- / non  male with a history of recurrent SBO due to intra-abdominal adhesions, NICMP with AICD, chronic CHF, type 2DM, paroxysmal afib, DVT on eliquis and ventricular tachycardia and ROHIT who presents with a 3 day history of progressively worsening abdominal pain, distention and nausea and is admitted to the hospital for the management of Small bowel obstruction. He reports that he stopped passing gas yesterday and was concerned for another bowel obstruction. He has multiple laparotomies with lysis of adhesions. He has seen Dr. Myriam Costello as his surgeon in the past who has since retired. He began vomiting in the ER and an NG tube was placed to suction with over 300ml of gastric contents out already. During my assessment he states he is feeling a little better but is still having quite a bit of nausea and abdominal pain.  Patient has a known history of Fatty liver with chronically elevated LFTs \"     The intitial assessment and plan included:  \"    * (Principal) SBO (small bowel obstruction) ([address] 1/22/2022 Yes     Ventricular ectopy 1/22/2022 Yes     Type 2 diabetes mellitus, with long-term current use of insulin ([address] 1/22/2022 Yes     Nausea and vomiting 1/22/2022 01/23/22  1505 01/23/22  1807 01/24/22  0611 01/24/22  1343 01/25/22  0654      < > 135  --  137  --  136   K 3.4*   < > 3.6*   < > 3.5* 3.6* 3.3*      < > 102  --  101  --  99   CO2 23   < > 21  --  23  --  26   GLUCOSE 129*   < > 169*  --  189*  --  218*   BUN 20   < > 18  --  11  --  7   CREATININE 0.80   < > 0.78  --  0.70  --  0.68*   MG 1.7  --   --   --  1.6  --  1.8   ANIONGAP 12   < > 12  --  13  --  11   LABGLOM >60   < > >60  --  >60  --  >60   GFRAA >60   < > >60  --  >60  --  >60   CALCIUM 8.4*   < > 8.4*  --  8.5*  --  8.7    < > = values in this interval not displayed. Recent Labs     01/23/22  0519 01/23/22  0600 01/24/22  0558 01/24/22  0611 01/24/22  1131 01/24/22  1604 01/24/22  1941 01/25/22  0642 01/25/22  0654 01/25/22  1108 01/25/22  1556   PROT 6.8  --   --  6.6  --   --   --   --  6.9  --   --    LABALBU 3.9  --   --  3.9  --   --   --   --  4.1  --   --    AST 18  --   --  17  --   --   --   --  17  --   --    ALT 34  --   --  26  --   --   --   --  24  --   --    ALKPHOS 63  --   --  64  --   --   --   --  66  --   --    BILITOT 2.54*  --   --  2.18*  --   --   --   --  2.17*  --   --    AMYLASE 16*  --   --   --   --   --   --   --   --   --   --    POCGLU  --    < >   < >  --  190* 185* 193* 211*  --  248* 227*    < > = values in this interval not displayed. Radiology:    XR ABDOMEN (KUB) (SINGLE AP VIEW)    Result Date: 1/24/2022  EXAMINATION: ONE SUPINE XRAY VIEW(S) OF THE ABDOMEN 1/24/2022 7:02 am COMPARISON: 01/23/2022, 01/22/2022 HISTORY: ORDERING SYSTEM PROVIDED HISTORY: sbo TECHNOLOGIST PROVIDED HISTORY: sbo Reason for Exam: Follow up SBO. FINDINGS: Enteric tube tip and proximal side port terminate within the proximal stomach. No aerated dilated loops of small bowel are identified on today's exam.  Residual enteric contrast in the normal caliber colon extending to the rectum. No gross pneumoperitoneum. Cholecystectomy clips. No acute bony findings. No aerated dilated loops of small bowel are visualized today, either due to improvement versus interval fluid filling of the small bowel loops. Recommend attention on follow-up. Normal caliber colon containing residual enteric contrast.     XR ABDOMEN (KUB) (SINGLE AP VIEW)    Result Date: 1/23/2022  EXAMINATION: ONE SUPINE XRAY VIEW(S) OF THE ABDOMEN 1/23/2022 7:00 am COMPARISON: 01/22/2022, 1755 hours HISTORY: ORDERING SYSTEM PROVIDED HISTORY: SBO TECHNOLOGIST PROVIDED HISTORY: SBO Reason for Exam: sbo 59-year-old male with small bowel obstruction FINDINGS: Portable supine view of the abdomen. Dilated small bowel loops measuring up to 4.9 cm within the left mid and central abdomen. Residual contrast and air throughout the transverse, descending, and rectosigmoid colon as well as the rectal vault. Prior cholecystectomy. NG tube partially visualized projecting over the left upper quadrant likely within the body of the stomach. Contrast outlining the region of the urinary bladder. Moderate degenerative changes throughout the spine. 1. Dilated small bowel loops measuring up to 4.9 cm within the left mid and central abdomen. Differential considerations include ileus or partial small bowel obstruction. 2. Residual contrast and air throughout the transverse, descending and rectosigmoid colon. 3. Prior cholecystectomy. 4. NG tube partially visualized projecting over the left upper quadrant likely within the body of the stomach     CT ABDOMEN PELVIS W IV CONTRAST Additional Contrast? Oral    Result Date: 1/22/2022  EXAMINATION: CT OF THE ABDOMEN AND PELVIS WITH CONTRAST 1/22/2022 8:47 am TECHNIQUE: CT of the abdomen and pelvis was performed with the administration of intravenous contrast. Multiplanar reformatted images are provided for review. Dose modulation, iterative reconstruction, and/or weight based adjustment of the mA/kV was utilized to reduce the radiation dose to as low as reasonably achievable. COMPARISON: 12/04/2020 HISTORY: ORDERING SYSTEM PROVIDED HISTORY: Small bowel obstruction TECHNOLOGIST PROVIDED HISTORY: Small bowel obstruction Decision Support Exception - unselect if not a suspected or confirmed emergency medical condition->Emergency Medical Condition (MA) Reason for Exam: Small bowel obstruction, left side abd pain, nausea, vomiting, hx SBO 41-year-old male with suspected small bowel obstruction; left-sided abdominal pain, nausea, and vomiting. FINDINGS: Lower Chest: No free intra-abdominal air. Cardiac pacemaker device in place. Organs: Fatty infiltration of the liver. Prior cholecystectomy. Adrenal glands, spleen, pancreas and left kidney grossly unremarkable in appearance. Stable midpole right renal cyst measures 4.2 cm on image 86, series 2. Mild bilateral perinephric fluid. No obstructing calculus, hydronephrosis, or hydroureter. GI/Bowel: Multiple dilated fluid-filled small bowel loops with a sudden transition point/caliber change in the central abdomen on image 129, series 2. Dilated fluid-filled small bowel loops measure up to 4.8 cm in greatest caliber on image 82, series 2. Mild stool burden. Nonvisualization of the appendix. The stomach is filled with contrast.  Relative dilution of oral contrast throughout the proximal and mid small bowel loops. Relative decompression of the distal small bowel loops. Pelvis: Moderate distention of the urinary bladder. No free fluid in the pelvis. Calcifications in the prostate. No inguinal or pelvic sidewall lymphadenopathy. Peritoneum/Retroperitoneum: Abdominal aorta normal in appearance and caliber. No retroperitoneal lymphadenopathy. Psoas muscles normal in size and symmetric in appearance. Stable left iliac chain lymph node measuring up to 1.3 cm on image 114, series 2. Bones/Soft Tissues: Moderate degenerative changes throughout the spine. Multilevel Schmorl's node deformities.      1. Findings above concerning for small bowel obstruction with sudden transition point/caliber change in the central abdomen in the mid small bowel. 2. Nonvisualization of the appendix. 3. Stable left iliac chain lymph node measuring up to 1.3 cm. 4. Fatty liver. Prior cholecystectomy. 5. Stable midpole right renal cyst measuring 4.2 cm. No obstructing calculus or hydronephrosis. XR CHEST PORTABLE    Result Date: 1/24/2022  EXAMINATION: ONE XRAY VIEW OF THE CHEST 1/24/2022 12:11 pm COMPARISON: 12/04/2020 HISTORY: ORDERING SYSTEM PROVIDED HISTORY: Fever, evaluate for pneumonia TECHNOLOGIST PROVIDED HISTORY: Fever, evaluate for pneumonia Reason for Exam: Fever, evaluate for pneumonia Additional signs and symptoms: Fever, evaluate for pneumonia FINDINGS: Left chest pacemaker device is noted. Cardiomediastinal silhouette and pulmonary vasculature are within normal limits. No focal airspace consolidation, pneumothorax, or pleural effusion. No free air beneath the diaphragm. No acute osseous abnormality. No acute intrathoracic process. XR ABDOMEN FOR NG/OG/NE TUBE PLACEMENT    Result Date: 1/24/2022  EXAMINATION: ONE SUPINE XRAY VIEW(S) OF THE ABDOMEN 1/22/2022 3:59 pm COMPARISON: June 12, 2020. CT dated January 22, 2022. HISTORY: ORDERING SYSTEM PROVIDED HISTORY: Confirmation of course of NG/OG/NE tube and location of tip of tube TECHNOLOGIST PROVIDED HISTORY: Confirmation of course of NG/OG/NE tube and location of tip of tube Portable? ->Yes Reason for Exam: Confirmation on NG tube placement FINDINGS: The nasogastric tube tip and side port are coiled within the fundus. The small bowel is not well evaluated on the current exam.  Mildly prominent loops are seen in the upper abdomen. No acute osseous abnormality. 1. Nasogastric tube tip and side port are coiled within the fundus. 2. Please refer to dedicated CT dated January 22, 2020 for details of the small bowel.      XR ABDOMEN FOR NG/OG/NE TUBE PLACEMENT    Result Date: 1/22/2022  EXAMINATION: ONE SUPINE XRAY VIEW(S) OF THE ABDOMEN 1/22/2022 3:04 pm COMPARISON: January 22nd 1550 hours HISTORY: ORDERING SYSTEM PROVIDED HISTORY: Confirmation of course of NG/OG/NE tube and location of tip of tube TECHNOLOGIST PROVIDED HISTORY: Confirmation of course of NG/OG/NE tube and location of tip of tube Portable? ->Yes Reason for Exam: NG tube placement Additional signs and symptoms: NG tube placement FINDINGS: NG tube is in place, coiled within the gastric fundus. Tip is directed medially towards the GE junction. Air-filled loops of small and large bowel are present. Atelectatic changes present within the lung bases. Small left pleural effusion is present. 1. NG tube in place, coiled within the gastric fundus.   Tip is directed medially, towards the GE junction         Consultations:    Consults:     Final Specialist Recommendations/Findings:   IP CONSULT TO GENERAL SURGERY  IP CONSULT TO HOSPITALIST        Discharged Condition:    Stable     Disposition: Home    Physician Follow Up:   [place], [address]  [phone]    Schedule an appointment as soon as possible for a visit in 1 week  call office to schedule a follow up appointment in one week     Manjit Donohue, [address] [place] [id number] [phone]    Schedule an appointment as soon as possible for a visit  call office to schedule a follow up appointment as needed        Activity:  activity as tolerated    Diet:    Diet as tolerated  Diabetic restrictions  Low-sodium     Discharge Medications:      Medication List      CONTINUE taking these medications    allopurinol 300 MG tablet  Commonly known as: ZYLOPRIM     apixaban 5 MG Tabs tablet  Commonly known as: ELIQUIS     aspirin EC 81 MG EC tablet     Farxiga 10 MG tablet  Generic drug: dapagliflozin     lisinopril 2.5 MG tablet  Commonly known as: PRINIVIL;ZESTRIL     magnesium gluconate 500 MG tablet  Commonly known as: Genaro Ocampo metFORMIN 1000 MG tablet  Commonly known as: GLUCOPHAGE     metoprolol succinate 50 MG extended release tablet  Commonly known as: TOPROL XL     omeprazole 20 MG delayed release capsule  Commonly known as: PRILOSEC     ondansetron 4 MG disintegrating tablet  Commonly known as: ZOFRAN-ODT  Take 1 tablet by mouth 3 times daily as needed for Nausea or Vomiting     potassium chloride 10 MEQ extended release tablet  Commonly known as: KLOR-CON M  Take 1 tablet by mouth daily     pravastatin 10 MG tablet  Commonly known as: PRAVACHOL     SITagliptin 100 MG tablet  Commonly known as: JANUVIA     sotalol 160 MG tablet  Commonly known as: BETAPACE     tamsulosin 0.4 MG capsule  Commonly known as: Flomax  Take 1 capsule by mouth daily     Trulicity 7.87 DH/2.5QF Sopn  Generic drug: Dulaglutide           Where to Get Your Medications      These medications were sent to [address] [place] [id number]    Hours: 24-hours Phone: [phone]   · potassium chloride 10 MEQ extended release tablet         Time Spent on discharge is  20 mins in patient examination, evaluation, counseling, medication reconciliation, discharge plan and follow up. Electronically signed by   Polo Tai DO  1/25/2022  11:05 PM      Thank you Dr. Wing Brandon MD for the opportunity to be involved in this patient's care.

## 2022-05-04 NOTE — DISCHARGE SUMMARY
Assessment & Plan     1. Dysuria  - WBC present on urine micro. Educated on urinary tract infection prevention.   - UA Macro with Reflex to Micro and Culture - lab collect  - Urine Microscopic    2. Vaginal itching  - Negative for trichomonas, yeast, clue cells. Likely due to current UTI.   - Wet prep - lab collect    3. Acute cystitis without hematuria  -WBC present on urine micro.   - nitroFURantoin macrocrystal-monohydrate (MACROBID) 100 MG capsule; Take 1 capsule (100 mg) by mouth 2 times daily for 7 days  Dispense: 14 capsule; Refill: 0  Increase fluid intake  Urine culture pending  Patient and her  are trying to conceive.  Recommended that it is okay to continue intercourse as long as it is comfortable for the patient.  Follow-up if symptoms worsen or do improve    Linda Chase MD  [facility] [place]    Subjective    HPI  Regan is a 31 year old female who presents to the clinic today for urinary discomfort. She reports urinary frequency, urinary urgency, and vaginal itching that started one week ago. She denies fevers, chills, blood in urine, and vaginal discharge. She is not concerned for STIs.     She reports she and her  are trying to conceive and so have been having intercourse more frequently.    Review of Systems   Constitutional, HEENT, cardiovascular, pulmonary, gi and gu systems are negative, except as otherwise noted.      Objective    BP (!) 147/91   Pulse 86   Temp 98.5  F (36.9  C) (Oral)   Resp 28   Wt 114.8 kg (253 lb)   SpO2 100%   BMI 42.10 kg/m    Body mass index is 42.1 kg/m .  Physical Exam   GENERAL: healthy, alert and no distress  RESP: lungs clear to auscultation - no rales, rhonchi or wheezes  CV: regular rate and rhythm, normal S1 S2, no S3 or S4, no murmur, click or rub  BACK: No CVA tenderness     Results for orders placed or performed in visit on 05/04/22   UA Macro with Reflex to Micro and Culture - lab collect     Status: Abnormal     [facility]    Discharge Summary     Patient ID: Jesus Manuel Barnett  :  1965   MRN: [id number]     ACCOUNT:  [de-identified]   Patient's PCP: Valarie Blackwood MD  Admit Date: 2019   Discharge Date: 4/10/2019     Length of Stay: 5  Code Status:  Full Code  Admitting Physician: Lester Butler, DO  Discharge Physician: Lester Butler DO     Active Discharge Diagnoses:     Hospital Problem Lists:  Principal Problem:    SBO (small bowel obstruction) ([facility]  Active Problems:    AICD (automatic cardioverter/defibrillator) present    Type 2 diabetes mellitus, with long-term current use of insulin ([facility])    Essential hypertension    Obesity (BMI 30-39. 9)    History of small bowel obstruction    Blood clot due to device, implant, or graft    History of atrial fibrillation  Resolved Problems:    * No resolved hospital problems. *      Admission Condition:  fair     Discharged Condition: good    Hospital Stay:     Hospital Course:  Jesus Manuel Barnett is a 48 y.o. male who was admitted for the management of   SBO (small bowel obstruction) ([facility] , presented to ER with Abdominal Pain (h/o bowel obst)    Admitted with abd pain and found to have recurrent psbo. Kept npo, seen by surgery and diet slowly advanced.   Ultimately pain improved and diet is tolerated    Surgery would like to keep him on reglan as he has done better on it in past      Significant therapeutic interventions: see above    Significant Diagnostic Studies:   Labs / Micro:  CBC:   Lab Results   Component Value Date    WBC 7.0 2019    RBC 4.45 2019    HGB 12.9 2019    HCT 37.2 2019    MCV 83.7 2019    MCH 28.9 2019    MCHC 34.5 2019    RDW 18.7 2019     2019     CMP:    Lab Results   Component Value Date    GLUCOSE 146 04/10/2019     04/10/2019    K 4.3 04/10/2019     04/10/2019    CO2 27 04/10/2019    BUN 12 04/10/2019 CREATININE 0.88 04/10/2019    ANIONGAP 9 04/10/2019    ALKPHOS 64 04/10/2019    ALT 53 04/10/2019    AST 27 04/10/2019    BILITOT 1.35 04/10/2019    LABALBU 4.0 04/10/2019    ALBUMIN NOT REPORTED 04/10/2019    LABGLOM >60 04/10/2019    GFRAA >60 04/10/2019    GFR      04/10/2019    GFR NOT REPORTED 04/10/2019    PROT 6.7 04/10/2019    CALCIUM 8.8 04/10/2019         Radiology:    Ct Abdomen Pelvis W Iv Contrast    Result Date: 4/5/2019  EXAMINATION: CT OF THE ABDOMEN AND PELVIS WITH CONTRAST 4/5/2019 12:51 am TECHNIQUE: CT of the abdomen and pelvis was performed with the administration of intravenous contrast. Multiplanar reformatted images are provided for review. Dose modulation, iterative reconstruction, and/or weight based adjustment of the mA/kV was utilized to reduce the radiation dose to as low as reasonably achievable. COMPARISON: 01/16/2019 HISTORY: ORDERING SYSTEM PROVIDED HISTORY: ABDOMINAL PAIN TECHNOLOGIST PROVIDED HISTORY: IV Only Contrast FINDINGS: Lower Chest: ICD in place Visualized portion of the lower chest demonstrates no acute abnormality. Organs: Diffuse hepatic steatosis. Cholecystectomy. No focal liver lesion. Splenomegaly. No focal splenic lesion. Enhanced attenuation pattern of the pancreas and adrenal glands is unremarkable. The kidneys appear normal in size and demonstrate symmetric enhancement. No focal renal mass. No hydronephrosis. No perinephric stranding. 3 cm simple right kidney exophytic cyst. GI/Bowel: Again seen are multiple dilated loops of proximal and mid small bowel, up to the 4.5 cm in diameter with transition point in the mid abdomen where there is a stenotic loop of proximal ileum. There are bowel contents distal to this point and a small amount of bowel contents in the proximal large bowel which is not distended. Again seen are intact appearing anastomotic sutures in the sigmoid colon. Pelvis: The urinary bladder appears unremarkable.   The pelvic organs Specimen: Urine, Midstream   Result Value Ref Range    Color Urine Yellow Colorless, Straw, Light Yellow, Yellow    Appearance Urine Clear Clear    Glucose Urine Negative Negative mg/dL    Bilirubin Urine Negative Negative    Ketones Urine Negative Negative mg/dL    Specific Gravity Urine 1.020 1.003 - 1.035    Blood Urine Trace (A) Negative    pH Urine 7.5 (H) 5.0 - 7.0    Protein Albumin Urine Negative Negative mg/dL    Urobilinogen Urine 1.0 0.2, 1.0 E.U./dL    Nitrite Urine Negative Negative    Leukocyte Esterase Urine Negative Negative   Urine Microscopic     Status: Abnormal   Result Value Ref Range    RBC Urine 0-2 0-2 /HPF /HPF    WBC Urine 5-10 (A) 0-5 /HPF /HPF    Squamous Epithelials Urine Few (A) None Seen /LPF    Narrative    Urine Culture not indicated   Wet prep - lab collect     Status: Abnormal    Specimen: Vagina; Swab   Result Value Ref Range    Trichomonas Absent Absent    Yeast Absent Absent    Clue Cells Absent Absent    WBCs/high power field 1+ (A) None       This patient was seen along with, Allyson Wallis-student, history and review of systems obtained by student and confirmed by attending. Objective, exams, assessment and plan reviewed with attending.     BULL Motley Student           Reviewed history with student and patient and repeated pertinent physical exam.  Treating with nitrofurantoin.  Linda Chase MD    demonstrate no acute abnormality. Peritoneum/Retroperitoneum: The abdominal aorta is normal in caliber. SMA is patent. No fluid collection. No free air. No gross lymphadenopathy. Bones/Soft Tissues: No acute findings. Partial small bowel obstruction as result of stenotic loop of proximal ileum. Fl Small Bowel Follow Through Only    Result Date: 4/5/2019  EXAMINATION: SMALL BOWEL FOLLOW THROUGH SERIES 4/5/2019 TECHNIQUE: Small bowel follow through series was performed with overhead images and spot images. FLUOROSCOPY DOSE AND TYPE OR TIME AND EXPOSURES: 0 minutes of fluoroscopy time. 8 overhead images. COMPARISON: None HISTORY: ORDERING SYSTEM PROVIDED HISTORY: SBFT. History of repeated episodes, most recent January 2019 TECHNOLOGIST PROVIDED HISTORY: SBFT. History of repeated episodes, most recent January 2019 Give contrast per NG tube. Ordering Physician Provided Reason for Exam:  SBFT. Hx of repeated episodes, most recent Jan. 2019; Acuity: Acute Type of Exam: Initial FINDINGS:  images demonstrate multiple dilated loops of small bowel and air-fluid levels. Normal small bowel transit time of between 2-3 hours. Diverticulum of the 3rd portion of the duodenum. Status post cholecystectomy. Osseous structures are grossly unremarkable. Normal small bowel transit time of 2-3 hours. Multiple dilated loops of small bowel suggestive of ileus or possibly partial small bowel obstruction. Duodenal diverticulum. Xr Abdomen (2 Views)    Result Date: 4/6/2019  EXAMINATION: TWO XRAY VIEWS OF THE ABDOMEN 4/6/2019 9:25 pm COMPARISON: April 5, 2019 HISTORY: ORDERING SYSTEM PROVIDED HISTORY: abd pain, ?sbo/ileus TECHNOLOGIST PROVIDED HISTORY: abd pain, ?sbo/ileus Ordering Physician Provided Reason for Exam: abd pain Acuity: Acute Type of Exam: Initial FINDINGS: Gas noted throughout the small large bowel. Retained oral contrast extending into the rectum is noted. Multiple air-fluid levels.   Surgical clips right upper quadrant. Enteric tube in the stomach. Ileus and retained oral contrast     Xr Abdomen (2 Views)    Result Date: 4/5/2019  EXAMINATION: TWO XRAY VIEWS OF THE ABDOMEN 4/5/2019 11:02 am COMPARISON: 23 January 2019 HISTORY: ORDERING SYSTEM PROVIDED HISTORY: f/u sbo TECHNOLOGIST PROVIDED HISTORY: f/u sbo Ordering Physician Provided Reason for Exam: Pt c/o pain to abd, hx of chronic bowel obstructions. Acuity: Chronic Type of Exam: Subsequent/Follow-up FINDINGS: Gas is noted in small bowel colon but there is preferential distention of small-bowel loops with air, maximum diameter of 4.6 cm without pneumatosis, appearance most compatible with partial small bowel obstruction. No organomegaly or free air is noted. Osseous structures are age-appropriate. Lung bases are clear. Cholecystectomy clips are present right upper quadrant. Findings compatible with partial small bowel obstruction with maximum diameter of 4.6 cm. No free air. Consultations:    Consults:     Final Specialist Recommendations/Findings:   IP CONSULT TO GENERAL SURGERY  IP CONSULT TO INTERNAL MEDICINE  IP CONSULT TO GENERAL SURGERY  IP CONSULT TO CARDIOLOGY      The patient was seen and examined on day of discharge and this discharge summary is in conjunction with any daily progress note from day of discharge.     Discharge plan:     Disposition: Home    Physician Follow Up:     Tanner Saeed MD  [facility] [address] [place] [address]  [phone]    In 1 week      Griselda Tran MD  [address] [place] [id number]  [phone]    [address] [place] [address] on 5/6/2019  Appointment 5-6-19 at 2:40pm       Requiring Further Evaluation/Follow Up POST HOSPITALIZATION/Incidental Findings: keep on reglan, consider tapering as outpatient-he will see dr Leann Marie in 3-4 weeks    Diet: cardiac diet    Activity: As tolerated    Instructions to Patient: take medications as prescribed      Discharge Medications:      Medication List

## 2022-06-16 ENCOUNTER — HOSPITAL ENCOUNTER (EMERGENCY)
Age: 57
Discharge: HOME | End: 2022-06-16
Attending: EMERGENCY MEDICINE
Payer: COMMERCIAL

## 2022-06-16 VITALS
HEART RATE: 75 BPM | SYSTOLIC BLOOD PRESSURE: 139 MMHG | DIASTOLIC BLOOD PRESSURE: 71 MMHG | BODY MASS INDEX: 35.12 KG/M2 | WEIGHT: 265 LBS | HEIGHT: 73 IN | RESPIRATION RATE: 16 BRPM | OXYGEN SATURATION: 98 % | TEMPERATURE: 98.9 F

## 2022-06-16 DIAGNOSIS — K04.7 DENTAL ABSCESS: Primary | ICD-10-CM

## 2022-06-16 LAB
ABSOLUTE EOS #: 0.1 K/UL (ref 0–0.44)
ABSOLUTE IMMATURE GRANULOCYTE: 0.02 K/UL (ref 0–0.3)
ABSOLUTE LYMPH #: 0.79 K/UL (ref 1.1–3.7)
ABSOLUTE MONO #: 0.55 K/UL (ref 0.1–1.2)
AMYLASE: 26 U/L (ref 28–100)
BASOPHILS # BLD: 0 % (ref 0–2)
BASOPHILS ABSOLUTE: 0.03 K/UL (ref 0–0.2)
EOSINOPHILS RELATIVE PERCENT: 1 % (ref 1–4)
HCT VFR BLD CALC: 34.9 % (ref 40.7–50.3)
HEMOGLOBIN: 11.8 G/DL (ref 13–17)
IMMATURE GRANULOCYTES: 0 %
LYMPHOCYTES # BLD: 11 % (ref 24–43)
MCH RBC QN AUTO: 28.2 PG (ref 25.2–33.5)
MCHC RBC AUTO-ENTMCNC: 33.8 G/DL (ref 28.4–34.8)
MCV RBC AUTO: 83.3 FL (ref 82.6–102.9)
MONOCYTES # BLD: 7 % (ref 3–12)
NRBC AUTOMATED: 0 PER 100 WBC
PDW BLD-RTO: 17.4 % (ref 11.8–14.4)
PLATELET # BLD: 129 K/UL (ref 138–453)
PMV BLD AUTO: 9.5 FL (ref 8.1–13.5)
RBC # BLD: 4.19 M/UL (ref 4.21–5.77)
RBC # BLD: ABNORMAL 10*6/UL
SEG NEUTROPHILS: 81 % (ref 36–65)
SEGMENTED NEUTROPHILS ABSOLUTE COUNT: 5.92 K/UL (ref 1.5–8.1)
WBC # BLD: 7.4 K/UL (ref 3.5–11.3)

## 2022-06-16 PROCEDURE — 82150 ASSAY OF AMYLASE: CPT

## 2022-06-16 PROCEDURE — 99283 EMERGENCY DEPT VISIT LOW MDM: CPT

## 2022-06-16 PROCEDURE — 85025 COMPLETE CBC W/AUTO DIFF WBC: CPT

## 2022-06-16 RX ORDER — IBUPROFEN 800 MG/1
800 TABLET ORAL EVERY 8 HOURS PRN
Qty: 30 TABLET | Refills: 0 | Status: SHIPPED | OUTPATIENT
Start: 2022-06-16

## 2022-06-16 RX ORDER — CLINDAMYCIN HYDROCHLORIDE 150 MG/1
300 CAPSULE ORAL 3 TIMES DAILY
Qty: 60 CAPSULE | Refills: 0 | Status: SHIPPED | OUTPATIENT
Start: 2022-06-16 | End: 2022-06-26

## 2022-06-16 ASSESSMENT — ENCOUNTER SYMPTOMS
COLOR CHANGE: 0
SORE THROAT: 0
SHORTNESS OF BREATH: 0
VOICE CHANGE: 0
TROUBLE SWALLOWING: 0
COUGH: 0
FACIAL SWELLING: 1

## 2022-06-16 ASSESSMENT — PAIN - FUNCTIONAL ASSESSMENT: PAIN_FUNCTIONAL_ASSESSMENT: 0-10

## 2022-06-16 ASSESSMENT — PAIN SCALES - GENERAL: PAINLEVEL_OUTOF10: 7

## 2022-06-16 NOTE — ED PROVIDER NOTES
eMERGENCY dEPARTMENT eNCOUnter   Independent Attestation     Pt Name: Lester Lemos  MRN: [id number]  [place] 1965  Date of evaluation: 6/16/22     Lester Lemos is a 62 y.o. male with CC: Facial Swelling (Right side facial pain and swelling x 4 days; denies dental pain but reports pain in his cheek radiating towards ear)      This visit was performed by both a physician and an APC. I performed all aspects of the MDM as documented. The care is provided during an unprecedented national emergency due to the novel coronavirus, COVID 19.     Sandra Saunders DO  Attending Emergency Physician                    Gabriel Lopes 1111,   06/16/22 8221

## 2022-06-16 NOTE — ED NOTES
Patient states 4 days ago he woke up in the morning with right side lower cheek pain and swelling, the pain and swelling have increased from minor to severe. Significant swelling noted to right lower cheek and jaw line. Patient states he has lower dentures so does not believe it is a dental abscess, but his cheek is 10/10 painful now.      Jessika Caballero., RN  77/89/33 5038

## 2022-06-16 NOTE — ED PROVIDER NOTES
[facility] ED  eMERGENCY dEPARTMENT eNCOUnter      Pt Name: Saida Hill  MRN: [id number]  [place] 1965  Date of evaluation: 6/16/2022  Provider: FABIANO Prado [id number]       Chief Complaint   Patient presents with    Facial Swelling     Right side facial pain and swelling x 4 days; denies dental pain but reports pain in his cheek radiating towards ear         HISTORY OF PRESENT ILLNESS  (Location/Symptom, Timing/Onset, Context/Setting, Quality, Duration, Modifying Factors, Severity.)   Saida Hill is a 62 y.o. male who presents to the emergency department via private auto for right lower jaw area swelling. Onset was 4 days ago. It has gotten worse. Denies fever, chills, injury, SOB, dental pain. The pain is now radiating up to his ear. Rates his pain 7/10 at this time. Nursing Notes were reviewed. ALLERGIES     Ciprofloxacin, Codeine, Diphenhydramine, Morphine, and Penicillins    CURRENT MEDICATIONS       Previous Medications    ALLOPURINOL (ZYLOPRIM) 300 MG TABLET    Take 300 mg by mouth daily    APIXABAN (ELIQUIS) 5 MG TABS TABLET    Take 5 mg by mouth 2 times daily     ASPIRIN EC 81 MG EC TABLET    Take 81 mg by mouth daily    DAPAGLIFLOZIN (FARXIGA) 10 MG TABLET    Take 10 mg by mouth every morning    DULAGLUTIDE (TRULICITY) 5.04 NU/9.7CH SOPN    Inject 0.75 mg into the skin once a week Indications: Mondays Mondays    LISINOPRIL (PRINIVIL;ZESTRIL) 2.5 MG TABLET    Take 2.5 mg by mouth daily     MAGNESIUM GLUCONATE (MAGONATE) 500 MG TABLET    Take 500 mg by mouth daily    METFORMIN (GLUCOPHAGE) 1000 MG TABLET    Take 1,000 mg by mouth daily    METOPROLOL SUCCINATE (TOPROL XL) 50 MG EXTENDED RELEASE TABLET    Take 50 mg by mouth 2 times daily    OMEPRAZOLE (PRILOSEC) 20 MG CAPSULE    Take 20 mg by mouth daily.     ONDANSETRON (ZOFRAN-ODT) 4 MG DISINTEGRATING TABLET    Take 1 tablet by mouth 3 times daily as needed for Nausea or Vomiting    POTASSIUM CHLORIDE (KLOR-CON M) 10 MEQ EXTENDED RELEASE TABLET    Take 1 tablet by mouth daily    PRAVASTATIN (PRAVACHOL) 10 MG TABLET    Take 10 mg by mouth daily     SITAGLIPTAN (JANUVIA) 100 MG TABLET    Take 100 mg by mouth daily. SOTALOL (BETAPACE) 160 MG TABLET    Take 160 mg by mouth 2 times daily     TAMSULOSIN (FLOMAX) 0.4 MG CAPSULE    Take 1 capsule by mouth daily       PAST MEDICAL HISTORY         Diagnosis Date    ADHD, predominantly hyperactive type     Anemia     Bowel obstruction (HCC)     CAD (coronary artery disease)     Cardiomy[facility]     Chronic diastolic CHF (congestive heart failure) ([facility]     Diabetes mellitus ([facility]     Diverticulitis 1996    GERD (gastroesophageal reflux disease)     History of atrial fibrillation     history of Blood clot due to device, implant, or graft     IV site L forearm    History of ventricular tachycardia     Kidney calculi     Obesity     Sleep apnea     uses CPAP nightly       SURGICAL HISTORY           Procedure Laterality Date    ABDOMEN SURGERY  9137 & 2615    umbillical hernia with mesh and lysis of adhesions    ABDOMINAL HERNIA REPAIR  7/7/2014    RECURRENT HERNIA REPAIR WITH MESH    APPENDECTOMY      CARDIAC DEFIBRILLATOR PLACEMENT      CHOLECYSTECTOMY      COLECTOMY  1996    d/t ruptured diverticuli    CYSTOSCOPY N/A     stent in place taped to penis head    CYSTOSCOPY  3/4/16    with bilateral pyelogram    HERNIA REPAIR      umbilical    LITHOTRIPSY  3/4/16    laser lithotripsy with ureteral dilation    PACEMAKER PLACEMENT      pacemaker/defib         FAMILY HISTORY           Problem Relation Age of Onset    Heart Disease Mother     Heart Disease Father      Family Status   Relation Name Status    Mother  Alive    Father  Alive        SOCIAL HISTORY      reports that he quit smoking about 10 years ago. He has a 2.00 pack-year smoking history. He has never used smokeless tobacco. He reports current alcohol use.  He reports that he does not use drugs. REVIEW OF SYSTEMS    (2-9 systems for level 4, 10 or more for level 5)     Review of Systems   Constitutional: Negative for chills, diaphoresis, fatigue and fever. HENT: Positive for facial swelling. Negative for dental problem, ear discharge, ear pain, sore throat, trouble swallowing and voice change. Respiratory: Negative for cough and shortness of breath. Musculoskeletal: Negative for myalgias and neck pain. Skin: Negative for color change, rash and wound. Neurological: Negative for weakness and headaches. Except as noted above the remainder of the review of systems was reviewed and negative. PHYSICAL EXAM    (up to 7 for level 4, 8 or more for level 5)     ED Triage Vitals [06/16/22 1515]   BP Temp Temp Source Heart Rate Resp SpO2 Height Weight   139/71 98.9 °F (37.2 °C) Oral 75 16 98 % 6' 1\" (1.854 m) 265 lb (120.2 kg)     Physical Exam  Vitals reviewed. Constitutional:       General: He is not in acute distress. Appearance: He is well-developed. He is not diaphoretic. HENT:      Mouth/Throat:      Mouth: Mucous membranes are moist.      Pharynx: Oropharynx is clear. Uvula midline. No pharyngeal swelling, oropharyngeal exudate or uvula swelling. Comments: Pt wears dentures. Mild swelling right lower jaw area. No swelling to floor of mouth. Patient is speaking in full sentences, handling secretions well. Eyes:      General: No scleral icterus. Conjunctiva/sclera: Conjunctivae normal.   Cardiovascular:      Rate and Rhythm: Normal rate. Pulmonary:      Effort: Pulmonary effort is normal. No respiratory distress. Breath sounds: No stridor. Musculoskeletal:      Cervical back: Neck supple. Skin:     General: Skin is warm and dry. Findings: No rash. Neurological:      Mental Status: He is alert and oriented to person, place, and time.    Psychiatric:         Behavior: Behavior normal.           DIAGNOSTIC RESULTS     LABS:  Labs Reviewed AMYLASE - Abnormal; Notable for the following components:       Result Value    Amylase 26 (*)     All other components within normal limits   CBC WITH AUTO DIFFERENTIAL - Abnormal; Notable for the following components:    RBC 4.19 (*)     Hemoglobin 11.8 (*)     Hematocrit 34.9 (*)     RDW 17.4 (*)     Platelets 913 (*)     Seg Neutrophils 81 (*)     Lymphocytes 11 (*)     Absolute Lymph # 0.79 (*)     All other components within normal limits       All other labs were within normal range or not returned as of this dictation. EMERGENCY DEPARTMENT COURSE and DIFFERENTIAL DIAGNOSIS/MDM:   Vitals:    Vitals:    06/16/22 1515   BP: 139/71   Pulse: 75   Resp: 16   Temp: 98.9 °F (37.2 °C)   TempSrc: Oral   SpO2: 98%   Weight: 265 lb (120.2 kg)   Height: 6' 1\" (1.854 m)       CLINICAL DECISION MAKING:  The patient presented alert with a nontoxic appearance and was seen in conjunction with Dr. Cheyanne Sutton. The patient will be treated for a dental infection. Prescriptions were written for clindamycin and motrin. Follow up with a dentist for a recheck. Evaluation and treatment course in the ED, and plan of care upon discharge was discussed in length with the patient. Patient had no further questions prior to being discharged and was instructed to return to the ED for new or worsening symptoms. Care was provided during an unprecedented national emergency due to the novel coronavirus, Covid-19. FINAL IMPRESSION      1.  Dental abscess            Problem List  Patient Active Problem List   Diagnosis Code    Intestinal obstruction ([facility] K56.609    Splenomegaly R16.1    Incisional hernia K43.2    Hypomagnesemia E83.42    VPB (ventricular premature beat) I49.3    Ventricular ectopy I49.3    AICD (automatic cardioverter/defibrillator) present Z95.810    Diabetes mellitus type 2 in obese (HCC) E11.69, E66.9    Postoperative ileus (HCC) K91.89, K56.7    Hyperuricemia E79.0    Anemia D64.9    Type 2 diabetes mellitus, with long-term current use of insulin ([facility]) E11.9, Z79.4    Gross hematuria R31.0    Bladder stone N21.0    Hematospermia R36.1    Nausea and vomiting R11.2    Adynamic ileus ([facility]) K56.0    Small bowel obstruction due to adhesions K56.50    Nonischemic cardiomyopathy ([facility]) I42.8    Essential hypertension I10    Obesity (BMI 30-39. 9) E66.9    Abnormal LFTs R94.5    SBO (small bowel obstruction) ([facility]) K56.609    History of small bowel obstruction Z87.19    Blood clot due to device, implant, or graft T85.818A    History of atrial fibrillation Z86.79    Abdominal pain R10.9    Sleep apnea G47.30    GERD (gastroesophageal reflux disease) K21.9    Chronic diastolic CHF (congestive heart failure) ([facility]) I50.32    CAD (coronary artery disease) I25.10    ADHD, predominantly hyperactive type F90.1    Uncontrolled type 2 diabetes mellitus with hyperglycemia ([facility]) E11.65    Fatty liver K76.0    Obesity, Class III, BMI 40-49.9 (morbid obesity) ([facility]) E66.01    Hypokalemia E87.6         DISPOSITION/PLAN   DISPOSITION Decision To Discharge 06/16/2022 04:57:09 PM      PATIENT REFERRED TO:   Follow up with your dentist    Schedule an appointment as soon as possible for a visit       Indigo Fox MD  [place] [address]  [phone]          [facility] ED  [address] [facility]  [phone]    As needed, If symptoms worsen      DISCHARGE MEDICATIONS:     New Prescriptions    CLINDAMYCIN (CLEOCIN) 150 MG CAPSULE    Take 2 capsules by mouth 3 times daily for 10 days    IBUPROFEN (ADVIL;MOTRIN) 800 MG TABLET    Take 1 tablet by mouth every 8 hours as needed for Pain or Fever           (Please note that portions of this note were completed with a voice recognition program.  Efforts were made to edit the dictations but occasionally words are mis-transcribed.)    FABIANO Rogel CNP, APRN - CNP  06/16/22 4060

## 2022-06-16 NOTE — ED NOTES
Blood specimens obtained from left AC, patient tolerated well.       Myron Shields., RN  80/99/39 7439

## 2023-02-23 ENCOUNTER — HOSPITAL ENCOUNTER (OUTPATIENT)
Age: 58
Setting detail: SPECIMEN
Discharge: HOME | End: 2023-02-23

## 2023-02-23 LAB
ANION GAP SERPL CALCULATED.3IONS-SCNC: 10 MMOL/L (ref 9–17)
BUN SERPL-MCNC: 11 MG/DL (ref 6–20)
CALCIUM SERPL-MCNC: 8.7 MG/DL (ref 8.6–10.4)
CHLORIDE SERPL-SCNC: 101 MMOL/L (ref 98–107)
CO2 SERPL-SCNC: 28 MMOL/L (ref 20–31)
CREAT SERPL-MCNC: 0.73 MG/DL (ref 0.7–1.2)
GFR SERPL CREATININE-BSD FRML MDRD: >60 ML/MIN/1.73M2
GLUCOSE SERPL-MCNC: 137 MG/DL (ref 70–99)
POTASSIUM SERPL-SCNC: 3.6 MMOL/L (ref 3.7–5.3)
PROSTATE SPECIFIC ANTIGEN: 1.18 NG/ML
SODIUM SERPL-SCNC: 139 MMOL/L (ref 135–144)

## 2023-02-24 LAB
EST. AVERAGE GLUCOSE BLD GHB EST-MCNC: 126 MG/DL
HBA1C MFR BLD: 6 % (ref 4–6)

## 2023-02-26 LAB
MICROORGANISM SPEC CULT: ABNORMAL
SPECIMEN DESCRIPTION: ABNORMAL

## 2023-03-04 ENCOUNTER — HOSPITAL ENCOUNTER (EMERGENCY)
Age: 58
Discharge: HOME | End: 2023-03-05
Attending: STUDENT IN AN ORGANIZED HEALTH CARE EDUCATION/TRAINING PROGRAM
Payer: COMMERCIAL

## 2023-03-04 VITALS
RESPIRATION RATE: 12 BRPM | SYSTOLIC BLOOD PRESSURE: 176 MMHG | HEART RATE: 85 BPM | TEMPERATURE: 98.2 F | BODY MASS INDEX: 35.78 KG/M2 | WEIGHT: 270 LBS | HEIGHT: 73 IN | OXYGEN SATURATION: 99 % | DIASTOLIC BLOOD PRESSURE: 78 MMHG

## 2023-03-04 DIAGNOSIS — T78.40XA ALLERGIC REACTION, INITIAL ENCOUNTER: Primary | ICD-10-CM

## 2023-03-04 PROCEDURE — 6360000002 HC RX W HCPCS: Performed by: STUDENT IN AN ORGANIZED HEALTH CARE EDUCATION/TRAINING PROGRAM

## 2023-03-04 PROCEDURE — 96372 THER/PROPH/DIAG INJ SC/IM: CPT

## 2023-03-04 PROCEDURE — 99284 EMERGENCY DEPT VISIT MOD MDM: CPT

## 2023-03-04 PROCEDURE — 6370000000 HC RX 637 (ALT 250 FOR IP): Performed by: STUDENT IN AN ORGANIZED HEALTH CARE EDUCATION/TRAINING PROGRAM

## 2023-03-04 RX ORDER — DIPHENHYDRAMINE HYDROCHLORIDE 50 MG/ML
25 INJECTION INTRAMUSCULAR; INTRAVENOUS ONCE
Status: COMPLETED | OUTPATIENT
Start: 2023-03-04 | End: 2023-03-04

## 2023-03-04 RX ORDER — DEXAMETHASONE SODIUM PHOSPHATE 10 MG/ML
8 INJECTION, SOLUTION INTRAMUSCULAR; INTRAVENOUS ONCE
Status: COMPLETED | OUTPATIENT
Start: 2023-03-04 | End: 2023-03-04

## 2023-03-04 RX ORDER — FAMOTIDINE 20 MG/1
20 TABLET, FILM COATED ORAL ONCE
Status: COMPLETED | OUTPATIENT
Start: 2023-03-04 | End: 2023-03-04

## 2023-03-04 RX ADMIN — DEXAMETHASONE SODIUM PHOSPHATE 8 MG: 10 INJECTION, SOLUTION INTRAMUSCULAR; INTRAVENOUS at 23:52

## 2023-03-04 RX ADMIN — FAMOTIDINE 20 MG: 20 TABLET, FILM COATED ORAL at 23:52

## 2023-03-04 RX ADMIN — DIPHENHYDRAMINE HYDROCHLORIDE 25 MG: 50 INJECTION, SOLUTION INTRAMUSCULAR; INTRAVENOUS at 23:52

## 2023-03-04 ASSESSMENT — PAIN - FUNCTIONAL ASSESSMENT: PAIN_FUNCTIONAL_ASSESSMENT: NONE - DENIES PAIN

## 2023-03-05 RX ORDER — HYDROXYZINE HYDROCHLORIDE 25 MG/1
25 TABLET, FILM COATED ORAL EVERY 6 HOURS PRN
Qty: 20 TABLET | Refills: 0 | Status: SHIPPED | OUTPATIENT
Start: 2023-03-05 | End: 2023-03-15

## 2023-03-05 RX ORDER — FAMOTIDINE 20 MG/1
20 TABLET, FILM COATED ORAL 2 TIMES DAILY
Qty: 60 TABLET | Refills: 0 | Status: SHIPPED | OUTPATIENT
Start: 2023-03-05

## 2023-03-05 RX ORDER — PREDNISONE 10 MG/1
TABLET ORAL
Qty: 20 TABLET | Refills: 0 | Status: SHIPPED | OUTPATIENT
Start: 2023-03-05

## 2023-03-05 NOTE — ED PROVIDER NOTES
[facility]      Pt Name: Alecia Chester  MRN: [id number]  [place] 1965  Date of evaluation: 3/5/23    CHIEF COMPLAINT       Chief Complaint   Patient presents with    Urticaria     Patient started taking prevastatin earlier this week and just finished macrobid for a UTI, and patient has also taken a couple of percocets for shoulder pain, which he has taken before. Patient has not taken anything or applied anything for the hives. HISTORY OF PRESENT ILLNESS   Alecia Chester is a 62 y.o. male who presents with reported hives and itchy rash multiple locations all over body. States he recently started taking Macrobid as well as few Percocet. Also restarted on pravastatin. States the only medication he has not taken previously was Macrobid. Does endorse allergies to multiple medications. Denies any shortness of breath, chest pain.     PASTMEDICAL HISTORY     Past Medical History:   Diagnosis Date    ADHD, predominantly hyperactive type     Anemia     Bowel obstruction (HCC)     CAD (coronary artery disease)     Cardiomyopathy ([address]     Chronic diastolic CHF (congestive heart failure) ([facility])     Diabetes mellitus ([address]     Diverticulitis 1996    GERD (gastroesophageal reflux disease)     History of atrial fibrillation     history of Blood clot due to device, implant, or graft     IV site L forearm    History of ventricular tachycardia     Kidney calculi     Obesity     Sleep apnea     uses CPAP nightly     Past Problem List  Patient Active Problem List   Diagnosis Code    Intestinal obstruction ([address] K56.609    Splenomegaly R16.1    Incisional hernia K43.2    Hypomagnesemia E83.42    VPB (ventricular premature beat) I49.3    Ventricular ectopy I49.3    AICD (automatic cardioverter/defibrillator) present Z95.810    Diabetes mellitus type 2 in obese (HCC) E11.69, E66.9    Postoperative ileus ([address] K91.89, K56.7    Hyperuricemia E79.0    Anemia D64.9    Type 2 diabetes mellitus, with long-term current use of insulin ([facility]) E11.9, Z79.4    Gross hematuria R31.0    Bladder stone N21.0    Hematospermia R36.1    Nausea and vomiting R11.2    Adynamic ileus ([facility]) K56.0    Small bowel obstruction due to adhesions K56.50    Nonischemic cardiomyopathy ([facility]) I42.8    Essential hypertension I10    Obesity (BMI 30-39. 9) E66.9    Abnormal LFTs R79.89    SBO (small bowel obstruction) ([facility] K56.609    History of small bowel obstruction Z87.19    Blood clot due to device, implant, or graft T85.818A    History of atrial fibrillation Z86.79    Abdominal pain R10.9    Sleep apnea G47.30    GERD (gastroesophageal reflux disease) K21.9    Chronic diastolic CHF (congestive heart failure) ([facility]) I50.32    CAD (coronary artery disease) I25.10    ADHD, predominantly hyperactive type F90.1    Uncontrolled type 2 diabetes mellitus with hyperglycemia ([facility]) E11.65    Fatty liver K76.0    Obesity, Class III, BMI 40-49.9 (morbid obesity) ([facility] E66.01    Hypokalemia E87.6       SURGICAL HISTORY       Past Surgical History:   Procedure Laterality Date    ABDOMEN SURGERY  3880 & 1274    umbillical hernia with mesh and lysis of adhesions    ABDOMINAL HERNIA REPAIR  7/7/2014    RECURRENT HERNIA REPAIR WITH MESH    APPENDECTOMY      CARDIAC DEFIBRILLATOR PLACEMENT      CHOLECYSTECTOMY      COLECTOMY  1996    d/t ruptured diverticuli    CYSTOSCOPY N/A     stent in place taped to penis head    CYSTOSCOPY  3/4/16    with bilateral pyelogram    HERNIA REPAIR      umbilical    LITHOTRIPSY  3/4/16    laser lithotripsy with ureteral dilation    PACEMAKER PLACEMENT      pacemaker/defib       CURRENT MEDICATIONS       Discharge Medication List as of 3/5/2023  1:11 AM        CONTINUE these medications which have NOT CHANGED    Details   ibuprofen (ADVIL;MOTRIN) 800 MG tablet Take 1 tablet by mouth every 8 hours as needed for Pain or Fever, Disp-30 tablet, R-0Print      dapagliflozin (FARXIGA) 10 MG tablet Take 10 mg by mouth every morningHistorical Med      potassium chloride (KLOR-CON M) 10 MEQ extended release tablet Take 1 tablet by mouth daily, Disp-60 tablet, R-3Normal      ondansetron (ZOFRAN-ODT) 4 MG disintegrating tablet Take 1 tablet by mouth 3 times daily as needed for Nausea or Vomiting, Disp-21 tablet, R-0Print      metoprolol succinate (TOPROL XL) 50 MG extended release tablet Take 50 mg by mouth 2 times dailyHistorical Med      magnesium gluconate (MAGONATE) 500 MG tablet Take 500 mg by mouth dailyHistorical Med      aspirin EC 81 MG EC tablet Take 81 mg by mouth dailyHistorical Med      apixaban (ELIQUIS) 5 MG TABS tablet Take 5 mg by mouth 2 times daily Historical Med      pravastatin (PRAVACHOL) 10 MG tablet Take 10 mg by mouth daily Historical Med      allopurinol (ZYLOPRIM) 300 MG tablet Take 300 mg by mouth daily, R-2Historical Med      metFORMIN (GLUCOPHAGE) 1000 MG tablet Take 1,000 mg by mouth dailyHistorical Med      Dulaglutide (TRULICITY) 3.82 LS/9.2SH SOPN Inject 0.75 mg into the skin once a week Indications: Historical Med      tamsulosin (FLOMAX) 0.4 MG capsule Take 1 capsule by mouth daily, Disp-30 capsule, R-3      sitaGLIPtan (JANUVIA) 100 MG tablet Take 100 mg by mouth daily. omeprazole (PRILOSEC) 20 MG capsule Take 20 mg by mouth daily. sotalol (BETAPACE) 160 MG tablet Take 160 mg by mouth 2 times daily Historical Med      lisinopril (PRINIVIL;ZESTRIL) 2.5 MG tablet Take 2.5 mg by mouth daily Historical Med             ALLERGIES     is allergic to ciprofloxacin, codeine, diphenhydramine, morphine, and penicillins. FAMILY HISTORY     He indicated that his mother is alive. He indicated that his father is alive.        SOCIAL HISTORY       Social History     Tobacco Use    Smoking status: Former     Packs/day: 1.00     Years: 2.00     Pack years: 2.00     Types: Cigarettes     Quit date: 2/15/2012     Years since quittin.0    Smokeless tobacco: Never   Vaping Use    Vaping Use: Never used   Substance Use Topics    Alcohol use: Yes     Alcohol/week: 0.0 standard drinks     Comment: rarely    Drug use: No       PHYSICAL EXAM     INITIAL VITALS: BP (!) 176/78   Pulse 85   Temp 98.2 °F (36.8 °C)   Resp 12   Ht 6' 1\" (1.854 m)   Wt 270 lb (122.5 kg)   SpO2 99%   BMI 35.62 kg/m²    Physical Exam  Vitals and nursing note reviewed. Constitutional:       General: He is not in acute distress. Appearance: He is well-developed. He is not toxic-appearing. HENT:      Head: Normocephalic and atraumatic. Nose: Nose normal.      Mouth/Throat:      Mouth: Mucous membranes are moist.   Eyes:      General: No scleral icterus. Conjunctiva/sclera: Conjunctivae normal.      Pupils: Pupils are equal, round, and reactive to light. Cardiovascular:      Rate and Rhythm: Normal rate and regular rhythm. Heart sounds: Normal heart sounds. No murmur heard. No friction rub. No gallop. Pulmonary:      Effort: Pulmonary effort is normal. No respiratory distress. Breath sounds: Normal breath sounds. No wheezing or rales. Musculoskeletal:         General: Normal range of motion. Skin:     General: Skin is warm and dry. Comments: Scattered hives over entire body, excoriated, no overlying erythema   Neurological:      Mental Status: He is alert and oriented to person, place, and time. Psychiatric:         Behavior: Behavior normal.       MEDICAL DECISION MAKING / ED COURSE:   Summary of Patient Presentation:      1)  Number and Complexity of Problems  Problem List This Visit:    1.  Allergic reaction, initial encounter        Differential Diagnosis: Medication reaction versus other allergic reaction versus Johnson-Tito    Diagnoses Considered but Do Not Suspect: Johnson-Tito    Pertinent Comorbid Conditions:    Past Medical History:   Diagnosis Date    ADHD, predominantly hyperactive type     Anemia     Bowel obstruction (HCC)     CAD (coronary artery disease) Cardiomyopathy ([facility]     Chronic diastolic CHF (congestive heart failure) (HCC)     Diabetes mellitus ([facility]     Diverticulitis 1996    GERD (gastroesophageal reflux disease)     History of atrial fibrillation     history of Blood clot due to device, implant, or graft     IV site L forearm    History of ventricular tachycardia     Kidney calculi     Obesity     Sleep apnea     uses CPAP nightly       2)  Data Reviewed    External Documents Reviewed: Previous ER visits reviewed by myself  3)  Treatment and Disposition  [Patient repeat assessment, Disposition discussion with patient/family, Case discussed with consulting clinician, MIPS, Social determinants of health impacting treatment or disposition, Shared Decision Making, Code Status Discussion:]    We will treat with Benadryl, Pepcid, steroid burst.  Has finished course of Macrobid. Would continue to take allergy medications as needed and see if symptoms improve after treatment. No acute respiratory distress. No stridor. Speaking in full sentences. Based on the low acuity of concerning symptoms and improvement of symptoms, patient will be discharged with follow up and prescription information listed in the Disposition section. Patient states they will follow-up with primary care physician and/or return to the emergency department should they experience a change or worsening of symptoms. Patient will be discharged with resources: summary of visit, instructions, follow-up information, prescriptions if necessary. Patient/ family instructed to read discharge paperwork. All of their questions and concerns were addressed. Suspicion for any acute life-threatening processes is low. Patient voices understanding of plan.         DATA FOR LAB AND RADIOLOGY TESTS ORDERED BELOW ARE REVIEWED BY THE ED CLINICIAN:    RADIOLOGY: All x-rays, CT, MRI, and formal ultrasound images (except ED bedside ultrasound) are read by the radiologist, see reports below, unless otherwise noted in MDM or here. Reports below are reviewed by myself. No orders to display       LABS: Lab orders shown below, the results are reviewed by myself, and all abnormals are listed below. Labs Reviewed - No data to display    Vitals Reviewed:    Vitals:    03/04/23 2204   BP: (!) 176/78   Pulse: 85   Resp: 12   Temp: 98.2 °F (36.8 °C)   SpO2: 99%   Weight: 270 lb (122.5 kg)   Height: 6' 1\" (1.854 m)     MEDICATIONS GIVEN TO PATIENT THIS ENCOUNTER:  Orders Placed This Encounter   Medications    diphenhydrAMINE (BENADRYL) injection 25 mg    famotidine (PEPCID) tablet 20 mg    dexamethasone (PF) (DECADRON) injection 8 mg    predniSONE (DELTASONE) 10 MG tablet     Sig: Take 4 tablets by mouth once daily for 5 days     Dispense:  20 tablet     Refill:  0    famotidine (PEPCID) 20 MG tablet     Sig: Take 1 tablet by mouth 2 times daily     Dispense:  60 tablet     Refill:  0    hydrOXYzine HCl (ATARAX) 25 MG tablet     Sig: Take 1 tablet by mouth every 6 hours as needed for Itching     Dispense:  20 tablet     Refill:  0     DISCHARGE PRESCRIPTIONS:  Discharge Medication List as of 3/5/2023  1:11 AM        START taking these medications    Details   predniSONE (DELTASONE) 10 MG tablet Take 4 tablets by mouth once daily for 5 days, Disp-20 tablet, R-0Print      famotidine (PEPCID) 20 MG tablet Take 1 tablet by mouth 2 times daily, Disp-60 tablet, R-0Print      hydrOXYzine HCl (ATARAX) 25 MG tablet Take 1 tablet by mouth every 6 hours as needed for Itching, Disp-20 tablet, R-0Print           PHYSICIAN CONSULTS ORDERED THIS ENCOUNTER:  None    ED Course Notes From Epic Narrator:  ED Course as of 03/05/23 0615   Sat Mar 04, 2023   5965 He does deny any allergy to Benadryl. States this is a mixup. He is agreeable to trying Benadryl. [MS]      ED Course User Index  [MS] Romeo Valentin, DO         CRITICAL CARE:   0    PROCEDURES:  none    FINAL IMPRESSION      1.  Allergic reaction, initial encounter DISPOSITION/PLAN   DISPOSITION Decision To Discharge 03/05/2023 01:10:31 AM      OUTPATIENT FOLLOW UP THE PATIENT:  Fatimah Vo, [address]  [phone]    Schedule an appointment as soon as possible for a visit in 1 week  As needed      DISCHARGE MEDICATIONS:  Discharge Medication List as of 3/5/2023  1:11 AM        START taking these medications    Details   predniSONE (DELTASONE) 10 MG tablet Take 4 tablets by mouth once daily for 5 days, Disp-20 tablet, R-0Print      famotidine (PEPCID) 20 MG tablet Take 1 tablet by mouth 2 times daily, Disp-60 tablet, R-0Print      hydrOXYzine HCl (ATARAX) 25 MG tablet Take 1 tablet by mouth every 6 hours as needed for Itching, Disp-20 tablet, R-0Print             Della Del Cid DO  EmergencyMedicine Attending    (Please note that portions of this note were completed with a voice recognition program.  Efforts were made to edit the dictations but occasionally words are mis-transcribed.)     Della Del Cid DO  03/05/23 8963

## 2023-03-05 NOTE — ED NOTES
Pt arrived to ED with c/o urticaria. Pt has hives on all over body. Pt states he just started taking prevastatin earlier this week and just finished macrobid for a UTI. Pt states he also took a couple percocet's for elbow pain which he has taken before. He noticed the hives on his neck first and thought it was spider bites then realized they were all over his body. Pt denies changing any products. Pt is A&Ox4.       Ada Adams RN  03/04/23 3613
Pt states itching has gotten better. Will continue to monitor.      Zarina Antony, RN  03/05/23 6873
No

## 2023-03-10 ENCOUNTER — HOSPITAL ENCOUNTER (OUTPATIENT)
Age: 58
Setting detail: SPECIMEN
Discharge: HOME | End: 2023-03-10

## 2023-03-12 LAB
MICROORGANISM SPEC CULT: ABNORMAL
SPECIMEN DESCRIPTION: ABNORMAL

## 2023-03-27 RX ORDER — FAMOTIDINE 20 MG/1
TABLET, FILM COATED ORAL
Qty: 60 TABLET | Refills: 0 | OUTPATIENT
Start: 2023-03-27

## 2023-05-04 ENCOUNTER — HOSPITAL ENCOUNTER (OUTPATIENT)
Age: 58
Setting detail: SPECIMEN
Discharge: HOME | End: 2023-05-04

## 2023-05-04 LAB
BILIRUBIN URINE: NEGATIVE
COLOR: YELLOW
COMMENT UA: ABNORMAL
CREATININE URINE: 77.1 MG/DL (ref 39–259)
GLUCOSE UR STRIP.AUTO-MCNC: ABNORMAL MG/DL
KETONES UR STRIP.AUTO-MCNC: NEGATIVE MG/DL
LEUKOCYTE ESTERASE UR QL STRIP.AUTO: NEGATIVE
MICROALBUMIN/CREAT 24H UR: <12 MG/L
MICROALBUMIN/CREAT UR-RTO: NORMAL MCG/MG CREAT
NITRITE UR QL STRIP.AUTO: NEGATIVE
PROT UR STRIP.AUTO-MCNC: 6 MG/DL (ref 5–8)
PROT UR STRIP.AUTO-MCNC: NEGATIVE MG/DL
SPECIFIC GRAVITY UA: 1.04 (ref 1–1.03)
TURBIDITY: CLEAR
URINE HGB: NEGATIVE
UROBILINOGEN, URINE: NORMAL

## 2023-10-17 LAB
BUN BLDV-MCNC: 11 MG/DL
CALCIUM SERPL-MCNC: 8.3 MG/DL
CHLORIDE BLD-SCNC: 101 MMOL/L
CO2: 23 MMOL/L
CREAT SERPL-MCNC: 0.83 MG/DL
GFR AFRICAN AMERICAN: 115.1 ML/M1.7
GFR NON-AFRICAN AMERICAN: 95.2 ML/M1.7
GLUCOSE: 293 MG/DL
MAGNESIUM: 1.6 MG/DL
POTASSIUM SERPL-SCNC: 4.2 MMOL/L
SODIUM BLD-SCNC: 136 MMOL/L

## 2024-02-08 ENCOUNTER — HOSPITAL ENCOUNTER (OUTPATIENT)
Age: 59
Setting detail: SPECIMEN
Discharge: HOME | End: 2024-02-08

## 2024-02-08 LAB
CHOLEST SERPL-MCNC: 102 MG/DL
CHOLESTEROL/HDL RATIO: 3.8
CREAT UR-MCNC: 138.4 MG/DL (ref 39–259)
EST. AVERAGE GLUCOSE BLD GHB EST-MCNC: 166 MG/DL
HBA1C MFR BLD: 7.4 % (ref 4–6)
HDLC SERPL-MCNC: 27 MG/DL
LDLC SERPL CALC-MCNC: 32 MG/DL (ref 0–130)
MICROALBUMIN UR-MCNC: 25 MG/L
MICROALBUMIN/CREAT UR-RTO: 18 MCG/MG CREAT
TRIGL SERPL-MCNC: 214 MG/DL
TSH SERPL DL<=0.05 MIU/L-ACNC: 3.93 UIU/ML (ref 0.3–5)

## 2024-02-09 LAB
MICROORGANISM SPEC CULT: ABNORMAL
SPECIMEN DESCRIPTION: ABNORMAL

## 2024-09-04 ENCOUNTER — APPOINTMENT (OUTPATIENT)
Dept: CT IMAGING | Age: 59
End: 2024-09-04
Payer: COMMERCIAL

## 2024-09-04 ENCOUNTER — HOSPITAL ENCOUNTER (EMERGENCY)
Age: 59
Discharge: HOME | End: 2024-09-05
Attending: EMERGENCY MEDICINE
Payer: COMMERCIAL

## 2024-09-04 VITALS
TEMPERATURE: 98.5 F | HEART RATE: 75 BPM | SYSTOLIC BLOOD PRESSURE: 158 MMHG | DIASTOLIC BLOOD PRESSURE: 75 MMHG | WEIGHT: 270 LBS | RESPIRATION RATE: 17 BRPM | OXYGEN SATURATION: 95 % | BODY MASS INDEX: 35.62 KG/M2

## 2024-09-04 DIAGNOSIS — N45.1 ACUTE EPIDIDYMITIS: ICD-10-CM

## 2024-09-04 DIAGNOSIS — N30.00 ACUTE CYSTITIS WITHOUT HEMATURIA: Primary | ICD-10-CM

## 2024-09-04 PROCEDURE — 99284 EMERGENCY DEPT VISIT MOD MDM: CPT

## 2024-09-04 PROCEDURE — 87086 URINE CULTURE/COLONY COUNT: CPT

## 2024-09-04 PROCEDURE — 87088 URINE BACTERIA CULTURE: CPT

## 2024-09-04 PROCEDURE — 87186 SC STD MICRODIL/AGAR DIL: CPT

## 2024-09-04 PROCEDURE — 74176 CT ABD & PELVIS W/O CONTRAST: CPT

## 2024-09-04 PROCEDURE — 81001 URINALYSIS AUTO W/SCOPE: CPT

## 2024-09-05 LAB
BACTERIA URNS QL MICRO: ABNORMAL
BILIRUB UR QL STRIP: NEGATIVE
CLARITY UR: CLEAR
COLOR UR: YELLOW
EPI CELLS #/AREA URNS HPF: ABNORMAL /HPF (ref 0–5)
GLUCOSE UR STRIP-MCNC: ABNORMAL MG/DL
HGB UR QL STRIP.AUTO: ABNORMAL
KETONES UR STRIP-MCNC: ABNORMAL MG/DL
LEUKOCYTE ESTERASE UR QL STRIP: ABNORMAL
NITRITE UR QL STRIP: NEGATIVE
PH UR STRIP: 6 [PH] (ref 5–8)
PROT UR STRIP-MCNC: ABNORMAL MG/DL
RBC #/AREA URNS HPF: ABNORMAL /HPF (ref 0–2)
SP GR UR STRIP: 1.04 (ref 1–1.03)
UROBILINOGEN UR STRIP-ACNC: NORMAL EU/DL (ref 0–1)
WBC #/AREA URNS HPF: ABNORMAL /HPF (ref 0–5)

## 2024-09-05 PROCEDURE — 6370000000 HC RX 637 (ALT 250 FOR IP): Performed by: EMERGENCY MEDICINE

## 2024-09-05 RX ORDER — TRAMADOL HYDROCHLORIDE 50 MG/1
50 TABLET ORAL EVERY 4 HOURS PRN
Qty: 8 TABLET | Refills: 0 | Status: SHIPPED | OUTPATIENT
Start: 2024-09-05 | End: 2024-09-08

## 2024-09-05 RX ORDER — TRAMADOL HYDROCHLORIDE 50 MG/1
50 TABLET ORAL ONCE
Status: COMPLETED | OUTPATIENT
Start: 2024-09-05 | End: 2024-09-05

## 2024-09-05 RX ORDER — SULFAMETHOXAZOLE/TRIMETHOPRIM 800-160 MG
1 TABLET ORAL 2 TIMES DAILY
Qty: 20 TABLET | Refills: 0 | Status: SHIPPED | OUTPATIENT
Start: 2024-09-05 | End: 2024-09-15

## 2024-09-05 RX ORDER — SULFAMETHOXAZOLE/TRIMETHOPRIM 800-160 MG
1 TABLET ORAL ONCE
Status: COMPLETED | OUTPATIENT
Start: 2024-09-05 | End: 2024-09-05

## 2024-09-05 RX ADMIN — TRAMADOL HYDROCHLORIDE 50 MG: 50 TABLET ORAL at 00:54

## 2024-09-05 RX ADMIN — SULFAMETHOXAZOLE AND TRIMETHOPRIM 1 TABLET: 800; 160 TABLET ORAL at 00:55

## 2024-09-05 NOTE — ED PROVIDER NOTES
EMERGENCY DEPARTMENT ENCOUNTER    Pt Name: Jim Ham  MRN: [id number]  Birthdate 1965  Date of evaluation: 9/5/24  CHIEF COMPLAINT       Chief Complaint   Patient presents with    testicle pain     Px arrives complaining of R testicle pain that has been present for 3 days. Px states it is slightly bigger then other testicle     HISTORY OF PRESENT ILLNESS   59-year-old male presents emergency room for intermittent testicular pain of the right testicle.  Pain has been bothering him for 3 days.  When he touches it a certain way or if he sits down he notices the pain.  He has had issues with urinary infections before due to prostate issues.             REVIEW OF SYSTEMS     Review of Systems   Genitourinary:  Positive for testicular pain.     PASTMEDICAL HISTORY     Past Medical History:   Diagnosis Date    ADHD, predominantly hyperactive type     Anemia     Bowel obstruction ([facility])     CAD (coronary artery disease)     Cardiomyopathy ([facility])     Chronic diastolic CHF (congestive heart failure) ([facility])     Diabetes mellitus ([facility])     Diverticulitis 1996    GERD (gastroesophageal reflux disease)     History of atrial fibrillation     history of Blood clot due to device, implant, or graft     IV site L forearm    History of ventricular tachycardia     Kidney calculi     Obesity     Sleep apnea     uses CPAP nightly     Past Problem List  Patient Active Problem List   Diagnosis Code    Intestinal obstruction ([facility]) K56.609    Splenomegaly R16.1    Incisional hernia K43.2    Hypomagnesemia E83.42    VPB (ventricular premature beat) I49.3    Ventricular ectopy I49.3    AICD (automatic cardioverter/defibrillator) present Z95.810    Diabetes mellitus type 2 in obese E11.69, E66.9    Postoperative ileus ([facility]) K91.89, K56.7    Hyperuricemia E79.0    Anemia D64.9    Type 2 diabetes mellitus, with long-term current use of insulin ([facility]) E11.9, Z79.4    Gross hematuria R31.0    Bladder stone N21.0    Hematospermia R36.1    Nausea

## 2024-09-05 NOTE — ED NOTES
Pt presents to ED via private auto with c/o right testicle pain, onset three days ago. No swelling noted to right testicle. Pt denies pain with urination. Pt states he had an ultra sound of his kidneys last week. Pt afebrile, vitals stable. Pt able to ambulate without assist.

## 2024-09-05 NOTE — DISCHARGE INSTRUCTIONS
Take antibiotics as prescribed.  Make sure to follow-up with your urologist.  If symptoms significantly worsen or change you may return to the emergency room at any time.

## 2024-09-07 LAB
MICROORGANISM SPEC CULT: ABNORMAL
SPECIMEN DESCRIPTION: ABNORMAL

## 2025-06-10 ENCOUNTER — HOSPITAL ENCOUNTER (EMERGENCY)
Age: 60
Discharge: HOME | End: 2025-06-11
Attending: STUDENT IN AN ORGANIZED HEALTH CARE EDUCATION/TRAINING PROGRAM
Payer: COMMERCIAL

## 2025-06-10 VITALS
BODY MASS INDEX: 35.15 KG/M2 | HEIGHT: 73 IN | DIASTOLIC BLOOD PRESSURE: 68 MMHG | WEIGHT: 265.2 LBS | TEMPERATURE: 97.9 F | SYSTOLIC BLOOD PRESSURE: 170 MMHG | OXYGEN SATURATION: 96 % | RESPIRATION RATE: 16 BRPM | HEART RATE: 84 BPM

## 2025-06-10 DIAGNOSIS — M25.571 ACUTE RIGHT ANKLE PAIN: Primary | ICD-10-CM

## 2025-06-10 PROCEDURE — 99284 EMERGENCY DEPT VISIT MOD MDM: CPT

## 2025-06-10 ASSESSMENT — LIFESTYLE VARIABLES
HOW MANY STANDARD DRINKS CONTAINING ALCOHOL DO YOU HAVE ON A TYPICAL DAY: 1 OR 2
HOW OFTEN DO YOU HAVE A DRINK CONTAINING ALCOHOL: MONTHLY OR LESS

## 2025-06-11 ENCOUNTER — APPOINTMENT (OUTPATIENT)
Dept: GENERAL RADIOLOGY | Age: 60
End: 2025-06-11
Payer: COMMERCIAL

## 2025-06-11 PROCEDURE — 73610 X-RAY EXAM OF ANKLE: CPT

## 2025-06-11 PROCEDURE — 96372 THER/PROPH/DIAG INJ SC/IM: CPT

## 2025-06-11 PROCEDURE — 6360000002 HC RX W HCPCS: Performed by: STUDENT IN AN ORGANIZED HEALTH CARE EDUCATION/TRAINING PROGRAM

## 2025-06-11 PROCEDURE — 6370000000 HC RX 637 (ALT 250 FOR IP): Performed by: STUDENT IN AN ORGANIZED HEALTH CARE EDUCATION/TRAINING PROGRAM

## 2025-06-11 RX ORDER — KETOROLAC TROMETHAMINE 30 MG/ML
30 INJECTION, SOLUTION INTRAMUSCULAR; INTRAVENOUS ONCE
Status: COMPLETED | OUTPATIENT
Start: 2025-06-11 | End: 2025-06-11

## 2025-06-11 RX ORDER — HYDROCODONE BITARTRATE AND ACETAMINOPHEN 5; 325 MG/1; MG/1
1 TABLET ORAL ONCE
Status: COMPLETED | OUTPATIENT
Start: 2025-06-11 | End: 2025-06-11

## 2025-06-11 RX ORDER — COLCHICINE 0.6 MG/1
0.6 TABLET ORAL DAILY
Qty: 7 TABLET | Refills: 0 | Status: SHIPPED | OUTPATIENT
Start: 2025-06-11

## 2025-06-11 RX ORDER — COLCHICINE 0.6 MG/1
1.2 TABLET ORAL ONCE
Status: COMPLETED | OUTPATIENT
Start: 2025-06-11 | End: 2025-06-11

## 2025-06-11 RX ADMIN — HYDROCODONE BITARTRATE AND ACETAMINOPHEN 1 TABLET: 5; 325 TABLET ORAL at 01:09

## 2025-06-11 RX ADMIN — COLCHICINE 1.2 MG: 0.6 TABLET ORAL at 01:27

## 2025-06-11 RX ADMIN — KETOROLAC TROMETHAMINE 30 MG: 30 INJECTION, SOLUTION INTRAMUSCULAR at 01:08

## 2025-06-11 ASSESSMENT — PAIN SCALES - GENERAL: PAINLEVEL_OUTOF10: 10

## 2025-06-11 ASSESSMENT — PAIN DESCRIPTION - DESCRIPTORS: DESCRIPTORS: THROBBING

## 2025-06-11 ASSESSMENT — PAIN DESCRIPTION - ORIENTATION: ORIENTATION: RIGHT

## 2025-06-11 ASSESSMENT — PAIN DESCRIPTION - LOCATION: LOCATION: ANKLE

## 2025-06-11 NOTE — ED NOTES
Pt arrived to ED with c/o ankle pain. Pt states he was at work tonight when he had sudden onset of pain in his right ankle. Pt states he takes uric acid reducer. Pt states he had this pain 2 weeks ago and it went away but tonight he can hardly walk. Pt is A&Ox4. Daughter at bedside.

## 2025-06-11 NOTE — ED PROVIDER NOTES
[facility] EMERGENCY DEPARTMENT ENCOUNTER      Pt Name: Jim Ham  MRN: [id number]  Birthdate 1965  Date of evaluation: 6/11/25    CHIEF COMPLAINT       Chief Complaint   Patient presents with    Ankle Pain     Right ankle pressure and pain, no known injury, was sitting in a chair at work when it happened        HISTORY OF PRESENT ILLNESS   Jim Ham is a 60 y.o. male who presents with right ankle pain.  Denies any known injury.  He does have history of gout.  States this feels typical for previous gout attack.    PASTMEDICAL HISTORY     Past Medical History:   Diagnosis Date    ADHD, predominantly hyperactive type     Anemia     Bowel obstruction (HCC)     CAD (coronary artery disease)     Cardiomyopathy (HCC)     Chronic diastolic CHF (congestive heart failure) (HCC)     Diabetes mellitus (HCC)     Diverticulitis 1996    GERD (gastroesophageal reflux disease)     History of atrial fibrillation     history of Blood clot due to device, implant, or graft     IV site L forearm    History of ventricular tachycardia     Kidney calculi     Obesity     Sleep apnea     uses CPAP nightly     Past Problem List  Patient Active Problem List   Diagnosis Code    Intestinal obstruction ([facility]) K56.609    Splenomegaly R16.1    Incisional hernia K43.2    Hypomagnesemia E83.42    VPB (ventricular premature beat) I49.3    Ventricular ectopy I49.3    AICD (automatic cardioverter/defibrillator) present Z95.810    Type 2 diabetes mellitus with obesity (HCC) E11.69, E66.9    Postoperative ileus ([facility]) K91.89, K56.7    Hyperuricemia E79.0    Anemia D64.9    Type 2 diabetes mellitus, with long-term current use of insulin ([facility]) E11.9, Z79.4    Gross hematuria R31.0    Bladder stone N21.0    Hematospermia R36.1    Nausea and vomiting R11.2    Adynamic ileus ([facility]) K56.0    Small bowel obstruction due to adhesions K56.50    Nonischemic cardiomyopathy ([facility]) I42.8    Essential hypertension I10    Obesity (BMI 30-39.9) E66.9